# Patient Record
Sex: MALE | Race: OTHER
[De-identification: names, ages, dates, MRNs, and addresses within clinical notes are randomized per-mention and may not be internally consistent; named-entity substitution may affect disease eponyms.]

---

## 2020-08-20 ENCOUNTER — HOSPITAL ENCOUNTER (INPATIENT)
Dept: HOSPITAL 61 - ER | Age: 65
LOS: 20 days | DRG: 870 | End: 2020-09-09
Attending: INTERNAL MEDICINE | Admitting: INTERNAL MEDICINE
Payer: SELF-PAY

## 2020-08-20 VITALS — SYSTOLIC BLOOD PRESSURE: 100 MMHG | DIASTOLIC BLOOD PRESSURE: 69 MMHG

## 2020-08-20 VITALS — SYSTOLIC BLOOD PRESSURE: 97 MMHG | DIASTOLIC BLOOD PRESSURE: 65 MMHG

## 2020-08-20 VITALS — SYSTOLIC BLOOD PRESSURE: 100 MMHG | DIASTOLIC BLOOD PRESSURE: 67 MMHG

## 2020-08-20 VITALS — SYSTOLIC BLOOD PRESSURE: 63 MMHG | DIASTOLIC BLOOD PRESSURE: 42 MMHG

## 2020-08-20 VITALS — SYSTOLIC BLOOD PRESSURE: 57 MMHG | DIASTOLIC BLOOD PRESSURE: 44 MMHG

## 2020-08-20 VITALS — DIASTOLIC BLOOD PRESSURE: 42 MMHG | SYSTOLIC BLOOD PRESSURE: 58 MMHG

## 2020-08-20 VITALS — SYSTOLIC BLOOD PRESSURE: 105 MMHG | DIASTOLIC BLOOD PRESSURE: 68 MMHG

## 2020-08-20 VITALS — DIASTOLIC BLOOD PRESSURE: 41 MMHG | SYSTOLIC BLOOD PRESSURE: 59 MMHG

## 2020-08-20 VITALS — DIASTOLIC BLOOD PRESSURE: 79 MMHG | SYSTOLIC BLOOD PRESSURE: 126 MMHG

## 2020-08-20 VITALS — DIASTOLIC BLOOD PRESSURE: 65 MMHG | SYSTOLIC BLOOD PRESSURE: 100 MMHG

## 2020-08-20 VITALS — SYSTOLIC BLOOD PRESSURE: 90 MMHG | DIASTOLIC BLOOD PRESSURE: 63 MMHG

## 2020-08-20 VITALS — HEIGHT: 62 IN | WEIGHT: 220.02 LBS | BODY MASS INDEX: 40.49 KG/M2

## 2020-08-20 VITALS — DIASTOLIC BLOOD PRESSURE: 39 MMHG | SYSTOLIC BLOOD PRESSURE: 57 MMHG

## 2020-08-20 DIAGNOSIS — E87.1: ICD-10-CM

## 2020-08-20 DIAGNOSIS — Z66: ICD-10-CM

## 2020-08-20 DIAGNOSIS — E87.0: ICD-10-CM

## 2020-08-20 DIAGNOSIS — R73.9: ICD-10-CM

## 2020-08-20 DIAGNOSIS — J96.01: ICD-10-CM

## 2020-08-20 DIAGNOSIS — I10: ICD-10-CM

## 2020-08-20 DIAGNOSIS — J12.89: ICD-10-CM

## 2020-08-20 DIAGNOSIS — R74.0: ICD-10-CM

## 2020-08-20 DIAGNOSIS — A41.89: Primary | ICD-10-CM

## 2020-08-20 DIAGNOSIS — N17.0: ICD-10-CM

## 2020-08-20 DIAGNOSIS — Z79.899: ICD-10-CM

## 2020-08-20 DIAGNOSIS — Z78.9: ICD-10-CM

## 2020-08-20 DIAGNOSIS — E86.1: ICD-10-CM

## 2020-08-20 DIAGNOSIS — U07.1: ICD-10-CM

## 2020-08-20 DIAGNOSIS — E43: ICD-10-CM

## 2020-08-20 LAB
% ATYL: 2 % (ref 0–0)
% BANDS: 18 % (ref 0–9)
% LYMPHS: 7 % (ref 24–48)
% METAS: 3 % (ref 0–0)
% MONOS: 4 % (ref 0–10)
% SEGS: 63 % (ref 35–66)
ALBUMIN SERPL-MCNC: 2.6 G/DL (ref 3.4–5)
ALBUMIN/GLOB SERPL: 0.5 {RATIO} (ref 1–1.7)
ALP SERPL-CCNC: 141 U/L (ref 46–116)
ALT SERPL-CCNC: 63 U/L (ref 16–63)
AMORPH SED URNS QL MICRO: PRESENT /HPF
ANION GAP SERPL CALC-SCNC: 17 MMOL/L (ref 6–14)
APTT PPP: (no result) S
AST SERPL-CCNC: 65 U/L (ref 15–37)
BACTERIA #/AREA URNS HPF: 0 /HPF
BASE EXCESS ABG: -6 MMOL/L (ref -3–3)
BASOPHILS # BLD AUTO: 0 X10^3/UL (ref 0–0.2)
BASOPHILS NFR BLD AUTO: 1 % (ref 0–3)
BASOPHILS NFR BLD: 0 % (ref 0–3)
BILIRUB SERPL-MCNC: 1.4 MG/DL (ref 0.2–1)
BILIRUB UR QL STRIP: (no result)
BUN SERPL-MCNC: 25 MG/DL (ref 8–26)
BUN/CREAT SERPL: 16 (ref 6–20)
CALCIUM SERPL-MCNC: 8.4 MG/DL (ref 8.5–10.1)
CHLORIDE SERPL-SCNC: 90 MMOL/L (ref 98–107)
CK SERPL-CCNC: 120 U/L (ref 39–308)
CO2 SERPL-SCNC: 22 MMOL/L (ref 21–32)
CREAT SERPL-MCNC: 1.6 MG/DL (ref 0.7–1.3)
CRP SERPL-MCNC: 342.9 MG/L (ref 0–3.3)
EOSINOPHIL NFR BLD AUTO: 2 % (ref 0–5)
EOSINOPHIL NFR BLD: 0 % (ref 0–3)
EOSINOPHIL NFR BLD: 0 X10^3/UL (ref 0–0.7)
ERYTHROCYTE [DISTWIDTH] IN BLOOD BY AUTOMATED COUNT: 13.1 % (ref 11.5–14.5)
FIBRINOGEN PPP-MCNC: (no result) MG/DL
GFR SERPLBLD BASED ON 1.73 SQ M-ARVRAT: 45.1 ML/MIN
GLOBULIN SER-MCNC: 5.3 G/DL (ref 2.2–3.8)
GLUCOSE SERPL-MCNC: 301 MG/DL (ref 70–99)
GRAN CASTS #/AREA URNS LPF: (no result) /HPF
HCO3 BLDA-SCNC: 18 MMOL/L (ref 21–28)
HCT VFR BLD CALC: 38.5 % (ref 39–53)
HGB BLD-MCNC: 13.3 G/DL (ref 13–17.5)
HYALINE CASTS #/AREA URNS LPF: (no result) /HPF
INSPIRATION SETTING TIME VENT: 95
LIPASE: 109 U/L (ref 73–393)
LYMPHOCYTES # BLD: 1.1 X10^3/UL (ref 1–4.8)
LYMPHOCYTES NFR BLD AUTO: 8 % (ref 24–48)
MAGNESIUM SERPL-MCNC: 2.4 MG/DL (ref 1.8–2.4)
MCH RBC QN AUTO: 31 PG (ref 25–35)
MCHC RBC AUTO-ENTMCNC: 34 G/DL (ref 31–37)
MCV RBC AUTO: 89 FL (ref 79–100)
MONO #: 0.8 X10^3/UL (ref 0–1.1)
MONOCYTES NFR BLD: 5 % (ref 0–9)
NEUT #: 12.5 X10^3/UL (ref 1.8–7.7)
NEUTROPHILS NFR BLD AUTO: 87 % (ref 31–73)
NITRITE UR QL STRIP: NEGATIVE
PCO2 BLDA: 31 MMHG (ref 35–46)
PH UR STRIP: 5 [PH]
PLATELET # BLD AUTO: 294 X10^3/UL (ref 140–400)
PLATELET # BLD EST: ADEQUATE 10*3/UL
PO2 BLDA: 82 MMHG (ref 75–108)
POLYCHROMASIA BLD QL SMEAR: SLIGHT
POTASSIUM SERPL-SCNC: 3.6 MMOL/L (ref 3.5–5.1)
PROT SERPL-MCNC: 7.9 G/DL (ref 6.4–8.2)
PROT UR STRIP-MCNC: 30 MG/DL
RBC # BLD AUTO: 4.33 X10^6/UL (ref 4.3–5.7)
RBC #/AREA URNS HPF: (no result) /HPF (ref 0–2)
SAO2 % BLDA: 95 % (ref 92–99)
SODIUM SERPL-SCNC: 129 MMOL/L (ref 136–145)
SQUAMOUS #/AREA URNS LPF: (no result) /LPF
UROBILINOGEN UR-MCNC: 2 MG/DL
WBC # BLD AUTO: 14.4 X10^3/UL (ref 4–11)
WBC #/AREA URNS HPF: (no result) /HPF (ref 0–4)

## 2020-08-20 PROCEDURE — 94640 AIRWAY INHALATION TREATMENT: CPT

## 2020-08-20 PROCEDURE — 84100 ASSAY OF PHOSPHORUS: CPT

## 2020-08-20 PROCEDURE — 71045 X-RAY EXAM CHEST 1 VIEW: CPT

## 2020-08-20 PROCEDURE — 82728 ASSAY OF FERRITIN: CPT

## 2020-08-20 PROCEDURE — 83735 ASSAY OF MAGNESIUM: CPT

## 2020-08-20 PROCEDURE — 80048 BASIC METABOLIC PNL TOTAL CA: CPT

## 2020-08-20 PROCEDURE — 86900 BLOOD TYPING SEROLOGIC ABO: CPT

## 2020-08-20 PROCEDURE — 85025 COMPLETE CBC W/AUTO DIFF WBC: CPT

## 2020-08-20 PROCEDURE — 86140 C-REACTIVE PROTEIN: CPT

## 2020-08-20 PROCEDURE — 83935 ASSAY OF URINE OSMOLALITY: CPT

## 2020-08-20 PROCEDURE — 96376 TX/PRO/DX INJ SAME DRUG ADON: CPT

## 2020-08-20 PROCEDURE — 51702 INSERT TEMP BLADDER CATH: CPT

## 2020-08-20 PROCEDURE — 94002 VENT MGMT INPAT INIT DAY: CPT

## 2020-08-20 PROCEDURE — 84145 PROCALCITONIN (PCT): CPT

## 2020-08-20 PROCEDURE — 93970 EXTREMITY STUDY: CPT

## 2020-08-20 PROCEDURE — 94003 VENT MGMT INPAT SUBQ DAY: CPT

## 2020-08-20 PROCEDURE — 83690 ASSAY OF LIPASE: CPT

## 2020-08-20 PROCEDURE — 93005 ELECTROCARDIOGRAM TRACING: CPT

## 2020-08-20 PROCEDURE — 36600 WITHDRAWAL OF ARTERIAL BLOOD: CPT

## 2020-08-20 PROCEDURE — 82805 BLOOD GASES W/O2 SATURATION: CPT

## 2020-08-20 PROCEDURE — 80053 COMPREHEN METABOLIC PANEL: CPT

## 2020-08-20 PROCEDURE — 31500 INSERT EMERGENCY AIRWAY: CPT

## 2020-08-20 PROCEDURE — 86850 RBC ANTIBODY SCREEN: CPT

## 2020-08-20 PROCEDURE — 5A1955Z RESPIRATORY VENTILATION, GREATER THAN 96 CONSECUTIVE HOURS: ICD-10-PCS | Performed by: INTERNAL MEDICINE

## 2020-08-20 PROCEDURE — 87103 BLOOD FUNGUS CULTURE: CPT

## 2020-08-20 PROCEDURE — 85007 BL SMEAR W/DIFF WBC COUNT: CPT

## 2020-08-20 PROCEDURE — 87040 BLOOD CULTURE FOR BACTERIA: CPT

## 2020-08-20 PROCEDURE — 84478 ASSAY OF TRIGLYCERIDES: CPT

## 2020-08-20 PROCEDURE — 36415 COLL VENOUS BLD VENIPUNCTURE: CPT

## 2020-08-20 PROCEDURE — 86927 PLASMA FRESH FROZEN: CPT

## 2020-08-20 PROCEDURE — 82550 ASSAY OF CK (CPK): CPT

## 2020-08-20 PROCEDURE — 85379 FIBRIN DEGRADATION QUANT: CPT

## 2020-08-20 PROCEDURE — 0BH17EZ INSERTION OF ENDOTRACHEAL AIRWAY INTO TRACHEA, VIA NATURAL OR ARTIFICIAL OPENING: ICD-10-PCS | Performed by: INTERNAL MEDICINE

## 2020-08-20 PROCEDURE — 87086 URINE CULTURE/COLONY COUNT: CPT

## 2020-08-20 PROCEDURE — P9017 PLASMA 1 DONOR FRZ W/IN 8 HR: HCPCS

## 2020-08-20 PROCEDURE — G0378 HOSPITAL OBSERVATION PER HR: HCPCS

## 2020-08-20 PROCEDURE — 81001 URINALYSIS AUTO W/SCOPE: CPT

## 2020-08-20 PROCEDURE — 96374 THER/PROPH/DIAG INJ IV PUSH: CPT

## 2020-08-20 PROCEDURE — 86901 BLOOD TYPING SEROLOGIC RH(D): CPT

## 2020-08-20 PROCEDURE — 83930 ASSAY OF BLOOD OSMOLALITY: CPT

## 2020-08-20 PROCEDURE — C9113 INJ PANTOPRAZOLE SODIUM, VIA: HCPCS

## 2020-08-20 PROCEDURE — 82962 GLUCOSE BLOOD TEST: CPT

## 2020-08-20 RX ADMIN — ENOXAPARIN SODIUM SCH MG: 40 INJECTION SUBCUTANEOUS at 22:13

## 2020-08-20 RX ADMIN — CHLORHEXIDINE GLUCONATE SCH ML: 1.2 RINSE ORAL at 22:13

## 2020-08-20 RX ADMIN — PROPOFOL ONE MLS/HR: 10 INJECTION, EMULSION INTRAVENOUS at 18:55

## 2020-08-20 RX ADMIN — MIDAZOLAM PRN MLS/HR: 5 INJECTION, SOLUTION INTRAMUSCULAR; INTRAVENOUS at 19:37

## 2020-08-20 RX ADMIN — VECURONIUM BROMIDE PRN MG: 1 INJECTION, POWDER, LYOPHILIZED, FOR SOLUTION INTRAVENOUS at 22:03

## 2020-08-20 RX ADMIN — METHYLPREDNISOLONE SODIUM SUCCINATE SCH MG: 40 INJECTION, POWDER, FOR SOLUTION INTRAMUSCULAR; INTRAVENOUS at 22:14

## 2020-08-20 NOTE — RAD
AP portable chest  radiograph 8/20/2020

 

Clinical History: Respiratory failure.

 

An AP erect portable digital radiograph of the chest was obtained.

 

No previous studies available for comparison. An ET tube has been placed. 

The tip of this tube overlies the trachea 3 cm below the level of 

clavicles. An NG tube been placed. The tip of this tube extends to overlie

the antrum of the stomach. The cardiac silhouette is mildly enlarged. The 

thoracic aorta is mildly tortuous. Bilateral perihilar infiltrates are 

seen which are most confluent within the lower lobes. No pneumothorax or 

pleural effusion is noted the osseous structures are grossly intact.

 

IMPRESSION:

1. ET and NG tube position as discussed above.

2. Bilateral perihilar infiltrates.

 

Electronically signed by: Shar Qureshi MD (8/20/2020 9:14 PM) [id number]

## 2020-08-20 NOTE — NUR
Patient admitted to room 116 at 2100. Patient transferred to ICU bed. RT at bedside placed 
patient on ventilator. ICU monitors placed. Patient not tolerating ventilator well, 
coughing, high peak pressures, raised arms- O2 saturation in 80s. Patient received bolus of 
versed. Patient became more relaxed, SBP then dropped to the 50s. Another ICU RN called Dr. Morgan on behalf of this writer. Orders received for levophed, vec, NS bolus, and Vec PRN. 
Patient responded well to levophed. This RN called patient's brother Keon- update given. 
Emphasized that patient is critically ill. Family states patient does not have known medical 
history and is not currently taking any medications. Patient did recently test positive for 
covid a couple days ago. Admission completed using this information to the best of this RN's 
ability.



ER reassessments not completed documented "not done" by this RN

## 2020-08-20 NOTE — NUR
Spoke with Dr. Morgan regarding patient's status. Orders received to keep patient well 
sedated--may add Fentanyl and 6 mg Vec PRN. For hypotension, may give 1 L bolus of fluid and 
then reassess--if the bolus helps patient then we may give another 1 L bolus NS. May start 
Levophed for BP, get ABG in AM, and may keep PEEP at 12 if needed, but if patient's O2 sats 
improve then try turning down PEEP to 11 and see how he does. 



Reassessments of Versed and Fentanyl not completed by ED RN--documented as "not done" by 
this RN.

## 2020-08-20 NOTE — PHYS DOC
Past Medical History


Past Medical History:  No Pertinent History


Past Surgical History:  No Surgical History


Smoking Status:  Unknown if ever smoked


Alcohol Use:  None





General Adult


EDM:


Chief Complaint:  SHORTNESS OF BREATH





HPI:


HPI:


Patient is a 55-year-old male reportedly previously healthy who presents 

emergency room complaining of respiratory distress.  History is very limited due

to respiratory distress and patient being Polish-speaking only.  Patient rep

ortedly tested positive for COVID on Monday





Review of Systems:


Review of Systems:


Unable to obtain





Heart Score:


Risk Factors:


Risk Factors:  DM, Current or recent (<one month) smoker, HTN, HLP, family 

history of CAD, obesity.


Risk Scores:


Score 0 - 3:  2.5% MACE over next 6 weeks - Discharge Home


Score 4 - 6:  20.3% MACE over next 6 weeks - Admit for Clinical Observation


Score 7 - 10:  72.7% MACE over next 6 weeks - Early Invasive Strategies





Current Medications:





Current Medications








 Medications


  (Trade)  Dose


 Ordered  Sig/Breanne  Start Time


 Stop Time Status Last Admin


Dose Admin


 


 Etomidate


  (Amidate)  20 mg  STK-MED ONCE  8/20/20 18:54


 8/20/20 18:54 DC  





 


 Propofol  50 ml @ As


 Directed  STK-MED ONCE  8/20/20 18:55


 8/20/20 18:56 DC  





 


 Succinylcholine


 Chloride


  (Anectine)  200 mg  STK-MED ONCE  8/20/20 18:55


 8/20/20 18:55 DC  














Allergies:


Allergies:





Allergies








Coded Allergies Type Severity Reaction Last Updated Verified


 


  No Known Drug Allergies    8/20/20 No











Physical Exam:


PE:


General: Awake, alert, moderate distress D. Well Nourished, well hydrated. 

Cooperative


HEENT: Atraumatic, EOMI, PERRL, airway patent, moist oral mucosa


Neck: Supple, trachea midline


Respiratory: Tachypnea, diffuse crackles, decreased breath sounds, respiratory 

distress, increased work of breathing


CV: Tachycardic, no murmur, cap refill <2


GI: Soft, nondistended, nontender, no masses


MSK: No obvious deformities


Skin: Warm, dry, intact


Neuro: A&O x3, speech NL, sensory and motor grossly intact, no focal deficits


Psych: Normal affect, normal mood, not suicidal or homicidal





Current Patient Data:


Vital Signs:





                                   Vital Signs








  Date Time  Temp Pulse Resp B/P (MAP) Pulse Ox O2 Delivery O2 Flow Rate FiO2


 


8/20/20 18:30 97.9 110 45 199/95 (129) 100 NonRebreather Mask 18.0 





 97.9       











EKG:


EKG:


[]





Radiology/Procedures:


Radiology/Procedures:


[]





Course & Med Decision Making:


Course & Med Decision Making


Pertinent Labs and Imaging studies reviewed. (See chart for details)





Patient is a 55-year-old male who presents to the emergency room with 

respiratory distress.  At this time there is concern for the novel coronavirus 

19.  Patient's risk factors include age, race.  Risk stratifying work-up was 

ordered including chest x-ray, d-dimer, CPK, CRP, LDH, troponin, ferritin, CBC, 

CMP. At this time, patients labs, vitals, and exam are significant for elevated 

inflammatory markers.  Upon arrival to the emergency room patient is in respirat

ory distress requiring 25 L on a nonrebreather.  Patient is in significant 

respiratory distress and is hypoxic.  He was intubated without difficulty.  

There was some difficulty with keeping him sedated.  Adjustments were made to 

the ventilator to help with oxygenation.  Chest x-ray and ABG are suggestive of 

severe ARDs. due to patient's risk and clinical picture, they will need to be 

admitted at this time. IVFs will be limited due to concern for fluid overload in

 COVID-19 patients. Patient will be given empiric antibiotics due to infiltrates

 and risk of co-bacterial infection. Further treatment will be dictated by the 

inpatient team.





Dragon Disclaimer:


Dragon Disclaimer:


This electronic medical record was generated, in whole or in part, using a voice

 recognition dictation system.





Justicifation of Admission Dx:


Justifications for Admission:


Justification of Admission Dx:  Yes





Critical Care Time


Critical Care:


Authorized and Performed by: Klever Ruiz MD


Total critical care time: approximately 40 minutes


Due to a high probability of clinically significant, life threatening 

deterioration, the patient required my highest level of preparedness to 

intervene emergently and I personally spent this critical care time directly and

 personally managing the patient. This critical care time included obtaining a 

history; examining the patient; pulse oximetry; ventilator management if 

necessary; ordering and review of studies; arranging urgent treatment with 

development of a management plan; evaluation of patient's response to treatment;

 frequent reassessment; discussion with patient/family; and, discussions with 

other providers.


This critical care time was performed to assess and manage the high probability 

of imminent, life-threatening deterioration that could result in multi-organ 

failure. It was exclusive of separately billable procedures and treating other 

patients and teaching time.


Please see MDM section and the rest of the note for further information on 

patient assessment and treatment.





PROCEDURE


Procedure


Intubation Performed by: Klever Ruiz MD


 Consent: Verbal consent not obtained. The procedure was performed in an 

emergent situation. 


Required items: required blood products, implants, devices, and special 

equipment available 


Patient identity confirmed: arm band 


Time out: Immediately prior to procedure a "time out" was called to verify the 

correct patient, procedure, equipment, support staff and site/side marked as 

required. 


Indications: respiratory failure and airway protection 


Intubation method:  glidescope


Patient status: paralyzed (RSI) 


Preoxygenation: NRB


Sedatives: etomidate


 Paralytic: succinylcoline 


Laryngoscope size: Mac 4 


Tube size: 7.5 mm 


Tube type: cuffed 


Number of attempts: 1 


Cords visualized: yes 


Post-procedure assessment: chest rise, BS = bilaterally none over epigastrum, 

+CO2 detector 


Breath sounds: equal and absent over the epigastrium 


Cuff inflated: yes 


Tube secured with: adhesive tape 


Chest x-ray interpreted by me. Chest x-ray findings: endotracheal tube in 

appropriate position 


Patient tolerance: Patient tolerated the procedure well with no immediate 

complications.











KLEVER RUIZ MD              Aug 20, 2020 19:01

## 2020-08-21 VITALS — SYSTOLIC BLOOD PRESSURE: 117 MMHG | DIASTOLIC BLOOD PRESSURE: 83 MMHG

## 2020-08-21 VITALS — SYSTOLIC BLOOD PRESSURE: 97 MMHG | DIASTOLIC BLOOD PRESSURE: 65 MMHG

## 2020-08-21 VITALS — SYSTOLIC BLOOD PRESSURE: 108 MMHG | DIASTOLIC BLOOD PRESSURE: 64 MMHG

## 2020-08-21 VITALS — SYSTOLIC BLOOD PRESSURE: 76 MMHG | DIASTOLIC BLOOD PRESSURE: 57 MMHG

## 2020-08-21 VITALS — SYSTOLIC BLOOD PRESSURE: 112 MMHG | DIASTOLIC BLOOD PRESSURE: 69 MMHG

## 2020-08-21 VITALS — DIASTOLIC BLOOD PRESSURE: 69 MMHG | SYSTOLIC BLOOD PRESSURE: 112 MMHG

## 2020-08-21 VITALS — DIASTOLIC BLOOD PRESSURE: 85 MMHG | SYSTOLIC BLOOD PRESSURE: 126 MMHG

## 2020-08-21 VITALS — SYSTOLIC BLOOD PRESSURE: 98 MMHG | DIASTOLIC BLOOD PRESSURE: 65 MMHG

## 2020-08-21 VITALS — SYSTOLIC BLOOD PRESSURE: 114 MMHG | DIASTOLIC BLOOD PRESSURE: 68 MMHG

## 2020-08-21 VITALS — SYSTOLIC BLOOD PRESSURE: 105 MMHG | DIASTOLIC BLOOD PRESSURE: 70 MMHG

## 2020-08-21 VITALS — SYSTOLIC BLOOD PRESSURE: 121 MMHG | DIASTOLIC BLOOD PRESSURE: 82 MMHG

## 2020-08-21 VITALS — SYSTOLIC BLOOD PRESSURE: 91 MMHG | DIASTOLIC BLOOD PRESSURE: 59 MMHG

## 2020-08-21 VITALS — DIASTOLIC BLOOD PRESSURE: 66 MMHG | SYSTOLIC BLOOD PRESSURE: 103 MMHG

## 2020-08-21 VITALS — SYSTOLIC BLOOD PRESSURE: 104 MMHG | DIASTOLIC BLOOD PRESSURE: 71 MMHG

## 2020-08-21 VITALS — SYSTOLIC BLOOD PRESSURE: 86 MMHG | DIASTOLIC BLOOD PRESSURE: 59 MMHG

## 2020-08-21 VITALS — SYSTOLIC BLOOD PRESSURE: 95 MMHG | DIASTOLIC BLOOD PRESSURE: 62 MMHG

## 2020-08-21 VITALS — SYSTOLIC BLOOD PRESSURE: 107 MMHG | DIASTOLIC BLOOD PRESSURE: 69 MMHG

## 2020-08-21 VITALS — SYSTOLIC BLOOD PRESSURE: 119 MMHG | DIASTOLIC BLOOD PRESSURE: 78 MMHG

## 2020-08-21 VITALS — DIASTOLIC BLOOD PRESSURE: 61 MMHG | SYSTOLIC BLOOD PRESSURE: 87 MMHG

## 2020-08-21 VITALS — SYSTOLIC BLOOD PRESSURE: 101 MMHG | DIASTOLIC BLOOD PRESSURE: 67 MMHG

## 2020-08-21 VITALS — DIASTOLIC BLOOD PRESSURE: 60 MMHG | SYSTOLIC BLOOD PRESSURE: 101 MMHG

## 2020-08-21 VITALS — DIASTOLIC BLOOD PRESSURE: 74 MMHG | SYSTOLIC BLOOD PRESSURE: 106 MMHG

## 2020-08-21 VITALS — DIASTOLIC BLOOD PRESSURE: 68 MMHG | SYSTOLIC BLOOD PRESSURE: 114 MMHG

## 2020-08-21 VITALS — SYSTOLIC BLOOD PRESSURE: 106 MMHG | DIASTOLIC BLOOD PRESSURE: 69 MMHG

## 2020-08-21 VITALS — SYSTOLIC BLOOD PRESSURE: 89 MMHG | DIASTOLIC BLOOD PRESSURE: 61 MMHG

## 2020-08-21 VITALS — SYSTOLIC BLOOD PRESSURE: 167 MMHG | DIASTOLIC BLOOD PRESSURE: 101 MMHG

## 2020-08-21 VITALS — DIASTOLIC BLOOD PRESSURE: 65 MMHG | SYSTOLIC BLOOD PRESSURE: 92 MMHG

## 2020-08-21 VITALS — DIASTOLIC BLOOD PRESSURE: 85 MMHG | SYSTOLIC BLOOD PRESSURE: 133 MMHG

## 2020-08-21 VITALS — DIASTOLIC BLOOD PRESSURE: 71 MMHG | SYSTOLIC BLOOD PRESSURE: 119 MMHG

## 2020-08-21 VITALS — SYSTOLIC BLOOD PRESSURE: 122 MMHG | DIASTOLIC BLOOD PRESSURE: 78 MMHG

## 2020-08-21 VITALS — DIASTOLIC BLOOD PRESSURE: 82 MMHG | SYSTOLIC BLOOD PRESSURE: 119 MMHG

## 2020-08-21 VITALS — SYSTOLIC BLOOD PRESSURE: 110 MMHG | DIASTOLIC BLOOD PRESSURE: 66 MMHG

## 2020-08-21 VITALS — DIASTOLIC BLOOD PRESSURE: 63 MMHG | SYSTOLIC BLOOD PRESSURE: 105 MMHG

## 2020-08-21 VITALS — SYSTOLIC BLOOD PRESSURE: 142 MMHG | DIASTOLIC BLOOD PRESSURE: 90 MMHG

## 2020-08-21 VITALS — SYSTOLIC BLOOD PRESSURE: 103 MMHG | DIASTOLIC BLOOD PRESSURE: 61 MMHG

## 2020-08-21 LAB
ALBUMIN SERPL-MCNC: 2 G/DL (ref 3.4–5)
ALBUMIN/GLOB SERPL: 0.4 {RATIO} (ref 1–1.7)
ALP SERPL-CCNC: 103 U/L (ref 46–116)
ALT SERPL-CCNC: 55 U/L (ref 16–63)
ANION GAP SERPL CALC-SCNC: 10 MMOL/L (ref 6–14)
AST SERPL-CCNC: 57 U/L (ref 15–37)
BASE EXCESS ABG: -6 MMOL/L (ref -3–3)
BASOPHILS # BLD AUTO: 0 X10^3/UL (ref 0–0.2)
BASOPHILS NFR BLD: 0 % (ref 0–3)
BILIRUB SERPL-MCNC: 1.1 MG/DL (ref 0.2–1)
BUN SERPL-MCNC: 29 MG/DL (ref 8–26)
BUN/CREAT SERPL: 24 (ref 6–20)
CALCIUM SERPL-MCNC: 7.5 MG/DL (ref 8.5–10.1)
CHLORIDE SERPL-SCNC: 97 MMOL/L (ref 98–107)
CO2 SERPL-SCNC: 23 MMOL/L (ref 21–32)
CREAT SERPL-MCNC: 1.2 MG/DL (ref 0.7–1.3)
EOSINOPHIL NFR BLD: 0 % (ref 0–3)
EOSINOPHIL NFR BLD: 0 X10^3/UL (ref 0–0.7)
ERYTHROCYTE [DISTWIDTH] IN BLOOD BY AUTOMATED COUNT: 13.1 % (ref 11.5–14.5)
GFR SERPLBLD BASED ON 1.73 SQ M-ARVRAT: 62.9 ML/MIN
GLOBULIN SER-MCNC: 4.5 G/DL (ref 2.2–3.8)
GLUCOSE SERPL-MCNC: 420 MG/DL (ref 70–99)
HCO3 BLDA-SCNC: 19 MMOL/L (ref 21–28)
HCT VFR BLD CALC: 33 % (ref 39–53)
HGB BLD-MCNC: 11.4 G/DL (ref 13–17.5)
INSPIRATION SETTING TIME VENT: 100
LYMPHOCYTES # BLD: 0.5 X10^3/UL (ref 1–4.8)
LYMPHOCYTES NFR BLD AUTO: 5 % (ref 24–48)
MCH RBC QN AUTO: 31 PG (ref 25–35)
MCHC RBC AUTO-ENTMCNC: 35 G/DL (ref 31–37)
MCV RBC AUTO: 88 FL (ref 79–100)
MONO #: 0.4 X10^3/UL (ref 0–1.1)
MONOCYTES NFR BLD: 4 % (ref 0–9)
NEUT #: 9.4 X10^3/UL (ref 1.8–7.7)
NEUTROPHILS NFR BLD AUTO: 91 % (ref 31–73)
PCO2 BLDA: 36 MMHG (ref 35–46)
PLATELET # BLD AUTO: 212 X10^3/UL (ref 140–400)
PO2 BLDA: 72 MMHG (ref 75–108)
POTASSIUM SERPL-SCNC: 4.5 MMOL/L (ref 3.5–5.1)
PROT SERPL-MCNC: 6.5 G/DL (ref 6.4–8.2)
RBC # BLD AUTO: 3.73 X10^6/UL (ref 4.3–5.7)
SAO2 % BLDA: 92 % (ref 92–99)
SODIUM SERPL-SCNC: 130 MMOL/L (ref 136–145)
WBC # BLD AUTO: 10.4 X10^3/UL (ref 4–11)

## 2020-08-21 PROCEDURE — 02HV33Z INSERTION OF INFUSION DEVICE INTO SUPERIOR VENA CAVA, PERCUTANEOUS APPROACH: ICD-10-PCS | Performed by: INTERNAL MEDICINE

## 2020-08-21 PROCEDURE — B548ZZA ULTRASONOGRAPHY OF SUPERIOR VENA CAVA, GUIDANCE: ICD-10-PCS | Performed by: INTERNAL MEDICINE

## 2020-08-21 PROCEDURE — XW13325 TRANSFUSION OF CONVALESCENT PLASMA (NONAUTOLOGOUS) INTO PERIPHERAL VEIN, PERCUTANEOUS APPROACH, NEW TECHNOLOGY GROUP 5: ICD-10-PCS | Performed by: INTERNAL MEDICINE

## 2020-08-21 RX ADMIN — INSULIN GLARGINE SCH UNIT: 100 INJECTION, SOLUTION SUBCUTANEOUS at 20:54

## 2020-08-21 RX ADMIN — ZINC SULFATE CAP 220 MG (50 MG ELEMENTAL ZN) SCH MG: 220 (50 ZN) CAP at 08:15

## 2020-08-21 RX ADMIN — MIDAZOLAM PRN MLS/HR: 5 INJECTION, SOLUTION INTRAMUSCULAR; INTRAVENOUS at 05:37

## 2020-08-21 RX ADMIN — ENOXAPARIN SODIUM SCH MG: 40 INJECTION SUBCUTANEOUS at 08:15

## 2020-08-21 RX ADMIN — PIPERACILLIN SODIUM AND TAZOBACTAM SODIUM SCH MLS/HR: 3; .375 INJECTION, POWDER, LYOPHILIZED, FOR SOLUTION INTRAVENOUS at 11:31

## 2020-08-21 RX ADMIN — METHYLPREDNISOLONE SODIUM SUCCINATE SCH MG: 40 INJECTION, POWDER, FOR SOLUTION INTRAMUSCULAR; INTRAVENOUS at 13:35

## 2020-08-21 RX ADMIN — PIPERACILLIN SODIUM AND TAZOBACTAM SODIUM SCH MLS/HR: 3; .375 INJECTION, POWDER, LYOPHILIZED, FOR SOLUTION INTRAVENOUS at 05:37

## 2020-08-21 RX ADMIN — METHYLPREDNISOLONE SODIUM SUCCINATE SCH MG: 40 INJECTION, POWDER, FOR SOLUTION INTRAMUSCULAR; INTRAVENOUS at 05:36

## 2020-08-21 RX ADMIN — INSULIN LISPRO SCH UNITS: 100 INJECTION, SOLUTION INTRAVENOUS; SUBCUTANEOUS at 17:42

## 2020-08-21 RX ADMIN — INSULIN LISPRO SCH UNITS: 100 INJECTION, SOLUTION INTRAVENOUS; SUBCUTANEOUS at 11:41

## 2020-08-21 RX ADMIN — CHLORHEXIDINE GLUCONATE SCH ML: 1.2 RINSE ORAL at 08:16

## 2020-08-21 RX ADMIN — OXYCODONE HYDROCHLORIDE AND ACETAMINOPHEN SCH MG: 500 TABLET ORAL at 17:36

## 2020-08-21 RX ADMIN — MIDAZOLAM PRN MLS/HR: 5 INJECTION, SOLUTION INTRAMUSCULAR; INTRAVENOUS at 20:07

## 2020-08-21 RX ADMIN — METHYLPREDNISOLONE SODIUM SUCCINATE SCH MG: 40 INJECTION, POWDER, FOR SOLUTION INTRAMUSCULAR; INTRAVENOUS at 20:52

## 2020-08-21 RX ADMIN — PANTOPRAZOLE SODIUM SCH MG: 40 INJECTION, POWDER, FOR SOLUTION INTRAVENOUS at 08:11

## 2020-08-21 RX ADMIN — BACITRACIN SCH MLS/HR: 5000 INJECTION, POWDER, FOR SOLUTION INTRAMUSCULAR at 10:16

## 2020-08-21 RX ADMIN — PIPERACILLIN SODIUM AND TAZOBACTAM SODIUM SCH MLS/HR: 3; .375 INJECTION, POWDER, LYOPHILIZED, FOR SOLUTION INTRAVENOUS at 17:36

## 2020-08-21 RX ADMIN — OXYCODONE HYDROCHLORIDE AND ACETAMINOPHEN SCH MG: 500 TABLET ORAL at 11:31

## 2020-08-21 RX ADMIN — PIPERACILLIN SODIUM AND TAZOBACTAM SODIUM SCH MLS/HR: 3; .375 INJECTION, POWDER, LYOPHILIZED, FOR SOLUTION INTRAVENOUS at 00:51

## 2020-08-21 NOTE — RAD
EXAM: CHEST ONE VIEW.

 

HISTORY: Central line placement.

 

COMPARISON: 08/20/2020.

 

FINDINGS: A frontal view of the chest is obtained. A right internal 

jugular central venous catheter has its tip in the superior vena cava. A 

nasogastric tube has its tip below the inferior margin of the view. An 

endotracheal tube has its tip 3 cm above the ko.

 

Bilateral diffuse airspace opacities have improved but not completely 

resolved. There is no pneumothorax or clear pleural effusion. The heart is

not enlarged.

 

IMPRESSION: 

1. Improved bilateral infiltrates. Correlate for moderate residual 

pulmonary edema or multifocal pneumonia.

 

Electronically signed by: KRISTEL Hearn MD (8/21/2020 3:07 PM) 

[id number]

## 2020-08-21 NOTE — EKG
[facility]

              [address] [place] [address]

Test Date:    2020               Test Time:    18:40:03

Pat Name:     RENATO KIRK         Department:   

Patient ID:   [id number]           Room:         116 1

Gender:       M                        Technician:   

:          1965               Requested By: KLEVER MONTAÑO

Order Number: [id number]           Reading MD:     

                                 Measurements

Intervals                              Axis          

Rate:         115                      P:            -2

NY:           106                      QRS:          36

QRSD:         98                       T:            47

QT:           314                                    

QTc:          436                                    

                           Interpretive Statements

SINUS TACHYCARDIA

LEFT ATRIAL ABNORMALITY

ABNORMAL ECG

RI6.02

No previous ECG available for comparison

## 2020-08-21 NOTE — CONS
DATE OF CONSULTATION:  



PULMONARY CONSULTATION



ATTENDING PHYSICIAN:  Griffin Jacobs MD



REASON FOR CONSULTATION:  Respiratory failure, COVID-19 pneumonia.



HISTORY OF PRESENT ILLNESS:  The patient is a 55-year-old  male with a

BMI of 31.  He was brought into the hospital in respiratory distress.  The

patient was clinically suspected to have COVID-19 hypoxic respiratory failure as

he tested positive on Monday.  Due to his respiratory distress, he was

intubated.  The patient is currently in the ICU.  His ABGs has latest shown a pH

of 7.34, pCO2 of 36 and a pO2 of 72 on 100% FiO2 with bicarbonate of 19.  He is

currently on assist control mode and 10 of PEEP and 100% FiO2.  His chest x-ray

revealed bilateral perihilar infiltrates.  Consultation requested for further

evaluation and management.



PAST MEDICAL HISTORY:  Unable to obtain.



FAMILY HISTORY:  Unable to obtain.



ALLERGIES:  None.



SOCIAL HISTORY:  Unable to obtain.



MEDICATIONS:  Reviewed as listed in the MRAD including broad-spectrum

antibiotic, Zosyn.  He is on sedation, Lovenox for DVT prophylaxis.



REVIEW OF SYSTEMS:  Unable to obtain from the patient.



PHYSICAL EXAMINATION:

VITAL SIGNS:  Reviewed.  Blood pressure 97 systolic.  Pulse ox is 99% on current

FiO2.  T-max of 99.

GENERAL:  Visual exam done due to COVID-19 pandemia.  He is obese.  He is in no

respiratory distress.  No paradoxical breathing pattern.

EXTREMITIES:  With trace pitting edema.



LABORATORY DATA:  Reviewed.  Sodium 130, potassium 4.5, chloride 97, BUN 29 and

creatinine 1.2.  Bilirubin 1.1 from 1.4.  AST, ALT mildly elevated.  Albumin is

2.0.  D-dimer is 4.02.  White cell count 10.4, hemoglobin 11.4 and platelets are

212.



IMPRESSION:

1.  Acute hypoxic respiratory failure secondary to COVID-19 pneumonia/ARDS/acute

lung injury.

2.  Abnormal chest x-ray with bilateral infiltrates consistent with COVID-19

pneumonia.

3.  Hypotension secondary to combination of hypovolemia and sepsis status post 2

liters of IV fluids.

4.  Acute kidney injury, improving.

5.  Severe protein-calorie malnutrition.

6.  Abnormal D-dimer related to COVID-19 pneumonia.  Currently on DVT

prophylaxis.



RECOMMENDATIONS:

1.  Continue present assist control mode, PEEP of 8 and FiO2 of 100% and

gradually wean based on improvement clinically.

2.  Follow chest x-rays as needed.

3.  Broad spectrum antibiotic.

4.  IV steroids will be added.

5.  DVT prophylaxis with Lovenox high dose.

6.  The patient would require intubation for minimal 1 week.

7.  Stress ulcer prophylaxis.

8.  Discussed with RN and RT.



Critical care time 39 minutes.

 



______________________________

ANGELA HUFFMAN MD



DR:  YAZAN/latesha  JOB#:  [id number] / [id number]

DD:  08/21/2020 09:25  DT:  08/21/2020 09:47

## 2020-08-21 NOTE — NUR
SS following for discharge planning. SS reviewed pt chart and discussed with pt RN. Pt is 
from home and is currently on the vent. Pt self pay. Per RN, pt tested positive for COVID19 
on Monday. COVID19 retest ordered. Plasma being ordered. Pt on IV Zosyn. SS will continue to 
follow for discharge planning.

## 2020-08-21 NOTE — PDOC1
History and Physical


Date of Admission


Date of Admission


Respiratory failure





Identification/Chief Complaint


Chief Complaint


COVID-19 infection





Source


Source:  Chart review





History of Present Illness


History of Present Illness


Patient is a 55-year-old gentleman who was diagnosed with COVID-19 infection on 

Monday 5 days prior to his admission to the intensive care unit.  Apparently the

patient reported worsening respiratory distress reason why he came to the 

emergency department for evaluation and treatment.  The story was quite limited 

since the patient was speaking Mongolian only and his acuity was such that he 

required mechanical ventilation and prompt intubation by the emergency 

department physician.  At the present time patient is intubated and sedated 

continues to require a lot of support no pertinent past medical history was 

reported he is admitted to the intensive care unit for further treatment





Past Medical History


Past Medical History


No past medical history





Past Surgical History


Past Surgical History:  No pertinent history





Family History


Family History:  No Significant





Social History


Smoke:  No


ALCOHOL:  none


Drugs:  None





Current Medications


Current Medications





Current Medications








 Medications


  (Trade)  Dose


 Ordered  Sig/Breanne  Start Time


 Stop Time Status Last Admin


Dose Admin


 


 Acetaminophen


  (Tylenol Supp)  650 mg  PRN Q4HRS  PRN  8/20/20 21:30


     





 


 Acetaminophen


  (Tylenol)  650 mg  PRN Q4HRS  PRN  8/20/20 21:30


     





 


 Albuterol/


 Ipratropium


  (Duoneb)  3 ml  STK-MED ONCE  8/20/20 19:27


 8/20/20 19:27 DC  





 


 Atropine Sulfate


  (ATROPINE 0.5mg


 SYRINGE)  0.5 mg  PRN Q5MIN  PRN  8/20/20 20:15


     





 


 Chlorhexidine


 Gluconate


  (Peridex)  15 ml  BID  8/20/20 21:00


    8/21/20 08:16


15 ML


 


 Dexmedetomidine


 HCl 400 mcg/


 Sodium Chloride  100 ml @ 0


 mls/hr  CONT  PRN  8/20/20 20:15


     





 


 Docusate Sodium


  (Colace)  100 mg  PRN BID  PRN  8/20/20 21:30


     





 


 Enoxaparin Sodium


  (Lovenox 40mg


 Syringe)  40 mg  BID  8/20/20 21:30


    8/21/20 08:15


40 MG


 


 Etomidate


  (Amidate)  20 mg  STK-MED ONCE  8/20/20 18:54


 8/20/20 18:54 DC  





 


 Fentanyl Citrate  30 ml @ 0


 mls/hr  CONT  PRN  8/20/20 21:45


    8/21/20 07:36


2.5 MLS/HR


 


 Fentanyl Citrate


  (Fentanyl 2ml


 Vial)  100 mcg  1X  ONCE  8/20/20 20:15


 8/20/20 20:18 DC 8/20/20 20:17


100 MCG


 


 Methylprednisolone


 Sodium Succinate


  (SOLU-Medrol


 40MG VIAL)  40 mg  Q8HRS  8/20/20 22:00


    8/21/20 05:36


40 MG


 


 Midazolam HCl


  (Versed)  5 mg  1X  ONCE  8/20/20 19:18


 8/20/20 19:59 DC 8/20/20 19:18


5 MG


 


 Midazolam HCl 50


 mg/Sodium Chloride  50 ml @ 0


 mls/hr  1X  ONCE  8/20/20 19:30


 8/20/20 19:31 UNV  





 


 Norepinephrine


 Bitartrate 8 mg/


 Dextrose  258 ml @ 


 12.578 mls/


 hr  CONT  PRN  8/20/20 21:45


    8/20/20 22:15


6.289 MLS/HR


 


 Ondansetron HCl


  (Zofran)  4 mg  PRN Q4HRS  PRN  8/20/20 21:30


     





 


 Pantoprazole


 Sodium


  (PROTONIX VIAL


 for IV PUSH)  40 mg  DAILYAC  8/21/20 07:30


    8/21/20 08:11


40 MG


 


 Piperacillin Sod/


 Tazobactam Sod


 3.375 gm/Sodium


 Chloride  50 ml @ 


 100 mls/hr  Q6HRS  8/21/20 00:00


    8/21/20 05:37


100 MLS/HR


 


 Potassium


 Chloride/Dextrose/


 Sod Cl  1,000 ml @ 


 125 mls/hr  Q8H ONCE  8/20/20 21:30


 8/21/20 05:29 DC 8/20/20 22:13


125 MLS/HR


 


 Propofol  50 ml @ 0


 mls/hr  1X  ONCE  8/20/20 20:00


 8/20/20 20:01 DC 8/20/20 18:55


3.9 MLS/HR


 


 Sodium Chloride  1,000 ml @ 


 1,000 mls/hr  1X  ONCE  8/20/20 22:30


 8/20/20 23:29 DC 8/20/20 22:36


1,000 MLS/HR


 


 Succinylcholine


 Chloride


  (Anectine)  200 mg  STK-MED ONCE  8/20/20 18:55


 8/20/20 18:55 DC  





 


 Vecuronium Bromide


  (Norcuron Bolus)  6 mg  PRN Q2HR  PRN  8/20/20 21:45


    8/20/20 22:03


6 MG


 


 Zinc Sulfate


  (Orazinc)  220 mg  DAILY  8/21/20 09:00


    8/21/20 08:15


220 MG











Allergies


Allergies





Allergies








Coded Allergies Type Severity Reaction Last Updated Verified


 


  No Known Drug Allergies    8/20/20 No











ROS


Review of System


Patient is intubated and sedated unable to assess due to acuity of his illness





Physical Exam


Physical Exam


GEN.:    No apparent distress.  Sedated


HEENT:    Head is normocephalic, atraumatic


NECK:    Supple.  


LUNGS:    Coarse to auscultation.


HEART:    RRR, S1, S2 present.  Peripheral pulses intact


ABDOMEN:    Soft, nontender.  Positive bowel sounds.


EXTREMITIES:    Without any cyanosis.    


NEUROLOGIC:    Sedated


PSYCHIATRIC:    Unable to assess


SKIN:   No ulcerations





Vitals


Vitals





Vital Signs








  Date Time  Temp Pulse Resp B/P (MAP) Pulse Ox O2 Delivery O2 Flow Rate FiO2


 


8/21/20 08:02     99 Ventilator  


 


8/21/20 07:36   20     


 


8/21/20 06:15    97/65 (76)    


 


8/21/20 06:00  73      


 


8/21/20 04:00 99.0       





 99.0       


 


8/20/20 18:30       18.0 











Labs


Labs





Laboratory Tests








Test


 8/20/20


18:30 8/20/20


19:00 8/20/20


19:41 8/21/20


05:30


 


White Blood Count


 14.4 x10^3/uL


(4.0-11.0) 


 


 10.4 x10^3/uL


(4.0-11.0)


 


Red Blood Count


 4.33 x10^6/uL


(4.30-5.70) 


 


 3.73 x10^6/uL


(4.30-5.70)


 


Hemoglobin


 13.3 g/dL


(13.0-17.5) 


 


 11.4 g/dL


(13.0-17.5)


 


Hematocrit


 38.5 %


(39.0-53.0) 


 


 33.0 %


(39.0-53.0)


 


Mean Corpuscular Volume 89 fL ()    88 fL () 


 


Mean Corpuscular Hemoglobin 31 pg (25-35)    31 pg (25-35) 


 


Mean Corpuscular Hemoglobin


Concent 34 g/dL


(31-37) 


 


 35 g/dL


(31-37)


 


Red Cell Distribution Width


 13.1 %


(11.5-14.5) 


 


 13.1 %


(11.5-14.5)


 


Platelet Count


 294 x10^3/uL


(140-400) 


 


 212 x10^3/uL


(140-400)


 


Neutrophils (%) (Auto) 87 % (31-73)    91 % (31-73) 


 


Lymphocytes (%) (Auto) 8 % (24-48)    5 % (24-48) 


 


Monocytes (%) (Auto) 5 % (0-9)    4 % (0-9) 


 


Eosinophils (%) (Auto) 0 % (0-3)    0 % (0-3) 


 


Basophils (%) (Auto) 0 % (0-3)    0 % (0-3) 


 


Neutrophils # (Auto)


 12.5 x10^3/uL


(1.8-7.7) 


 


 9.4 x10^3/uL


(1.8-7.7)


 


Lymphocytes # (Auto)


 1.1 x10^3/uL


(1.0-4.8) 


 


 0.5 x10^3/uL


(1.0-4.8)


 


Monocytes # (Auto)


 0.8 x10^3/uL


(0.0-1.1) 


 


 0.4 x10^3/uL


(0.0-1.1)


 


Eosinophils # (Auto)


 0.0 x10^3/uL


(0.0-0.7) 


 


 0.0 x10^3/uL


(0.0-0.7)


 


Basophils # (Auto)


 0.0 x10^3/uL


(0.0-0.2) 


 


 0.0 x10^3/uL


(0.0-0.2)


 


Segmented Neutrophils % 63 % (35-66)    


 


Band Neutrophils % 18 % (0-9)    


 


Lymphocytes % 7 % (24-48)    


 


Atypical Lymphocytes %


(Manual) 2 % (0-0) 


 


 


 





 


Monocytes % 4 % (0-10)    


 


Eosinophils % 2 % (0-5)    


 


Basophils % 1 % (0-3)    


 


Metamyelocytes % 3 % (0-0)    


 


Platelet Estimate


 Adequate


(ADEQUATE) 


 


 





 


Giant Platelets     


 


Polychromasia Slight    


 


D-Dimer (Lexii)


 4.02 ug/mlFEU


(0.00-0.50) 


 


 





 


Sodium Level


 129 mmol/L


(136-145) 


 


 130 mmol/L


(136-145)


 


Potassium Level


 3.6 mmol/L


(3.5-5.1) 


 


 4.5 mmol/L


(3.5-5.1)


 


Chloride Level


 90 mmol/L


() 


 


 97 mmol/L


()


 


Carbon Dioxide Level


 22 mmol/L


(21-32) 


 


 23 mmol/L


(21-32)


 


Anion Gap 17 (6-14)    10 (6-14) 


 


Blood Urea Nitrogen


 25 mg/dL


(8-26) 


 


 29 mg/dL


(8-26)


 


Creatinine


 1.6 mg/dL


(0.7-1.3) 


 


 1.2 mg/dL


(0.7-1.3)


 


Estimated GFR


(Cockcroft-Gault) 45.1 


 


 


 62.9 





 


BUN/Creatinine Ratio 16 (6-20)    24 (6-20) 


 


Glucose Level


 301 mg/dL


(70-99) 


 


 420 mg/dL


(70-99)


 


Calcium Level


 8.4 mg/dL


(8.5-10.1) 


 


 7.5 mg/dL


(8.5-10.1)


 


Magnesium Level


 2.4 mg/dL


(1.8-2.4) 


 


 





 


Ferritin


 1683 ng/mL


() 


 


 





 


Total Bilirubin


 1.4 mg/dL


(0.2-1.0) 


 


 1.1 mg/dL


(0.2-1.0)


 


Aspartate Amino Transf


(AST/SGOT) 65 U/L (15-37) 


 


 


 57 U/L (15-37) 





 


Alanine Aminotransferase


(ALT/SGPT) 63 U/L (16-63) 


 


 


 55 U/L (16-63) 





 


Alkaline Phosphatase


 141 U/L


() 


 


 103 U/L


()


 


Creatine Kinase


 120 U/L


() 


 


 





 


C-Reactive Protein,


Quantitative 342.9 mg/L


(0-3.3) 


 


 





 


Total Protein


 7.9 g/dL


(6.4-8.2) 


 


 6.5 g/dL


(6.4-8.2)


 


Albumin


 2.6 g/dL


(3.4-5.0) 


 


 2.0 g/dL


(3.4-5.0)


 


Albumin/Globulin Ratio 0.5 (1.0-1.7)    0.4 (1.0-1.7) 


 


Lipase


 109 U/L


() 


 


 





 


O2 Saturation  95 % (92-99)   


 


Arterial Blood pH


 


 7.38


(7.35-7.45) 


 





 


Arterial Blood pCO2 at


Patient Temp 


 31 mmHg


(35-46) 


 





 


Arterial Blood pO2 at Patient


Temp 


 82 mmHg


() 


 





 


Arterial Blood HCO3


 


 18 mmol/L


(21-28) 


 





 


Arterial Blood Base Excess


 


 -6 mmol/L


(-3-3) 


 





 


FiO2  95   


 


Urine Collection Type   U cath  


 


Urine Color   Orange  


 


Urine Clarity   Cloudy  


 


Urine pH   5.0 (<5.0-8.0)  


 


Urine Specific Gravity


 


 


 1.025


(1.000-1.030) 





 


Urine Protein


 


 


 30 mg/dL


(NEG-TRACE) 





 


Urine Glucose (UA)


 


 


 250 mg/dL


(NEG) 





 


Urine Ketones (Stick)


 


 


 Trace mg/dL


(NEG) 





 


Urine Blood   Negative (NEG)  


 


Urine Nitrite   Negative (NEG)  


 


Urine Bilirubin   Small (NEG)  


 


Urine Urobilinogen Dipstick


 


 


 2.0 mg/dL (0.2


mg/dL) 





 


Urine Leukocyte Esterase   Trace (NEG)  


 


Urine RBC   Occ /HPF (0-2)  


 


Urine WBC   1-4 /HPF (0-4)  


 


Urine Squamous Epithelial


Cells 


 


 Occ /LPF 


 





 


Urine Amorphous Sediment   Present /HPF  


 


Urine Bacteria   0 /HPF (0-FEW)  


 


Urine Hyaline Casts   Moderate /HPF  


 


Urine Granular Casts


 


 


 Occasional


/HPF 





 


Urine Mucus   Marked /LPF  


 


Test


 8/21/20


08:15 


 


 





 


O2 Saturation 92 % (92-99)    


 


Arterial Blood pH


 7.34


(7.35-7.45) 


 


 





 


Arterial Blood pCO2 at


Patient Temp 36 mmHg


(35-46) 


 


 





 


Arterial Blood pO2 at Patient


Temp 72 mmHg


() 


 


 





 


Arterial Blood HCO3


 19 mmol/L


(21-28) 


 


 





 


Arterial Blood Base Excess


 -6 mmol/L


(-3-3) 


 


 





 


FiO2 100    








Laboratory Tests








Test


 8/20/20


18:30 8/20/20


19:00 8/20/20


19:41 8/21/20


05:30


 


White Blood Count


 14.4 x10^3/uL


(4.0-11.0) 


 


 10.4 x10^3/uL


(4.0-11.0)


 


Red Blood Count


 4.33 x10^6/uL


(4.30-5.70) 


 


 3.73 x10^6/uL


(4.30-5.70)


 


Hemoglobin


 13.3 g/dL


(13.0-17.5) 


 


 11.4 g/dL


(13.0-17.5)


 


Hematocrit


 38.5 %


(39.0-53.0) 


 


 33.0 %


(39.0-53.0)


 


Mean Corpuscular Volume 89 fL ()    88 fL () 


 


Mean Corpuscular Hemoglobin 31 pg (25-35)    31 pg (25-35) 


 


Mean Corpuscular Hemoglobin


Concent 34 g/dL


(31-37) 


 


 35 g/dL


(31-37)


 


Red Cell Distribution Width


 13.1 %


(11.5-14.5) 


 


 13.1 %


(11.5-14.5)


 


Platelet Count


 294 x10^3/uL


(140-400) 


 


 212 x10^3/uL


(140-400)


 


Neutrophils (%) (Auto) 87 % (31-73)    91 % (31-73) 


 


Lymphocytes (%) (Auto) 8 % (24-48)    5 % (24-48) 


 


Monocytes (%) (Auto) 5 % (0-9)    4 % (0-9) 


 


Eosinophils (%) (Auto) 0 % (0-3)    0 % (0-3) 


 


Basophils (%) (Auto) 0 % (0-3)    0 % (0-3) 


 


Neutrophils # (Auto)


 12.5 x10^3/uL


(1.8-7.7) 


 


 9.4 x10^3/uL


(1.8-7.7)


 


Lymphocytes # (Auto)


 1.1 x10^3/uL


(1.0-4.8) 


 


 0.5 x10^3/uL


(1.0-4.8)


 


Monocytes # (Auto)


 0.8 x10^3/uL


(0.0-1.1) 


 


 0.4 x10^3/uL


(0.0-1.1)


 


Eosinophils # (Auto)


 0.0 x10^3/uL


(0.0-0.7) 


 


 0.0 x10^3/uL


(0.0-0.7)


 


Basophils # (Auto)


 0.0 x10^3/uL


(0.0-0.2) 


 


 0.0 x10^3/uL


(0.0-0.2)


 


Segmented Neutrophils % 63 % (35-66)    


 


Band Neutrophils % 18 % (0-9)    


 


Lymphocytes % 7 % (24-48)    


 


Atypical Lymphocytes %


(Manual) 2 % (0-0) 


 


 


 





 


Monocytes % 4 % (0-10)    


 


Eosinophils % 2 % (0-5)    


 


Basophils % 1 % (0-3)    


 


Metamyelocytes % 3 % (0-0)    


 


Platelet Estimate


 Adequate


(ADEQUATE) 


 


 





 


Giant Platelets     


 


Polychromasia Slight    


 


D-Dimer (Lexii)


 4.02 ug/mlFEU


(0.00-0.50) 


 


 





 


Sodium Level


 129 mmol/L


(136-145) 


 


 130 mmol/L


(136-145)


 


Potassium Level


 3.6 mmol/L


(3.5-5.1) 


 


 4.5 mmol/L


(3.5-5.1)


 


Chloride Level


 90 mmol/L


() 


 


 97 mmol/L


()


 


Carbon Dioxide Level


 22 mmol/L


(21-32) 


 


 23 mmol/L


(21-32)


 


Anion Gap 17 (6-14)    10 (6-14) 


 


Blood Urea Nitrogen


 25 mg/dL


(8-26) 


 


 29 mg/dL


(8-26)


 


Creatinine


 1.6 mg/dL


(0.7-1.3) 


 


 1.2 mg/dL


(0.7-1.3)


 


Estimated GFR


(Cockcroft-Gault) 45.1 


 


 


 62.9 





 


BUN/Creatinine Ratio 16 (6-20)    24 (6-20) 


 


Glucose Level


 301 mg/dL


(70-99) 


 


 420 mg/dL


(70-99)


 


Calcium Level


 8.4 mg/dL


(8.5-10.1) 


 


 7.5 mg/dL


(8.5-10.1)


 


Magnesium Level


 2.4 mg/dL


(1.8-2.4) 


 


 





 


Ferritin


 1683 ng/mL


() 


 


 





 


Total Bilirubin


 1.4 mg/dL


(0.2-1.0) 


 


 1.1 mg/dL


(0.2-1.0)


 


Aspartate Amino Transf


(AST/SGOT) 65 U/L (15-37) 


 


 


 57 U/L (15-37) 





 


Alanine Aminotransferase


(ALT/SGPT) 63 U/L (16-63) 


 


 


 55 U/L (16-63) 





 


Alkaline Phosphatase


 141 U/L


() 


 


 103 U/L


()


 


Creatine Kinase


 120 U/L


() 


 


 





 


C-Reactive Protein,


Quantitative 342.9 mg/L


(0-3.3) 


 


 





 


Total Protein


 7.9 g/dL


(6.4-8.2) 


 


 6.5 g/dL


(6.4-8.2)


 


Albumin


 2.6 g/dL


(3.4-5.0) 


 


 2.0 g/dL


(3.4-5.0)


 


Albumin/Globulin Ratio 0.5 (1.0-1.7)    0.4 (1.0-1.7) 


 


Lipase


 109 U/L


() 


 


 





 


O2 Saturation  95 % (92-99)   


 


Arterial Blood pH


 


 7.38


(7.35-7.45) 


 





 


Arterial Blood pCO2 at


Patient Temp 


 31 mmHg


(35-46) 


 





 


Arterial Blood pO2 at Patient


Temp 


 82 mmHg


() 


 





 


Arterial Blood HCO3


 


 18 mmol/L


(21-28) 


 





 


Arterial Blood Base Excess


 


 -6 mmol/L


(-3-3) 


 





 


FiO2  95   


 


Urine Collection Type   U cath  


 


Urine Color   Orange  


 


Urine Clarity   Cloudy  


 


Urine pH   5.0 (<5.0-8.0)  


 


Urine Specific Gravity


 


 


 1.025


(1.000-1.030) 





 


Urine Protein


 


 


 30 mg/dL


(NEG-TRACE) 





 


Urine Glucose (UA)


 


 


 250 mg/dL


(NEG) 





 


Urine Ketones (Stick)


 


 


 Trace mg/dL


(NEG) 





 


Urine Blood   Negative (NEG)  


 


Urine Nitrite   Negative (NEG)  


 


Urine Bilirubin   Small (NEG)  


 


Urine Urobilinogen Dipstick


 


 


 2.0 mg/dL (0.2


mg/dL) 





 


Urine Leukocyte Esterase   Trace (NEG)  


 


Urine RBC   Occ /HPF (0-2)  


 


Urine WBC   1-4 /HPF (0-4)  


 


Urine Squamous Epithelial


Cells 


 


 Occ /LPF 


 





 


Urine Amorphous Sediment   Present /HPF  


 


Urine Bacteria   0 /HPF (0-FEW)  


 


Urine Hyaline Casts   Moderate /HPF  


 


Urine Granular Casts


 


 


 Occasional


/HPF 





 


Urine Mucus   Marked /LPF  


 


Test


 8/21/20


08:15 


 


 





 


O2 Saturation 92 % (92-99)    


 


Arterial Blood pH


 7.34


(7.35-7.45) 


 


 





 


Arterial Blood pCO2 at


Patient Temp 36 mmHg


(35-46) 


 


 





 


Arterial Blood pO2 at Patient


Temp 72 mmHg


() 


 


 





 


Arterial Blood HCO3


 19 mmol/L


(21-28) 


 


 





 


Arterial Blood Base Excess


 -6 mmol/L


(-3-3) 


 


 





 


FiO2 100    











VTE Prophylaxis Ordered


VTE Prophylaxis Devices:  Yes


VTE Pharmacological Prophylaxi:  Yes





Assessment/Plan


Assessment/Plan


Acute hypoxemic respiratory failure


Leukocytosis with bandemia secondary to septic process


Hyponatremia secondary to low effective circulatory volume


Acue renal failure due to vasomotor nephropathy most likely 


Hyperglycemia


mild transaminitis 





Plan:


supoprtive measures


will start insulin


follow labs in am


prognosis guarded


consult pulmonology





Justicifation of Admission Dx:


Justifications for Admission:


Justification of Admission Dx:  Yes











ELIJAH AGUSTIN MD             Aug 21, 2020 09:44

## 2020-08-22 VITALS — SYSTOLIC BLOOD PRESSURE: 119 MMHG | DIASTOLIC BLOOD PRESSURE: 71 MMHG

## 2020-08-22 VITALS — SYSTOLIC BLOOD PRESSURE: 135 MMHG | DIASTOLIC BLOOD PRESSURE: 67 MMHG

## 2020-08-22 VITALS — DIASTOLIC BLOOD PRESSURE: 73 MMHG | SYSTOLIC BLOOD PRESSURE: 145 MMHG

## 2020-08-22 VITALS — DIASTOLIC BLOOD PRESSURE: 64 MMHG | SYSTOLIC BLOOD PRESSURE: 126 MMHG

## 2020-08-22 VITALS — DIASTOLIC BLOOD PRESSURE: 64 MMHG | SYSTOLIC BLOOD PRESSURE: 128 MMHG

## 2020-08-22 VITALS — DIASTOLIC BLOOD PRESSURE: 68 MMHG | SYSTOLIC BLOOD PRESSURE: 132 MMHG

## 2020-08-22 VITALS — DIASTOLIC BLOOD PRESSURE: 68 MMHG | SYSTOLIC BLOOD PRESSURE: 143 MMHG

## 2020-08-22 VITALS — SYSTOLIC BLOOD PRESSURE: 126 MMHG | DIASTOLIC BLOOD PRESSURE: 67 MMHG

## 2020-08-22 VITALS — SYSTOLIC BLOOD PRESSURE: 161 MMHG | DIASTOLIC BLOOD PRESSURE: 81 MMHG

## 2020-08-22 VITALS — SYSTOLIC BLOOD PRESSURE: 117 MMHG | DIASTOLIC BLOOD PRESSURE: 64 MMHG

## 2020-08-22 VITALS — SYSTOLIC BLOOD PRESSURE: 127 MMHG | DIASTOLIC BLOOD PRESSURE: 67 MMHG

## 2020-08-22 VITALS — SYSTOLIC BLOOD PRESSURE: 143 MMHG | DIASTOLIC BLOOD PRESSURE: 73 MMHG

## 2020-08-22 VITALS — DIASTOLIC BLOOD PRESSURE: 68 MMHG | SYSTOLIC BLOOD PRESSURE: 142 MMHG

## 2020-08-22 VITALS — DIASTOLIC BLOOD PRESSURE: 64 MMHG | SYSTOLIC BLOOD PRESSURE: 114 MMHG

## 2020-08-22 VITALS — DIASTOLIC BLOOD PRESSURE: 87 MMHG | SYSTOLIC BLOOD PRESSURE: 188 MMHG

## 2020-08-22 VITALS — DIASTOLIC BLOOD PRESSURE: 64 MMHG | SYSTOLIC BLOOD PRESSURE: 117 MMHG

## 2020-08-22 VITALS — SYSTOLIC BLOOD PRESSURE: 115 MMHG | DIASTOLIC BLOOD PRESSURE: 60 MMHG

## 2020-08-22 VITALS — DIASTOLIC BLOOD PRESSURE: 62 MMHG | SYSTOLIC BLOOD PRESSURE: 122 MMHG

## 2020-08-22 VITALS — DIASTOLIC BLOOD PRESSURE: 67 MMHG | SYSTOLIC BLOOD PRESSURE: 137 MMHG

## 2020-08-22 VITALS — DIASTOLIC BLOOD PRESSURE: 72 MMHG | SYSTOLIC BLOOD PRESSURE: 146 MMHG

## 2020-08-22 VITALS — DIASTOLIC BLOOD PRESSURE: 88 MMHG | SYSTOLIC BLOOD PRESSURE: 167 MMHG

## 2020-08-22 VITALS — DIASTOLIC BLOOD PRESSURE: 69 MMHG | SYSTOLIC BLOOD PRESSURE: 137 MMHG

## 2020-08-22 VITALS — DIASTOLIC BLOOD PRESSURE: 83 MMHG | SYSTOLIC BLOOD PRESSURE: 160 MMHG

## 2020-08-22 VITALS — SYSTOLIC BLOOD PRESSURE: 160 MMHG | DIASTOLIC BLOOD PRESSURE: 75 MMHG

## 2020-08-22 VITALS — DIASTOLIC BLOOD PRESSURE: 72 MMHG | SYSTOLIC BLOOD PRESSURE: 145 MMHG

## 2020-08-22 LAB
ALBUMIN SERPL-MCNC: 1.9 G/DL (ref 3.4–5)
ALBUMIN/GLOB SERPL: 0.5 {RATIO} (ref 1–1.7)
ALP SERPL-CCNC: 110 U/L (ref 46–116)
ALT SERPL-CCNC: 74 U/L (ref 16–63)
ANION GAP SERPL CALC-SCNC: 13 MMOL/L (ref 6–14)
AST SERPL-CCNC: 96 U/L (ref 15–37)
BASE EXCESS ABG: -6 MMOL/L (ref -3–3)
BASOPHILS # BLD AUTO: 0 X10^3/UL (ref 0–0.2)
BASOPHILS NFR BLD: 0 % (ref 0–3)
BILIRUB SERPL-MCNC: 0.9 MG/DL (ref 0.2–1)
BUN SERPL-MCNC: 50 MG/DL (ref 8–26)
BUN/CREAT SERPL: 36 (ref 6–20)
CALCIUM SERPL-MCNC: 7.3 MG/DL (ref 8.5–10.1)
CHLORIDE SERPL-SCNC: 104 MMOL/L (ref 98–107)
CO2 SERPL-SCNC: 21 MMOL/L (ref 21–32)
CREAT SERPL-MCNC: 1.4 MG/DL (ref 0.7–1.3)
EOSINOPHIL NFR BLD: 0 % (ref 0–3)
EOSINOPHIL NFR BLD: 0 X10^3/UL (ref 0–0.7)
ERYTHROCYTE [DISTWIDTH] IN BLOOD BY AUTOMATED COUNT: 13.4 % (ref 11.5–14.5)
GFR SERPLBLD BASED ON 1.73 SQ M-ARVRAT: 52.6 ML/MIN
GLOBULIN SER-MCNC: 3.6 G/DL (ref 2.2–3.8)
GLUCOSE SERPL-MCNC: 350 MG/DL (ref 70–99)
HCO3 BLDA-SCNC: 17 MMOL/L (ref 21–28)
HCT VFR BLD CALC: 30.3 % (ref 39–53)
HGB BLD-MCNC: 10.4 G/DL (ref 13–17.5)
INSPIRATION SETTING TIME VENT: (no result)
LYMPHOCYTES # BLD: 0.4 X10^3/UL (ref 1–4.8)
LYMPHOCYTES NFR BLD AUTO: 4 % (ref 24–48)
MCH RBC QN AUTO: 31 PG (ref 25–35)
MCHC RBC AUTO-ENTMCNC: 34 G/DL (ref 31–37)
MCV RBC AUTO: 90 FL (ref 79–100)
MONO #: 0.4 X10^3/UL (ref 0–1.1)
MONOCYTES NFR BLD: 3 % (ref 0–9)
NEUT #: 11.1 X10^3/UL (ref 1.8–7.7)
NEUTROPHILS NFR BLD AUTO: 93 % (ref 31–73)
PCO2 BLDA: 28 MMHG (ref 35–46)
PLATELET # BLD AUTO: 215 X10^3/UL (ref 140–400)
PO2 BLDA: 80 MMHG (ref 75–108)
POTASSIUM SERPL-SCNC: 3.7 MMOL/L (ref 3.5–5.1)
PROT SERPL-MCNC: 5.5 G/DL (ref 6.4–8.2)
RBC # BLD AUTO: 3.36 X10^6/UL (ref 4.3–5.7)
SAO2 % BLDA: 94 % (ref 92–99)
SODIUM SERPL-SCNC: 138 MMOL/L (ref 136–145)
WBC # BLD AUTO: 12 X10^3/UL (ref 4–11)

## 2020-08-22 RX ADMIN — INSULIN GLARGINE SCH UNIT: 100 INJECTION, SOLUTION SUBCUTANEOUS at 21:07

## 2020-08-22 RX ADMIN — PIPERACILLIN SODIUM AND TAZOBACTAM SODIUM SCH MLS/HR: 3; .375 INJECTION, POWDER, LYOPHILIZED, FOR SOLUTION INTRAVENOUS at 11:30

## 2020-08-22 RX ADMIN — INSULIN LISPRO SCH UNITS: 100 INJECTION, SOLUTION INTRAVENOUS; SUBCUTANEOUS at 18:23

## 2020-08-22 RX ADMIN — PIPERACILLIN SODIUM AND TAZOBACTAM SODIUM SCH MLS/HR: 3; .375 INJECTION, POWDER, LYOPHILIZED, FOR SOLUTION INTRAVENOUS at 18:11

## 2020-08-22 RX ADMIN — DEXMEDETOMIDINE HYDROCHLORIDE PRN MLS/HR: 100 INJECTION, SOLUTION, CONCENTRATE INTRAVENOUS at 16:29

## 2020-08-22 RX ADMIN — METHYLPREDNISOLONE SODIUM SUCCINATE SCH MG: 40 INJECTION, POWDER, FOR SOLUTION INTRAMUSCULAR; INTRAVENOUS at 13:59

## 2020-08-22 RX ADMIN — OXYCODONE HYDROCHLORIDE AND ACETAMINOPHEN SCH MG: 500 TABLET ORAL at 18:11

## 2020-08-22 RX ADMIN — MIDAZOLAM PRN MLS/HR: 5 INJECTION, SOLUTION INTRAMUSCULAR; INTRAVENOUS at 06:09

## 2020-08-22 RX ADMIN — Medication PRN MLS/HR: at 16:31

## 2020-08-22 RX ADMIN — INSULIN LISPRO SCH UNITS: 100 INJECTION, SOLUTION INTRAVENOUS; SUBCUTANEOUS at 00:32

## 2020-08-22 RX ADMIN — VECURONIUM BROMIDE PRN MG: 1 INJECTION, POWDER, LYOPHILIZED, FOR SOLUTION INTRAVENOUS at 06:36

## 2020-08-22 RX ADMIN — VECURONIUM BROMIDE PRN MG: 1 INJECTION, POWDER, LYOPHILIZED, FOR SOLUTION INTRAVENOUS at 11:23

## 2020-08-22 RX ADMIN — INSULIN GLARGINE SCH UNIT: 100 INJECTION, SOLUTION SUBCUTANEOUS at 09:27

## 2020-08-22 RX ADMIN — INSULIN LISPRO SCH UNITS: 100 INJECTION, SOLUTION INTRAVENOUS; SUBCUTANEOUS at 06:11

## 2020-08-22 RX ADMIN — PROPOFOL PRN MLS/HR: 10 INJECTION, EMULSION INTRAVENOUS at 17:09

## 2020-08-22 RX ADMIN — ACETAMINOPHEN PRN MG: 650 SOLUTION ORAL at 19:35

## 2020-08-22 RX ADMIN — DEXMEDETOMIDINE HYDROCHLORIDE PRN MLS/HR: 100 INJECTION, SOLUTION, CONCENTRATE INTRAVENOUS at 06:10

## 2020-08-22 RX ADMIN — ENOXAPARIN SODIUM SCH MG: 100 INJECTION SUBCUTANEOUS at 21:02

## 2020-08-22 RX ADMIN — PIPERACILLIN SODIUM AND TAZOBACTAM SODIUM SCH MLS/HR: 3; .375 INJECTION, POWDER, LYOPHILIZED, FOR SOLUTION INTRAVENOUS at 06:09

## 2020-08-22 RX ADMIN — OXYCODONE HYDROCHLORIDE AND ACETAMINOPHEN SCH MG: 500 TABLET ORAL at 00:22

## 2020-08-22 RX ADMIN — METHYLPREDNISOLONE SODIUM SUCCINATE SCH MG: 40 INJECTION, POWDER, FOR SOLUTION INTRAMUSCULAR; INTRAVENOUS at 06:09

## 2020-08-22 RX ADMIN — MIDAZOLAM PRN MLS/HR: 5 INJECTION, SOLUTION INTRAMUSCULAR; INTRAVENOUS at 16:29

## 2020-08-22 RX ADMIN — OXYCODONE HYDROCHLORIDE AND ACETAMINOPHEN SCH MG: 500 TABLET ORAL at 23:38

## 2020-08-22 RX ADMIN — ZINC SULFATE CAP 220 MG (50 MG ELEMENTAL ZN) SCH MG: 220 (50 ZN) CAP at 09:26

## 2020-08-22 RX ADMIN — PIPERACILLIN SODIUM AND TAZOBACTAM SODIUM SCH MLS/HR: 3; .375 INJECTION, POWDER, LYOPHILIZED, FOR SOLUTION INTRAVENOUS at 00:22

## 2020-08-22 RX ADMIN — VECURONIUM BROMIDE PRN MG: 1 INJECTION, POWDER, LYOPHILIZED, FOR SOLUTION INTRAVENOUS at 19:35

## 2020-08-22 RX ADMIN — VECURONIUM BROMIDE PRN MG: 1 INJECTION, POWDER, LYOPHILIZED, FOR SOLUTION INTRAVENOUS at 04:19

## 2020-08-22 RX ADMIN — INSULIN LISPRO SCH UNITS: 100 INJECTION, SOLUTION INTRAVENOUS; SUBCUTANEOUS at 11:48

## 2020-08-22 RX ADMIN — INSULIN LISPRO SCH UNITS: 100 INJECTION, SOLUTION INTRAVENOUS; SUBCUTANEOUS at 23:41

## 2020-08-22 RX ADMIN — OXYCODONE HYDROCHLORIDE AND ACETAMINOPHEN SCH MG: 500 TABLET ORAL at 06:09

## 2020-08-22 RX ADMIN — BACITRACIN SCH MLS/HR: 5000 INJECTION, POWDER, FOR SOLUTION INTRAMUSCULAR at 12:59

## 2020-08-22 RX ADMIN — METHYLPREDNISOLONE SODIUM SUCCINATE SCH MG: 40 INJECTION, POWDER, FOR SOLUTION INTRAMUSCULAR; INTRAVENOUS at 21:02

## 2020-08-22 RX ADMIN — PIPERACILLIN SODIUM AND TAZOBACTAM SODIUM SCH MLS/HR: 3; .375 INJECTION, POWDER, LYOPHILIZED, FOR SOLUTION INTRAVENOUS at 23:39

## 2020-08-22 RX ADMIN — ENOXAPARIN SODIUM SCH MG: 100 INJECTION SUBCUTANEOUS at 09:26

## 2020-08-22 RX ADMIN — BACITRACIN SCH MLS/HR: 5000 INJECTION, POWDER, FOR SOLUTION INTRAMUSCULAR at 00:22

## 2020-08-22 RX ADMIN — PANTOPRAZOLE SODIUM SCH MG: 40 INJECTION, POWDER, FOR SOLUTION INTRAVENOUS at 07:51

## 2020-08-22 RX ADMIN — OXYCODONE HYDROCHLORIDE AND ACETAMINOPHEN SCH MG: 500 TABLET ORAL at 11:30

## 2020-08-22 NOTE — NUR
Patient overbreathing vent RR in 32-38, Peaks easily hit 50-60 when even touched. Deep 
suctioning produces no sputum. Patient recently placed on propofol gtt, now maxed, also used 
dose of PRN vec to get patient back in sync. Also spiked new fever of 101.7. Paged Dr. Jacobs about development, spoke in lengths about patient condition: labs, UO, sedation, vent 
settings. Decided to draw fungal blood cultures and see if dose of Tylenol and ice packs are 
effective in treating newly developed fever.

## 2020-08-22 NOTE — RAD
EXAM: CHEST ONE VIEW.

 

HISTORY: Ventilated, respiratory failure.

 

COMPARISON: 08/21/2020.

 

FINDINGS: A frontal view of the chest is obtained. An endotracheal tube 

has its tip 5 cm above the ko. A right internal jugular central venous

catheter has its tip in the right brachycephalic vein. A nasogastric tube 

has its tip below the inferior margin of the view.

 

Bilateral diffuse airspace infiltrates appear increased in the left base 

and slightly improved elsewhere. There is no pneumothorax or clear pleural

effusion. The heart is not enlarged.

 

IMPRESSION: 

1. Bilateral diffuse infiltrates are increased in the left base and mildly

improved elsewhere.

 

Electronically signed by: KRISTEL Hearn MD (8/22/2020 8:47 AM) 

[id number]

## 2020-08-22 NOTE — PDOC
PROGRESS NOTES


Date of Service:


DATE: 8/22/20 


TIME: 11:07





Chief Complaint


Chief Complaint


Assessment/Plan


Acute hypoxemic respiratory failure


Leukocytosis with bandemia secondary to septic process


Hyponatremia secondary to low effective circulatory volume


Acue renal failure due to vasomotor nephropathy most likely 


Hyperglycemia


mild transaminitis 





Plan:


supportive measures


will adjust insulin for better glycemia control


follow labs in am


prognosis guarded


follow pulmonology recommendations





Vitals


Vitals





Vital Signs








  Date Time  Temp Pulse Resp B/P (MAP) Pulse Ox O2 Delivery O2 Flow Rate FiO2


 


8/22/20 11:00  82 37 143/68 (93) 94   


 


8/22/20 10:44      Ventilator  


 


8/22/20 08:00 99.2       





 99.2       











Labs


LABS





Laboratory Tests








Test


 8/21/20


17:41 8/21/20


20:51 8/22/20


00:29 8/22/20


05:30


 


Glucose (Fingerstick)


 285 mg/dL


(70-99) 257 mg/dL


(70-99) 316 mg/dL


(70-99) 





 


White Blood Count


 


 


 


 12.0 x10^3/uL


(4.0-11.0)


 


Red Blood Count


 


 


 


 3.36 x10^6/uL


(4.30-5.70)


 


Hemoglobin


 


 


 


 10.4 g/dL


(13.0-17.5)


 


Hematocrit


 


 


 


 30.3 %


(39.0-53.0)


 


Mean Corpuscular Volume    90 fL () 


 


Mean Corpuscular Hemoglobin    31 pg (25-35) 


 


Mean Corpuscular Hemoglobin


Concent 


 


 


 34 g/dL


(31-37)


 


Red Cell Distribution Width


 


 


 


 13.4 %


(11.5-14.5)


 


Platelet Count


 


 


 


 215 x10^3/uL


(140-400)


 


Neutrophils (%) (Auto)    93 % (31-73) 


 


Lymphocytes (%) (Auto)    4 % (24-48) 


 


Monocytes (%) (Auto)    3 % (0-9) 


 


Eosinophils (%) (Auto)    0 % (0-3) 


 


Basophils (%) (Auto)    0 % (0-3) 


 


Neutrophils # (Auto)


 


 


 


 11.1 x10^3/uL


(1.8-7.7)


 


Lymphocytes # (Auto)


 


 


 


 0.4 x10^3/uL


(1.0-4.8)


 


Monocytes # (Auto)


 


 


 


 0.4 x10^3/uL


(0.0-1.1)


 


Eosinophils # (Auto)


 


 


 


 0.0 x10^3/uL


(0.0-0.7)


 


Basophils # (Auto)


 


 


 


 0.0 x10^3/uL


(0.0-0.2)


 


Sodium Level


 


 


 


 138 mmol/L


(136-145)


 


Potassium Level


 


 


 


 3.7 mmol/L


(3.5-5.1)


 


Chloride Level


 


 


 


 104 mmol/L


()


 


Carbon Dioxide Level


 


 


 


 21 mmol/L


(21-32)


 


Anion Gap    13 (6-14) 


 


Blood Urea Nitrogen


 


 


 


 50 mg/dL


(8-26)


 


Creatinine


 


 


 


 1.4 mg/dL


(0.7-1.3)


 


Estimated GFR


(Cockcroft-Gault) 


 


 


 52.6 





 


BUN/Creatinine Ratio    36 (6-20) 


 


Glucose Level


 


 


 


 350 mg/dL


(70-99)


 


Calcium Level


 


 


 


 7.3 mg/dL


(8.5-10.1)


 


Total Bilirubin


 


 


 


 0.9 mg/dL


(0.2-1.0)


 


Aspartate Amino Transf


(AST/SGOT) 


 


 


 96 U/L (15-37) 





 


Alanine Aminotransferase


(ALT/SGPT) 


 


 


 74 U/L (16-63) 





 


Alkaline Phosphatase


 


 


 


 110 U/L


()


 


Total Protein


 


 


 


 5.5 g/dL


(6.4-8.2)


 


Albumin


 


 


 


 1.9 g/dL


(3.4-5.0)


 


Albumin/Globulin Ratio    0.5 (1.0-1.7) 


 


Test


 8/22/20


06:00 8/22/20


08:00 


 





 


Glucose (Fingerstick)


 351 mg/dL


(70-99) 


 


 





 


O2 Saturation  94 % (92-99)   


 


Arterial Blood pH


 


 7.41


(7.35-7.45) 


 





 


Arterial Blood pCO2 at


Patient Temp 


 28 mmHg


(35-46) 


 





 


Arterial Blood pO2 at Patient


Temp 


 80 mmHg


() 


 





 


Arterial Blood HCO3


 


 17 mmol/L


(21-28) 


 





 


Arterial Blood Base Excess


 


 -6 mmol/L


(-3-3) 


 





 


FiO2  90%+10   











Comment


Review of Relevant


I have reviewed the following items jim (where applicable) has been applied.


Labs





Laboratory Tests








Test


 8/20/20


18:30 8/20/20


19:00 8/20/20


19:35 8/20/20


19:41


 


White Blood Count


 14.4 x10^3/uL


(4.0-11.0) 


 


 





 


Red Blood Count


 4.33 x10^6/uL


(4.30-5.70) 


 


 





 


Hemoglobin


 13.3 g/dL


(13.0-17.5) 


 


 





 


Hematocrit


 38.5 %


(39.0-53.0) 


 


 





 


Mean Corpuscular Volume 89 fL ()    


 


Mean Corpuscular Hemoglobin 31 pg (25-35)    


 


Mean Corpuscular Hemoglobin


Concent 34 g/dL


(31-37) 


 


 





 


Red Cell Distribution Width


 13.1 %


(11.5-14.5) 


 


 





 


Platelet Count


 294 x10^3/uL


(140-400) 


 


 





 


Neutrophils (%) (Auto) 87 % (31-73)    


 


Lymphocytes (%) (Auto) 8 % (24-48)    


 


Monocytes (%) (Auto) 5 % (0-9)    


 


Eosinophils (%) (Auto) 0 % (0-3)    


 


Basophils (%) (Auto) 0 % (0-3)    


 


Neutrophils # (Auto)


 12.5 x10^3/uL


(1.8-7.7) 


 


 





 


Lymphocytes # (Auto)


 1.1 x10^3/uL


(1.0-4.8) 


 


 





 


Monocytes # (Auto)


 0.8 x10^3/uL


(0.0-1.1) 


 


 





 


Eosinophils # (Auto)


 0.0 x10^3/uL


(0.0-0.7) 


 


 





 


Basophils # (Auto)


 0.0 x10^3/uL


(0.0-0.2) 


 


 





 


Segmented Neutrophils % 63 % (35-66)    


 


Band Neutrophils % 18 % (0-9)    


 


Lymphocytes % 7 % (24-48)    


 


Atypical Lymphocytes %


(Manual) 2 % (0-0) 


 


 


 





 


Monocytes % 4 % (0-10)    


 


Eosinophils % 2 % (0-5)    


 


Basophils % 1 % (0-3)    


 


Metamyelocytes % 3 % (0-0)    


 


Platelet Estimate


 Adequate


(ADEQUATE) 


 


 





 


Giant Platelets     


 


Polychromasia Slight    


 


D-Dimer (Lexii)


 4.02 ug/mlFEU


(0.00-0.50) 


 


 





 


Sodium Level


 129 mmol/L


(136-145) 


 


 





 


Potassium Level


 3.6 mmol/L


(3.5-5.1) 


 


 





 


Chloride Level


 90 mmol/L


() 


 


 





 


Carbon Dioxide Level


 22 mmol/L


(21-32) 


 


 





 


Anion Gap 17 (6-14)    


 


Blood Urea Nitrogen


 25 mg/dL


(8-26) 


 


 





 


Creatinine


 1.6 mg/dL


(0.7-1.3) 


 


 





 


Estimated GFR


(Cockcroft-Gault) 45.1 


 


 


 





 


BUN/Creatinine Ratio 16 (6-20)    


 


Glucose Level


 301 mg/dL


(70-99) 


 


 





 


Calcium Level


 8.4 mg/dL


(8.5-10.1) 


 


 





 


Magnesium Level


 2.4 mg/dL


(1.8-2.4) 


 


 





 


Ferritin


 1683 ng/mL


() 


 


 





 


Total Bilirubin


 1.4 mg/dL


(0.2-1.0) 


 


 





 


Aspartate Amino Transf


(AST/SGOT) 65 U/L (15-37) 


 


 


 





 


Alanine Aminotransferase


(ALT/SGPT) 63 U/L (16-63) 


 


 


 





 


Alkaline Phosphatase


 141 U/L


() 


 


 





 


Creatine Kinase


 120 U/L


() 


 


 





 


C-Reactive Protein,


Quantitative 342.9 mg/L


(0-3.3) 


 


 





 


Total Protein


 7.9 g/dL


(6.4-8.2) 


 


 





 


Albumin


 2.6 g/dL


(3.4-5.0) 


 


 





 


Albumin/Globulin Ratio 0.5 (1.0-1.7)    


 


Lipase


 109 U/L


() 


 


 





 


O2 Saturation  95 % (92-99)   


 


Arterial Blood pH


 


 7.38


(7.35-7.45) 


 





 


Arterial Blood pCO2 at


Patient Temp 


 31 mmHg


(35-46) 


 





 


Arterial Blood pO2 at Patient


Temp 


 82 mmHg


() 


 





 


Arterial Blood HCO3


 


 18 mmol/L


(21-28) 


 





 


Arterial Blood Base Excess


 


 -6 mmol/L


(-3-3) 


 





 


FiO2  95   


 


Coronavirus (PCR)


 


 


 Detected (Not


Detected) 





 


Urine Collection Type    U cath 


 


Urine Color    Orange 


 


Urine Clarity    Cloudy 


 


Urine pH    5.0 (<5.0-8.0) 


 


Urine Specific Gravity


 


 


 


 1.025


(1.000-1.030)


 


Urine Protein


 


 


 


 30 mg/dL


(NEG-TRACE)


 


Urine Glucose (UA)


 


 


 


 250 mg/dL


(NEG)


 


Urine Ketones (Stick)


 


 


 


 Trace mg/dL


(NEG)


 


Urine Blood    Negative (NEG) 


 


Urine Nitrite    Negative (NEG) 


 


Urine Bilirubin    Small (NEG) 


 


Urine Urobilinogen Dipstick


 


 


 


 2.0 mg/dL (0.2


mg/dL)


 


Urine Leukocyte Esterase    Trace (NEG) 


 


Urine RBC    Occ /HPF (0-2) 


 


Urine WBC    1-4 /HPF (0-4) 


 


Urine Squamous Epithelial


Cells 


 


 


 Occ /LPF 





 


Urine Amorphous Sediment    Present /HPF 


 


Urine Bacteria    0 /HPF (0-FEW) 


 


Urine Hyaline Casts    Moderate /HPF 


 


Urine Granular Casts


 


 


 


 Occasional


/HPF


 


Urine Mucus    Marked /LPF 


 


Test


 8/21/20


05:30 8/21/20


08:15 8/21/20


17:41 8/21/20


20:51


 


White Blood Count


 10.4 x10^3/uL


(4.0-11.0) 


 


 





 


Red Blood Count


 3.73 x10^6/uL


(4.30-5.70) 


 


 





 


Hemoglobin


 11.4 g/dL


(13.0-17.5) 


 


 





 


Hematocrit


 33.0 %


(39.0-53.0) 


 


 





 


Mean Corpuscular Volume 88 fL ()    


 


Mean Corpuscular Hemoglobin 31 pg (25-35)    


 


Mean Corpuscular Hemoglobin


Concent 35 g/dL


(31-37) 


 


 





 


Red Cell Distribution Width


 13.1 %


(11.5-14.5) 


 


 





 


Platelet Count


 212 x10^3/uL


(140-400) 


 


 





 


Neutrophils (%) (Auto) 91 % (31-73)    


 


Lymphocytes (%) (Auto) 5 % (24-48)    


 


Monocytes (%) (Auto) 4 % (0-9)    


 


Eosinophils (%) (Auto) 0 % (0-3)    


 


Basophils (%) (Auto) 0 % (0-3)    


 


Neutrophils # (Auto)


 9.4 x10^3/uL


(1.8-7.7) 


 


 





 


Lymphocytes # (Auto)


 0.5 x10^3/uL


(1.0-4.8) 


 


 





 


Monocytes # (Auto)


 0.4 x10^3/uL


(0.0-1.1) 


 


 





 


Eosinophils # (Auto)


 0.0 x10^3/uL


(0.0-0.7) 


 


 





 


Basophils # (Auto)


 0.0 x10^3/uL


(0.0-0.2) 


 


 





 


Sodium Level


 130 mmol/L


(136-145) 


 


 





 


Potassium Level


 4.5 mmol/L


(3.5-5.1) 


 


 





 


Chloride Level


 97 mmol/L


() 


 


 





 


Carbon Dioxide Level


 23 mmol/L


(21-32) 


 


 





 


Anion Gap 10 (6-14)    


 


Blood Urea Nitrogen


 29 mg/dL


(8-26) 


 


 





 


Creatinine


 1.2 mg/dL


(0.7-1.3) 


 


 





 


Estimated GFR


(Cockcroft-Gault) 62.9 


 


 


 





 


BUN/Creatinine Ratio 24 (6-20)    


 


Glucose Level


 420 mg/dL


(70-99) 


 


 





 


Calcium Level


 7.5 mg/dL


(8.5-10.1) 


 


 





 


Total Bilirubin


 1.1 mg/dL


(0.2-1.0) 


 


 





 


Aspartate Amino Transf


(AST/SGOT) 57 U/L (15-37) 


 


 


 





 


Alanine Aminotransferase


(ALT/SGPT) 55 U/L (16-63) 


 


 


 





 


Alkaline Phosphatase


 103 U/L


() 


 


 





 


Total Protein


 6.5 g/dL


(6.4-8.2) 


 


 





 


Albumin


 2.0 g/dL


(3.4-5.0) 


 


 





 


Albumin/Globulin Ratio 0.4 (1.0-1.7)    


 


O2 Saturation  92 % (92-99)   


 


Arterial Blood pH


 


 7.34


(7.35-7.45) 


 





 


Arterial Blood pCO2 at


Patient Temp 


 36 mmHg


(35-46) 


 





 


Arterial Blood pO2 at Patient


Temp 


 72 mmHg


() 


 





 


Arterial Blood HCO3


 


 19 mmol/L


(21-28) 


 





 


Arterial Blood Base Excess


 


 -6 mmol/L


(-3-3) 


 





 


FiO2  100   


 


Glucose (Fingerstick)


 


 


 285 mg/dL


(70-99) 257 mg/dL


(70-99)


 


Test


 8/22/20


00:29 8/22/20


05:30 8/22/20


06:00 8/22/20


08:00


 


Glucose (Fingerstick)


 316 mg/dL


(70-99) 


 351 mg/dL


(70-99) 





 


White Blood Count


 


 12.0 x10^3/uL


(4.0-11.0) 


 





 


Red Blood Count


 


 3.36 x10^6/uL


(4.30-5.70) 


 





 


Hemoglobin


 


 10.4 g/dL


(13.0-17.5) 


 





 


Hematocrit


 


 30.3 %


(39.0-53.0) 


 





 


Mean Corpuscular Volume  90 fL ()   


 


Mean Corpuscular Hemoglobin  31 pg (25-35)   


 


Mean Corpuscular Hemoglobin


Concent 


 34 g/dL


(31-37) 


 





 


Red Cell Distribution Width


 


 13.4 %


(11.5-14.5) 


 





 


Platelet Count


 


 215 x10^3/uL


(140-400) 


 





 


Neutrophils (%) (Auto)  93 % (31-73)   


 


Lymphocytes (%) (Auto)  4 % (24-48)   


 


Monocytes (%) (Auto)  3 % (0-9)   


 


Eosinophils (%) (Auto)  0 % (0-3)   


 


Basophils (%) (Auto)  0 % (0-3)   


 


Neutrophils # (Auto)


 


 11.1 x10^3/uL


(1.8-7.7) 


 





 


Lymphocytes # (Auto)


 


 0.4 x10^3/uL


(1.0-4.8) 


 





 


Monocytes # (Auto)


 


 0.4 x10^3/uL


(0.0-1.1) 


 





 


Eosinophils # (Auto)


 


 0.0 x10^3/uL


(0.0-0.7) 


 





 


Basophils # (Auto)


 


 0.0 x10^3/uL


(0.0-0.2) 


 





 


Sodium Level


 


 138 mmol/L


(136-145) 


 





 


Potassium Level


 


 3.7 mmol/L


(3.5-5.1) 


 





 


Chloride Level


 


 104 mmol/L


() 


 





 


Carbon Dioxide Level


 


 21 mmol/L


(21-32) 


 





 


Anion Gap  13 (6-14)   


 


Blood Urea Nitrogen


 


 50 mg/dL


(8-26) 


 





 


Creatinine


 


 1.4 mg/dL


(0.7-1.3) 


 





 


Estimated GFR


(Cockcroft-Gault) 


 52.6 


 


 





 


BUN/Creatinine Ratio  36 (6-20)   


 


Glucose Level


 


 350 mg/dL


(70-99) 


 





 


Calcium Level


 


 7.3 mg/dL


(8.5-10.1) 


 





 


Total Bilirubin


 


 0.9 mg/dL


(0.2-1.0) 


 





 


Aspartate Amino Transf


(AST/SGOT) 


 96 U/L (15-37) 


 


 





 


Alanine Aminotransferase


(ALT/SGPT) 


 74 U/L (16-63) 


 


 





 


Alkaline Phosphatase


 


 110 U/L


() 


 





 


Total Protein


 


 5.5 g/dL


(6.4-8.2) 


 





 


Albumin


 


 1.9 g/dL


(3.4-5.0) 


 





 


Albumin/Globulin Ratio  0.5 (1.0-1.7)   


 


O2 Saturation    94 % (92-99) 


 


Arterial Blood pH


 


 


 


 7.41


(7.35-7.45)


 


Arterial Blood pCO2 at


Patient Temp 


 


 


 28 mmHg


(35-46)


 


Arterial Blood pO2 at Patient


Temp 


 


 


 80 mmHg


()


 


Arterial Blood HCO3


 


 


 


 17 mmol/L


(21-28)


 


Arterial Blood Base Excess


 


 


 


 -6 mmol/L


(-3-3)


 


FiO2    90%+10 








Laboratory Tests








Test


 8/21/20


17:41 8/21/20


20:51 8/22/20


00:29 8/22/20


05:30


 


Glucose (Fingerstick)


 285 mg/dL


(70-99) 257 mg/dL


(70-99) 316 mg/dL


(70-99) 





 


White Blood Count


 


 


 


 12.0 x10^3/uL


(4.0-11.0)


 


Red Blood Count


 


 


 


 3.36 x10^6/uL


(4.30-5.70)


 


Hemoglobin


 


 


 


 10.4 g/dL


(13.0-17.5)


 


Hematocrit


 


 


 


 30.3 %


(39.0-53.0)


 


Mean Corpuscular Volume    90 fL () 


 


Mean Corpuscular Hemoglobin    31 pg (25-35) 


 


Mean Corpuscular Hemoglobin


Concent 


 


 


 34 g/dL


(31-37)


 


Red Cell Distribution Width


 


 


 


 13.4 %


(11.5-14.5)


 


Platelet Count


 


 


 


 215 x10^3/uL


(140-400)


 


Neutrophils (%) (Auto)    93 % (31-73) 


 


Lymphocytes (%) (Auto)    4 % (24-48) 


 


Monocytes (%) (Auto)    3 % (0-9) 


 


Eosinophils (%) (Auto)    0 % (0-3) 


 


Basophils (%) (Auto)    0 % (0-3) 


 


Neutrophils # (Auto)


 


 


 


 11.1 x10^3/uL


(1.8-7.7)


 


Lymphocytes # (Auto)


 


 


 


 0.4 x10^3/uL


(1.0-4.8)


 


Monocytes # (Auto)


 


 


 


 0.4 x10^3/uL


(0.0-1.1)


 


Eosinophils # (Auto)


 


 


 


 0.0 x10^3/uL


(0.0-0.7)


 


Basophils # (Auto)


 


 


 


 0.0 x10^3/uL


(0.0-0.2)


 


Sodium Level


 


 


 


 138 mmol/L


(136-145)


 


Potassium Level


 


 


 


 3.7 mmol/L


(3.5-5.1)


 


Chloride Level


 


 


 


 104 mmol/L


()


 


Carbon Dioxide Level


 


 


 


 21 mmol/L


(21-32)


 


Anion Gap    13 (6-14) 


 


Blood Urea Nitrogen


 


 


 


 50 mg/dL


(8-26)


 


Creatinine


 


 


 


 1.4 mg/dL


(0.7-1.3)


 


Estimated GFR


(Cockcroft-Gault) 


 


 


 52.6 





 


BUN/Creatinine Ratio    36 (6-20) 


 


Glucose Level


 


 


 


 350 mg/dL


(70-99)


 


Calcium Level


 


 


 


 7.3 mg/dL


(8.5-10.1)


 


Total Bilirubin


 


 


 


 0.9 mg/dL


(0.2-1.0)


 


Aspartate Amino Transf


(AST/SGOT) 


 


 


 96 U/L (15-37) 





 


Alanine Aminotransferase


(ALT/SGPT) 


 


 


 74 U/L (16-63) 





 


Alkaline Phosphatase


 


 


 


 110 U/L


()


 


Total Protein


 


 


 


 5.5 g/dL


(6.4-8.2)


 


Albumin


 


 


 


 1.9 g/dL


(3.4-5.0)


 


Albumin/Globulin Ratio    0.5 (1.0-1.7) 


 


Test


 8/22/20


06:00 8/22/20


08:00 


 





 


Glucose (Fingerstick)


 351 mg/dL


(70-99) 


 


 





 


O2 Saturation  94 % (92-99)   


 


Arterial Blood pH


 


 7.41


(7.35-7.45) 


 





 


Arterial Blood pCO2 at


Patient Temp 


 28 mmHg


(35-46) 


 





 


Arterial Blood pO2 at Patient


Temp 


 80 mmHg


() 


 





 


Arterial Blood HCO3


 


 17 mmol/L


(21-28) 


 





 


Arterial Blood Base Excess


 


 -6 mmol/L


(-3-3) 


 





 


FiO2  90%+10   








Microbiology


8/20/20 Urine Culture - Final, Complete


Medications





Current Medications


Etomidate (Amidate) 20 mg STK-MED ONCE IV ;  Start 8/20/20 at 18:54;  Stop 

8/20/20 at 18:54;  Status DC


Succinylcholine Chloride (Anectine) 200 mg STK-MED ONCE .ROUTE ;  Start 8/20/20 

at 18:55;  Stop 8/20/20 at 18:55;  Status DC


Propofol 50 ml @ As Directed STK-MED ONCE IV ;  Start 8/20/20 at 18:55;  Stop 

8/20/20 at 18:56;  Status DC


Propofol 100 ml @ 0 mls/hr CONT  PRN IV SEE PROTOCOL;  Start 8/20/20 at 19:00


Fentanyl Citrate (Fentanyl 2ml Vial) 25 mcg PRN Q1HR  PRN IV SEE COMMENTS;  

Start 8/20/20 at 19:00


Fentanyl Citrate (Fentanyl 2ml Vial) 50 mcg PRN Q1HR  PRN IV SEE COMMENTS Last 

administered on 8/20/20at 19:42;  Start 8/20/20 at 19:00


Chlorhexidine Gluconate (Peridex) 15 ml BID MM  Last administered on 8/21/20at 

08:16;  Start 8/20/20 at 21:00;  Stop 8/21/20 at 09:45;  Status DC


Midazolam HCl (Versed) 5 mg STK-MED ONCE .ROUTE ;  Start 8/20/20 at 19:17;  Stop

8/20/20 at 19:18;  Status DC


Midazolam HCl 50 mg/Sodium Chloride 50 ml @ 0 mls/hr 1X  ONCE IV ;  Start 

8/20/20 at 19:30;  Stop 8/20/20 at 19:31;  Status UNV


Albuterol/ Ipratropium (Duoneb) 3 ml STK-MED ONCE .ROUTE ;  Start 8/20/20 at 

19:27;  Stop 8/20/20 at 19:27;  Status DC


Midazolam HCl 100 ml @ 0 mls/hr CONT  PRN IV SEE PROTOCOL Last administered on 

8/22/20at 06:09;  Start 8/20/20 at 19:30


Propofol 50 ml @ 0 mls/hr 1X  ONCE IV  Last administered on 8/20/20at 18:55;  

Start 8/20/20 at 20:00;  Stop 8/20/20 at 20:01;  Status DC


Midazolam HCl (Versed) 5 mg 1X  ONCE NS  Last administered on 8/20/20at 19:18;  

Start 8/20/20 at 19:18;  Stop 8/20/20 at 19:59;  Status DC


Piperacillin Sod/ Tazobactam Sod 3.375 gm/Sodium Chloride 50 ml @  100 mls/hr 1X

 ONCE IV  Last administered on 8/20/20at 21:47;  Start 8/20/20 at 20:45;  Stop 

8/20/20 at 21:14;  Status DC


Dexmedetomidine HCl 400 mcg/ Sodium Chloride 100 ml @ 0 mls/hr CONT  PRN IV 

SEDATION Last administered on 8/22/20at 06:10;  Start 8/20/20 at 20:15


Sodium Chloride 500 ml @  500 mls/hr 1X PRN  PRN IV SEE COMMENTS;  Start 8/20/20

at 20:15


Atropine Sulfate (ATROPINE 0.5mg SYRINGE) 0.5 mg PRN Q5MIN  PRN IV SEE COMMENTS;

 Start 8/20/20 at 20:15


Fentanyl Citrate (Fentanyl 2ml Vial) 100 mcg 1X  ONCE IVP  Last administered on 

8/20/20at 20:17;  Start 8/20/20 at 20:15;  Stop 8/20/20 at 20:18;  Status DC


Methylprednisolone Sodium Succinate (SOLU-Medrol 40MG VIAL) 40 mg Q8HRS IV  Last

administered on 8/22/20at 06:09;  Start 8/20/20 at 22:00


Enoxaparin Sodium (Lovenox 40mg Syringe) 40 mg BID SQ  Last administered on 

8/21/20at 08:15;  Start 8/20/20 at 21:30;  Stop 8/21/20 at 09:47;  Status DC


Potassium Chloride/Dextrose/ Sod Cl 1,000 ml @  125 mls/hr Q8H ONCE IV  Last 

administered on 8/20/20at 22:13;  Start 8/20/20 at 21:30;  Stop 8/21/20 at 05:

29;  Status DC


Zinc Sulfate (Orazinc) 220 mg DAILY PO  Last administered on 8/22/20at 09:26;  

Start 8/21/20 at 09:00


Ondansetron HCl (Zofran) 4 mg PRN Q4HRS  PRN IV NAUSEA/VOMITING;  Start 8/20/20 

at 21:30


Acetaminophen (Tylenol) 650 mg PRN Q4HRS  PRN GT TEMP OVER 100.4F OR MILD PAIN; 

Start 8/20/20 at 21:30


Acetaminophen (Tylenol Supp) 650 mg PRN Q4HRS  PRN [place] TEMP OVER 100.4F OR MILD 

PAIN;  Start 8/20/20 at 21:30


Docusate Sodium (Colace) 100 mg PRN BID  PRN PO HARD STOOLS;  Start 8/20/20 at 

21:30


Piperacillin Sod/ Tazobactam Sod 3.375 gm/Sodium Chloride 50 ml @  100 mls/hr 

Q6HRS IV  Last administered on 8/22/20at 06:09;  Start 8/21/20 at 00:00


Pantoprazole Sodium (PROTONIX VIAL for IV PUSH) 40 mg DAILYAC IVP  Last 

administered on 8/22/20at 07:51;  Start 8/21/20 at 07:30


Norepinephrine Bitartrate 8 mg/ Dextrose 258 ml @  12.578 mls/ hr CONT  PRN IV 

PER PROTOCOL Last administered on 8/20/20at 22:15;  Start 8/20/20 at 21:45


Sodium Chloride 1,000 ml @  1,000 mls/hr 1X  ONCE IV  Last administered on 

8/20/20at 21:44;  Start 8/20/20 at 21:45;  Stop 8/20/20 at 22:44;  Status DC


Fentanyl Citrate 30 ml @ 0 mls/hr CONT  PRN IV SEE PROTOCOL Last administered on

8/22/20at 10:44;  Start 8/20/20 at 21:45


Vecuronium Bromide (Norcuron Bolus) 6 mg PRN Q2HR  PRN IV VENT ASYNCHRONY Last 

administered on 8/22/20at 06:36;  Start 8/20/20 at 21:45


Sodium Chloride 1,000 ml @  1,000 mls/hr 1X  ONCE IV  Last administered on 

8/20/20at 22:36;  Start 8/20/20 at 22:30;  Stop 8/20/20 at 23:29;  Status DC


Methylprednisolone Sodium Succinate (SOLU-Medrol 40MG VIAL) 40 mg Q8HRS IV ;  

Start 8/21/20 at 14:00;  Status UNV


Enoxaparin Sodium (Lovenox 80mg Syringe) 80 mg BID SQ  Last administered on 

8/21/20at 20:53;  Start 8/21/20 at 21:00;  Stop 8/22/20 at 08:57;  Status DC


Insulin Human Lispro (HumaLOG) 6 units TID SQ ;  Start 8/21/20 at 14:00;  Stop 

8/21/20 at 10:05;  Status DC


Insulin Glargine (Lantus Syringe) 10 unit BID SQ  Last administered on 8/22/20at

09:27;  Start 8/21/20 at 21:00


Ascorbic Acid (Vitamin C) 250 mg Q6HRS PO  Last administered on 8/22/20at 06:09;

 Start 8/21/20 at 12:00


Sodium Chloride 1,000 ml @  75 mls/hr [id number] IV  Last administered on 8/22/20at

00:22;  Start 8/21/20 at 10:00


Insulin Human Lispro (HumaLOG) 6 units Q6HRS SQ  Last administered on 8/22/20at 

06:11;  Start 8/21/20 at 12:00


Insulin Glargine (Lantus Syringe) 10 unit 1X  ONCE SQ  Last administered on 

8/21/20at 11:40;  Start 8/21/20 at 11:00;  Stop 8/21/20 at 11:01;  Status DC


Enoxaparin Sodium (Lovenox 60mg Syringe) 60 mg BID SQ  Last administered on 

8/22/20at 09:26;  Start 8/22/20 at 09:00


Vecuronium Bromide (Norcuron Bolus) 6 mg PRN Q6HRS  PRN IV SEDATION;  Start 

8/22/20 at 09:30


Vitals/I & O





Vital Sign - Last 24 Hours








 8/21/20 8/21/20 8/21/20 8/21/20





 12:00 12:00 12:15 13:00


 


Temp  99.2  





  99.2  


 


Pulse  72  66


 


Resp  20  20


 


B/P (MAP)  91/59 (70)  95/62 (73)


 


Pulse Ox  100 100 100


 


O2 Delivery Mechanical Ventilator  Ventilator 


 


    





    





 8/21/20 8/21/20 8/21/20 8/21/20





 14:00 15:00 15:46 16:00


 


Temp    99.3





    99.3


 


Pulse 64 66  67


 


Resp 20 20 26


 


B/P (MAP) 86/59 (68) 117/83 (94)  101/67 (78)


 


Pulse Ox 98 100 100 96


 


O2 Delivery   Ventilator 


 


    





    





 8/21/20 8/21/20 8/21/20 8/21/20





 16:00 16:24 17:00 18:00


 


Pulse   67 66


 


Resp   22 20


 


B/P (MAP)   103/61 (75) 101/60 (74)


 


Pulse Ox  99 100 100


 


O2 Delivery Mechanical Ventilator Ventilator  





 8/21/20 8/21/20 8/21/20 8/21/20





 19:00 20:00 20:00 21:00


 


Temp   99.0 





   99.0 


 


Pulse 66  64 66


 


Resp 20 22 20


 


B/P (MAP) 108/64 (79)  105/63 (77) 110/66 (81)


 


Pulse Ox 100  100 96


 


O2 Delivery  Mechanical Ventilator  


 


    





    





 8/21/20 8/21/20 8/21/20 8/21/20





 21:00 21:54 22:00 22:54


 


Temp  98.7  99.0





  98.7  99.0


 


Pulse  66 66 67


 


Resp  22 22 22


 


B/P (MAP)  112/69 112/69 (83) 114/68


 


Pulse Ox 96  96 


 


O2 Delivery Ventilator   


 


    





    





 8/21/20 8/21/20 8/22/20 8/22/20





 23:00 23:54 00:00 00:00


 


Temp  98.6  98.6





  98.6  98.6


 


Pulse 67 74  74


 


Resp 22 22 22


 


B/P (MAP) 114/68 (83) 119/71  119/71 (87)


 


Pulse Ox 99   100


 


O2 Delivery   Mechanical Ventilator 


 


    





    





 8/22/20 8/22/20 8/22/20 8/22/20





 00:46 01:00 01:52 02:00


 


Temp   98.5 





   98.5 


 


Pulse  70 73 73


 


Resp  22 22 22


 


B/P (MAP)  114/64 (81) 117/64 117/64 (81)


 


Pulse Ox 95 99  96


 


O2 Delivery Ventilator   


 


    





    





 8/22/20 8/22/20 8/22/20 8/22/20





 03:00 04:00 04:00 05:00


 


Temp   99.8 





   99.8 


 


Pulse 71  73 84


 


Resp 22 22 28


 


B/P (MAP) 126/67 (86)  115/60 (78) 126/64 (84)


 


Pulse Ox 99  100 97


 


O2 Delivery  Mechanical Ventilator  


 


    





    





 8/22/20 8/22/20 8/22/20 8/22/20





 05:00 06:00 07:00 07:45


 


Pulse  75 82 


 


Resp  30 24 


 


B/P (MAP)  137/67 (90) 142/68 (92) 


 


Pulse Ox 95 98 100 98


 


O2 Delivery Ventilator   Ventilator





 8/22/20 8/22/20 8/22/20 8/22/20





 08:00 08:00 09:00 10:00


 


Temp  99.2  





  99.2  


 


Pulse  83 85 82


 


Resp  23 35 36


 


B/P (MAP)  145/72 (96) 135/67 (89) 145/73 (97)


 


Pulse Ox  99 93 96


 


O2 Delivery Mechanical Ventilator   


 


    





    





 8/22/20 8/22/20  





 10:44 11:00  


 


Pulse  82  


 


Resp 22 37  


 


B/P (MAP)  143/68 (93)  


 


Pulse Ox 96 94  


 


O2 Delivery Ventilator   














Intake and Output   


 


 8/21/20 8/21/20 8/22/20





 15:00 23:00 07:00


 


Intake Total 225 ml 678 ml 1969 ml


 


Output Total 370 ml 255 ml 450 ml


 


Balance -145 ml 423 ml 1519 ml











Justicifation of Admission Dx:


Justifications for Admission:


Justification of Admission Dx:  Yes











ELIJAH AGUSTIN MD             Aug 22, 2020 11:24

## 2020-08-22 NOTE — NUR
Domenico's Renal Function has Changed



8/21

BUN 29

Creat 1.2



08/22

BUN 50

Creat 1.4



Paged Dr. Dillon to discuss need for Renal Consult?

## 2020-08-22 NOTE — PDOC
PULMONARY PROGRESS NOTES


DATE: 8/22/20 


TIME: 08:49


Subjective


remains intubated/sedated


AC mode ,90%FIO2/10 PEEP


Vitals





Vital Signs








  Date Time  Temp Pulse Resp B/P (MAP) Pulse Ox O2 Delivery O2 Flow Rate FiO2


 


8/22/20 08:00 99.2 83 23 145/72 (96) 99   





 99.2       


 


8/22/20 08:00      Mechanical Ventilator  








Comments


visual exam done due to COVID-Pandemia


no resp distess


no skin rash


lower extremity edema


Labs





Laboratory Tests








Test


 8/20/20


18:30 8/20/20


19:00 8/20/20


19:35 8/20/20


19:41


 


White Blood Count


 14.4 x10^3/uL


(4.0-11.0) 


 


 





 


Red Blood Count


 4.33 x10^6/uL


(4.30-5.70) 


 


 





 


Hemoglobin


 13.3 g/dL


(13.0-17.5) 


 


 





 


Hematocrit


 38.5 %


(39.0-53.0) 


 


 





 


Mean Corpuscular Volume 89 fL ()    


 


Mean Corpuscular Hemoglobin 31 pg (25-35)    


 


Mean Corpuscular Hemoglobin


Concent 34 g/dL


(31-37) 


 


 





 


Red Cell Distribution Width


 13.1 %


(11.5-14.5) 


 


 





 


Platelet Count


 294 x10^3/uL


(140-400) 


 


 





 


Neutrophils (%) (Auto) 87 % (31-73)    


 


Lymphocytes (%) (Auto) 8 % (24-48)    


 


Monocytes (%) (Auto) 5 % (0-9)    


 


Eosinophils (%) (Auto) 0 % (0-3)    


 


Basophils (%) (Auto) 0 % (0-3)    


 


Neutrophils # (Auto)


 12.5 x10^3/uL


(1.8-7.7) 


 


 





 


Lymphocytes # (Auto)


 1.1 x10^3/uL


(1.0-4.8) 


 


 





 


Monocytes # (Auto)


 0.8 x10^3/uL


(0.0-1.1) 


 


 





 


Eosinophils # (Auto)


 0.0 x10^3/uL


(0.0-0.7) 


 


 





 


Basophils # (Auto)


 0.0 x10^3/uL


(0.0-0.2) 


 


 





 


Segmented Neutrophils % 63 % (35-66)    


 


Band Neutrophils % 18 % (0-9)    


 


Lymphocytes % 7 % (24-48)    


 


Atypical Lymphocytes %


(Manual) 2 % (0-0) 


 


 


 





 


Monocytes % 4 % (0-10)    


 


Eosinophils % 2 % (0-5)    


 


Basophils % 1 % (0-3)    


 


Metamyelocytes % 3 % (0-0)    


 


Platelet Estimate


 Adequate


(ADEQUATE) 


 


 





 


Giant Platelets     


 


Polychromasia Slight    


 


D-Dimer (Lexii)


 4.02 ug/mlFEU


(0.00-0.50) 


 


 





 


Sodium Level


 129 mmol/L


(136-145) 


 


 





 


Potassium Level


 3.6 mmol/L


(3.5-5.1) 


 


 





 


Chloride Level


 90 mmol/L


() 


 


 





 


Carbon Dioxide Level


 22 mmol/L


(21-32) 


 


 





 


Anion Gap 17 (6-14)    


 


Blood Urea Nitrogen


 25 mg/dL


(8-26) 


 


 





 


Creatinine


 1.6 mg/dL


(0.7-1.3) 


 


 





 


Estimated GFR


(Cockcroft-Gault) 45.1 


 


 


 





 


BUN/Creatinine Ratio 16 (6-20)    


 


Glucose Level


 301 mg/dL


(70-99) 


 


 





 


Calcium Level


 8.4 mg/dL


(8.5-10.1) 


 


 





 


Magnesium Level


 2.4 mg/dL


(1.8-2.4) 


 


 





 


Ferritin


 1683 ng/mL


() 


 


 





 


Total Bilirubin


 1.4 mg/dL


(0.2-1.0) 


 


 





 


Aspartate Amino Transf


(AST/SGOT) 65 U/L (15-37) 


 


 


 





 


Alanine Aminotransferase


(ALT/SGPT) 63 U/L (16-63) 


 


 


 





 


Alkaline Phosphatase


 141 U/L


() 


 


 





 


Creatine Kinase


 120 U/L


() 


 


 





 


C-Reactive Protein,


Quantitative 342.9 mg/L


(0-3.3) 


 


 





 


Total Protein


 7.9 g/dL


(6.4-8.2) 


 


 





 


Albumin


 2.6 g/dL


(3.4-5.0) 


 


 





 


Albumin/Globulin Ratio 0.5 (1.0-1.7)    


 


Lipase


 109 U/L


() 


 


 





 


O2 Saturation  95 % (92-99)   


 


Arterial Blood pH


 


 7.38


(7.35-7.45) 


 





 


Arterial Blood pCO2 at


Patient Temp 


 31 mmHg


(35-46) 


 





 


Arterial Blood pO2 at Patient


Temp 


 82 mmHg


() 


 





 


Arterial Blood HCO3


 


 18 mmol/L


(21-28) 


 





 


Arterial Blood Base Excess


 


 -6 mmol/L


(-3-3) 


 





 


FiO2  95   


 


Coronavirus (PCR)


 


 


 Detected (Not


Detected) 





 


Urine Collection Type    U cath 


 


Urine Color    Orange 


 


Urine Clarity    Cloudy 


 


Urine pH    5.0 (<5.0-8.0) 


 


Urine Specific Gravity


 


 


 


 1.025


(1.000-1.030)


 


Urine Protein


 


 


 


 30 mg/dL


(NEG-TRACE)


 


Urine Glucose (UA)


 


 


 


 250 mg/dL


(NEG)


 


Urine Ketones (Stick)


 


 


 


 Trace mg/dL


(NEG)


 


Urine Blood    Negative (NEG) 


 


Urine Nitrite    Negative (NEG) 


 


Urine Bilirubin    Small (NEG) 


 


Urine Urobilinogen Dipstick


 


 


 


 2.0 mg/dL (0.2


mg/dL)


 


Urine Leukocyte Esterase    Trace (NEG) 


 


Urine RBC    Occ /HPF (0-2) 


 


Urine WBC    1-4 /HPF (0-4) 


 


Urine Squamous Epithelial


Cells 


 


 


 Occ /LPF 





 


Urine Amorphous Sediment    Present /HPF 


 


Urine Bacteria    0 /HPF (0-FEW) 


 


Urine Hyaline Casts    Moderate /HPF 


 


Urine Granular Casts


 


 


 


 Occasional


/HPF


 


Urine Mucus    Marked /LPF 


 


Test


 8/21/20


05:30 8/21/20


08:15 8/21/20


17:41 8/21/20


20:51


 


White Blood Count


 10.4 x10^3/uL


(4.0-11.0) 


 


 





 


Red Blood Count


 3.73 x10^6/uL


(4.30-5.70) 


 


 





 


Hemoglobin


 11.4 g/dL


(13.0-17.5) 


 


 





 


Hematocrit


 33.0 %


(39.0-53.0) 


 


 





 


Mean Corpuscular Volume 88 fL ()    


 


Mean Corpuscular Hemoglobin 31 pg (25-35)    


 


Mean Corpuscular Hemoglobin


Concent 35 g/dL


(31-37) 


 


 





 


Red Cell Distribution Width


 13.1 %


(11.5-14.5) 


 


 





 


Platelet Count


 212 x10^3/uL


(140-400) 


 


 





 


Neutrophils (%) (Auto) 91 % (31-73)    


 


Lymphocytes (%) (Auto) 5 % (24-48)    


 


Monocytes (%) (Auto) 4 % (0-9)    


 


Eosinophils (%) (Auto) 0 % (0-3)    


 


Basophils (%) (Auto) 0 % (0-3)    


 


Neutrophils # (Auto)


 9.4 x10^3/uL


(1.8-7.7) 


 


 





 


Lymphocytes # (Auto)


 0.5 x10^3/uL


(1.0-4.8) 


 


 





 


Monocytes # (Auto)


 0.4 x10^3/uL


(0.0-1.1) 


 


 





 


Eosinophils # (Auto)


 0.0 x10^3/uL


(0.0-0.7) 


 


 





 


Basophils # (Auto)


 0.0 x10^3/uL


(0.0-0.2) 


 


 





 


Sodium Level


 130 mmol/L


(136-145) 


 


 





 


Potassium Level


 4.5 mmol/L


(3.5-5.1) 


 


 





 


Chloride Level


 97 mmol/L


() 


 


 





 


Carbon Dioxide Level


 23 mmol/L


(21-32) 


 


 





 


Anion Gap 10 (6-14)    


 


Blood Urea Nitrogen


 29 mg/dL


(8-26) 


 


 





 


Creatinine


 1.2 mg/dL


(0.7-1.3) 


 


 





 


Estimated GFR


(Cockcroft-Gault) 62.9 


 


 


 





 


BUN/Creatinine Ratio 24 (6-20)    


 


Glucose Level


 420 mg/dL


(70-99) 


 


 





 


Calcium Level


 7.5 mg/dL


(8.5-10.1) 


 


 





 


Total Bilirubin


 1.1 mg/dL


(0.2-1.0) 


 


 





 


Aspartate Amino Transf


(AST/SGOT) 57 U/L (15-37) 


 


 


 





 


Alanine Aminotransferase


(ALT/SGPT) 55 U/L (16-63) 


 


 


 





 


Alkaline Phosphatase


 103 U/L


() 


 


 





 


Total Protein


 6.5 g/dL


(6.4-8.2) 


 


 





 


Albumin


 2.0 g/dL


(3.4-5.0) 


 


 





 


Albumin/Globulin Ratio 0.4 (1.0-1.7)    


 


O2 Saturation  92 % (92-99)   


 


Arterial Blood pH


 


 7.34


(7.35-7.45) 


 





 


Arterial Blood pCO2 at


Patient Temp 


 36 mmHg


(35-46) 


 





 


Arterial Blood pO2 at Patient


Temp 


 72 mmHg


() 


 





 


Arterial Blood HCO3


 


 19 mmol/L


(21-28) 


 





 


Arterial Blood Base Excess


 


 -6 mmol/L


(-3-3) 


 





 


FiO2  100   


 


Glucose (Fingerstick)


 


 


 285 mg/dL


(70-99) 257 mg/dL


(70-99)


 


Test


 8/22/20


00:29 8/22/20


05:30 8/22/20


06:00 





 


Glucose (Fingerstick)


 316 mg/dL


(70-99) 


 351 mg/dL


(70-99) 





 


White Blood Count


 


 12.0 x10^3/uL


(4.0-11.0) 


 





 


Red Blood Count


 


 3.36 x10^6/uL


(4.30-5.70) 


 





 


Hemoglobin


 


 10.4 g/dL


(13.0-17.5) 


 





 


Hematocrit


 


 30.3 %


(39.0-53.0) 


 





 


Mean Corpuscular Volume  90 fL ()   


 


Mean Corpuscular Hemoglobin  31 pg (25-35)   


 


Mean Corpuscular Hemoglobin


Concent 


 34 g/dL


(31-37) 


 





 


Red Cell Distribution Width


 


 13.4 %


(11.5-14.5) 


 





 


Platelet Count


 


 215 x10^3/uL


(140-400) 


 





 


Neutrophils (%) (Auto)  93 % (31-73)   


 


Lymphocytes (%) (Auto)  4 % (24-48)   


 


Monocytes (%) (Auto)  3 % (0-9)   


 


Eosinophils (%) (Auto)  0 % (0-3)   


 


Basophils (%) (Auto)  0 % (0-3)   


 


Neutrophils # (Auto)


 


 11.1 x10^3/uL


(1.8-7.7) 


 





 


Lymphocytes # (Auto)


 


 0.4 x10^3/uL


(1.0-4.8) 


 





 


Monocytes # (Auto)


 


 0.4 x10^3/uL


(0.0-1.1) 


 





 


Eosinophils # (Auto)


 


 0.0 x10^3/uL


(0.0-0.7) 


 





 


Basophils # (Auto)


 


 0.0 x10^3/uL


(0.0-0.2) 


 





 


Sodium Level


 


 138 mmol/L


(136-145) 


 





 


Potassium Level


 


 3.7 mmol/L


(3.5-5.1) 


 





 


Chloride Level


 


 104 mmol/L


() 


 





 


Carbon Dioxide Level


 


 21 mmol/L


(21-32) 


 





 


Anion Gap  13 (6-14)   


 


Blood Urea Nitrogen


 


 50 mg/dL


(8-26) 


 





 


Creatinine


 


 1.4 mg/dL


(0.7-1.3) 


 





 


Estimated GFR


(Cockcroft-Gault) 


 52.6 


 


 





 


BUN/Creatinine Ratio  36 (6-20)   


 


Glucose Level


 


 350 mg/dL


(70-99) 


 





 


Calcium Level


 


 7.3 mg/dL


(8.5-10.1) 


 





 


Total Bilirubin


 


 0.9 mg/dL


(0.2-1.0) 


 





 


Aspartate Amino Transf


(AST/SGOT) 


 96 U/L (15-37) 


 


 





 


Alanine Aminotransferase


(ALT/SGPT) 


 74 U/L (16-63) 


 


 





 


Alkaline Phosphatase


 


 110 U/L


() 


 





 


Total Protein


 


 5.5 g/dL


(6.4-8.2) 


 





 


Albumin


 


 1.9 g/dL


(3.4-5.0) 


 





 


Albumin/Globulin Ratio  0.5 (1.0-1.7)   








Laboratory Tests








Test


 8/21/20


17:41 8/21/20


20:51 8/22/20


00:29 8/22/20


05:30


 


Glucose (Fingerstick)


 285 mg/dL


(70-99) 257 mg/dL


(70-99) 316 mg/dL


(70-99) 





 


White Blood Count


 


 


 


 12.0 x10^3/uL


(4.0-11.0)


 


Red Blood Count


 


 


 


 3.36 x10^6/uL


(4.30-5.70)


 


Hemoglobin


 


 


 


 10.4 g/dL


(13.0-17.5)


 


Hematocrit


 


 


 


 30.3 %


(39.0-53.0)


 


Mean Corpuscular Volume    90 fL () 


 


Mean Corpuscular Hemoglobin    31 pg (25-35) 


 


Mean Corpuscular Hemoglobin


Concent 


 


 


 34 g/dL


(31-37)


 


Red Cell Distribution Width


 


 


 


 13.4 %


(11.5-14.5)


 


Platelet Count


 


 


 


 215 x10^3/uL


(140-400)


 


Neutrophils (%) (Auto)    93 % (31-73) 


 


Lymphocytes (%) (Auto)    4 % (24-48) 


 


Monocytes (%) (Auto)    3 % (0-9) 


 


Eosinophils (%) (Auto)    0 % (0-3) 


 


Basophils (%) (Auto)    0 % (0-3) 


 


Neutrophils # (Auto)


 


 


 


 11.1 x10^3/uL


(1.8-7.7)


 


Lymphocytes # (Auto)


 


 


 


 0.4 x10^3/uL


(1.0-4.8)


 


Monocytes # (Auto)


 


 


 


 0.4 x10^3/uL


(0.0-1.1)


 


Eosinophils # (Auto)


 


 


 


 0.0 x10^3/uL


(0.0-0.7)


 


Basophils # (Auto)


 


 


 


 0.0 x10^3/uL


(0.0-0.2)


 


Sodium Level


 


 


 


 138 mmol/L


(136-145)


 


Potassium Level


 


 


 


 3.7 mmol/L


(3.5-5.1)


 


Chloride Level


 


 


 


 104 mmol/L


()


 


Carbon Dioxide Level


 


 


 


 21 mmol/L


(21-32)


 


Anion Gap    13 (6-14) 


 


Blood Urea Nitrogen


 


 


 


 50 mg/dL


(8-26)


 


Creatinine


 


 


 


 1.4 mg/dL


(0.7-1.3)


 


Estimated GFR


(Cockcroft-Gault) 


 


 


 52.6 





 


BUN/Creatinine Ratio    36 (6-20) 


 


Glucose Level


 


 


 


 350 mg/dL


(70-99)


 


Calcium Level


 


 


 


 7.3 mg/dL


(8.5-10.1)


 


Total Bilirubin


 


 


 


 0.9 mg/dL


(0.2-1.0)


 


Aspartate Amino Transf


(AST/SGOT) 


 


 


 96 U/L (15-37) 





 


Alanine Aminotransferase


(ALT/SGPT) 


 


 


 74 U/L (16-63) 





 


Alkaline Phosphatase


 


 


 


 110 U/L


()


 


Total Protein


 


 


 


 5.5 g/dL


(6.4-8.2)


 


Albumin


 


 


 


 1.9 g/dL


(3.4-5.0)


 


Albumin/Globulin Ratio    0.5 (1.0-1.7) 


 


Test


 8/22/20


06:00 


 


 





 


Glucose (Fingerstick)


 351 mg/dL


(70-99) 


 


 














Impression


.


1.  Acute hypoxic respiratory failure secondary to COVID-19 pneumonia/ARDS/acute


lung injury.


2.  Abnormal chest x-ray with bilateral infiltrates consistent with COVID-19


pneumonia.


3.  Hypotension secondary to combination of hypovolemia and sepsis status post 2


liters of IV fluids. no pressors


4.  Acute kidney injury, improving.


5.  Severe protein-calorie malnutrition.


6.  Abnormal D-dimer related to COVID-19 pneumonia.  Currently on DVT


prophylaxis.





Plan


.


RECOMMENDATIONS:


1.  Continue present assist control mode, PEEP of 10 and FiO2 of 90% and


gradually wean based on improvement clinically.


2.  Follow chest x-rays as needed.


3.  Broad spectrum antibiotic.


4.  IV steroids 


5.  DVT prophylaxis with Lovenox high dose. 


6.  The patient would require intubation for minimal 1 week.


7.  Stress ulcer prophylaxis.


8.  Discussed with RN and RT.


9.  s/p Plasma


10. repeat D-Dimer Monday





Critical care time 30 minutes.











ANGELA HUFFMAN MD                 Aug 22, 2020 08:56

## 2020-08-23 VITALS — DIASTOLIC BLOOD PRESSURE: 90 MMHG | SYSTOLIC BLOOD PRESSURE: 169 MMHG

## 2020-08-23 VITALS — DIASTOLIC BLOOD PRESSURE: 81 MMHG | SYSTOLIC BLOOD PRESSURE: 151 MMHG

## 2020-08-23 VITALS — SYSTOLIC BLOOD PRESSURE: 180 MMHG | DIASTOLIC BLOOD PRESSURE: 92 MMHG

## 2020-08-23 VITALS — SYSTOLIC BLOOD PRESSURE: 152 MMHG | DIASTOLIC BLOOD PRESSURE: 84 MMHG

## 2020-08-23 VITALS — DIASTOLIC BLOOD PRESSURE: 89 MMHG | SYSTOLIC BLOOD PRESSURE: 162 MMHG

## 2020-08-23 VITALS — SYSTOLIC BLOOD PRESSURE: 174 MMHG | DIASTOLIC BLOOD PRESSURE: 96 MMHG

## 2020-08-23 VITALS — DIASTOLIC BLOOD PRESSURE: 91 MMHG | SYSTOLIC BLOOD PRESSURE: 163 MMHG

## 2020-08-23 VITALS — SYSTOLIC BLOOD PRESSURE: 172 MMHG | DIASTOLIC BLOOD PRESSURE: 81 MMHG

## 2020-08-23 VITALS — DIASTOLIC BLOOD PRESSURE: 87 MMHG | SYSTOLIC BLOOD PRESSURE: 150 MMHG

## 2020-08-23 VITALS — DIASTOLIC BLOOD PRESSURE: 85 MMHG | SYSTOLIC BLOOD PRESSURE: 166 MMHG

## 2020-08-23 VITALS — SYSTOLIC BLOOD PRESSURE: 175 MMHG | DIASTOLIC BLOOD PRESSURE: 91 MMHG

## 2020-08-23 VITALS — DIASTOLIC BLOOD PRESSURE: 83 MMHG | SYSTOLIC BLOOD PRESSURE: 159 MMHG

## 2020-08-23 VITALS — DIASTOLIC BLOOD PRESSURE: 69 MMHG | SYSTOLIC BLOOD PRESSURE: 128 MMHG

## 2020-08-23 VITALS — DIASTOLIC BLOOD PRESSURE: 92 MMHG | SYSTOLIC BLOOD PRESSURE: 172 MMHG

## 2020-08-23 VITALS — DIASTOLIC BLOOD PRESSURE: 71 MMHG | SYSTOLIC BLOOD PRESSURE: 157 MMHG

## 2020-08-23 VITALS — DIASTOLIC BLOOD PRESSURE: 80 MMHG | SYSTOLIC BLOOD PRESSURE: 147 MMHG

## 2020-08-23 VITALS — SYSTOLIC BLOOD PRESSURE: 167 MMHG | DIASTOLIC BLOOD PRESSURE: 86 MMHG

## 2020-08-23 VITALS — SYSTOLIC BLOOD PRESSURE: 155 MMHG | DIASTOLIC BLOOD PRESSURE: 85 MMHG

## 2020-08-23 VITALS — SYSTOLIC BLOOD PRESSURE: 144 MMHG | DIASTOLIC BLOOD PRESSURE: 79 MMHG

## 2020-08-23 VITALS — DIASTOLIC BLOOD PRESSURE: 83 MMHG | SYSTOLIC BLOOD PRESSURE: 152 MMHG

## 2020-08-23 VITALS — SYSTOLIC BLOOD PRESSURE: 145 MMHG | DIASTOLIC BLOOD PRESSURE: 75 MMHG

## 2020-08-23 VITALS — SYSTOLIC BLOOD PRESSURE: 161 MMHG | DIASTOLIC BLOOD PRESSURE: 82 MMHG

## 2020-08-23 VITALS — DIASTOLIC BLOOD PRESSURE: 93 MMHG | SYSTOLIC BLOOD PRESSURE: 166 MMHG

## 2020-08-23 VITALS — SYSTOLIC BLOOD PRESSURE: 170 MMHG | DIASTOLIC BLOOD PRESSURE: 96 MMHG

## 2020-08-23 LAB
ALBUMIN SERPL-MCNC: 1.7 G/DL (ref 3.4–5)
ALBUMIN/GLOB SERPL: 0.5 {RATIO} (ref 1–1.7)
ALP SERPL-CCNC: 139 U/L (ref 46–116)
ALT SERPL-CCNC: 101 U/L (ref 16–63)
ANION GAP SERPL CALC-SCNC: 10 MMOL/L (ref 6–14)
AST SERPL-CCNC: 106 U/L (ref 15–37)
BASE EXCESS ABG: -4 MMOL/L (ref -3–3)
BASOPHILS # BLD AUTO: 0.1 X10^3/UL (ref 0–0.2)
BASOPHILS NFR BLD: 1 % (ref 0–3)
BILIRUB SERPL-MCNC: 0.7 MG/DL (ref 0.2–1)
BUN SERPL-MCNC: 44 MG/DL (ref 8–26)
BUN/CREAT SERPL: 31 (ref 6–20)
CALCIUM SERPL-MCNC: 6.9 MG/DL (ref 8.5–10.1)
CHLORIDE SERPL-SCNC: 109 MMOL/L (ref 98–107)
CO2 SERPL-SCNC: 24 MMOL/L (ref 21–32)
CREAT SERPL-MCNC: 1.4 MG/DL (ref 0.7–1.3)
EOSINOPHIL NFR BLD: 0 % (ref 0–3)
EOSINOPHIL NFR BLD: 0 X10^3/UL (ref 0–0.7)
ERYTHROCYTE [DISTWIDTH] IN BLOOD BY AUTOMATED COUNT: 13.4 % (ref 11.5–14.5)
GFR SERPLBLD BASED ON 1.73 SQ M-ARVRAT: 52.6 ML/MIN
GLOBULIN SER-MCNC: 3.6 G/DL (ref 2.2–3.8)
GLUCOSE SERPL-MCNC: 462 MG/DL (ref 70–99)
HCO3 BLDA-SCNC: 20 MMOL/L (ref 21–28)
HCT VFR BLD CALC: 31.2 % (ref 39–53)
HGB BLD-MCNC: 10.6 G/DL (ref 13–17.5)
INSPIRATION SETTING TIME VENT: (no result)
LYMPHOCYTES # BLD: 0.5 X10^3/UL (ref 1–4.8)
LYMPHOCYTES NFR BLD AUTO: 5 % (ref 24–48)
MCH RBC QN AUTO: 31 PG (ref 25–35)
MCHC RBC AUTO-ENTMCNC: 34 G/DL (ref 31–37)
MCV RBC AUTO: 91 FL (ref 79–100)
MONO #: 0.4 X10^3/UL (ref 0–1.1)
MONOCYTES NFR BLD: 4 % (ref 0–9)
NEUT #: 10.2 X10^3/UL (ref 1.8–7.7)
NEUTROPHILS NFR BLD AUTO: 91 % (ref 31–73)
PCO2 BLDA: 34 MMHG (ref 35–46)
PLATELET # BLD AUTO: 202 X10^3/UL (ref 140–400)
PO2 BLDA: 46 MMHG (ref 75–108)
POTASSIUM SERPL-SCNC: 4.4 MMOL/L (ref 3.5–5.1)
PROT SERPL-MCNC: 5.3 G/DL (ref 6.4–8.2)
RBC # BLD AUTO: 3.44 X10^6/UL (ref 4.3–5.7)
SODIUM SERPL-SCNC: 143 MMOL/L (ref 136–145)
WBC # BLD AUTO: 11.3 X10^3/UL (ref 4–11)

## 2020-08-23 RX ADMIN — OXYCODONE HYDROCHLORIDE AND ACETAMINOPHEN SCH MG: 500 TABLET ORAL at 23:28

## 2020-08-23 RX ADMIN — INSULIN LISPRO SCH UNITS: 100 INJECTION, SOLUTION INTRAVENOUS; SUBCUTANEOUS at 06:19

## 2020-08-23 RX ADMIN — OXYCODONE HYDROCHLORIDE AND ACETAMINOPHEN SCH MG: 500 TABLET ORAL at 17:46

## 2020-08-23 RX ADMIN — PROPOFOL PRN MLS/HR: 10 INJECTION, EMULSION INTRAVENOUS at 12:49

## 2020-08-23 RX ADMIN — OXYCODONE HYDROCHLORIDE AND ACETAMINOPHEN SCH MG: 500 TABLET ORAL at 11:39

## 2020-08-23 RX ADMIN — METHYLPREDNISOLONE SODIUM SUCCINATE SCH MG: 40 INJECTION, POWDER, FOR SOLUTION INTRAMUSCULAR; INTRAVENOUS at 14:41

## 2020-08-23 RX ADMIN — ZINC SULFATE CAP 220 MG (50 MG ELEMENTAL ZN) SCH MG: 220 (50 ZN) CAP at 09:02

## 2020-08-23 RX ADMIN — ACETAMINOPHEN PRN MG: 650 SOLUTION ORAL at 22:59

## 2020-08-23 RX ADMIN — INSULIN LISPRO SCH UNITS: 100 INJECTION, SOLUTION INTRAVENOUS; SUBCUTANEOUS at 11:42

## 2020-08-23 RX ADMIN — Medication PRN MLS/HR: at 10:24

## 2020-08-23 RX ADMIN — PROPOFOL PRN MLS/HR: 10 INJECTION, EMULSION INTRAVENOUS at 17:23

## 2020-08-23 RX ADMIN — PROPOFOL PRN MLS/HR: 10 INJECTION, EMULSION INTRAVENOUS at 02:35

## 2020-08-23 RX ADMIN — INSULIN GLARGINE SCH UNIT: 100 INJECTION, SOLUTION SUBCUTANEOUS at 09:02

## 2020-08-23 RX ADMIN — DEXMEDETOMIDINE HYDROCHLORIDE PRN MLS/HR: 100 INJECTION, SOLUTION, CONCENTRATE INTRAVENOUS at 12:58

## 2020-08-23 RX ADMIN — PANTOPRAZOLE SODIUM SCH MG: 40 INJECTION, POWDER, FOR SOLUTION INTRAVENOUS at 07:50

## 2020-08-23 RX ADMIN — OXYCODONE HYDROCHLORIDE AND ACETAMINOPHEN SCH MG: 500 TABLET ORAL at 05:56

## 2020-08-23 RX ADMIN — BACITRACIN SCH MLS/HR: 5000 INJECTION, POWDER, FOR SOLUTION INTRAMUSCULAR at 15:04

## 2020-08-23 RX ADMIN — INSULIN GLARGINE SCH UNIT: 100 INJECTION, SOLUTION SUBCUTANEOUS at 21:10

## 2020-08-23 RX ADMIN — PIPERACILLIN SODIUM AND TAZOBACTAM SODIUM SCH MLS/HR: 3; .375 INJECTION, POWDER, LYOPHILIZED, FOR SOLUTION INTRAVENOUS at 17:47

## 2020-08-23 RX ADMIN — PIPERACILLIN SODIUM AND TAZOBACTAM SODIUM SCH MLS/HR: 3; .375 INJECTION, POWDER, LYOPHILIZED, FOR SOLUTION INTRAVENOUS at 11:39

## 2020-08-23 RX ADMIN — MIDAZOLAM PRN MLS/HR: 5 INJECTION, SOLUTION INTRAMUSCULAR; INTRAVENOUS at 12:59

## 2020-08-23 RX ADMIN — INSULIN LISPRO SCH UNITS: 100 INJECTION, SOLUTION INTRAVENOUS; SUBCUTANEOUS at 17:52

## 2020-08-23 RX ADMIN — PROPOFOL PRN MLS/HR: 10 INJECTION, EMULSION INTRAVENOUS at 03:47

## 2020-08-23 RX ADMIN — ENOXAPARIN SODIUM SCH MG: 100 INJECTION SUBCUTANEOUS at 09:00

## 2020-08-23 RX ADMIN — DEXMEDETOMIDINE HYDROCHLORIDE PRN MLS/HR: 100 INJECTION, SOLUTION, CONCENTRATE INTRAVENOUS at 00:43

## 2020-08-23 RX ADMIN — DEXMEDETOMIDINE HYDROCHLORIDE PRN MLS/HR: 100 INJECTION, SOLUTION, CONCENTRATE INTRAVENOUS at 04:56

## 2020-08-23 RX ADMIN — BACITRACIN SCH MLS/HR: 5000 INJECTION, POWDER, FOR SOLUTION INTRAMUSCULAR at 03:40

## 2020-08-23 RX ADMIN — PROPOFOL PRN MLS/HR: 10 INJECTION, EMULSION INTRAVENOUS at 08:11

## 2020-08-23 RX ADMIN — DEXMEDETOMIDINE HYDROCHLORIDE PRN MLS/HR: 100 INJECTION, SOLUTION, CONCENTRATE INTRAVENOUS at 08:12

## 2020-08-23 RX ADMIN — DEXMEDETOMIDINE HYDROCHLORIDE PRN MLS/HR: 100 INJECTION, SOLUTION, CONCENTRATE INTRAVENOUS at 18:20

## 2020-08-23 RX ADMIN — METHYLPREDNISOLONE SODIUM SUCCINATE SCH MG: 40 INJECTION, POWDER, FOR SOLUTION INTRAMUSCULAR; INTRAVENOUS at 23:28

## 2020-08-23 RX ADMIN — PIPERACILLIN SODIUM AND TAZOBACTAM SODIUM SCH MLS/HR: 3; .375 INJECTION, POWDER, LYOPHILIZED, FOR SOLUTION INTRAVENOUS at 05:56

## 2020-08-23 RX ADMIN — PIPERACILLIN SODIUM AND TAZOBACTAM SODIUM SCH MLS/HR: 3; .375 INJECTION, POWDER, LYOPHILIZED, FOR SOLUTION INTRAVENOUS at 23:28

## 2020-08-23 RX ADMIN — ENOXAPARIN SODIUM SCH MG: 100 INJECTION SUBCUTANEOUS at 21:08

## 2020-08-23 RX ADMIN — METHYLPREDNISOLONE SODIUM SUCCINATE SCH MG: 40 INJECTION, POWDER, FOR SOLUTION INTRAMUSCULAR; INTRAVENOUS at 05:56

## 2020-08-23 RX ADMIN — VECURONIUM BROMIDE PRN MG: 1 INJECTION, POWDER, LYOPHILIZED, FOR SOLUTION INTRAVENOUS at 15:38

## 2020-08-23 RX ADMIN — PROPOFOL PRN MLS/HR: 10 INJECTION, EMULSION INTRAVENOUS at 21:09

## 2020-08-23 RX ADMIN — MIDAZOLAM PRN MLS/HR: 5 INJECTION, SOLUTION INTRAMUSCULAR; INTRAVENOUS at 04:55

## 2020-08-23 NOTE — PDOC
PULMONARY PROGRESS NOTES


DATE: 8/23/20 


TIME: 09:37


Subjective


remains intubated/sedated


AC mode ,100%FIO2/10 PEEP


nursing report hypoxia with minimal stimulation


Vitals





Vital Signs








  Date Time  Temp Pulse Resp B/P (MAP) Pulse Ox O2 Delivery O2 Flow Rate FiO2


 


8/23/20 09:00  73 21 144/79 (100) 94   


 


8/23/20 08:00 98.8       





 98.8       


 


8/23/20 08:00      Mechanical Ventilator  








Comments


visual exam done due to COVID-Pandemia


no resp distess


no skin rash


lower extremity edema


Labs





Laboratory Tests








Test


 8/21/20


17:41 8/21/20


20:51 8/22/20


00:29 8/22/20


05:30


 


Glucose (Fingerstick)


 285 mg/dL


(70-99) 257 mg/dL


(70-99) 316 mg/dL


(70-99) 





 


White Blood Count


 


 


 


 12.0 x10^3/uL


(4.0-11.0)


 


Red Blood Count


 


 


 


 3.36 x10^6/uL


(4.30-5.70)


 


Hemoglobin


 


 


 


 10.4 g/dL


(13.0-17.5)


 


Hematocrit


 


 


 


 30.3 %


(39.0-53.0)


 


Mean Corpuscular Volume    90 fL () 


 


Mean Corpuscular Hemoglobin    31 pg (25-35) 


 


Mean Corpuscular Hemoglobin


Concent 


 


 


 34 g/dL


(31-37)


 


Red Cell Distribution Width


 


 


 


 13.4 %


(11.5-14.5)


 


Platelet Count


 


 


 


 215 x10^3/uL


(140-400)


 


Neutrophils (%) (Auto)    93 % (31-73) 


 


Lymphocytes (%) (Auto)    4 % (24-48) 


 


Monocytes (%) (Auto)    3 % (0-9) 


 


Eosinophils (%) (Auto)    0 % (0-3) 


 


Basophils (%) (Auto)    0 % (0-3) 


 


Neutrophils # (Auto)


 


 


 


 11.1 x10^3/uL


(1.8-7.7)


 


Lymphocytes # (Auto)


 


 


 


 0.4 x10^3/uL


(1.0-4.8)


 


Monocytes # (Auto)


 


 


 


 0.4 x10^3/uL


(0.0-1.1)


 


Eosinophils # (Auto)


 


 


 


 0.0 x10^3/uL


(0.0-0.7)


 


Basophils # (Auto)


 


 


 


 0.0 x10^3/uL


(0.0-0.2)


 


Sodium Level


 


 


 


 138 mmol/L


(136-145)


 


Potassium Level


 


 


 


 3.7 mmol/L


(3.5-5.1)


 


Chloride Level


 


 


 


 104 mmol/L


()


 


Carbon Dioxide Level


 


 


 


 21 mmol/L


(21-32)


 


Anion Gap    13 (6-14) 


 


Blood Urea Nitrogen


 


 


 


 50 mg/dL


(8-26)


 


Creatinine


 


 


 


 1.4 mg/dL


(0.7-1.3)


 


Estimated GFR


(Cockcroft-Gault) 


 


 


 52.6 





 


BUN/Creatinine Ratio    36 (6-20) 


 


Glucose Level


 


 


 


 350 mg/dL


(70-99)


 


Calcium Level


 


 


 


 7.3 mg/dL


(8.5-10.1)


 


Total Bilirubin


 


 


 


 0.9 mg/dL


(0.2-1.0)


 


Aspartate Amino Transf


(AST/SGOT) 


 


 


 96 U/L (15-37) 





 


Alanine Aminotransferase


(ALT/SGPT) 


 


 


 74 U/L (16-63) 





 


Alkaline Phosphatase


 


 


 


 110 U/L


()


 


Total Protein


 


 


 


 5.5 g/dL


(6.4-8.2)


 


Albumin


 


 


 


 1.9 g/dL


(3.4-5.0)


 


Albumin/Globulin Ratio    0.5 (1.0-1.7) 


 


Test


 8/22/20


06:00 8/22/20


08:00 8/22/20


11:42 8/22/20


18:16


 


Glucose (Fingerstick)


 351 mg/dL


(70-99) 


 420 mg/dL


(70-99) 390 mg/dL


(70-99)


 


O2 Saturation  94 % (92-99)   


 


Arterial Blood pH


 


 7.41


(7.35-7.45) 


 





 


Arterial Blood pCO2 at


Patient Temp 


 28 mmHg


(35-46) 


 





 


Arterial Blood pO2 at Patient


Temp 


 80 mmHg


() 


 





 


Arterial Blood HCO3


 


 17 mmol/L


(21-28) 


 





 


Arterial Blood Base Excess


 


 -6 mmol/L


(-3-3) 


 





 


FiO2  90%+10   


 


Test


 8/22/20


20:58 8/22/20


23:33 8/23/20


05:30 8/23/20


05:42


 


Glucose (Fingerstick)


 429 mg/dL


(70-99) 432 mg/dL


(70-99) 


 444 mg/dL


(70-99)


 


White Blood Count


 


 


 11.3 x10^3/uL


(4.0-11.0) 





 


Red Blood Count


 


 


 3.44 x10^6/uL


(4.30-5.70) 





 


Hemoglobin


 


 


 10.6 g/dL


(13.0-17.5) 





 


Hematocrit


 


 


 31.2 %


(39.0-53.0) 





 


Mean Corpuscular Volume   91 fL ()  


 


Mean Corpuscular Hemoglobin   31 pg (25-35)  


 


Mean Corpuscular Hemoglobin


Concent 


 


 34 g/dL


(31-37) 





 


Red Cell Distribution Width


 


 


 13.4 %


(11.5-14.5) 





 


Platelet Count


 


 


 202 x10^3/uL


(140-400) 





 


Neutrophils (%) (Auto)   91 % (31-73)  


 


Lymphocytes (%) (Auto)   5 % (24-48)  


 


Monocytes (%) (Auto)   4 % (0-9)  


 


Eosinophils (%) (Auto)   0 % (0-3)  


 


Basophils (%) (Auto)   1 % (0-3)  


 


Neutrophils # (Auto)


 


 


 10.2 x10^3/uL


(1.8-7.7) 





 


Lymphocytes # (Auto)


 


 


 0.5 x10^3/uL


(1.0-4.8) 





 


Monocytes # (Auto)


 


 


 0.4 x10^3/uL


(0.0-1.1) 





 


Eosinophils # (Auto)


 


 


 0.0 x10^3/uL


(0.0-0.7) 





 


Basophils # (Auto)


 


 


 0.1 x10^3/uL


(0.0-0.2) 





 


Sodium Level


 


 


 143 mmol/L


(136-145) 





 


Potassium Level


 


 


 4.4 mmol/L


(3.5-5.1) 





 


Chloride Level


 


 


 109 mmol/L


() 





 


Carbon Dioxide Level


 


 


 24 mmol/L


(21-32) 





 


Anion Gap   10 (6-14)  


 


Blood Urea Nitrogen


 


 


 44 mg/dL


(8-26) 





 


Creatinine


 


 


 1.4 mg/dL


(0.7-1.3) 





 


Estimated GFR


(Cockcroft-Gault) 


 


 52.6 


 





 


BUN/Creatinine Ratio   31 (6-20)  


 


Glucose Level


 


 


 462 mg/dL


(70-99) 





 


Calcium Level


 


 


 6.9 mg/dL


(8.5-10.1) 





 


Total Bilirubin


 


 


 0.7 mg/dL


(0.2-1.0) 





 


Aspartate Amino Transf


(AST/SGOT) 


 


 106 U/L


(15-37) 





 


Alanine Aminotransferase


(ALT/SGPT) 


 


 101 U/L


(16-63) 





 


Alkaline Phosphatase


 


 


 139 U/L


() 





 


Total Protein


 


 


 5.3 g/dL


(6.4-8.2) 





 


Albumin


 


 


 1.7 g/dL


(3.4-5.0) 





 


Albumin/Globulin Ratio   0.5 (1.0-1.7)  


 


Test


 8/23/20


08:00 


 


 





 


Arterial Blood pH


 7.40


(7.35-7.45) 


 


 





 


Arterial Blood pCO2 at


Patient Temp 34 mmHg


(35-46) 


 


 





 


Arterial Blood pO2 at Patient


Temp 46 mmHg


() 


 


 





 


Arterial Blood HCO3


 20 mmol/L


(21-28) 


 


 





 


Arterial Blood Base Excess


 -4 mmol/L


(-3-3) 


 


 





 


FiO2 100%+10    








Laboratory Tests








Test


 8/22/20


11:42 8/22/20


18:16 8/22/20


20:58 8/22/20


23:33


 


Glucose (Fingerstick)


 420 mg/dL


(70-99) 390 mg/dL


(70-99) 429 mg/dL


(70-99) 432 mg/dL


(70-99)


 


Test


 8/23/20


05:30 8/23/20


05:42 8/23/20


08:00 





 


White Blood Count


 11.3 x10^3/uL


(4.0-11.0) 


 


 





 


Red Blood Count


 3.44 x10^6/uL


(4.30-5.70) 


 


 





 


Hemoglobin


 10.6 g/dL


(13.0-17.5) 


 


 





 


Hematocrit


 31.2 %


(39.0-53.0) 


 


 





 


Mean Corpuscular Volume 91 fL ()    


 


Mean Corpuscular Hemoglobin 31 pg (25-35)    


 


Mean Corpuscular Hemoglobin


Concent 34 g/dL


(31-37) 


 


 





 


Red Cell Distribution Width


 13.4 %


(11.5-14.5) 


 


 





 


Platelet Count


 202 x10^3/uL


(140-400) 


 


 





 


Neutrophils (%) (Auto) 91 % (31-73)    


 


Lymphocytes (%) (Auto) 5 % (24-48)    


 


Monocytes (%) (Auto) 4 % (0-9)    


 


Eosinophils (%) (Auto) 0 % (0-3)    


 


Basophils (%) (Auto) 1 % (0-3)    


 


Neutrophils # (Auto)


 10.2 x10^3/uL


(1.8-7.7) 


 


 





 


Lymphocytes # (Auto)


 0.5 x10^3/uL


(1.0-4.8) 


 


 





 


Monocytes # (Auto)


 0.4 x10^3/uL


(0.0-1.1) 


 


 





 


Eosinophils # (Auto)


 0.0 x10^3/uL


(0.0-0.7) 


 


 





 


Basophils # (Auto)


 0.1 x10^3/uL


(0.0-0.2) 


 


 





 


Sodium Level


 143 mmol/L


(136-145) 


 


 





 


Potassium Level


 4.4 mmol/L


(3.5-5.1) 


 


 





 


Chloride Level


 109 mmol/L


() 


 


 





 


Carbon Dioxide Level


 24 mmol/L


(21-32) 


 


 





 


Anion Gap 10 (6-14)    


 


Blood Urea Nitrogen


 44 mg/dL


(8-26) 


 


 





 


Creatinine


 1.4 mg/dL


(0.7-1.3) 


 


 





 


Estimated GFR


(Cockcroft-Gault) 52.6 


 


 


 





 


BUN/Creatinine Ratio 31 (6-20)    


 


Glucose Level


 462 mg/dL


(70-99) 


 


 





 


Calcium Level


 6.9 mg/dL


(8.5-10.1) 


 


 





 


Total Bilirubin


 0.7 mg/dL


(0.2-1.0) 


 


 





 


Aspartate Amino Transf


(AST/SGOT) 106 U/L


(15-37) 


 


 





 


Alanine Aminotransferase


(ALT/SGPT) 101 U/L


(16-63) 


 


 





 


Alkaline Phosphatase


 139 U/L


() 


 


 





 


Total Protein


 5.3 g/dL


(6.4-8.2) 


 


 





 


Albumin


 1.7 g/dL


(3.4-5.0) 


 


 





 


Albumin/Globulin Ratio 0.5 (1.0-1.7)    


 


Glucose (Fingerstick)


 


 444 mg/dL


(70-99) 


 





 


Arterial Blood pH


 


 


 7.40


(7.35-7.45) 





 


Arterial Blood pCO2 at


Patient Temp 


 


 34 mmHg


(35-46) 





 


Arterial Blood pO2 at Patient


Temp 


 


 46 mmHg


() 





 


Arterial Blood HCO3


 


 


 20 mmol/L


(21-28) 





 


Arterial Blood Base Excess


 


 


 -4 mmol/L


(-3-3) 





 


FiO2   100%+10  








Comments


 CXR 8/21 


IMPRESSION: 


1. Bilateral diffuse infiltrates are increased in the left base and mildly


improved elsewhere.





Impression


.


1.  Acute hypoxic respiratory failure secondary to COVID-19 pneumonia/ARDS/acute


lung injury--worsening oxygen requirements 


2.  Abnormal chest x-ray with bilateral infiltrates consistent with COVID-19


pneumonia.


3.  Hypotension secondary to combination of hypovolemia and sepsis status post 2


liters of IV fluids. no pressors


4.  Acute kidney injury, improving.


5.  Severe protein-calorie malnutrition.


6.  Abnormal D-dimer related to COVID-19 pneumonia.  Currently on DVT


prophylaxis.





Plan


.


RECOMMENDATIONS:


1.  Continue present assist control mode, PEEP of 10 and FiO2 of 90% ABG 

reviewed , remain hypoxic will increase PEEP, ensure adequate sedation 


2.  Follow chest x-rays as needed.


3.  Broad spectrum antibiotic.


4. Cont.  IV steroids 


5.  DVT prophylaxis with Lovenox high dose. 


6.  The patient would require intubation for minimal 1 week.


7.  Stress ulcer prophylaxis.


8.  Discussed with RN and RT.


9.  s/p Plasma


10. repeat D-Dimer Monday 11. cont. TF for nutritional sup[port 





Critical care time 30 minutes.











ANGELA HUFFMAN MD                 Aug 23, 2020 09:39

## 2020-08-24 VITALS — SYSTOLIC BLOOD PRESSURE: 157 MMHG | DIASTOLIC BLOOD PRESSURE: 77 MMHG

## 2020-08-24 VITALS — DIASTOLIC BLOOD PRESSURE: 65 MMHG | SYSTOLIC BLOOD PRESSURE: 120 MMHG

## 2020-08-24 VITALS — SYSTOLIC BLOOD PRESSURE: 169 MMHG | DIASTOLIC BLOOD PRESSURE: 81 MMHG

## 2020-08-24 VITALS — DIASTOLIC BLOOD PRESSURE: 66 MMHG | SYSTOLIC BLOOD PRESSURE: 124 MMHG

## 2020-08-24 VITALS — SYSTOLIC BLOOD PRESSURE: 110 MMHG | DIASTOLIC BLOOD PRESSURE: 60 MMHG

## 2020-08-24 VITALS — SYSTOLIC BLOOD PRESSURE: 137 MMHG | DIASTOLIC BLOOD PRESSURE: 68 MMHG

## 2020-08-24 VITALS — SYSTOLIC BLOOD PRESSURE: 155 MMHG | DIASTOLIC BLOOD PRESSURE: 77 MMHG

## 2020-08-24 VITALS — SYSTOLIC BLOOD PRESSURE: 145 MMHG | DIASTOLIC BLOOD PRESSURE: 75 MMHG

## 2020-08-24 VITALS — DIASTOLIC BLOOD PRESSURE: 62 MMHG | SYSTOLIC BLOOD PRESSURE: 117 MMHG

## 2020-08-24 VITALS — SYSTOLIC BLOOD PRESSURE: 156 MMHG | DIASTOLIC BLOOD PRESSURE: 79 MMHG

## 2020-08-24 VITALS — DIASTOLIC BLOOD PRESSURE: 67 MMHG | SYSTOLIC BLOOD PRESSURE: 162 MMHG

## 2020-08-24 VITALS — DIASTOLIC BLOOD PRESSURE: 67 MMHG | SYSTOLIC BLOOD PRESSURE: 136 MMHG

## 2020-08-24 VITALS — SYSTOLIC BLOOD PRESSURE: 134 MMHG | DIASTOLIC BLOOD PRESSURE: 67 MMHG

## 2020-08-24 VITALS — SYSTOLIC BLOOD PRESSURE: 137 MMHG | DIASTOLIC BLOOD PRESSURE: 79 MMHG

## 2020-08-24 VITALS — SYSTOLIC BLOOD PRESSURE: 131 MMHG | DIASTOLIC BLOOD PRESSURE: 69 MMHG

## 2020-08-24 VITALS — SYSTOLIC BLOOD PRESSURE: 172 MMHG | DIASTOLIC BLOOD PRESSURE: 80 MMHG

## 2020-08-24 VITALS — SYSTOLIC BLOOD PRESSURE: 117 MMHG | DIASTOLIC BLOOD PRESSURE: 61 MMHG

## 2020-08-24 VITALS — DIASTOLIC BLOOD PRESSURE: 65 MMHG | SYSTOLIC BLOOD PRESSURE: 134 MMHG

## 2020-08-24 VITALS — SYSTOLIC BLOOD PRESSURE: 127 MMHG | DIASTOLIC BLOOD PRESSURE: 66 MMHG

## 2020-08-24 VITALS — SYSTOLIC BLOOD PRESSURE: 127 MMHG | DIASTOLIC BLOOD PRESSURE: 68 MMHG

## 2020-08-24 VITALS — SYSTOLIC BLOOD PRESSURE: 184 MMHG | DIASTOLIC BLOOD PRESSURE: 83 MMHG

## 2020-08-24 VITALS — SYSTOLIC BLOOD PRESSURE: 166 MMHG | DIASTOLIC BLOOD PRESSURE: 81 MMHG

## 2020-08-24 VITALS — SYSTOLIC BLOOD PRESSURE: 141 MMHG | DIASTOLIC BLOOD PRESSURE: 70 MMHG

## 2020-08-24 VITALS — DIASTOLIC BLOOD PRESSURE: 77 MMHG | SYSTOLIC BLOOD PRESSURE: 167 MMHG

## 2020-08-24 LAB
ALBUMIN SERPL-MCNC: 1.6 G/DL (ref 3.4–5)
ALBUMIN/GLOB SERPL: 0.4 {RATIO} (ref 1–1.7)
ALP SERPL-CCNC: 118 U/L (ref 46–116)
ALT SERPL-CCNC: 77 U/L (ref 16–63)
ANION GAP SERPL CALC-SCNC: 10 MMOL/L (ref 6–14)
AST SERPL-CCNC: 76 U/L (ref 15–37)
BASE EXCESS ABG: 1 MMOL/L (ref -3–3)
BASOPHILS # BLD AUTO: 0 X10^3/UL (ref 0–0.2)
BASOPHILS NFR BLD: 0 % (ref 0–3)
BILIRUB SERPL-MCNC: 0.7 MG/DL (ref 0.2–1)
BUN SERPL-MCNC: 34 MG/DL (ref 8–26)
BUN/CREAT SERPL: 31 (ref 6–20)
CALCIUM SERPL-MCNC: 7.2 MG/DL (ref 8.5–10.1)
CHLORIDE SERPL-SCNC: 114 MMOL/L (ref 98–107)
CO2 SERPL-SCNC: 25 MMOL/L (ref 21–32)
CREAT SERPL-MCNC: 1.1 MG/DL (ref 0.7–1.3)
EOSINOPHIL NFR BLD: 0 % (ref 0–3)
EOSINOPHIL NFR BLD: 0 X10^3/UL (ref 0–0.7)
ERYTHROCYTE [DISTWIDTH] IN BLOOD BY AUTOMATED COUNT: 13.3 % (ref 11.5–14.5)
GFR SERPLBLD BASED ON 1.73 SQ M-ARVRAT: 69.5 ML/MIN
GLOBULIN SER-MCNC: 4.1 G/DL (ref 2.2–3.8)
GLUCOSE SERPL-MCNC: 316 MG/DL (ref 70–99)
HCO3 BLDA-SCNC: 25 MMOL/L (ref 21–28)
HCT VFR BLD CALC: 30.9 % (ref 39–53)
HGB BLD-MCNC: 10.4 G/DL (ref 13–17.5)
INSPIRATION SETTING TIME VENT: 100
LYMPHOCYTES # BLD: 0.5 X10^3/UL (ref 1–4.8)
LYMPHOCYTES NFR BLD AUTO: 4 % (ref 24–48)
MCH RBC QN AUTO: 31 PG (ref 25–35)
MCHC RBC AUTO-ENTMCNC: 34 G/DL (ref 31–37)
MCV RBC AUTO: 91 FL (ref 79–100)
MONO #: 0.4 X10^3/UL (ref 0–1.1)
MONOCYTES NFR BLD: 3 % (ref 0–9)
NEUT #: 13 X10^3/UL (ref 1.8–7.7)
NEUTROPHILS NFR BLD AUTO: 93 % (ref 31–73)
PCO2 BLDA: 40 MMHG (ref 35–46)
PLATELET # BLD AUTO: 184 X10^3/UL (ref 140–400)
PO2 BLDA: 83 MMHG (ref 75–108)
POTASSIUM SERPL-SCNC: 4.5 MMOL/L (ref 3.5–5.1)
PROT SERPL-MCNC: 5.7 G/DL (ref 6.4–8.2)
RBC # BLD AUTO: 3.38 X10^6/UL (ref 4.3–5.7)
SODIUM SERPL-SCNC: 149 MMOL/L (ref 136–145)
WBC # BLD AUTO: 13.9 X10^3/UL (ref 4–11)

## 2020-08-24 RX ADMIN — DEXMEDETOMIDINE HYDROCHLORIDE PRN MLS/HR: 100 INJECTION, SOLUTION, CONCENTRATE INTRAVENOUS at 18:59

## 2020-08-24 RX ADMIN — MIDAZOLAM PRN MLS/HR: 5 INJECTION, SOLUTION INTRAMUSCULAR; INTRAVENOUS at 10:27

## 2020-08-24 RX ADMIN — ENOXAPARIN SODIUM SCH MG: 80 INJECTION SUBCUTANEOUS at 21:11

## 2020-08-24 RX ADMIN — MIDAZOLAM PRN MLS/HR: 5 INJECTION, SOLUTION INTRAMUSCULAR; INTRAVENOUS at 19:29

## 2020-08-24 RX ADMIN — OXYCODONE HYDROCHLORIDE AND ACETAMINOPHEN SCH MG: 500 TABLET ORAL at 18:28

## 2020-08-24 RX ADMIN — INSULIN LISPRO SCH UNITS: 100 INJECTION, SOLUTION INTRAVENOUS; SUBCUTANEOUS at 01:20

## 2020-08-24 RX ADMIN — PROPOFOL PRN MLS/HR: 10 INJECTION, EMULSION INTRAVENOUS at 10:28

## 2020-08-24 RX ADMIN — PIPERACILLIN SODIUM AND TAZOBACTAM SODIUM SCH MLS/HR: 3; .375 INJECTION, POWDER, LYOPHILIZED, FOR SOLUTION INTRAVENOUS at 12:04

## 2020-08-24 RX ADMIN — PROPOFOL PRN MLS/HR: 10 INJECTION, EMULSION INTRAVENOUS at 15:31

## 2020-08-24 RX ADMIN — METHYLPREDNISOLONE SODIUM SUCCINATE SCH MG: 40 INJECTION, POWDER, FOR SOLUTION INTRAMUSCULAR; INTRAVENOUS at 05:41

## 2020-08-24 RX ADMIN — DEXMEDETOMIDINE HYDROCHLORIDE PRN MLS/HR: 100 INJECTION, SOLUTION, CONCENTRATE INTRAVENOUS at 00:18

## 2020-08-24 RX ADMIN — DEXMEDETOMIDINE HYDROCHLORIDE PRN MLS/HR: 100 INJECTION, SOLUTION, CONCENTRATE INTRAVENOUS at 05:42

## 2020-08-24 RX ADMIN — VECURONIUM BROMIDE PRN MG: 1 INJECTION, POWDER, LYOPHILIZED, FOR SOLUTION INTRAVENOUS at 14:00

## 2020-08-24 RX ADMIN — INSULIN LISPRO SCH UNITS: 100 INJECTION, SOLUTION INTRAVENOUS; SUBCUTANEOUS at 18:41

## 2020-08-24 RX ADMIN — Medication PRN MLS/HR: at 02:32

## 2020-08-24 RX ADMIN — BACITRACIN SCH MLS/HR: 5000 INJECTION, POWDER, FOR SOLUTION INTRAMUSCULAR at 19:33

## 2020-08-24 RX ADMIN — INSULIN LISPRO SCH UNITS: 100 INJECTION, SOLUTION INTRAVENOUS; SUBCUTANEOUS at 12:11

## 2020-08-24 RX ADMIN — OXYCODONE HYDROCHLORIDE AND ACETAMINOPHEN SCH MG: 500 TABLET ORAL at 12:05

## 2020-08-24 RX ADMIN — VECURONIUM BROMIDE PRN MG: 1 INJECTION, POWDER, LYOPHILIZED, FOR SOLUTION INTRAVENOUS at 16:36

## 2020-08-24 RX ADMIN — PROPOFOL PRN MLS/HR: 10 INJECTION, EMULSION INTRAVENOUS at 05:50

## 2020-08-24 RX ADMIN — INSULIN GLARGINE SCH UNIT: 100 INJECTION, SOLUTION SUBCUTANEOUS at 09:24

## 2020-08-24 RX ADMIN — PIPERACILLIN SODIUM AND TAZOBACTAM SODIUM SCH MLS/HR: 3; .375 INJECTION, POWDER, LYOPHILIZED, FOR SOLUTION INTRAVENOUS at 05:41

## 2020-08-24 RX ADMIN — ENOXAPARIN SODIUM SCH MG: 100 INJECTION SUBCUTANEOUS at 08:55

## 2020-08-24 RX ADMIN — Medication PRN MLS/HR: at 19:32

## 2020-08-24 RX ADMIN — MIDAZOLAM PRN MLS/HR: 5 INJECTION, SOLUTION INTRAMUSCULAR; INTRAVENOUS at 00:16

## 2020-08-24 RX ADMIN — OXYCODONE HYDROCHLORIDE AND ACETAMINOPHEN SCH MG: 500 TABLET ORAL at 05:42

## 2020-08-24 RX ADMIN — ZINC SULFATE CAP 220 MG (50 MG ELEMENTAL ZN) SCH MG: 220 (50 ZN) CAP at 08:56

## 2020-08-24 RX ADMIN — VECURONIUM BROMIDE PRN MLS/HR: 1 INJECTION, POWDER, LYOPHILIZED, FOR SOLUTION INTRAVENOUS at 16:36

## 2020-08-24 RX ADMIN — INSULIN LISPRO SCH UNITS: 100 INJECTION, SOLUTION INTRAVENOUS; SUBCUTANEOUS at 05:43

## 2020-08-24 RX ADMIN — PROPOFOL PRN MLS/HR: 10 INJECTION, EMULSION INTRAVENOUS at 01:21

## 2020-08-24 RX ADMIN — PROPOFOL PRN MLS/HR: 10 INJECTION, EMULSION INTRAVENOUS at 17:17

## 2020-08-24 RX ADMIN — BACITRACIN SCH MLS/HR: 5000 INJECTION, POWDER, FOR SOLUTION INTRAMUSCULAR at 05:44

## 2020-08-24 RX ADMIN — PANTOPRAZOLE SODIUM SCH MG: 40 INJECTION, POWDER, FOR SOLUTION INTRAVENOUS at 08:55

## 2020-08-24 RX ADMIN — VECURONIUM BROMIDE PRN MLS/HR: 1 INJECTION, POWDER, LYOPHILIZED, FOR SOLUTION INTRAVENOUS at 21:12

## 2020-08-24 RX ADMIN — METHYLPREDNISOLONE SODIUM SUCCINATE SCH MG: 40 INJECTION, POWDER, FOR SOLUTION INTRAMUSCULAR; INTRAVENOUS at 21:12

## 2020-08-24 RX ADMIN — PIPERACILLIN SODIUM AND TAZOBACTAM SODIUM SCH MLS/HR: 3; .375 INJECTION, POWDER, LYOPHILIZED, FOR SOLUTION INTRAVENOUS at 18:28

## 2020-08-24 RX ADMIN — METHYLPREDNISOLONE SODIUM SUCCINATE SCH MG: 40 INJECTION, POWDER, FOR SOLUTION INTRAMUSCULAR; INTRAVENOUS at 13:32

## 2020-08-24 RX ADMIN — INSULIN GLARGINE SCH UNIT: 100 INJECTION, SOLUTION SUBCUTANEOUS at 21:12

## 2020-08-24 NOTE — RAD
Examination: Bilateral Lower Extremity Venous Doppler Ultrasound

 

History: Bilateral lower extremity edema

 

Comparison: None

 

Procedure: Gray scale, color flow 2D and spectal waveform analysis images 

are obtained with and without compression in the area of the common 

femoral vein, superficial femoral vein - femoral vein junction, main 

femoral vein (superficial femoral vein) and popliteal vein. Veins of the 

proximal calf are also imaged.

 

Findings:

 

There is normal duplex flow, color flow and compressibility of all 

visualized vein segments. No evidence of deep venous thrombus is present.

 

Impression: 

 

No evidence of DVT in the bilateral lower extremity venous system. 

 

Electronically signed by: Denzel Meredith MD (8/24/2020 1:11 PM) [id number]

## 2020-08-24 NOTE — PDOC
PULMONARY PROGRESS NOTES


DATE: 8/24/20 


TIME: 11:44


Subjective


remains intubated/sedated


AC mode ,100%FIO2/10 PEEP


nursing report hypoxia with minimal stimulation


Vitals





Vital Signs








  Date Time  Temp Pulse Resp B/P (MAP) Pulse Ox O2 Delivery O2 Flow Rate FiO2


 


8/24/20 10:00  62 22 137/68 (91) 100 Ventilator  


 


8/24/20 08:00 98.1       





 98.1       


 


8/24/20 03:02       18.0 








Comments


visual exam done due to COVID-Pandemia


no resp distess


no skin rash


lower extremity edema


Labs





Laboratory Tests








Test


 8/22/20


18:16 8/22/20


20:58 8/22/20


23:33 8/23/20


05:30


 


Glucose (Fingerstick)


 390 mg/dL


(70-99) 429 mg/dL


(70-99) 432 mg/dL


(70-99) 





 


White Blood Count


 


 


 


 11.3 x10^3/uL


(4.0-11.0)


 


Red Blood Count


 


 


 


 3.44 x10^6/uL


(4.30-5.70)


 


Hemoglobin


 


 


 


 10.6 g/dL


(13.0-17.5)


 


Hematocrit


 


 


 


 31.2 %


(39.0-53.0)


 


Mean Corpuscular Volume    91 fL () 


 


Mean Corpuscular Hemoglobin    31 pg (25-35) 


 


Mean Corpuscular Hemoglobin


Concent 


 


 


 34 g/dL


(31-37)


 


Red Cell Distribution Width


 


 


 


 13.4 %


(11.5-14.5)


 


Platelet Count


 


 


 


 202 x10^3/uL


(140-400)


 


Neutrophils (%) (Auto)    91 % (31-73) 


 


Lymphocytes (%) (Auto)    5 % (24-48) 


 


Monocytes (%) (Auto)    4 % (0-9) 


 


Eosinophils (%) (Auto)    0 % (0-3) 


 


Basophils (%) (Auto)    1 % (0-3) 


 


Neutrophils # (Auto)


 


 


 


 10.2 x10^3/uL


(1.8-7.7)


 


Lymphocytes # (Auto)


 


 


 


 0.5 x10^3/uL


(1.0-4.8)


 


Monocytes # (Auto)


 


 


 


 0.4 x10^3/uL


(0.0-1.1)


 


Eosinophils # (Auto)


 


 


 


 0.0 x10^3/uL


(0.0-0.7)


 


Basophils # (Auto)


 


 


 


 0.1 x10^3/uL


(0.0-0.2)


 


Sodium Level


 


 


 


 143 mmol/L


(136-145)


 


Potassium Level


 


 


 


 4.4 mmol/L


(3.5-5.1)


 


Chloride Level


 


 


 


 109 mmol/L


()


 


Carbon Dioxide Level


 


 


 


 24 mmol/L


(21-32)


 


Anion Gap    10 (6-14) 


 


Blood Urea Nitrogen


 


 


 


 44 mg/dL


(8-26)


 


Creatinine


 


 


 


 1.4 mg/dL


(0.7-1.3)


 


Estimated GFR


(Cockcroft-Gault) 


 


 


 52.6 





 


BUN/Creatinine Ratio    31 (6-20) 


 


Glucose Level


 


 


 


 462 mg/dL


(70-99)


 


Calcium Level


 


 


 


 6.9 mg/dL


(8.5-10.1)


 


Total Bilirubin


 


 


 


 0.7 mg/dL


(0.2-1.0)


 


Aspartate Amino Transf


(AST/SGOT) 


 


 


 106 U/L


(15-37)


 


Alanine Aminotransferase


(ALT/SGPT) 


 


 


 101 U/L


(16-63)


 


Alkaline Phosphatase


 


 


 


 139 U/L


()


 


Total Protein


 


 


 


 5.3 g/dL


(6.4-8.2)


 


Albumin


 


 


 


 1.7 g/dL


(3.4-5.0)


 


Albumin/Globulin Ratio    0.5 (1.0-1.7) 


 


Test


 8/23/20


05:42 8/23/20


08:00 8/23/20


11:28 8/23/20


17:51


 


Glucose (Fingerstick)


 444 mg/dL


(70-99) 


 426 mg/dL


(70-99) 363 mg/dL


(70-99)


 


O2 Saturation   % (92-99)   


 


Arterial Blood pH


 


 7.40


(7.35-7.45) 


 





 


Arterial Blood pCO2 at


Patient Temp 


 34 mmHg


(35-46) 


 





 


Arterial Blood pO2 at Patient


Temp 


 46 mmHg


() 


 





 


Arterial Blood HCO3


 


 20 mmol/L


(21-28) 


 





 


Arterial Blood Base Excess


 


 -4 mmol/L


(-3-3) 


 





 


FiO2  100%+10   


 


Test


 8/24/20


00:39 8/24/20


05:00 8/24/20


05:04 8/24/20


07:30


 


Glucose (Fingerstick)


 342 mg/dL


(70-99) 


 326 mg/dL


(70-99) 





 


White Blood Count


 


 13.9 x10^3/uL


(4.0-11.0) 


 





 


Red Blood Count


 


 3.38 x10^6/uL


(4.30-5.70) 


 





 


Hemoglobin


 


 10.4 g/dL


(13.0-17.5) 


 





 


Hematocrit


 


 30.9 %


(39.0-53.0) 


 





 


Mean Corpuscular Volume  91 fL ()   


 


Mean Corpuscular Hemoglobin  31 pg (25-35)   


 


Mean Corpuscular Hemoglobin


Concent 


 34 g/dL


(31-37) 


 





 


Red Cell Distribution Width


 


 13.3 %


(11.5-14.5) 


 





 


Platelet Count


 


 184 x10^3/uL


(140-400) 


 





 


Neutrophils (%) (Auto)  93 % (31-73)   


 


Lymphocytes (%) (Auto)  4 % (24-48)   


 


Monocytes (%) (Auto)  3 % (0-9)   


 


Eosinophils (%) (Auto)  0 % (0-3)   


 


Basophils (%) (Auto)  0 % (0-3)   


 


Neutrophils # (Auto)


 


 13.0 x10^3/uL


(1.8-7.7) 


 





 


Lymphocytes # (Auto)


 


 0.5 x10^3/uL


(1.0-4.8) 


 





 


Monocytes # (Auto)


 


 0.4 x10^3/uL


(0.0-1.1) 


 





 


Eosinophils # (Auto)


 


 0.0 x10^3/uL


(0.0-0.7) 


 





 


Basophils # (Auto)


 


 0.0 x10^3/uL


(0.0-0.2) 


 





 


D-Dimer (Lexii)


 


 > 20.00


ug/mlFEU 


 





 


Sodium Level


 


 149 mmol/L


(136-145) 


 





 


Potassium Level


 


 4.5 mmol/L


(3.5-5.1) 


 





 


Chloride Level


 


 114 mmol/L


() 


 





 


Carbon Dioxide Level


 


 25 mmol/L


(21-32) 


 





 


Anion Gap  10 (6-14)   


 


Blood Urea Nitrogen


 


 34 mg/dL


(8-26) 


 





 


Creatinine


 


 1.1 mg/dL


(0.7-1.3) 


 





 


Estimated GFR


(Cockcroft-Gault) 


 69.5 


 


 





 


BUN/Creatinine Ratio  31 (6-20)   


 


Glucose Level


 


 316 mg/dL


(70-99) 


 





 


Calcium Level


 


 7.2 mg/dL


(8.5-10.1) 


 





 


Total Bilirubin


 


 0.7 mg/dL


(0.2-1.0) 


 





 


Aspartate Amino Transf


(AST/SGOT) 


 76 U/L (15-37) 


 


 





 


Alanine Aminotransferase


(ALT/SGPT) 


 77 U/L (16-63) 


 


 





 


Alkaline Phosphatase


 


 118 U/L


() 


 





 


Total Protein


 


 5.7 g/dL


(6.4-8.2) 


 





 


Albumin


 


 1.6 g/dL


(3.4-5.0) 


 





 


Albumin/Globulin Ratio  0.4 (1.0-1.7)   


 


Arterial Blood pH


 


 


 


 7.42


(7.35-7.45)


 


Arterial Blood pCO2 at


Patient Temp 


 


 


 40 mmHg


(35-46)


 


Arterial Blood pO2 at Patient


Temp 


 


 


 83 mmHg


()


 


Arterial Blood HCO3


 


 


 


 25 mmol/L


(21-28)


 


Arterial Blood Base Excess


 


 


 


 1 mmol/L


(-3-3)


 


FiO2    100 








Laboratory Tests








Test


 8/23/20


17:51 8/24/20


00:39 8/24/20


05:00 8/24/20


05:04


 


Glucose (Fingerstick)


 363 mg/dL


(70-99) 342 mg/dL


(70-99) 


 326 mg/dL


(70-99)


 


White Blood Count


 


 


 13.9 x10^3/uL


(4.0-11.0) 





 


Red Blood Count


 


 


 3.38 x10^6/uL


(4.30-5.70) 





 


Hemoglobin


 


 


 10.4 g/dL


(13.0-17.5) 





 


Hematocrit


 


 


 30.9 %


(39.0-53.0) 





 


Mean Corpuscular Volume   91 fL ()  


 


Mean Corpuscular Hemoglobin   31 pg (25-35)  


 


Mean Corpuscular Hemoglobin


Concent 


 


 34 g/dL


(31-37) 





 


Red Cell Distribution Width


 


 


 13.3 %


(11.5-14.5) 





 


Platelet Count


 


 


 184 x10^3/uL


(140-400) 





 


Neutrophils (%) (Auto)   93 % (31-73)  


 


Lymphocytes (%) (Auto)   4 % (24-48)  


 


Monocytes (%) (Auto)   3 % (0-9)  


 


Eosinophils (%) (Auto)   0 % (0-3)  


 


Basophils (%) (Auto)   0 % (0-3)  


 


Neutrophils # (Auto)


 


 


 13.0 x10^3/uL


(1.8-7.7) 





 


Lymphocytes # (Auto)


 


 


 0.5 x10^3/uL


(1.0-4.8) 





 


Monocytes # (Auto)


 


 


 0.4 x10^3/uL


(0.0-1.1) 





 


Eosinophils # (Auto)


 


 


 0.0 x10^3/uL


(0.0-0.7) 





 


Basophils # (Auto)


 


 


 0.0 x10^3/uL


(0.0-0.2) 





 


D-Dimer (Lexii)


 


 


 > 20.00


ug/mlFEU 





 


Sodium Level


 


 


 149 mmol/L


(136-145) 





 


Potassium Level


 


 


 4.5 mmol/L


(3.5-5.1) 





 


Chloride Level


 


 


 114 mmol/L


() 





 


Carbon Dioxide Level


 


 


 25 mmol/L


(21-32) 





 


Anion Gap   10 (6-14)  


 


Blood Urea Nitrogen


 


 


 34 mg/dL


(8-26) 





 


Creatinine


 


 


 1.1 mg/dL


(0.7-1.3) 





 


Estimated GFR


(Cockcroft-Gault) 


 


 69.5 


 





 


BUN/Creatinine Ratio   31 (6-20)  


 


Glucose Level


 


 


 316 mg/dL


(70-99) 





 


Calcium Level


 


 


 7.2 mg/dL


(8.5-10.1) 





 


Total Bilirubin


 


 


 0.7 mg/dL


(0.2-1.0) 





 


Aspartate Amino Transf


(AST/SGOT) 


 


 76 U/L (15-37) 


 





 


Alanine Aminotransferase


(ALT/SGPT) 


 


 77 U/L (16-63) 


 





 


Alkaline Phosphatase


 


 


 118 U/L


() 





 


Total Protein


 


 


 5.7 g/dL


(6.4-8.2) 





 


Albumin


 


 


 1.6 g/dL


(3.4-5.0) 





 


Albumin/Globulin Ratio   0.4 (1.0-1.7)  


 


Test


 8/24/20


07:30 


 


 





 


Arterial Blood pH


 7.42


(7.35-7.45) 


 


 





 


Arterial Blood pCO2 at


Patient Temp 40 mmHg


(35-46) 


 


 





 


Arterial Blood pO2 at Patient


Temp 83 mmHg


() 


 


 





 


Arterial Blood HCO3


 25 mmol/L


(21-28) 


 


 





 


Arterial Blood Base Excess


 1 mmol/L


(-3-3) 


 


 





 


FiO2 100    








Comments


 CXR 8/21 


IMPRESSION: 


1. Bilateral diffuse infiltrates are increased in the left base and mildly


improved elsewhere.





Impression


.


1.  Acute hypoxic respiratory failure secondary to COVID-19 pneumonia/ARDS/acute


lung injury--worsening oxygen requirements 


2.  Abnormal chest x-ray with bilateral infiltrates consistent with COVID-19


pneumonia.


3.  Hypotension secondary to combination of hypovolemia and sepsis status post 2


liters of IV fluids. no pressors


4.  Acute kidney injury, improving.


5.  Severe protein-calorie malnutrition.


6.  Abnormal D-dimer related to COVID-19 pneumonia.  Currently on high dose DVT


prophylaxis.Sig increase in D-Dimer to 20. will order dopplers





Plan


.


RECOMMENDATIONS:


1.  Continue present assist control mode, PEEP of 12 and FiO2 of 100% ABG 

reviewed , wean FIO2/PEEP as tolerated, ensure adequate sedation 


2.  Follow chest x-rays as needed.


3.  Broad spectrum antibiotic.


4. Cont.  IV steroids 


5. Abnormal D-dimer related to COVID-19 pneumonia.  Currently on high dose DVT


prophylaxis. Sig increase in D-Dimer to 20. will order dopplers


7.  Stress ulcer prophylaxis.


8.  Discussed with RN and RT.


9.  s/p Plasma


10. cont. TF for nutritional sup[port 





Critical care time 30 minutes.











ANGELA HUFFMAN MD                 Aug 24, 2020 11:47

## 2020-08-24 NOTE — NUR
SS following up with discharge planning. SS reviewed pt chart and discussed with pt RN. Pt 
remains on the vent at this time. COVID19 positive. Pt on IV Zosyn. Pt is self pay. SS 
discussed with [facility]. Pt is not a citizen and works at [employer]. [employer] working 
to complete application for [employer]. SS will continue to follow for discharge planning.

## 2020-08-24 NOTE — PDOC
PROGRESS NOTES


Date of Service:


DATE: 8/24/20 


TIME: 12:23





Chief Complaint


Chief Complaint


Assessment/Plan


Acute hypoxemic respiratory failure


Leukocytosis with bandemia secondary to septic process


Hyponatremia secondary to low effective circulatory volume


Acue renal failure due to vasomotor nephropathy most likely 


Hyperglycemia


mild transaminitis 


Uncontrolled hypertension





Plan:


will start hidralazine


will increase insulin for better glycemia control 


supportive measures


will adjust insulin for better glycemia control


follow labs in am


prognosis guarded


follow pulmonology recommendations





History of Present Illness


Patient is a 55-year-old gentleman who was diagnosed with COVID-19 infection on 

Monday 5 days prior to his admission to the intensive care unit.  Apparently the

patient reported worsening respiratory distress reason why he came to the 

emergency department for evaluation and treatment.  The story was quite limited 

since the patient was speaking [place]ian only and his acuity was such that he 

required mechanical ventilation and prompt intubation by the emergency 

department physician.  At the present time patient is intubated and sedated 

continues to require a lot of support no pertinent past medical history was 

reported he is admitted to the intensive care unit for further treatment








8/24/2020


Continues to require full support, glycemia has been noted in hypertension as 

well.  Discussed with nursing staff, all of concerns were addressed to the best 

of my abilities no acute events reported overnight





Vitals


Vitals





Vital Signs








  Date Time  Temp Pulse Resp B/P (MAP) Pulse Ox O2 Delivery O2 Flow Rate FiO2


 


8/24/20 10:00  62 22 137/68 (91) 100 Ventilator  


 


8/24/20 08:00 98.1       





 98.1       


 


8/24/20 03:02       18.0 











Labs


LABS





Laboratory Tests








Test


 8/23/20


17:51 8/24/20


00:39 8/24/20


05:00 8/24/20


05:04


 


Glucose (Fingerstick)


 363 mg/dL


(70-99) 342 mg/dL


(70-99) 


 326 mg/dL


(70-99)


 


White Blood Count


 


 


 13.9 x10^3/uL


(4.0-11.0) 





 


Red Blood Count


 


 


 3.38 x10^6/uL


(4.30-5.70) 





 


Hemoglobin


 


 


 10.4 g/dL


(13.0-17.5) 





 


Hematocrit


 


 


 30.9 %


(39.0-53.0) 





 


Mean Corpuscular Volume   91 fL ()  


 


Mean Corpuscular Hemoglobin   31 pg (25-35)  


 


Mean Corpuscular Hemoglobin


Concent 


 


 34 g/dL


(31-37) 





 


Red Cell Distribution Width


 


 


 13.3 %


(11.5-14.5) 





 


Platelet Count


 


 


 184 x10^3/uL


(140-400) 





 


Neutrophils (%) (Auto)   93 % (31-73)  


 


Lymphocytes (%) (Auto)   4 % (24-48)  


 


Monocytes (%) (Auto)   3 % (0-9)  


 


Eosinophils (%) (Auto)   0 % (0-3)  


 


Basophils (%) (Auto)   0 % (0-3)  


 


Neutrophils # (Auto)


 


 


 13.0 x10^3/uL


(1.8-7.7) 





 


Lymphocytes # (Auto)


 


 


 0.5 x10^3/uL


(1.0-4.8) 





 


Monocytes # (Auto)


 


 


 0.4 x10^3/uL


(0.0-1.1) 





 


Eosinophils # (Auto)


 


 


 0.0 x10^3/uL


(0.0-0.7) 





 


Basophils # (Auto)


 


 


 0.0 x10^3/uL


(0.0-0.2) 





 


D-Dimer (Lexii)


 


 


 > 20.00


ug/mlFEU 





 


Sodium Level


 


 


 149 mmol/L


(136-145) 





 


Potassium Level


 


 


 4.5 mmol/L


(3.5-5.1) 





 


Chloride Level


 


 


 114 mmol/L


() 





 


Carbon Dioxide Level


 


 


 25 mmol/L


(21-32) 





 


Anion Gap   10 (6-14)  


 


Blood Urea Nitrogen


 


 


 34 mg/dL


(8-26) 





 


Creatinine


 


 


 1.1 mg/dL


(0.7-1.3) 





 


Estimated GFR


(Cockcroft-Gault) 


 


 69.5 


 





 


BUN/Creatinine Ratio   31 (6-20)  


 


Glucose Level


 


 


 316 mg/dL


(70-99) 





 


Calcium Level


 


 


 7.2 mg/dL


(8.5-10.1) 





 


Total Bilirubin


 


 


 0.7 mg/dL


(0.2-1.0) 





 


Aspartate Amino Transf


(AST/SGOT) 


 


 76 U/L (15-37) 


 





 


Alanine Aminotransferase


(ALT/SGPT) 


 


 77 U/L (16-63) 


 





 


Alkaline Phosphatase


 


 


 118 U/L


() 





 


Total Protein


 


 


 5.7 g/dL


(6.4-8.2) 





 


Albumin


 


 


 1.6 g/dL


(3.4-5.0) 





 


Albumin/Globulin Ratio   0.4 (1.0-1.7)  


 


Test


 8/24/20


07:30 8/24/20


11:53 


 





 


Arterial Blood pH


 7.42


(7.35-7.45) 


 


 





 


Arterial Blood pCO2 at


Patient Temp 40 mmHg


(35-46) 


 


 





 


Arterial Blood pO2 at Patient


Temp 83 mmHg


() 


 


 





 


Arterial Blood HCO3


 25 mmol/L


(21-28) 


 


 





 


Arterial Blood Base Excess


 1 mmol/L


(-3-3) 


 


 





 


FiO2 100    


 


Glucose (Fingerstick)


 


 243 mg/dL


(70-99) 


 














Comment


Review of Relevant


I have reviewed the following items jim (where applicable) has been applied.


Labs





Laboratory Tests








Test


 8/22/20


18:16 8/22/20


20:58 8/22/20


23:33 8/23/20


05:30


 


Glucose (Fingerstick)


 390 mg/dL


(70-99) 429 mg/dL


(70-99) 432 mg/dL


(70-99) 





 


White Blood Count


 


 


 


 11.3 x10^3/uL


(4.0-11.0)


 


Red Blood Count


 


 


 


 3.44 x10^6/uL


(4.30-5.70)


 


Hemoglobin


 


 


 


 10.6 g/dL


(13.0-17.5)


 


Hematocrit


 


 


 


 31.2 %


(39.0-53.0)


 


Mean Corpuscular Volume    91 fL () 


 


Mean Corpuscular Hemoglobin    31 pg (25-35) 


 


Mean Corpuscular Hemoglobin


Concent 


 


 


 34 g/dL


(31-37)


 


Red Cell Distribution Width


 


 


 


 13.4 %


(11.5-14.5)


 


Platelet Count


 


 


 


 202 x10^3/uL


(140-400)


 


Neutrophils (%) (Auto)    91 % (31-73) 


 


Lymphocytes (%) (Auto)    5 % (24-48) 


 


Monocytes (%) (Auto)    4 % (0-9) 


 


Eosinophils (%) (Auto)    0 % (0-3) 


 


Basophils (%) (Auto)    1 % (0-3) 


 


Neutrophils # (Auto)


 


 


 


 10.2 x10^3/uL


(1.8-7.7)


 


Lymphocytes # (Auto)


 


 


 


 0.5 x10^3/uL


(1.0-4.8)


 


Monocytes # (Auto)


 


 


 


 0.4 x10^3/uL


(0.0-1.1)


 


Eosinophils # (Auto)


 


 


 


 0.0 x10^3/uL


(0.0-0.7)


 


Basophils # (Auto)


 


 


 


 0.1 x10^3/uL


(0.0-0.2)


 


Sodium Level


 


 


 


 143 mmol/L


(136-145)


 


Potassium Level


 


 


 


 4.4 mmol/L


(3.5-5.1)


 


Chloride Level


 


 


 


 109 mmol/L


()


 


Carbon Dioxide Level


 


 


 


 24 mmol/L


(21-32)


 


Anion Gap    10 (6-14) 


 


Blood Urea Nitrogen


 


 


 


 44 mg/dL


(8-26)


 


Creatinine


 


 


 


 1.4 mg/dL


(0.7-1.3)


 


Estimated GFR


(Cockcroft-Gault) 


 


 


 52.6 





 


BUN/Creatinine Ratio    31 (6-20) 


 


Glucose Level


 


 


 


 462 mg/dL


(70-99)


 


Calcium Level


 


 


 


 6.9 mg/dL


(8.5-10.1)


 


Total Bilirubin


 


 


 


 0.7 mg/dL


(0.2-1.0)


 


Aspartate Amino Transf


(AST/SGOT) 


 


 


 106 U/L


(15-37)


 


Alanine Aminotransferase


(ALT/SGPT) 


 


 


 101 U/L


(16-63)


 


Alkaline Phosphatase


 


 


 


 139 U/L


()


 


Total Protein


 


 


 


 5.3 g/dL


(6.4-8.2)


 


Albumin


 


 


 


 1.7 g/dL


(3.4-5.0)


 


Albumin/Globulin Ratio    0.5 (1.0-1.7) 


 


Test


 8/23/20


05:42 8/23/20


08:00 8/23/20


11:28 8/23/20


17:51


 


Glucose (Fingerstick)


 444 mg/dL


(70-99) 


 426 mg/dL


(70-99) 363 mg/dL


(70-99)


 


O2 Saturation   % (92-99)   


 


Arterial Blood pH


 


 7.40


(7.35-7.45) 


 





 


Arterial Blood pCO2 at


Patient Temp 


 34 mmHg


(35-46) 


 





 


Arterial Blood pO2 at Patient


Temp 


 46 mmHg


() 


 





 


Arterial Blood HCO3


 


 20 mmol/L


(21-28) 


 





 


Arterial Blood Base Excess


 


 -4 mmol/L


(-3-3) 


 





 


FiO2  100%+10   


 


Test


 8/24/20


00:39 8/24/20


05:00 8/24/20


05:04 8/24/20


07:30


 


Glucose (Fingerstick)


 342 mg/dL


(70-99) 


 326 mg/dL


(70-99) 





 


White Blood Count


 


 13.9 x10^3/uL


(4.0-11.0) 


 





 


Red Blood Count


 


 3.38 x10^6/uL


(4.30-5.70) 


 





 


Hemoglobin


 


 10.4 g/dL


(13.0-17.5) 


 





 


Hematocrit


 


 30.9 %


(39.0-53.0) 


 





 


Mean Corpuscular Volume  91 fL ()   


 


Mean Corpuscular Hemoglobin  31 pg (25-35)   


 


Mean Corpuscular Hemoglobin


Concent 


 34 g/dL


(31-37) 


 





 


Red Cell Distribution Width


 


 13.3 %


(11.5-14.5) 


 





 


Platelet Count


 


 184 x10^3/uL


(140-400) 


 





 


Neutrophils (%) (Auto)  93 % (31-73)   


 


Lymphocytes (%) (Auto)  4 % (24-48)   


 


Monocytes (%) (Auto)  3 % (0-9)   


 


Eosinophils (%) (Auto)  0 % (0-3)   


 


Basophils (%) (Auto)  0 % (0-3)   


 


Neutrophils # (Auto)


 


 13.0 x10^3/uL


(1.8-7.7) 


 





 


Lymphocytes # (Auto)


 


 0.5 x10^3/uL


(1.0-4.8) 


 





 


Monocytes # (Auto)


 


 0.4 x10^3/uL


(0.0-1.1) 


 





 


Eosinophils # (Auto)


 


 0.0 x10^3/uL


(0.0-0.7) 


 





 


Basophils # (Auto)


 


 0.0 x10^3/uL


(0.0-0.2) 


 





 


D-Dimer (Lexii)


 


 > 20.00


ug/mlFEU 


 





 


Sodium Level


 


 149 mmol/L


(136-145) 


 





 


Potassium Level


 


 4.5 mmol/L


(3.5-5.1) 


 





 


Chloride Level


 


 114 mmol/L


() 


 





 


Carbon Dioxide Level


 


 25 mmol/L


(21-32) 


 





 


Anion Gap  10 (6-14)   


 


Blood Urea Nitrogen


 


 34 mg/dL


(8-26) 


 





 


Creatinine


 


 1.1 mg/dL


(0.7-1.3) 


 





 


Estimated GFR


(Cockcroft-Gault) 


 69.5 


 


 





 


BUN/Creatinine Ratio  31 (6-20)   


 


Glucose Level


 


 316 mg/dL


(70-99) 


 





 


Calcium Level


 


 7.2 mg/dL


(8.5-10.1) 


 





 


Total Bilirubin


 


 0.7 mg/dL


(0.2-1.0) 


 





 


Aspartate Amino Transf


(AST/SGOT) 


 76 U/L (15-37) 


 


 





 


Alanine Aminotransferase


(ALT/SGPT) 


 77 U/L (16-63) 


 


 





 


Alkaline Phosphatase


 


 118 U/L


() 


 





 


Total Protein


 


 5.7 g/dL


(6.4-8.2) 


 





 


Albumin


 


 1.6 g/dL


(3.4-5.0) 


 





 


Albumin/Globulin Ratio  0.4 (1.0-1.7)   


 


Arterial Blood pH


 


 


 


 7.42


(7.35-7.45)


 


Arterial Blood pCO2 at


Patient Temp 


 


 


 40 mmHg


(35-46)


 


Arterial Blood pO2 at Patient


Temp 


 


 


 83 mmHg


()


 


Arterial Blood HCO3


 


 


 


 25 mmol/L


(21-28)


 


Arterial Blood Base Excess


 


 


 


 1 mmol/L


(-3-3)


 


FiO2    100 


 


Test


 8/24/20


11:53 


 


 





 


Glucose (Fingerstick)


 243 mg/dL


(70-99) 


 


 











Laboratory Tests








Test


 8/23/20


17:51 8/24/20


00:39 8/24/20


05:00 8/24/20


05:04


 


Glucose (Fingerstick)


 363 mg/dL


(70-99) 342 mg/dL


(70-99) 


 326 mg/dL


(70-99)


 


White Blood Count


 


 


 13.9 x10^3/uL


(4.0-11.0) 





 


Red Blood Count


 


 


 3.38 x10^6/uL


(4.30-5.70) 





 


Hemoglobin


 


 


 10.4 g/dL


(13.0-17.5) 





 


Hematocrit


 


 


 30.9 %


(39.0-53.0) 





 


Mean Corpuscular Volume   91 fL ()  


 


Mean Corpuscular Hemoglobin   31 pg (25-35)  


 


Mean Corpuscular Hemoglobin


Concent 


 


 34 g/dL


(31-37) 





 


Red Cell Distribution Width


 


 


 13.3 %


(11.5-14.5) 





 


Platelet Count


 


 


 184 x10^3/uL


(140-400) 





 


Neutrophils (%) (Auto)   93 % (31-73)  


 


Lymphocytes (%) (Auto)   4 % (24-48)  


 


Monocytes (%) (Auto)   3 % (0-9)  


 


Eosinophils (%) (Auto)   0 % (0-3)  


 


Basophils (%) (Auto)   0 % (0-3)  


 


Neutrophils # (Auto)


 


 


 13.0 x10^3/uL


(1.8-7.7) 





 


Lymphocytes # (Auto)


 


 


 0.5 x10^3/uL


(1.0-4.8) 





 


Monocytes # (Auto)


 


 


 0.4 x10^3/uL


(0.0-1.1) 





 


Eosinophils # (Auto)


 


 


 0.0 x10^3/uL


(0.0-0.7) 





 


Basophils # (Auto)


 


 


 0.0 x10^3/uL


(0.0-0.2) 





 


D-Dimer (Lexii)


 


 


 > 20.00


ug/mlFEU 





 


Sodium Level


 


 


 149 mmol/L


(136-145) 





 


Potassium Level


 


 


 4.5 mmol/L


(3.5-5.1) 





 


Chloride Level


 


 


 114 mmol/L


() 





 


Carbon Dioxide Level


 


 


 25 mmol/L


(21-32) 





 


Anion Gap   10 (6-14)  


 


Blood Urea Nitrogen


 


 


 34 mg/dL


(8-26) 





 


Creatinine


 


 


 1.1 mg/dL


(0.7-1.3) 





 


Estimated GFR


(Cockcroft-Gault) 


 


 69.5 


 





 


BUN/Creatinine Ratio   31 (6-20)  


 


Glucose Level


 


 


 316 mg/dL


(70-99) 





 


Calcium Level


 


 


 7.2 mg/dL


(8.5-10.1) 





 


Total Bilirubin


 


 


 0.7 mg/dL


(0.2-1.0) 





 


Aspartate Amino Transf


(AST/SGOT) 


 


 76 U/L (15-37) 


 





 


Alanine Aminotransferase


(ALT/SGPT) 


 


 77 U/L (16-63) 


 





 


Alkaline Phosphatase


 


 


 118 U/L


() 





 


Total Protein


 


 


 5.7 g/dL


(6.4-8.2) 





 


Albumin


 


 


 1.6 g/dL


(3.4-5.0) 





 


Albumin/Globulin Ratio   0.4 (1.0-1.7)  


 


Test


 8/24/20


07:30 8/24/20


11:53 


 





 


Arterial Blood pH


 7.42


(7.35-7.45) 


 


 





 


Arterial Blood pCO2 at


Patient Temp 40 mmHg


(35-46) 


 


 





 


Arterial Blood pO2 at Patient


Temp 83 mmHg


() 


 


 





 


Arterial Blood HCO3


 25 mmol/L


(21-28) 


 


 





 


Arterial Blood Base Excess


 1 mmol/L


(-3-3) 


 


 





 


FiO2 100    


 


Glucose (Fingerstick)


 


 243 mg/dL


(70-99) 


 











Microbiology


8/22/20 Blood Culture - Preliminary, Resulted


          NO GROWTH AFTER 1 DAY


8/20/20 Urine Culture - Final, Complete


Medications





Current Medications


Etomidate (Amidate) 20 mg STK-MED ONCE IV ;  Start 8/20/20 at 18:54;  Stop 

8/20/20 at 18:54;  Status DC


Succinylcholine Chloride (Anectine) 200 mg STK-MED ONCE .ROUTE ;  Start 8/20/20 

at 18:55;  Stop 8/20/20 at 18:55;  Status DC


Propofol 50 ml @ As Directed STK-MED ONCE IV ;  Start 8/20/20 at 18:55;  Stop 

8/20/20 at 18:56;  Status DC


Propofol 100 ml @ 0 mls/hr CONT  PRN IV SEE PROTOCOL Last administered on 

8/24/20at 10:28;  Start 8/20/20 at 19:00


Fentanyl Citrate (Fentanyl 2ml Vial) 25 mcg PRN Q1HR  PRN IV SEE COMMENTS;  

Start 8/20/20 at 19:00


Fentanyl Citrate (Fentanyl 2ml Vial) 50 mcg PRN Q1HR  PRN IV SEE COMMENTS Last 

administered on 8/20/20at 19:42;  Start 8/20/20 at 19:00


Chlorhexidine Gluconate (Peridex) 15 ml BID MM  Last administered on 8/21/20at 

08:16;  Start 8/20/20 at 21:00;  Stop 8/21/20 at 09:45;  Status DC


Midazolam HCl (Versed) 5 mg STK-MED ONCE .ROUTE ;  Start 8/20/20 at 19:17;  Stop

8/20/20 at 19:18;  Status DC


Midazolam HCl 50 mg/Sodium Chloride 50 ml @ 0 mls/hr 1X  ONCE IV ;  Start 

8/20/20 at 19:30;  Stop 8/20/20 at 19:31;  Status UNV


Albuterol/ Ipratropium (Duoneb) 3 ml STK-MED ONCE .ROUTE ;  Start 8/20/20 at 

19:27;  Stop 8/20/20 at 19:27;  Status DC


Midazolam HCl 100 ml @ 0 mls/hr CONT  PRN IV SEE PROTOCOL Last administered on 

8/24/20at 10:27;  Start 8/20/20 at 19:30


Propofol 50 ml @ 0 mls/hr 1X  ONCE IV  Last administered on 8/20/20at 18:55;  

Start 8/20/20 at 20:00;  Stop 8/20/20 at 20:01;  Status DC


Midazolam HCl (Versed) 5 mg 1X  ONCE NS  Last administered on 8/20/20at 19:18;  

Start 8/20/20 at 19:18;  Stop 8/20/20 at 19:59;  Status DC


Piperacillin Sod/ Tazobactam Sod 3.375 gm/Sodium Chloride 50 ml @  100 mls/hr 1X

 ONCE IV  Last administered on 8/20/20at 21:47;  Start 8/20/20 at 20:45;  Stop 

8/20/20 at 21:14;  Status DC


Dexmedetomidine HCl 400 mcg/ Sodium Chloride 100 ml @ 0 mls/hr CONT  PRN IV 

SEDATION Last administered on 8/24/20at 05:42;  Start 8/20/20 at 20:15


Sodium Chloride 500 ml @  500 mls/hr 1X PRN  PRN IV SEE COMMENTS;  Start 8/20/20

at 20:15


Atropine Sulfate (ATROPINE 0.5mg SYRINGE) 0.5 mg PRN Q5MIN  PRN IV SEE COMMENTS;

 Start 8/20/20 at 20:15


Fentanyl Citrate (Fentanyl 2ml Vial) 100 mcg 1X  ONCE IVP  Last administered on 

8/20/20at 20:17;  Start 8/20/20 at 20:15;  Stop 8/20/20 at 20:18;  Status DC


Methylprednisolone Sodium Succinate (SOLU-Medrol 40MG VIAL) 40 mg Q8HRS IV  Last

administered on 8/24/20at 05:41;  Start 8/20/20 at 22:00


Enoxaparin Sodium (Lovenox 40mg Syringe) 40 mg BID SQ  Last administered on 

8/21/20at 08:15;  Start 8/20/20 at 21:30;  Stop 8/21/20 at 09:47;  Status DC


Potassium Chloride/Dextrose/ Sod Cl 1,000 ml @  125 mls/hr Q8H ONCE IV  Last 

administered on 8/20/20at 22:13;  Start 8/20/20 at 21:30;  Stop 8/21/20 at 

05:29;  Status DC


Zinc Sulfate (Orazinc) 220 mg DAILY PO  Last administered on 8/24/20at 08:56;  

Start 8/21/20 at 09:00


Ondansetron HCl (Zofran) 4 mg PRN Q4HRS  PRN IV NAUSEA/VOMITING;  Start 8/20/20 

at 21:30


Acetaminophen (Tylenol) 650 mg PRN Q4HRS  PRN GT TEMP OVER 100.4F OR MILD PAIN 

Last administered on 8/23/20at 22:59;  Start 8/20/20 at 21:30


Acetaminophen (Tylenol Supp) 650 mg PRN Q4HRS  PRN NE TEMP OVER 100.4F OR MILD 

PAIN;  Start 8/20/20 at 21:30


Docusate Sodium (Colace) 100 mg PRN BID  PRN PO HARD STOOLS;  Start 8/20/20 at 

21:30


Piperacillin Sod/ Tazobactam Sod 3.375 gm/Sodium Chloride 50 ml @  100 mls/hr 

Q6HRS IV  Last administered on 8/24/20at 12:04;  Start 8/21/20 at 00:00


Pantoprazole Sodium (PROTONIX VIAL for IV PUSH) 40 mg DAILYAC IVP  Last 

administered on 8/24/20at 08:55;  Start 8/21/20 at 07:30


Norepinephrine Bitartrate 8 mg/ Dextrose 258 ml @  12.578 mls/ hr CONT  PRN IV 

PER PROTOCOL Last administered on 8/20/20at 22:15;  Start 8/20/20 at 21:45


Sodium Chloride 1,000 ml @  1,000 mls/hr 1X  ONCE IV  Last administered on 

8/20/20at 21:44;  Start 8/20/20 at 21:45;  Stop 8/20/20 at 22:44;  Status DC


Fentanyl Citrate 30 ml @ 0 mls/hr CONT  PRN IV SEE PROTOCOL Last administered on

8/22/20at 10:44;  Start 8/20/20 at 21:45;  Stop 8/22/20 at 14:49;  Status DC


Vecuronium Bromide (Norcuron Bolus) 6 mg PRN Q2HR  PRN IV VENT ASYNCHRONY Last 

administered on 8/22/20at 11:23;  Start 8/20/20 at 21:45;  Stop 8/22/20 at 

14:48;  Status DC


Sodium Chloride 1,000 ml @  1,000 mls/hr 1X  ONCE IV  Last administered on 

8/20/20at 22:36;  Start 8/20/20 at 22:30;  Stop 8/20/20 at 23:29;  Status DC


Methylprednisolone Sodium Succinate (SOLU-Medrol 40MG VIAL) 40 mg Q8HRS IV ;  

Start 8/21/20 at 14:00;  Status UNV


Enoxaparin Sodium (Lovenox 80mg Syringe) 80 mg BID SQ  Last administered on 

8/21/20at 20:53;  Start 8/21/20 at 21:00;  Stop 8/22/20 at 08:57;  Status DC


Insulin Human Lispro (HumaLOG) 6 units TID SQ ;  Start 8/21/20 at 14:00;  Stop 

8/21/20 at 10:05;  Status DC


Insulin Glargine (Lantus Syringe) 10 unit BID SQ  Last administered on 8/22/20at

09:27;  Start 8/21/20 at 21:00;  Stop 8/22/20 at 11:25;  Status DC


Ascorbic Acid (Vitamin C) 250 mg Q6HRS PO  Last administered on 8/24/20at 12:05;

 Start 8/21/20 at 12:00


Sodium Chloride 1,000 ml @  75 mls/hr [id number] IV  Last administered on 8/24/20at

05:44;  Start 8/21/20 at 10:00


Insulin Human Lispro (HumaLOG) 6 units Q6HRS SQ  Last administered on 8/22/20at 

06:11;  Start 8/21/20 at 12:00;  Stop 8/22/20 at 11:25;  Status DC


Insulin Glargine (Lantus Syringe) 10 unit 1X  ONCE SQ  Last administered on 

8/21/20at 11:40;  Start 8/21/20 at 11:00;  Stop 8/21/20 at 11:01;  Status DC


Enoxaparin Sodium (Lovenox 60mg Syringe) 60 mg BID SQ  Last administered on 8/24 /20at 08:55;  Start 8/22/20 at 09:00


Vecuronium Bromide (Norcuron Bolus) 6 mg PRN Q6HRS  PRN IV SEDATION Last 

administered on 8/23/20at 15:38;  Start 8/22/20 at 09:30


Insulin Glargine (Lantus Syringe) 14 unit BID SQ  Last administered on 8/24/20at

09:24;  Start 8/22/20 at 21:00


Insulin Human Lispro (HumaLOG) 8 units Q6HRS SQ  Last administered on 8/24/20at 

12:11;  Start 8/22/20 at 12:00


Fentanyl Citrate 55 ml @ 0 mls/hr CONT  PRN IV SEE PROTOCOL Last administered on

8/24/20at 02:32;  Start 8/22/20 at 14:49


Lorazepam (Ativan Inj) 1 mg PRN Q1HR  PRN IVP ANXIETY / AGITATION- MODERATE;  

Start 8/22/20 at 16:15


Lorazepam (Ativan Inj) 2 mg PRN Q1HR  PRN IVP ANXIETY / AGITATION- SEVERE Last 

administered on 8/23/20at 13:10;  Start 8/22/20 at 16:30


Insulin Human Lispro (HumaLOG) 6 units 1X  ONCE SQ  Last administered on 

8/23/20at 06:20;  Start 8/23/20 at 06:15;  Stop 8/23/20 at 06:16;  Status DC


Hydralazine HCl (Apresoline Inj) 10 mg PRN Q4HRS  PRN IVP ELEVATED BP, SEE 

COMMENTS;  Start 8/24/20 at 09:15


Alteplase, Recombinant (Cathflo For Central Catheter Clearance) 1 mg 1X  ONCE 

INT CAT  Last administered on 8/24/20at 12:05;  Start 8/24/20 at 11:00;  Stop 

8/24/20 at 11:01;  Status DC


Vitals/I & O





Vital Sign - Last 24 Hours








 8/23/20 8/23/20 8/23/20 8/23/20





 13:00 14:00 15:00 15:22


 


Pulse 81 77 78 


 


Resp 24 23 23 


 


B/P (MAP) 172/81 (111) 166/85 (112) 180/92 (121) 


 


Pulse Ox 93 99 97 98


 


O2 Delivery    Ventilator





 8/23/20 8/23/20 8/23/20 8/23/20





 16:00 16:00 17:00 18:00


 


Temp 99.1   





 99.1   


 


Pulse 79  77 80


 


Resp 23 23 22


 


B/P (MAP) 167/86 (113)  157/71 (99) 172/92 (118)


 


Pulse Ox 97  99 100


 


O2 Delivery  Mechanical Ventilator  


 


    





    





 8/23/20 8/23/20 8/23/20 8/23/20





 19:00 19:26 20:00 20:00


 


Pulse 81   81


 


Resp 25 22


 


B/P (MAP) 161/82 (108)   174/96 (122)


 


Pulse Ox 99 94  100


 


O2 Delivery  Ventilator Mechanical Ventilator 





 8/23/20 8/23/20 8/23/20 8/23/20





 21:00 22:00 23:00 23:59


 


Temp 103.0   





 103.0   


 


Pulse 81 80 78 


 


Resp 22 22 22 


 


B/P (MAP) 175/91 (119) 169/90 (116) 145/75 (98) 


 


Pulse Ox 100 100 100 


 


O2 Delivery    Mechanical Ventilator


 


    





    





 8/24/20 8/24/20 8/24/20 8/24/20





 00:01 00:33 01:00 02:00


 


Temp 102.0   





 102.0   


 


Pulse 78  77 77


 


Resp 23 22 22


 


B/P (MAP) 172/80 (110)  145/75 (98) 184/83 (116)


 


Pulse Ox 99 100 100 100


 


O2 Delivery  Ventilator  


 


    





    





 8/24/20 8/24/20 8/24/20 8/24/20





 02:32 03:00 03:02 03:57


 


Pulse  77  


 


Resp 22 22 22 


 


B/P (MAP)  162/67 (98)  


 


Pulse Ox 100 100 100 100


 


O2 Delivery   Ventilator Ventilator


 


O2 Flow Rate 18.0  18.0 





 8/24/20 8/24/20 8/24/20 8/24/20





 04:00 04:00 05:00 06:00


 


Temp 101.3   





 101.3   


 


Pulse 77  71 70


 


Resp 22 22 24


 


B/P (MAP) 167/77 (107)  169/81 (110) 166/81 (109)


 


Pulse Ox 100  100 100


 


O2 Delivery  Mechanical Ventilator  


 


    





    





 8/24/20 8/24/20 8/24/20 8/24/20





 07:00 07:30 08:00 08:00


 


Temp   98.1 





   98.1 


 


Pulse 66  64 


 


Resp 22  22 


 


B/P (MAP) 157/77 (103)  156/79 (104) 


 


Pulse Ox 100 100 100 


 


O2 Delivery Ventilator Ventilator Ventilator Mechanical Ventilator


 


    





    





 8/24/20 8/24/20  





 09:00 10:00  


 


Pulse 62 62  


 


Resp 22 22  


 


B/P (MAP) 155/77 (103) 137/68 (91)  


 


Pulse Ox 100 100  


 


O2 Delivery Ventilator Ventilator  














Intake and Output   


 


 8/23/20 8/23/20 8/24/20





 15:00 23:00 07:00


 


Intake Total 300 ml 2518 ml 2995.1 ml


 


Output Total 1100 ml 1050 ml 775 ml


 


Balance -800 ml 1468 ml 2220.1 ml











Justicifation of Admission Dx:


Justifications for Admission:


Justification of Admission Dx:  Yes











ELIJAH AGUSTIN MD             Aug 24, 2020 12:27

## 2020-08-25 VITALS — DIASTOLIC BLOOD PRESSURE: 70 MMHG | SYSTOLIC BLOOD PRESSURE: 147 MMHG

## 2020-08-25 VITALS — SYSTOLIC BLOOD PRESSURE: 142 MMHG | DIASTOLIC BLOOD PRESSURE: 62 MMHG

## 2020-08-25 VITALS — DIASTOLIC BLOOD PRESSURE: 93 MMHG | SYSTOLIC BLOOD PRESSURE: 220 MMHG

## 2020-08-25 VITALS — DIASTOLIC BLOOD PRESSURE: 64 MMHG | SYSTOLIC BLOOD PRESSURE: 140 MMHG

## 2020-08-25 VITALS — SYSTOLIC BLOOD PRESSURE: 143 MMHG | DIASTOLIC BLOOD PRESSURE: 63 MMHG

## 2020-08-25 VITALS — DIASTOLIC BLOOD PRESSURE: 71 MMHG | SYSTOLIC BLOOD PRESSURE: 149 MMHG

## 2020-08-25 VITALS — DIASTOLIC BLOOD PRESSURE: 66 MMHG | SYSTOLIC BLOOD PRESSURE: 144 MMHG

## 2020-08-25 VITALS — SYSTOLIC BLOOD PRESSURE: 171 MMHG | DIASTOLIC BLOOD PRESSURE: 76 MMHG

## 2020-08-25 VITALS — SYSTOLIC BLOOD PRESSURE: 163 MMHG | DIASTOLIC BLOOD PRESSURE: 77 MMHG

## 2020-08-25 VITALS — DIASTOLIC BLOOD PRESSURE: 65 MMHG | SYSTOLIC BLOOD PRESSURE: 138 MMHG

## 2020-08-25 VITALS — DIASTOLIC BLOOD PRESSURE: 71 MMHG | SYSTOLIC BLOOD PRESSURE: 148 MMHG

## 2020-08-25 VITALS — DIASTOLIC BLOOD PRESSURE: 70 MMHG | SYSTOLIC BLOOD PRESSURE: 141 MMHG

## 2020-08-25 VITALS — DIASTOLIC BLOOD PRESSURE: 62 MMHG | SYSTOLIC BLOOD PRESSURE: 140 MMHG

## 2020-08-25 VITALS — SYSTOLIC BLOOD PRESSURE: 160 MMHG | DIASTOLIC BLOOD PRESSURE: 77 MMHG

## 2020-08-25 VITALS — SYSTOLIC BLOOD PRESSURE: 144 MMHG | DIASTOLIC BLOOD PRESSURE: 65 MMHG

## 2020-08-25 VITALS — SYSTOLIC BLOOD PRESSURE: 134 MMHG | DIASTOLIC BLOOD PRESSURE: 61 MMHG

## 2020-08-25 VITALS — SYSTOLIC BLOOD PRESSURE: 148 MMHG | DIASTOLIC BLOOD PRESSURE: 67 MMHG

## 2020-08-25 VITALS — DIASTOLIC BLOOD PRESSURE: 63 MMHG | SYSTOLIC BLOOD PRESSURE: 144 MMHG

## 2020-08-25 VITALS — SYSTOLIC BLOOD PRESSURE: 157 MMHG | DIASTOLIC BLOOD PRESSURE: 71 MMHG

## 2020-08-25 VITALS — SYSTOLIC BLOOD PRESSURE: 151 MMHG | DIASTOLIC BLOOD PRESSURE: 75 MMHG

## 2020-08-25 VITALS — DIASTOLIC BLOOD PRESSURE: 64 MMHG | SYSTOLIC BLOOD PRESSURE: 133 MMHG

## 2020-08-25 VITALS — SYSTOLIC BLOOD PRESSURE: 174 MMHG | DIASTOLIC BLOOD PRESSURE: 72 MMHG

## 2020-08-25 VITALS — SYSTOLIC BLOOD PRESSURE: 134 MMHG | DIASTOLIC BLOOD PRESSURE: 64 MMHG

## 2020-08-25 LAB
ALBUMIN SERPL-MCNC: 1.4 G/DL (ref 3.4–5)
ALBUMIN/GLOB SERPL: 0.4 {RATIO} (ref 1–1.7)
ALP SERPL-CCNC: 111 U/L (ref 46–116)
ALT SERPL-CCNC: 55 U/L (ref 16–63)
ANION GAP SERPL CALC-SCNC: 8 MMOL/L (ref 6–14)
AST SERPL-CCNC: 51 U/L (ref 15–37)
BASE EXCESS ABG: -2 MMOL/L (ref -3–3)
BASOPHILS # BLD AUTO: 0 X10^3/UL (ref 0–0.2)
BASOPHILS NFR BLD: 0 % (ref 0–3)
BILIRUB SERPL-MCNC: 0.6 MG/DL (ref 0.2–1)
BUN SERPL-MCNC: 39 MG/DL (ref 8–26)
BUN/CREAT SERPL: 39 (ref 6–20)
CALCIUM SERPL-MCNC: 7.1 MG/DL (ref 8.5–10.1)
CHLORIDE SERPL-SCNC: 114 MMOL/L (ref 98–107)
CO2 SERPL-SCNC: 26 MMOL/L (ref 21–32)
CREAT SERPL-MCNC: 1 MG/DL (ref 0.7–1.3)
EOSINOPHIL NFR BLD: 0 % (ref 0–3)
EOSINOPHIL NFR BLD: 0 X10^3/UL (ref 0–0.7)
ERYTHROCYTE [DISTWIDTH] IN BLOOD BY AUTOMATED COUNT: 13.6 % (ref 11.5–14.5)
GFR SERPLBLD BASED ON 1.73 SQ M-ARVRAT: 75 ML/MIN
GLOBULIN SER-MCNC: 4 G/DL (ref 2.2–3.8)
GLUCOSE SERPL-MCNC: 342 MG/DL (ref 70–99)
HCO3 BLDA-SCNC: 23 MMOL/L (ref 21–28)
HCT VFR BLD CALC: 30.3 % (ref 39–53)
HGB BLD-MCNC: 10 G/DL (ref 13–17.5)
INSPIRATION SETTING TIME VENT: (no result)
LYMPHOCYTES # BLD: 0.4 X10^3/UL (ref 1–4.8)
LYMPHOCYTES NFR BLD AUTO: 4 % (ref 24–48)
MCH RBC QN AUTO: 31 PG (ref 25–35)
MCHC RBC AUTO-ENTMCNC: 33 G/DL (ref 31–37)
MCV RBC AUTO: 92 FL (ref 79–100)
MONO #: 0.2 X10^3/UL (ref 0–1.1)
MONOCYTES NFR BLD: 2 % (ref 0–9)
NEUT #: 11.7 X10^3/UL (ref 1.8–7.7)
NEUTROPHILS NFR BLD AUTO: 95 % (ref 31–73)
PCO2 BLDA: 45 MMHG (ref 35–46)
PLATELET # BLD AUTO: 179 X10^3/UL (ref 140–400)
PO2 BLDA: 63 MMHG (ref 65–108)
POTASSIUM SERPL-SCNC: 4.8 MMOL/L (ref 3.5–5.1)
PROT SERPL-MCNC: 5.4 G/DL (ref 6.4–8.2)
RBC # BLD AUTO: 3.28 X10^6/UL (ref 4.3–5.7)
SAO2 % BLDA: 90 % (ref 92–99)
SAO2 % BLDA: 95 % (ref 92–99)
SODIUM SERPL-SCNC: 148 MMOL/L (ref 136–145)
WBC # BLD AUTO: 12.3 X10^3/UL (ref 4–11)

## 2020-08-25 RX ADMIN — INSULIN GLARGINE SCH UNIT: 100 INJECTION, SOLUTION SUBCUTANEOUS at 10:30

## 2020-08-25 RX ADMIN — PROPOFOL PRN MLS/HR: 10 INJECTION, EMULSION INTRAVENOUS at 13:59

## 2020-08-25 RX ADMIN — PROPOFOL PRN MLS/HR: 10 INJECTION, EMULSION INTRAVENOUS at 21:04

## 2020-08-25 RX ADMIN — PROPOFOL PRN MLS/HR: 10 INJECTION, EMULSION INTRAVENOUS at 10:19

## 2020-08-25 RX ADMIN — METHYLPREDNISOLONE SODIUM SUCCINATE SCH MG: 40 INJECTION, POWDER, FOR SOLUTION INTRAMUSCULAR; INTRAVENOUS at 20:31

## 2020-08-25 RX ADMIN — OXYCODONE HYDROCHLORIDE AND ACETAMINOPHEN SCH MG: 500 TABLET ORAL at 17:36

## 2020-08-25 RX ADMIN — ACETAMINOPHEN PRN MG: 650 SOLUTION ORAL at 20:31

## 2020-08-25 RX ADMIN — INSULIN LISPRO SCH UNITS: 100 INJECTION, SOLUTION INTRAVENOUS; SUBCUTANEOUS at 05:53

## 2020-08-25 RX ADMIN — OXYCODONE HYDROCHLORIDE AND ACETAMINOPHEN SCH MG: 500 TABLET ORAL at 00:30

## 2020-08-25 RX ADMIN — VECURONIUM BROMIDE PRN MLS/HR: 1 INJECTION, POWDER, LYOPHILIZED, FOR SOLUTION INTRAVENOUS at 09:47

## 2020-08-25 RX ADMIN — METHYLPREDNISOLONE SODIUM SUCCINATE SCH MG: 40 INJECTION, POWDER, FOR SOLUTION INTRAMUSCULAR; INTRAVENOUS at 05:53

## 2020-08-25 RX ADMIN — INSULIN LISPRO SCH UNITS: 100 INJECTION, SOLUTION INTRAVENOUS; SUBCUTANEOUS at 11:33

## 2020-08-25 RX ADMIN — PIPERACILLIN SODIUM AND TAZOBACTAM SODIUM SCH MLS/HR: 3; .375 INJECTION, POWDER, LYOPHILIZED, FOR SOLUTION INTRAVENOUS at 17:29

## 2020-08-25 RX ADMIN — MIDAZOLAM PRN MLS/HR: 5 INJECTION, SOLUTION INTRAMUSCULAR; INTRAVENOUS at 07:09

## 2020-08-25 RX ADMIN — INSULIN GLARGINE SCH UNIT: 100 INJECTION, SOLUTION SUBCUTANEOUS at 08:30

## 2020-08-25 RX ADMIN — DOCUSATE SODIUM PRN MG: 100 CAPSULE, LIQUID FILLED ORAL at 08:02

## 2020-08-25 RX ADMIN — INSULIN GLARGINE SCH UNIT: 100 INJECTION, SOLUTION SUBCUTANEOUS at 20:30

## 2020-08-25 RX ADMIN — ENOXAPARIN SODIUM SCH MG: 80 INJECTION SUBCUTANEOUS at 20:30

## 2020-08-25 RX ADMIN — PROPOFOL PRN MLS/HR: 10 INJECTION, EMULSION INTRAVENOUS at 05:54

## 2020-08-25 RX ADMIN — PIPERACILLIN SODIUM AND TAZOBACTAM SODIUM SCH MLS/HR: 3; .375 INJECTION, POWDER, LYOPHILIZED, FOR SOLUTION INTRAVENOUS at 05:54

## 2020-08-25 RX ADMIN — INSULIN LISPRO SCH UNITS: 100 INJECTION, SOLUTION INTRAVENOUS; SUBCUTANEOUS at 17:32

## 2020-08-25 RX ADMIN — DEXMEDETOMIDINE HYDROCHLORIDE PRN MLS/HR: 100 INJECTION, SOLUTION, CONCENTRATE INTRAVENOUS at 18:36

## 2020-08-25 RX ADMIN — HYDRALAZINE HYDROCHLORIDE PRN MG: 20 INJECTION INTRAMUSCULAR; INTRAVENOUS at 18:10

## 2020-08-25 RX ADMIN — MIDAZOLAM PRN MLS/HR: 5 INJECTION, SOLUTION INTRAMUSCULAR; INTRAVENOUS at 18:42

## 2020-08-25 RX ADMIN — METHYLPREDNISOLONE SODIUM SUCCINATE SCH MG: 40 INJECTION, POWDER, FOR SOLUTION INTRAMUSCULAR; INTRAVENOUS at 13:34

## 2020-08-25 RX ADMIN — DEXMEDETOMIDINE HYDROCHLORIDE PRN MLS/HR: 100 INJECTION, SOLUTION, CONCENTRATE INTRAVENOUS at 08:29

## 2020-08-25 RX ADMIN — INSULIN LISPRO SCH UNITS: 100 INJECTION, SOLUTION INTRAVENOUS; SUBCUTANEOUS at 00:31

## 2020-08-25 RX ADMIN — VECURONIUM BROMIDE PRN MLS/HR: 1 INJECTION, POWDER, LYOPHILIZED, FOR SOLUTION INTRAVENOUS at 18:35

## 2020-08-25 RX ADMIN — PIPERACILLIN SODIUM AND TAZOBACTAM SODIUM SCH MLS/HR: 3; .375 INJECTION, POWDER, LYOPHILIZED, FOR SOLUTION INTRAVENOUS at 11:32

## 2020-08-25 RX ADMIN — ZINC SULFATE CAP 220 MG (50 MG ELEMENTAL ZN) SCH MG: 220 (50 ZN) CAP at 08:02

## 2020-08-25 RX ADMIN — PIPERACILLIN SODIUM AND TAZOBACTAM SODIUM SCH MLS/HR: 3; .375 INJECTION, POWDER, LYOPHILIZED, FOR SOLUTION INTRAVENOUS at 00:30

## 2020-08-25 RX ADMIN — HYDRALAZINE HYDROCHLORIDE PRN MG: 20 INJECTION INTRAMUSCULAR; INTRAVENOUS at 09:23

## 2020-08-25 RX ADMIN — BACITRACIN SCH MLS/HR: 5000 INJECTION, POWDER, FOR SOLUTION INTRAMUSCULAR at 10:22

## 2020-08-25 RX ADMIN — OXYCODONE HYDROCHLORIDE AND ACETAMINOPHEN SCH MG: 500 TABLET ORAL at 11:33

## 2020-08-25 RX ADMIN — OXYCODONE HYDROCHLORIDE AND ACETAMINOPHEN SCH MG: 500 TABLET ORAL at 05:52

## 2020-08-25 RX ADMIN — PROPOFOL PRN MLS/HR: 10 INJECTION, EMULSION INTRAVENOUS at 02:46

## 2020-08-25 RX ADMIN — ENOXAPARIN SODIUM SCH MG: 80 INJECTION SUBCUTANEOUS at 08:03

## 2020-08-25 RX ADMIN — Medication PRN MLS/HR: at 13:58

## 2020-08-25 RX ADMIN — PANTOPRAZOLE SODIUM SCH MG: 40 INJECTION, POWDER, FOR SOLUTION INTRAVENOUS at 08:02

## 2020-08-25 NOTE — PDOC
PULMONARY PROGRESS NOTES


DATE: 8/25/20 


TIME: 09:49


Subjective


remains intubated/sedated


AC mode ,100%FIO2/12 PEEP


nursing report hypoxia with minimal stimulation 


Full dose lovenox added 8/24 due to increase in D-dimer to 20


Vitals





Vital Signs








  Date Time  Temp Pulse Resp B/P (MAP) Pulse Ox O2 Delivery O2 Flow Rate FiO2


 


8/25/20 09:23  93  177/72    


 


8/25/20 07:45     95 Ventilator  


 


8/25/20 07:00   22     


 


8/25/20 04:00 99.1       





 99.1       








Comments


visual exam done due to COVID-Pandemia


no resp distess


no skin rash


lower extremity edema


Labs





Laboratory Tests








Test


 8/23/20


11:28 8/23/20


17:51 8/24/20


00:39 8/24/20


05:00


 


Glucose (Fingerstick)


 426 mg/dL


(70-99) 363 mg/dL


(70-99) 342 mg/dL


(70-99) 





 


White Blood Count


 


 


 


 13.9 x10^3/uL


(4.0-11.0)


 


Red Blood Count


 


 


 


 3.38 x10^6/uL


(4.30-5.70)


 


Hemoglobin


 


 


 


 10.4 g/dL


(13.0-17.5)


 


Hematocrit


 


 


 


 30.9 %


(39.0-53.0)


 


Mean Corpuscular Volume    91 fL () 


 


Mean Corpuscular Hemoglobin    31 pg (25-35) 


 


Mean Corpuscular Hemoglobin


Concent 


 


 


 34 g/dL


(31-37)


 


Red Cell Distribution Width


 


 


 


 13.3 %


(11.5-14.5)


 


Platelet Count


 


 


 


 184 x10^3/uL


(140-400)


 


Neutrophils (%) (Auto)    93 % (31-73) 


 


Lymphocytes (%) (Auto)    4 % (24-48) 


 


Monocytes (%) (Auto)    3 % (0-9) 


 


Eosinophils (%) (Auto)    0 % (0-3) 


 


Basophils (%) (Auto)    0 % (0-3) 


 


Neutrophils # (Auto)


 


 


 


 13.0 x10^3/uL


(1.8-7.7)


 


Lymphocytes # (Auto)


 


 


 


 0.5 x10^3/uL


(1.0-4.8)


 


Monocytes # (Auto)


 


 


 


 0.4 x10^3/uL


(0.0-1.1)


 


Eosinophils # (Auto)


 


 


 


 0.0 x10^3/uL


(0.0-0.7)


 


Basophils # (Auto)


 


 


 


 0.0 x10^3/uL


(0.0-0.2)


 


D-Dimer (Lexii)


 


 


 


 > 20.00


ug/mlFEU


 


Sodium Level


 


 


 


 149 mmol/L


(136-145)


 


Potassium Level


 


 


 


 4.5 mmol/L


(3.5-5.1)


 


Chloride Level


 


 


 


 114 mmol/L


()


 


Carbon Dioxide Level


 


 


 


 25 mmol/L


(21-32)


 


Anion Gap    10 (6-14) 


 


Blood Urea Nitrogen


 


 


 


 34 mg/dL


(8-26)


 


Creatinine


 


 


 


 1.1 mg/dL


(0.7-1.3)


 


Estimated GFR


(Cockcroft-Gault) 


 


 


 69.5 





 


BUN/Creatinine Ratio    31 (6-20) 


 


Glucose Level


 


 


 


 316 mg/dL


(70-99)


 


Calcium Level


 


 


 


 7.2 mg/dL


(8.5-10.1)


 


Total Bilirubin


 


 


 


 0.7 mg/dL


(0.2-1.0)


 


Aspartate Amino Transf


(AST/SGOT) 


 


 


 76 U/L (15-37) 





 


Alanine Aminotransferase


(ALT/SGPT) 


 


 


 77 U/L (16-63) 





 


Alkaline Phosphatase


 


 


 


 118 U/L


()


 


Total Protein


 


 


 


 5.7 g/dL


(6.4-8.2)


 


Albumin


 


 


 


 1.6 g/dL


(3.4-5.0)


 


Albumin/Globulin Ratio    0.4 (1.0-1.7) 


 


Test


 8/24/20


05:04 8/24/20


07:30 8/24/20


11:53 8/24/20


18:34


 


Glucose (Fingerstick)


 326 mg/dL


(70-99) 


 243 mg/dL


(70-99) 317 mg/dL


(70-99)


 


O2 Saturation  95 % (92-99)   


 


Arterial Blood pH


 


 7.42


(7.35-7.45) 


 





 


Arterial Blood pCO2 at


Patient Temp 


 40 mmHg


(35-46) 


 





 


Arterial Blood pO2 at Patient


Temp 


 83 mmHg


() 


 





 


Arterial Blood HCO3


 


 25 mmol/L


(21-28) 


 





 


Arterial Blood Base Excess


 


 1 mmol/L


(-3-3) 


 





 


FiO2  100   


 


Test


 8/25/20


00:29 8/25/20


05:30 8/25/20


05:41 8/25/20


08:00


 


Glucose (Fingerstick)


 304 mg/dL


(70-99) 


 327 mg/dL


(70-99) 





 


White Blood Count


 


 12.3 x10^3/uL


(4.0-11.0) 


 





 


Red Blood Count


 


 3.28 x10^6/uL


(4.30-5.70) 


 





 


Hemoglobin


 


 10.0 g/dL


(13.0-17.5) 


 





 


Hematocrit


 


 30.3 %


(39.0-53.0) 


 





 


Mean Corpuscular Volume  92 fL ()   


 


Mean Corpuscular Hemoglobin  31 pg (25-35)   


 


Mean Corpuscular Hemoglobin


Concent 


 33 g/dL


(31-37) 


 





 


Red Cell Distribution Width


 


 13.6 %


(11.5-14.5) 


 





 


Platelet Count


 


 179 x10^3/uL


(140-400) 


 





 


Neutrophils (%) (Auto)  95 % (31-73)   


 


Lymphocytes (%) (Auto)  4 % (24-48)   


 


Monocytes (%) (Auto)  2 % (0-9)   


 


Eosinophils (%) (Auto)  0 % (0-3)   


 


Basophils (%) (Auto)  0 % (0-3)   


 


Neutrophils # (Auto)


 


 11.7 x10^3/uL


(1.8-7.7) 


 





 


Lymphocytes # (Auto)


 


 0.4 x10^3/uL


(1.0-4.8) 


 





 


Monocytes # (Auto)


 


 0.2 x10^3/uL


(0.0-1.1) 


 





 


Eosinophils # (Auto)


 


 0.0 x10^3/uL


(0.0-0.7) 


 





 


Basophils # (Auto)


 


 0.0 x10^3/uL


(0.0-0.2) 


 





 


Sodium Level


 


 148 mmol/L


(136-145) 


 





 


Potassium Level


 


 4.8 mmol/L


(3.5-5.1) 


 





 


Chloride Level


 


 114 mmol/L


() 


 





 


Carbon Dioxide Level


 


 26 mmol/L


(21-32) 


 





 


Anion Gap  8 (6-14)   


 


Blood Urea Nitrogen


 


 39 mg/dL


(8-26) 


 





 


Creatinine


 


 1.0 mg/dL


(0.7-1.3) 


 





 


Estimated GFR


(Cockcroft-Gault) 


 75.0 


 


 





 


BUN/Creatinine Ratio  39 (6-20)   


 


Glucose Level


 


 342 mg/dL


(70-99) 


 





 


Calcium Level


 


 7.1 mg/dL


(8.5-10.1) 


 





 


Total Bilirubin


 


 0.6 mg/dL


(0.2-1.0) 


 





 


Aspartate Amino Transf


(AST/SGOT) 


 51 U/L (15-37) 


 


 





 


Alanine Aminotransferase


(ALT/SGPT) 


 55 U/L (16-63) 


 


 





 


Alkaline Phosphatase


 


 111 U/L


() 


 





 


Total Protein


 


 5.4 g/dL


(6.4-8.2) 


 





 


Albumin


 


 1.4 g/dL


(3.4-5.0) 


 





 


Albumin/Globulin Ratio  0.4 (1.0-1.7)   


 


O2 Saturation    90 % (92-99) 


 


Arterial Blood pH


 


 


 


 7.34


(7.35-7.45)


 


Arterial Blood pCO2 at


Patient Temp 


 


 


 45 mmHg


(35-46)


 


Arterial Blood pO2 at Patient


Temp 


 


 


 63 mmHg


()


 


Arterial Blood HCO3


 


 


 


 23 mmol/L


(21-28)


 


Arterial Blood Base Excess


 


 


 


 -2 mmol/L


(-3-3)


 


FiO2    100% vent 








Laboratory Tests








Test


 8/24/20


11:53 8/24/20


18:34 8/25/20


00:29 8/25/20


05:30


 


Glucose (Fingerstick)


 243 mg/dL


(70-99) 317 mg/dL


(70-99) 304 mg/dL


(70-99) 





 


White Blood Count


 


 


 


 12.3 x10^3/uL


(4.0-11.0)


 


Red Blood Count


 


 


 


 3.28 x10^6/uL


(4.30-5.70)


 


Hemoglobin


 


 


 


 10.0 g/dL


(13.0-17.5)


 


Hematocrit


 


 


 


 30.3 %


(39.0-53.0)


 


Mean Corpuscular Volume    92 fL () 


 


Mean Corpuscular Hemoglobin    31 pg (25-35) 


 


Mean Corpuscular Hemoglobin


Concent 


 


 


 33 g/dL


(31-37)


 


Red Cell Distribution Width


 


 


 


 13.6 %


(11.5-14.5)


 


Platelet Count


 


 


 


 179 x10^3/uL


(140-400)


 


Neutrophils (%) (Auto)    95 % (31-73) 


 


Lymphocytes (%) (Auto)    4 % (24-48) 


 


Monocytes (%) (Auto)    2 % (0-9) 


 


Eosinophils (%) (Auto)    0 % (0-3) 


 


Basophils (%) (Auto)    0 % (0-3) 


 


Neutrophils # (Auto)


 


 


 


 11.7 x10^3/uL


(1.8-7.7)


 


Lymphocytes # (Auto)


 


 


 


 0.4 x10^3/uL


(1.0-4.8)


 


Monocytes # (Auto)


 


 


 


 0.2 x10^3/uL


(0.0-1.1)


 


Eosinophils # (Auto)


 


 


 


 0.0 x10^3/uL


(0.0-0.7)


 


Basophils # (Auto)


 


 


 


 0.0 x10^3/uL


(0.0-0.2)


 


Sodium Level


 


 


 


 148 mmol/L


(136-145)


 


Potassium Level


 


 


 


 4.8 mmol/L


(3.5-5.1)


 


Chloride Level


 


 


 


 114 mmol/L


()


 


Carbon Dioxide Level


 


 


 


 26 mmol/L


(21-32)


 


Anion Gap    8 (6-14) 


 


Blood Urea Nitrogen


 


 


 


 39 mg/dL


(8-26)


 


Creatinine


 


 


 


 1.0 mg/dL


(0.7-1.3)


 


Estimated GFR


(Cockcroft-Gault) 


 


 


 75.0 





 


BUN/Creatinine Ratio    39 (6-20) 


 


Glucose Level


 


 


 


 342 mg/dL


(70-99)


 


Calcium Level


 


 


 


 7.1 mg/dL


(8.5-10.1)


 


Total Bilirubin


 


 


 


 0.6 mg/dL


(0.2-1.0)


 


Aspartate Amino Transf


(AST/SGOT) 


 


 


 51 U/L (15-37) 





 


Alanine Aminotransferase


(ALT/SGPT) 


 


 


 55 U/L (16-63) 





 


Alkaline Phosphatase


 


 


 


 111 U/L


()


 


Total Protein


 


 


 


 5.4 g/dL


(6.4-8.2)


 


Albumin


 


 


 


 1.4 g/dL


(3.4-5.0)


 


Albumin/Globulin Ratio    0.4 (1.0-1.7) 


 


Test


 8/25/20


05:41 8/25/20


08:00 


 





 


Glucose (Fingerstick)


 327 mg/dL


(70-99) 


 


 





 


O2 Saturation  90 % (92-99)   


 


Arterial Blood pH


 


 7.34


(7.35-7.45) 


 





 


Arterial Blood pCO2 at


Patient Temp 


 45 mmHg


(35-46) 


 





 


Arterial Blood pO2 at Patient


Temp 


 63 mmHg


() 


 





 


Arterial Blood HCO3


 


 23 mmol/L


(21-28) 


 





 


Arterial Blood Base Excess


 


 -2 mmol/L


(-3-3) 


 





 


FiO2  100% vent   








Comments


 CXR 8/21 


IMPRESSION: 


1. Bilateral diffuse infiltrates are increased in the left base and mildly


improved elsewhere.





Impression


.


1.  Acute hypoxic respiratory failure secondary to COVID-19 pneumonia/ARDS/acute


lung injury--worsening oxygen requirements 


2.  Abnormal chest x-ray with bilateral infiltrates consistent with COVID-19


pneumonia.


3.  Hypotension secondary to combination of hypovolemia and sepsis status post 2


liters of IV fluids. no pressors, now hypertensive


4.  Acute kidney injury, improving.


5.  Severe protein-calorie malnutrition.


6.  Abnormal D-dimer related to COVID-19 pneumonia. Full dose lovenox added 8/24

due to increase in D-dimer to 20





Plan


.


RECOMMENDATIONS:


1.  Continue present assist control mode, PEEP of 12 and FiO2 of 100% ABG revi

ewed , wean FIO2/PEEP as tolerated, ensure adequate sedation 


2.  Follow chest x-rays as needed.


3.  Broad spectrum antibiotic.


4. Cont.  IV steroids 


5. Abnormal D-dimer related to COVID-19 pneumonia.  Full dose lovenox added 8/24

due to increase in D-dimer to 20. dopplers neg for DVT


7.  Stress ulcer prophylaxis.


8.  Discussed with RN and RT.


9.  s/p Plasma


10. cont. TF for nutritional sup[port 





Critical care time 30 minutes.











ANGELA HUFFMAN MD                 Aug 25, 2020 09:52

## 2020-08-25 NOTE — NUR
Asymptomatic bradycardia. VSS,[id number]. NO overt resp distress observed CPOT in parameters. Dr Nuñez at bedside. Aware of rhythm and lack of symptoms. No order changes at this time

 

-------------------------------------------------------------------------------

Addendum: 08/25/20 at 1436 by Summer Gross RN

-------------------------------------------------------------------------------

disreguard above note. Documented on wrong patient

## 2020-08-25 NOTE — NUR
SS following up with discharge planning. SS reviewed pt chart and discussed with pt RN. Pt 
remains on the vent at this time. COVID19 positive. Pt on IV Zosyn. Pt is self pay. SS will 
continue to follow for discharge planning.

## 2020-08-25 NOTE — PDOC
PROGRESS NOTES


Date of Service:


DATE: 8/25/20 


TIME: 10:00





Chief Complaint


Chief Complaint


Assessment/Plan


Acute hypoxemic respiratory failure


Leukocytosis with bandemia secondary to septic process


Hyponatremia secondary to low effective circulatory volume


Acue renal failure due to vasomotor nephropathy most likely 


Hyperglycemia


mild transaminitis 


Uncontrolled hypertension





Plan:


will start hidralazine


will increase insulin for better glycemia control 


supportive measures


will adjust insulin for better glycemia control


follow labs in am


prognosis guarded


follow pulmonology recommendations





History of Present Illness


Patient is a 55-year-old gentleman who was diagnosed with COVID-19 infection on 

Monday 5 days prior to his admission to the intensive care unit.  Apparently the

patient reported worsening respiratory distress reason why he came to the 

emergency department for evaluation and treatment.  The story was quite limited 

since the patient was speaking [place] only and his acuity was such that he 

required mechanical ventilation and prompt intubation by the emergency 

department physician.  At the present time patient is intubated and sedated 

continues to require a lot of support no pertinent past medical history was 

reported he is admitted to the intensive care unit for further treatment





8/24/2020


Continues to require full support, glycemia has been noted in hypertension as 

well.  Discussed with nursing staff, all of concerns were addressed to the best 

of my abilities no acute events reported overnight





8/25/2020


No acute events reported overnight, case discussed with nursing staff patient in

no acute distress





Vitals


Vitals





Vital Signs








  Date Time  Temp Pulse Resp B/P (MAP) Pulse Ox O2 Delivery O2 Flow Rate FiO2


 


8/25/20 09:23  93  177/72    


 


8/25/20 08:00      Mechanical Ventilator  


 


8/25/20 07:45     95   


 


8/25/20 07:00   22     


 


8/25/20 04:00 99.1       





 99.1       











Labs


LABS





Laboratory Tests








Test


 8/24/20


11:53 8/24/20


18:34 8/25/20


00:29 8/25/20


05:30


 


Glucose (Fingerstick)


 243 mg/dL


(70-99) 317 mg/dL


(70-99) 304 mg/dL


(70-99) 





 


White Blood Count


 


 


 


 12.3 x10^3/uL


(4.0-11.0)


 


Red Blood Count


 


 


 


 3.28 x10^6/uL


(4.30-5.70)


 


Hemoglobin


 


 


 


 10.0 g/dL


(13.0-17.5)


 


Hematocrit


 


 


 


 30.3 %


(39.0-53.0)


 


Mean Corpuscular Volume    92 fL () 


 


Mean Corpuscular Hemoglobin    31 pg (25-35) 


 


Mean Corpuscular Hemoglobin


Concent 


 


 


 33 g/dL


(31-37)


 


Red Cell Distribution Width


 


 


 


 13.6 %


(11.5-14.5)


 


Platelet Count


 


 


 


 179 x10^3/uL


(140-400)


 


Neutrophils (%) (Auto)    95 % (31-73) 


 


Lymphocytes (%) (Auto)    4 % (24-48) 


 


Monocytes (%) (Auto)    2 % (0-9) 


 


Eosinophils (%) (Auto)    0 % (0-3) 


 


Basophils (%) (Auto)    0 % (0-3) 


 


Neutrophils # (Auto)


 


 


 


 11.7 x10^3/uL


(1.8-7.7)


 


Lymphocytes # (Auto)


 


 


 


 0.4 x10^3/uL


(1.0-4.8)


 


Monocytes # (Auto)


 


 


 


 0.2 x10^3/uL


(0.0-1.1)


 


Eosinophils # (Auto)


 


 


 


 0.0 x10^3/uL


(0.0-0.7)


 


Basophils # (Auto)


 


 


 


 0.0 x10^3/uL


(0.0-0.2)


 


Sodium Level


 


 


 


 148 mmol/L


(136-145)


 


Potassium Level


 


 


 


 4.8 mmol/L


(3.5-5.1)


 


Chloride Level


 


 


 


 114 mmol/L


()


 


Carbon Dioxide Level


 


 


 


 26 mmol/L


(21-32)


 


Anion Gap    8 (6-14) 


 


Blood Urea Nitrogen


 


 


 


 39 mg/dL


(8-26)


 


Creatinine


 


 


 


 1.0 mg/dL


(0.7-1.3)


 


Estimated GFR


(Cockcroft-Gault) 


 


 


 75.0 





 


BUN/Creatinine Ratio    39 (6-20) 


 


Glucose Level


 


 


 


 342 mg/dL


(70-99)


 


Calcium Level


 


 


 


 7.1 mg/dL


(8.5-10.1)


 


Total Bilirubin


 


 


 


 0.6 mg/dL


(0.2-1.0)


 


Aspartate Amino Transf


(AST/SGOT) 


 


 


 51 U/L (15-37) 





 


Alanine Aminotransferase


(ALT/SGPT) 


 


 


 55 U/L (16-63) 





 


Alkaline Phosphatase


 


 


 


 111 U/L


()


 


Total Protein


 


 


 


 5.4 g/dL


(6.4-8.2)


 


Albumin


 


 


 


 1.4 g/dL


(3.4-5.0)


 


Albumin/Globulin Ratio    0.4 (1.0-1.7) 


 


Test


 8/25/20


05:41 8/25/20


08:00 


 





 


Glucose (Fingerstick)


 327 mg/dL


(70-99) 


 


 





 


O2 Saturation  90 % (92-99)   


 


Arterial Blood pH


 


 7.34


(7.35-7.45) 


 





 


Arterial Blood pCO2 at


Patient Temp 


 45 mmHg


(35-46) 


 





 


Arterial Blood pO2 at Patient


Temp 


 63 mmHg


() 


 





 


Arterial Blood HCO3


 


 23 mmol/L


(21-28) 


 





 


Arterial Blood Base Excess


 


 -2 mmol/L


(-3-3) 


 





 


FiO2  100% vent   











Comment


Review of Relevant


I have reviewed the following items jim (where applicable) has been applied.


Labs





Laboratory Tests








Test


 8/23/20


11:28 8/23/20


17:51 8/24/20


00:39 8/24/20


05:00


 


Glucose (Fingerstick)


 426 mg/dL


(70-99) 363 mg/dL


(70-99) 342 mg/dL


(70-99) 





 


White Blood Count


 


 


 


 13.9 x10^3/uL


(4.0-11.0)


 


Red Blood Count


 


 


 


 3.38 x10^6/uL


(4.30-5.70)


 


Hemoglobin


 


 


 


 10.4 g/dL


(13.0-17.5)


 


Hematocrit


 


 


 


 30.9 %


(39.0-53.0)


 


Mean Corpuscular Volume    91 fL () 


 


Mean Corpuscular Hemoglobin    31 pg (25-35) 


 


Mean Corpuscular Hemoglobin


Concent 


 


 


 34 g/dL


(31-37)


 


Red Cell Distribution Width


 


 


 


 13.3 %


(11.5-14.5)


 


Platelet Count


 


 


 


 184 x10^3/uL


(140-400)


 


Neutrophils (%) (Auto)    93 % (31-73) 


 


Lymphocytes (%) (Auto)    4 % (24-48) 


 


Monocytes (%) (Auto)    3 % (0-9) 


 


Eosinophils (%) (Auto)    0 % (0-3) 


 


Basophils (%) (Auto)    0 % (0-3) 


 


Neutrophils # (Auto)


 


 


 


 13.0 x10^3/uL


(1.8-7.7)


 


Lymphocytes # (Auto)


 


 


 


 0.5 x10^3/uL


(1.0-4.8)


 


Monocytes # (Auto)


 


 


 


 0.4 x10^3/uL


(0.0-1.1)


 


Eosinophils # (Auto)


 


 


 


 0.0 x10^3/uL


(0.0-0.7)


 


Basophils # (Auto)


 


 


 


 0.0 x10^3/uL


(0.0-0.2)


 


D-Dimer (Lexii)


 


 


 


 > 20.00


ug/mlFEU


 


Sodium Level


 


 


 


 149 mmol/L


(136-145)


 


Potassium Level


 


 


 


 4.5 mmol/L


(3.5-5.1)


 


Chloride Level


 


 


 


 114 mmol/L


()


 


Carbon Dioxide Level


 


 


 


 25 mmol/L


(21-32)


 


Anion Gap    10 (6-14) 


 


Blood Urea Nitrogen


 


 


 


 34 mg/dL


(8-26)


 


Creatinine


 


 


 


 1.1 mg/dL


(0.7-1.3)


 


Estimated GFR


(Cockcroft-Gault) 


 


 


 69.5 





 


BUN/Creatinine Ratio    31 (6-20) 


 


Glucose Level


 


 


 


 316 mg/dL


(70-99)


 


Calcium Level


 


 


 


 7.2 mg/dL


(8.5-10.1)


 


Total Bilirubin


 


 


 


 0.7 mg/dL


(0.2-1.0)


 


Aspartate Amino Transf


(AST/SGOT) 


 


 


 76 U/L (15-37) 





 


Alanine Aminotransferase


(ALT/SGPT) 


 


 


 77 U/L (16-63) 





 


Alkaline Phosphatase


 


 


 


 118 U/L


()


 


Total Protein


 


 


 


 5.7 g/dL


(6.4-8.2)


 


Albumin


 


 


 


 1.6 g/dL


(3.4-5.0)


 


Albumin/Globulin Ratio    0.4 (1.0-1.7) 


 


Test


 8/24/20


05:04 8/24/20


07:30 8/24/20


11:53 8/24/20


18:34


 


Glucose (Fingerstick)


 326 mg/dL


(70-99) 


 243 mg/dL


(70-99) 317 mg/dL


(70-99)


 


O2 Saturation  95 % (92-99)   


 


Arterial Blood pH


 


 7.42


(7.35-7.45) 


 





 


Arterial Blood pCO2 at


Patient Temp 


 40 mmHg


(35-46) 


 





 


Arterial Blood pO2 at Patient


Temp 


 83 mmHg


() 


 





 


Arterial Blood HCO3


 


 25 mmol/L


(21-28) 


 





 


Arterial Blood Base Excess


 


 1 mmol/L


(-3-3) 


 





 


FiO2  100   


 


Test


 8/25/20


00:29 8/25/20


05:30 8/25/20


05:41 8/25/20


08:00


 


Glucose (Fingerstick)


 304 mg/dL


(70-99) 


 327 mg/dL


(70-99) 





 


White Blood Count


 


 12.3 x10^3/uL


(4.0-11.0) 


 





 


Red Blood Count


 


 3.28 x10^6/uL


(4.30-5.70) 


 





 


Hemoglobin


 


 10.0 g/dL


(13.0-17.5) 


 





 


Hematocrit


 


 30.3 %


(39.0-53.0) 


 





 


Mean Corpuscular Volume  92 fL ()   


 


Mean Corpuscular Hemoglobin  31 pg (25-35)   


 


Mean Corpuscular Hemoglobin


Concent 


 33 g/dL


(31-37) 


 





 


Red Cell Distribution Width


 


 13.6 %


(11.5-14.5) 


 





 


Platelet Count


 


 179 x10^3/uL


(140-400) 


 





 


Neutrophils (%) (Auto)  95 % (31-73)   


 


Lymphocytes (%) (Auto)  4 % (24-48)   


 


Monocytes (%) (Auto)  2 % (0-9)   


 


Eosinophils (%) (Auto)  0 % (0-3)   


 


Basophils (%) (Auto)  0 % (0-3)   


 


Neutrophils # (Auto)


 


 11.7 x10^3/uL


(1.8-7.7) 


 





 


Lymphocytes # (Auto)


 


 0.4 x10^3/uL


(1.0-4.8) 


 





 


Monocytes # (Auto)


 


 0.2 x10^3/uL


(0.0-1.1) 


 





 


Eosinophils # (Auto)


 


 0.0 x10^3/uL


(0.0-0.7) 


 





 


Basophils # (Auto)


 


 0.0 x10^3/uL


(0.0-0.2) 


 





 


Sodium Level


 


 148 mmol/L


(136-145) 


 





 


Potassium Level


 


 4.8 mmol/L


(3.5-5.1) 


 





 


Chloride Level


 


 114 mmol/L


() 


 





 


Carbon Dioxide Level


 


 26 mmol/L


(21-32) 


 





 


Anion Gap  8 (6-14)   


 


Blood Urea Nitrogen


 


 39 mg/dL


(8-26) 


 





 


Creatinine


 


 1.0 mg/dL


(0.7-1.3) 


 





 


Estimated GFR


(Cockcroft-Gault) 


 75.0 


 


 





 


BUN/Creatinine Ratio  39 (6-20)   


 


Glucose Level


 


 342 mg/dL


(70-99) 


 





 


Calcium Level


 


 7.1 mg/dL


(8.5-10.1) 


 





 


Total Bilirubin


 


 0.6 mg/dL


(0.2-1.0) 


 





 


Aspartate Amino Transf


(AST/SGOT) 


 51 U/L (15-37) 


 


 





 


Alanine Aminotransferase


(ALT/SGPT) 


 55 U/L (16-63) 


 


 





 


Alkaline Phosphatase


 


 111 U/L


() 


 





 


Total Protein


 


 5.4 g/dL


(6.4-8.2) 


 





 


Albumin


 


 1.4 g/dL


(3.4-5.0) 


 





 


Albumin/Globulin Ratio  0.4 (1.0-1.7)   


 


O2 Saturation    90 % (92-99) 


 


Arterial Blood pH


 


 


 


 7.34


(7.35-7.45)


 


Arterial Blood pCO2 at


Patient Temp 


 


 


 45 mmHg


(35-46)


 


Arterial Blood pO2 at Patient


Temp 


 


 


 63 mmHg


()


 


Arterial Blood HCO3


 


 


 


 23 mmol/L


(21-28)


 


Arterial Blood Base Excess


 


 


 


 -2 mmol/L


(-3-3)


 


FiO2    100% vent 








Laboratory Tests








Test


 8/24/20


11:53 8/24/20


18:34 8/25/20


00:29 8/25/20


05:30


 


Glucose (Fingerstick)


 243 mg/dL


(70-99) 317 mg/dL


(70-99) 304 mg/dL


(70-99) 





 


White Blood Count


 


 


 


 12.3 x10^3/uL


(4.0-11.0)


 


Red Blood Count


 


 


 


 3.28 x10^6/uL


(4.30-5.70)


 


Hemoglobin


 


 


 


 10.0 g/dL


(13.0-17.5)


 


Hematocrit


 


 


 


 30.3 %


(39.0-53.0)


 


Mean Corpuscular Volume    92 fL () 


 


Mean Corpuscular Hemoglobin    31 pg (25-35) 


 


Mean Corpuscular Hemoglobin


Concent 


 


 


 33 g/dL


(31-37)


 


Red Cell Distribution Width


 


 


 


 13.6 %


(11.5-14.5)


 


Platelet Count


 


 


 


 179 x10^3/uL


(140-400)


 


Neutrophils (%) (Auto)    95 % (31-73) 


 


Lymphocytes (%) (Auto)    4 % (24-48) 


 


Monocytes (%) (Auto)    2 % (0-9) 


 


Eosinophils (%) (Auto)    0 % (0-3) 


 


Basophils (%) (Auto)    0 % (0-3) 


 


Neutrophils # (Auto)


 


 


 


 11.7 x10^3/uL


(1.8-7.7)


 


Lymphocytes # (Auto)


 


 


 


 0.4 x10^3/uL


(1.0-4.8)


 


Monocytes # (Auto)


 


 


 


 0.2 x10^3/uL


(0.0-1.1)


 


Eosinophils # (Auto)


 


 


 


 0.0 x10^3/uL


(0.0-0.7)


 


Basophils # (Auto)


 


 


 


 0.0 x10^3/uL


(0.0-0.2)


 


Sodium Level


 


 


 


 148 mmol/L


(136-145)


 


Potassium Level


 


 


 


 4.8 mmol/L


(3.5-5.1)


 


Chloride Level


 


 


 


 114 mmol/L


()


 


Carbon Dioxide Level


 


 


 


 26 mmol/L


(21-32)


 


Anion Gap    8 (6-14) 


 


Blood Urea Nitrogen


 


 


 


 39 mg/dL


(8-26)


 


Creatinine


 


 


 


 1.0 mg/dL


(0.7-1.3)


 


Estimated GFR


(Cockcroft-Gault) 


 


 


 75.0 





 


BUN/Creatinine Ratio    39 (6-20) 


 


Glucose Level


 


 


 


 342 mg/dL


(70-99)


 


Calcium Level


 


 


 


 7.1 mg/dL


(8.5-10.1)


 


Total Bilirubin


 


 


 


 0.6 mg/dL


(0.2-1.0)


 


Aspartate Amino Transf


(AST/SGOT) 


 


 


 51 U/L (15-37) 





 


Alanine Aminotransferase


(ALT/SGPT) 


 


 


 55 U/L (16-63) 





 


Alkaline Phosphatase


 


 


 


 111 U/L


()


 


Total Protein


 


 


 


 5.4 g/dL


(6.4-8.2)


 


Albumin


 


 


 


 1.4 g/dL


(3.4-5.0)


 


Albumin/Globulin Ratio    0.4 (1.0-1.7) 


 


Test


 8/25/20


05:41 8/25/20


08:00 


 





 


Glucose (Fingerstick)


 327 mg/dL


(70-99) 


 


 





 


O2 Saturation  90 % (92-99)   


 


Arterial Blood pH


 


 7.34


(7.35-7.45) 


 





 


Arterial Blood pCO2 at


Patient Temp 


 45 mmHg


(35-46) 


 





 


Arterial Blood pO2 at Patient


Temp 


 63 mmHg


() 


 





 


Arterial Blood HCO3


 


 23 mmol/L


(21-28) 


 





 


Arterial Blood Base Excess


 


 -2 mmol/L


(-3-3) 


 





 


FiO2  100% vent   








Microbiology


8/22/20 Blood Culture - Preliminary, Resulted


          NO GROWTH AFTER 2 DAYS


8/20/20 Urine Culture - Final, Complete


Medications





Current Medications


Etomidate (Amidate) 20 mg STK-MED ONCE IV ;  Start 8/20/20 at 18:54;  Stop 

8/20/20 at 18:54;  Status DC


Succinylcholine Chloride (Anectine) 200 mg STK-MED ONCE .ROUTE ;  Start 8/20/20 

at 18:55;  Stop 8/20/20 at 18:55;  Status DC


Propofol 50 ml @ As Directed STK-MED ONCE IV ;  Start 8/20/20 at 18:55;  Stop 

8/20/20 at 18:56;  Status DC


Propofol 100 ml @ 0 mls/hr CONT  PRN IV SEE PROTOCOL Last administered on 

8/25/20at 05:54;  Start 8/20/20 at 19:00


Fentanyl Citrate (Fentanyl 2ml Vial) 25 mcg PRN Q1HR  PRN IV SEE COMMENTS;  

Start 8/20/20 at 19:00


Fentanyl Citrate (Fentanyl 2ml Vial) 50 mcg PRN Q1HR  PRN IV SEE COMMENTS Last 

administered on 8/20/20at 19:42;  Start 8/20/20 at 19:00


Chlorhexidine Gluconate (Peridex) 15 ml BID MM  Last administered on 8/21/20at 

08:16;  Start 8/20/20 at 21:00;  Stop 8/21/20 at 09:45;  Status DC


Midazolam HCl (Versed) 5 mg STK-MED ONCE .ROUTE ;  Start 8/20/20 at 19:17;  Stop

8/20/20 at 19:18;  Status DC


Midazolam HCl 50 mg/Sodium Chloride 50 ml @ 0 mls/hr 1X  ONCE IV ;  Start 

8/20/20 at 19:30;  Stop 8/20/20 at 19:31;  Status UNV


Albuterol/ Ipratropium (Duoneb) 3 ml STK-MED ONCE .ROUTE ;  Start 8/20/20 at 

19:27;  Stop 8/20/20 at 19:27;  Status DC


Midazolam HCl 100 ml @ 0 mls/hr CONT  PRN IV SEE PROTOCOL Last administered on 

8/25/20at 07:09;  Start 8/20/20 at 19:30


Propofol 50 ml @ 0 mls/hr 1X  ONCE IV  Last administered on 8/20/20at 18:55;  

Start 8/20/20 at 20:00;  Stop 8/20/20 at 20:01;  Status DC


Midazolam HCl (Versed) 5 mg 1X  ONCE NS  Last administered on 8/20/20at 19:18;  

Start 8/20/20 at 19:18;  Stop 8/20/20 at 19:59;  Status DC


Piperacillin Sod/ Tazobactam Sod 3.375 gm/Sodium Chloride 50 ml @  100 mls/hr 1X

 ONCE IV  Last administered on 8/20/20at 21:47;  Start 8/20/20 at 20:45;  Stop 

8/20/20 at 21:14;  Status DC


Dexmedetomidine HCl 400 mcg/ Sodium Chloride 100 ml @ 0 mls/hr CONT  PRN IV 

SEDATION Last administered on 8/25/20at 08:29;  Start 8/20/20 at 20:15


Sodium Chloride 500 ml @  500 mls/hr 1X PRN  PRN IV SEE COMMENTS;  Start 8/20/20

at 20:15


Atropine Sulfate (ATROPINE 0.5mg SYRINGE) 0.5 mg PRN Q5MIN  PRN IV SEE COMMENTS;

 Start 8/20/20 at 20:15


Fentanyl Citrate (Fentanyl 2ml Vial) 100 mcg 1X  ONCE IVP  Last administered on 

8/20/20at 20:17;  Start 8/20/20 at 20:15;  Stop 8/20/20 at 20:18;  Status DC


Methylprednisolone Sodium Succinate (SOLU-Medrol 40MG VIAL) 40 mg Q8HRS IV  Last

administered on 8/25/20at 05:53;  Start 8/20/20 at 22:00


Enoxaparin Sodium (Lovenox 40mg Syringe) 40 mg BID SQ  Last administered on 

8/21/20at 08:15;  Start 8/20/20 at 21:30;  Stop 8/21/20 at 09:47;  Status DC


Potassium Chloride/Dextrose/ Sod Cl 1,000 ml @  125 mls/hr Q8H ONCE IV  Last 

administered on 8/20/20at 22:13;  Start 8/20/20 at 21:30;  Stop 8/21/20 at 

05:29;  Status DC


Zinc Sulfate (Orazinc) 220 mg DAILY PO  Last administered on 8/25/20at 08:02;  

Start 8/21/20 at 09:00


Ondansetron HCl (Zofran) 4 mg PRN Q4HRS  PRN IV NAUSEA/VOMITING;  Start 8/20/20 

at 21:30


Acetaminophen (Tylenol) 650 mg PRN Q4HRS  PRN GT TEMP OVER 100.4F OR MILD PAIN 

Last administered on 8/23/20at 22:59;  Start 8/20/20 at 21:30


Acetaminophen (Tylenol Supp) 650 mg PRN Q4HRS  PRN MT TEMP OVER 100.4F OR MILD 

PAIN;  Start 8/20/20 at 21:30


Docusate Sodium (Colace) 100 mg PRN BID  PRN PO HARD STOOLS Last administered on

8/25/20at 08:02;  Start 8/20/20 at 21:30


Piperacillin Sod/ Tazobactam Sod 3.375 gm/Sodium Chloride 50 ml @  100 mls/hr 

Q6HRS IV  Last administered on 8/25/20at 05:54;  Start 8/21/20 at 00:00


Pantoprazole Sodium (PROTONIX VIAL for IV PUSH) 40 mg DAILYAC IVP  Last 

administered on 8/25/20at 08:02;  Start 8/21/20 at 07:30


Norepinephrine Bitartrate 8 mg/ Dextrose 258 ml @  12.578 mls/ hr CONT  PRN IV 

PER PROTOCOL Last administered on 8/20/20at 22:15;  Start 8/20/20 at 21:45


Sodium Chloride 1,000 ml @  1,000 mls/hr 1X  ONCE IV  Last administered on 

8/20/20at 21:44;  Start 8/20/20 at 21:45;  Stop 8/20/20 at 22:44;  Status DC


Fentanyl Citrate 30 ml @ 0 mls/hr CONT  PRN IV SEE PROTOCOL Last administered on

8/22/20at 10:44;  Start 8/20/20 at 21:45;  Stop 8/22/20 at 14:49;  Status DC


Vecuronium Bromide (Norcuron Bolus) 6 mg PRN Q2HR  PRN IV VENT ASYNCHRONY Last 

administered on 8/22/20at 11:23;  Start 8/20/20 at 21:45;  Stop 8/22/20 at 

14:48;  Status DC


Sodium Chloride 1,000 ml @  1,000 mls/hr 1X  ONCE IV  Last administered on 

8/20/20at 22:36;  Start 8/20/20 at 22:30;  Stop 8/20/20 at 23:29;  Status DC


Methylprednisolone Sodium Succinate (SOLU-Medrol 40MG VIAL) 40 mg Q8HRS IV ;  

Start 8/21/20 at 14:00;  Status UNV


Enoxaparin Sodium (Lovenox 80mg Syringe) 80 mg BID SQ  Last administered on 

8/21/20at 20:53;  Start 8/21/20 at 21:00;  Stop 8/22/20 at 08:57;  Status DC


Insulin Human Lispro (HumaLOG) 6 units TID SQ ;  Start 8/21/20 at 14:00;  Stop 

8/21/20 at 10:05;  Status DC


Insulin Glargine (Lantus Syringe) 10 unit BID SQ  Last administered on 8/22/20at

09:27;  Start 8/21/20 at 21:00;  Stop 8/22/20 at 11:25;  Status DC


Ascorbic Acid (Vitamin C) 250 mg Q6HRS PO  Last administered on 8/25/20at 05:52;

 Start 8/21/20 at 12:00


Sodium Chloride 1,000 ml @  75 mls/hr [id number] IV  Last administered on 8/24/20at

19:33;  Start 8/21/20 at 10:00


Insulin Human Lispro (HumaLOG) 6 units Q6HRS SQ  Last administered on 8/22/20at 

06:11;  Start 8/21/20 at 12:00;  Stop 8/22/20 at 11:25;  Status DC


Insulin Glargine (Lantus Syringe) 10 unit 1X  ONCE SQ  Last administered on 

8/21/20at 11:40;  Start 8/21/20 at 11:00;  Stop 8/21/20 at 11:01;  Status DC


Enoxaparin Sodium (Lovenox 60mg Syringe) 60 mg BID SQ  Last administered on 

8/24/20at 08:55;  Start 8/22/20 at 09:00;  Stop 8/24/20 at 16:45;  Status DC


Vecuronium Bromide (Norcuron Bolus) 6 mg PRN Q6HRS  PRN IV SEDATION Last 

administered on 8/24/20at 16:36;  Start 8/22/20 at 09:30


Insulin Glargine (Lantus Syringe) 14 unit BID SQ  Last administered on 8/24/20at

09:24;  Start 8/22/20 at 21:00;  Stop 8/24/20 at 12:27;  Status DC


Insulin Human Lispro (HumaLOG) 8 units Q6HRS SQ  Last administered on 8/24/20at 

12:11;  Start 8/22/20 at 12:00;  Stop 8/24/20 at 12:27;  Status DC


Fentanyl Citrate 55 ml @ 0 mls/hr CONT  PRN IV SEE PROTOCOL Last administered on

8/24/20at 19:32;  Start 8/22/20 at 14:49


Lorazepam (Ativan Inj) 1 mg PRN Q1HR  PRN IVP ANXIETY / AGITATION- MODERATE;  

Start 8/22/20 at 16:15


Lorazepam (Ativan Inj) 2 mg PRN Q1HR  PRN IVP ANXIETY / AGITATION- SEVERE Last 

administered on 8/23/20at 13:10;  Start 8/22/20 at 16:30


Insulin Human Lispro (HumaLOG) 6 units 1X  ONCE SQ  Last administered on 

8/23/20at 06:20;  Start 8/23/20 at 06:15;  Stop 8/23/20 at 06:16;  Status DC


Hydralazine HCl (Apresoline Inj) 10 mg PRN Q4HRS  PRN IVP ELEVATED BP, SEE 

COMMENTS Last administered on 8/25/20at 09:23;  Start 8/24/20 at 09:15


Alteplase, Recombinant (Cathflo For Central Catheter Clearance) 1 mg 1X  ONCE 

INT CAT  Last administered on 8/24/20at 12:05;  Start 8/24/20 at 11:00;  Stop 

8/24/20 at 11:01;  Status DC


Insulin Glargine (Lantus Syringe) 18 unit BID SQ  Last administered on 8/25/20at

08:30;  Start 8/24/20 at 21:00


Insulin Human Lispro (HumaLOG) 10 units Q6HRS SQ  Last administered on 8/25/20at

05:53;  Start 8/24/20 at 18:00


Vecuronium Bromide 50 mg/ Miscellaneous 50 ml @  4.027 mls/ hr CONT  PRN IV SEE 

I/O RECORD Last administered on 8/25/20at 09:47;  Start 8/24/20 at 16:15


Enoxaparin Sodium (Lovenox Per Pharmacy Treatment Dosing) 1 each PRN DAILY  PRN 

MC SEE COMMENTS;  Start 8/24/20 at 16:45


Enoxaparin Sodium (Lovenox 80mg Syringe) 80 mg Q12HR SQ  Last administered on 

8/25/20at 08:03;  Start 8/24/20 at 21:00


Furosemide (Lasix) 40 mg 1X  ONCE IVP ;  Start 8/25/20 at 10:00;  Stop 8/25/20 

at 10:01


Vitals/I & O





Vital Sign - Last 24 Hours








 8/24/20 8/24/20 8/24/20 8/24/20





 11:00 12:00 12:00 12:00


 


Temp    98.4





    98.4


 


Pulse 62   60


 


Resp 22   22


 


B/P (MAP) 141/70 (93)   137/79 (98)


 


Pulse Ox 99 99  99


 


O2 Delivery Ventilator Ventilator Mechanical Ventilator Ventilator


 


    





    





 8/24/20 8/24/20 8/24/20 8/24/20





 13:00 14:00 15:00 15:38


 


Pulse 62 76 64 


 


Resp 22 22 22 


 


B/P (MAP) 117/62 (80) 127/66 (86) 134/67 (89) 


 


Pulse Ox 98 44 89 88


 


O2 Delivery Ventilator Ventilator Ventilator Ventilator





 8/24/20 8/24/20 8/24/20 8/24/20





 16:00 16:00 17:00 18:00


 


Temp 98.0   





 98.0   


 


Pulse 68  66 68


 


Resp 22 22 22


 


B/P (MAP) 117/61 (79)  110/60 (77) 120/65 (83)


 


Pulse Ox 70  94 94


 


O2 Delivery Ventilator Mechanical Ventilator Ventilator Ventilator


 


    





    





 8/24/20 8/24/20 8/24/20 8/24/20





 19:00 19:32 20:00 20:00


 


Temp    98.5





    98.5


 


Pulse 69   71


 


Resp 22 22


 


B/P (MAP) 124/66 (85)   136/67 (90)


 


Pulse Ox 95 94  96


 


O2 Delivery Ventilator Ventilator Mechanical Ventilator Ventilator


 


    





    





 8/24/20 8/24/20 8/24/20 8/25/20





 21:00 22:00 23:00 00:00


 


Pulse 70 71 72 


 


Resp 22 22 22 


 


B/P (MAP) 134/65 (88) 127/68 (87) 131/69 (89) 


 


Pulse Ox 97 97 96 


 


O2 Delivery Ventilator Ventilator Ventilator Mechanical Ventilator





 8/25/20 8/25/20 8/25/20 8/25/20





 00:00 00:04 01:00 02:00


 


Temp 98.5   





 98.5   


 


Pulse 72  72 78


 


Resp 22  22 22


 


B/P (MAP) 140/62 (88)  138/65 (89) 144/66 (92)


 


Pulse Ox 98 94 98 99


 


O2 Delivery Ventilator Ventilator Ventilator Ventilator


 


    





    





 8/25/20 8/25/20 8/25/20 8/25/20





 03:00 03:44 04:00 04:00


 


Temp   99.1 





   99.1 


 


Pulse 72  84 


 


Resp 22 22 


 


B/P (MAP) 134/61 (85)  142/62 (88) 


 


Pulse Ox 99 96 97 


 


O2 Delivery Ventilator Ventilator Ventilator Mechanical Ventilator


 


    





    





 8/25/20 8/25/20 8/25/20 8/25/20





 05:00 06:00 07:00 07:45


 


Pulse 83 86 88 


 


Resp 22 22 22 


 


B/P (MAP) 148/67 (94) 149/71 (97) 151/75 (100) 


 


Pulse Ox 96 99 98 95


 


O2 Delivery Ventilator Ventilator Ventilator Ventilator





 8/25/20 8/25/20  





 08:00 09:23  


 


Pulse  93  


 


B/P (MAP)  177/72  


 


O2 Delivery Mechanical Ventilator   














Intake and Output   


 


 8/24/20 8/24/20 8/25/20





 15:00 23:00 07:00


 


Intake Total 300 ml 2449.6 ml 2361 ml


 


Output Total 525 ml 525 ml 700 ml


 


Balance -225 ml 1924.6 ml 1661 ml











Justicifation of Admission Dx:


Justifications for Admission:


Justification of Admission Dx:  Yes











ELIJAH AGUSTIN MD             Aug 25, 2020 10:01

## 2020-08-26 VITALS — DIASTOLIC BLOOD PRESSURE: 66 MMHG | SYSTOLIC BLOOD PRESSURE: 117 MMHG

## 2020-08-26 VITALS — SYSTOLIC BLOOD PRESSURE: 131 MMHG | DIASTOLIC BLOOD PRESSURE: 67 MMHG

## 2020-08-26 VITALS — SYSTOLIC BLOOD PRESSURE: 140 MMHG | DIASTOLIC BLOOD PRESSURE: 66 MMHG

## 2020-08-26 VITALS — SYSTOLIC BLOOD PRESSURE: 129 MMHG | DIASTOLIC BLOOD PRESSURE: 74 MMHG

## 2020-08-26 VITALS — DIASTOLIC BLOOD PRESSURE: 67 MMHG | SYSTOLIC BLOOD PRESSURE: 125 MMHG

## 2020-08-26 VITALS — DIASTOLIC BLOOD PRESSURE: 71 MMHG | SYSTOLIC BLOOD PRESSURE: 149 MMHG

## 2020-08-26 VITALS — SYSTOLIC BLOOD PRESSURE: 135 MMHG | DIASTOLIC BLOOD PRESSURE: 67 MMHG

## 2020-08-26 VITALS — SYSTOLIC BLOOD PRESSURE: 126 MMHG | DIASTOLIC BLOOD PRESSURE: 68 MMHG

## 2020-08-26 VITALS — DIASTOLIC BLOOD PRESSURE: 64 MMHG | SYSTOLIC BLOOD PRESSURE: 135 MMHG

## 2020-08-26 VITALS — DIASTOLIC BLOOD PRESSURE: 62 MMHG | SYSTOLIC BLOOD PRESSURE: 120 MMHG

## 2020-08-26 VITALS — SYSTOLIC BLOOD PRESSURE: 106 MMHG | DIASTOLIC BLOOD PRESSURE: 63 MMHG

## 2020-08-26 VITALS — SYSTOLIC BLOOD PRESSURE: 135 MMHG | DIASTOLIC BLOOD PRESSURE: 71 MMHG

## 2020-08-26 VITALS — SYSTOLIC BLOOD PRESSURE: 105 MMHG | DIASTOLIC BLOOD PRESSURE: 61 MMHG

## 2020-08-26 VITALS — DIASTOLIC BLOOD PRESSURE: 63 MMHG | SYSTOLIC BLOOD PRESSURE: 149 MMHG

## 2020-08-26 VITALS — SYSTOLIC BLOOD PRESSURE: 138 MMHG | DIASTOLIC BLOOD PRESSURE: 66 MMHG

## 2020-08-26 VITALS — SYSTOLIC BLOOD PRESSURE: 131 MMHG | DIASTOLIC BLOOD PRESSURE: 70 MMHG

## 2020-08-26 VITALS — SYSTOLIC BLOOD PRESSURE: 204 MMHG | DIASTOLIC BLOOD PRESSURE: 78 MMHG

## 2020-08-26 VITALS — DIASTOLIC BLOOD PRESSURE: 75 MMHG | SYSTOLIC BLOOD PRESSURE: 167 MMHG

## 2020-08-26 VITALS — DIASTOLIC BLOOD PRESSURE: 79 MMHG | SYSTOLIC BLOOD PRESSURE: 153 MMHG

## 2020-08-26 VITALS — DIASTOLIC BLOOD PRESSURE: 69 MMHG | SYSTOLIC BLOOD PRESSURE: 128 MMHG

## 2020-08-26 VITALS — DIASTOLIC BLOOD PRESSURE: 56 MMHG | SYSTOLIC BLOOD PRESSURE: 134 MMHG

## 2020-08-26 VITALS — SYSTOLIC BLOOD PRESSURE: 128 MMHG | DIASTOLIC BLOOD PRESSURE: 72 MMHG

## 2020-08-26 VITALS — DIASTOLIC BLOOD PRESSURE: 85 MMHG | SYSTOLIC BLOOD PRESSURE: 266 MMHG

## 2020-08-26 VITALS — DIASTOLIC BLOOD PRESSURE: 78 MMHG | SYSTOLIC BLOOD PRESSURE: 169 MMHG

## 2020-08-26 LAB
BASE EXCESS ABG: 0 MMOL/L (ref -3–3)
HCO3 BLDA-SCNC: 26 MMOL/L (ref 21–28)
INSPIRATION SETTING TIME VENT: 100
PCO2 BLDA: 45 MMHG (ref 35–46)
PCO2 TEMP ADJ BLD: 47 MMHG
PH TEMP ADJ BLD: 7.36 [PH]
PO2 BLDA: 55 MMHG (ref 65–108)
PO2 TEMP ADJ BLD: 58 MMHG
SAO2 % BLDA: 87 % (ref 92–99)

## 2020-08-26 RX ADMIN — VECURONIUM BROMIDE PRN MLS/HR: 1 INJECTION, POWDER, LYOPHILIZED, FOR SOLUTION INTRAVENOUS at 01:10

## 2020-08-26 RX ADMIN — MIDAZOLAM PRN MLS/HR: 5 INJECTION, SOLUTION INTRAMUSCULAR; INTRAVENOUS at 05:41

## 2020-08-26 RX ADMIN — ENOXAPARIN SODIUM SCH MG: 80 INJECTION SUBCUTANEOUS at 21:00

## 2020-08-26 RX ADMIN — PIPERACILLIN SODIUM AND TAZOBACTAM SODIUM SCH MLS/HR: 3; .375 INJECTION, POWDER, LYOPHILIZED, FOR SOLUTION INTRAVENOUS at 01:03

## 2020-08-26 RX ADMIN — METHYLPREDNISOLONE SODIUM SUCCINATE SCH MG: 40 INJECTION, POWDER, FOR SOLUTION INTRAMUSCULAR; INTRAVENOUS at 06:05

## 2020-08-26 RX ADMIN — INSULIN LISPRO SCH UNITS: 100 INJECTION, SOLUTION INTRAVENOUS; SUBCUTANEOUS at 13:18

## 2020-08-26 RX ADMIN — OXYCODONE HYDROCHLORIDE AND ACETAMINOPHEN SCH MG: 500 TABLET ORAL at 00:00

## 2020-08-26 RX ADMIN — Medication PRN MLS/HR: at 03:45

## 2020-08-26 RX ADMIN — DEXMEDETOMIDINE HYDROCHLORIDE PRN MLS/HR: 100 INJECTION, SOLUTION, CONCENTRATE INTRAVENOUS at 19:12

## 2020-08-26 RX ADMIN — MIDAZOLAM PRN MLS/HR: 5 INJECTION, SOLUTION INTRAMUSCULAR; INTRAVENOUS at 16:15

## 2020-08-26 RX ADMIN — INSULIN GLARGINE SCH UNIT: 100 INJECTION, SOLUTION SUBCUTANEOUS at 10:45

## 2020-08-26 RX ADMIN — INSULIN LISPRO SCH UNITS: 100 INJECTION, SOLUTION INTRAVENOUS; SUBCUTANEOUS at 06:07

## 2020-08-26 RX ADMIN — METHYLPREDNISOLONE SODIUM SUCCINATE SCH MG: 40 INJECTION, POWDER, FOR SOLUTION INTRAMUSCULAR; INTRAVENOUS at 13:18

## 2020-08-26 RX ADMIN — ZINC SULFATE CAP 220 MG (50 MG ELEMENTAL ZN) SCH MG: 220 (50 ZN) CAP at 08:27

## 2020-08-26 RX ADMIN — INSULIN LISPRO SCH UNITS: 100 INJECTION, SOLUTION INTRAVENOUS; SUBCUTANEOUS at 17:58

## 2020-08-26 RX ADMIN — PIPERACILLIN SODIUM AND TAZOBACTAM SODIUM SCH MLS/HR: 3; .375 INJECTION, POWDER, LYOPHILIZED, FOR SOLUTION INTRAVENOUS at 13:21

## 2020-08-26 RX ADMIN — PIPERACILLIN SODIUM AND TAZOBACTAM SODIUM SCH MLS/HR: 3; .375 INJECTION, POWDER, LYOPHILIZED, FOR SOLUTION INTRAVENOUS at 16:58

## 2020-08-26 RX ADMIN — DOCUSATE SODIUM PRN MG: 100 CAPSULE, LIQUID FILLED ORAL at 08:26

## 2020-08-26 RX ADMIN — PROPOFOL PRN MLS/HR: 10 INJECTION, EMULSION INTRAVENOUS at 02:39

## 2020-08-26 RX ADMIN — OXYCODONE HYDROCHLORIDE AND ACETAMINOPHEN SCH MG: 500 TABLET ORAL at 13:18

## 2020-08-26 RX ADMIN — OXYCODONE HYDROCHLORIDE AND ACETAMINOPHEN SCH MG: 500 TABLET ORAL at 06:05

## 2020-08-26 RX ADMIN — ENOXAPARIN SODIUM SCH MG: 80 INJECTION SUBCUTANEOUS at 08:29

## 2020-08-26 RX ADMIN — OXYCODONE HYDROCHLORIDE AND ACETAMINOPHEN SCH MG: 500 TABLET ORAL at 16:59

## 2020-08-26 RX ADMIN — PROPOFOL PRN MLS/HR: 10 INJECTION, EMULSION INTRAVENOUS at 13:19

## 2020-08-26 RX ADMIN — PANTOPRAZOLE SODIUM SCH MG: 40 INJECTION, POWDER, FOR SOLUTION INTRAVENOUS at 08:26

## 2020-08-26 RX ADMIN — INSULIN LISPRO SCH UNITS: 100 INJECTION, SOLUTION INTRAVENOUS; SUBCUTANEOUS at 01:04

## 2020-08-26 RX ADMIN — VECURONIUM BROMIDE PRN MLS/HR: 1 INJECTION, POWDER, LYOPHILIZED, FOR SOLUTION INTRAVENOUS at 16:57

## 2020-08-26 RX ADMIN — Medication PRN MLS/HR: at 18:00

## 2020-08-26 RX ADMIN — METHYLPREDNISOLONE SODIUM SUCCINATE SCH MG: 40 INJECTION, POWDER, FOR SOLUTION INTRAMUSCULAR; INTRAVENOUS at 22:00

## 2020-08-26 RX ADMIN — ACETAMINOPHEN PRN MG: 650 SOLUTION ORAL at 06:05

## 2020-08-26 RX ADMIN — Medication PRN EACH: at 09:59

## 2020-08-26 RX ADMIN — PIPERACILLIN SODIUM AND TAZOBACTAM SODIUM SCH MLS/HR: 3; .375 INJECTION, POWDER, LYOPHILIZED, FOR SOLUTION INTRAVENOUS at 06:06

## 2020-08-26 NOTE — PDOC
PROGRESS NOTES


Date of Service:


DATE: 8/26/20 


TIME: 11:05





Chief Complaint


Chief Complaint


Assessment/Plan


Acute hypoxemic respiratory failure


Leukocytosis with bandemia secondary to septic process


Hyponatremia secondary to low effective circulatory volume


Acue renal failure due to vasomotor nephropathy most likely 


Hyperglycemia


mild transaminitis 


Uncontrolled hypertension





History of Present Illness


Patient is a 55-year-old gentleman who was diagnosed with COVID-19 infection on 

Monday 5 days prior to his admission to the intensive care unit.  Apparently the

patient reported worsening respiratory distress reason why he came to the 

emergency department for evaluation and treatment.  The story was quite limited 

since the patient was speaking Citizen of [place] only and his acuity was such that he 

required mechanical ventilation and prompt intubation by the emergency de

partment physician.  At the present time patient is intubated and sedated 

continues to require a lot of support no pertinent past medical history was 

reported he is admitted to the intensive care unit for further treatment





8/24/2020


Continues to require full support, glycemia has been noted in hypertension as 

well.  Discussed with nursing staff, all of concerns were addressed to the best 

of my abilities no acute events reported overnight





8/25/2020


No acute events reported overnight, case discussed with nursing staff patient in

no acute distress  





8/26/2020


Per nursing staff, patient febrile overnight, treated with Tylenol and tepid 

water bath. Case discussed with nursing staff, continue with IV antibiotics and 

full VTE prophylaxis.





Vitals


Vitals





Vital Signs








  Date Time  Temp Pulse Resp B/P (MAP) Pulse Ox O2 Delivery O2 Flow Rate FiO2


 


8/26/20 07:09     98 Ventilator  


 


8/26/20 06:00  80 22 106/63 (77)    


 


8/26/20 04:00 99.7       





 99.7       


 


8/25/20 14:28       18.0 











Labs


LABS





Laboratory Tests








Test


 8/25/20


11:29 8/25/20


16:54 8/26/20


01:00 8/26/20


06:02


 


Glucose (Fingerstick)


 327 mg/dL


(70-99) 303 mg/dL


(70-99) 288 mg/dL


(70-99) 245 mg/dL


(70-99)


 


Test


 8/26/20


07:45 8/26/20


07:50 8/26/20


08:15 





 


O2 Saturation 87 % (92-99)    


 


Arterial Blood pH


 7.37


(7.35-7.45) 


 


 





 


Arterial Blood pH (Temp


corrected) 7.36 


 


 


 





 


Arterial Blood pCO2 at


Patient Temp 45 mmHg


(35-46) 


 


 





 


Arterial Blood pCO2 (Temp


correct) 47 mmHg 


 


 


 





 


Arterial Blood pO2 at Patient


Temp 55 mmHg


() 


 


 





 


Arterial Blood pO2 (Temp


corrected) 58 mmHg 


 


 


 





 


Arterial Blood HCO3


 26 mmol/L


(21-28) 


 


 





 


Arterial Blood Base Excess


 0 mmol/L


(-3-3) 


 


 





 


FiO2 100    


 


D-Dimer (Lexii)


 


 8.10 ug/mlFEU


(0.00-0.50) 


 





 


Glucose (Fingerstick)


 


 


 203 mg/dL


(70-99) 














Review of Systems


Review of Systems


Unable to obtain due to clinical condition





Assessment and Plan


Assessmemt and Plan


Continue IV antibiotics, continue IV steroids, continue full dose Lovenox.





Comment


Review of Relevant


I have reviewed the following items jim (where applicable) has been applied.


Labs





Laboratory Tests








Test


 8/24/20


11:53 8/24/20


18:34 8/25/20


00:29 8/25/20


05:30


 


Glucose (Fingerstick)


 243 mg/dL


(70-99) 317 mg/dL


(70-99) 304 mg/dL


(70-99) 





 


White Blood Count


 


 


 


 12.3 x10^3/uL


(4.0-11.0)


 


Red Blood Count


 


 


 


 3.28 x10^6/uL


(4.30-5.70)


 


Hemoglobin


 


 


 


 10.0 g/dL


(13.0-17.5)


 


Hematocrit


 


 


 


 30.3 %


(39.0-53.0)


 


Mean Corpuscular Volume    92 fL () 


 


Mean Corpuscular Hemoglobin    31 pg (25-35) 


 


Mean Corpuscular Hemoglobin


Concent 


 


 


 33 g/dL


(31-37)


 


Red Cell Distribution Width


 


 


 


 13.6 %


(11.5-14.5)


 


Platelet Count


 


 


 


 179 x10^3/uL


(140-400)


 


Neutrophils (%) (Auto)    95 % (31-73) 


 


Lymphocytes (%) (Auto)    4 % (24-48) 


 


Monocytes (%) (Auto)    2 % (0-9) 


 


Eosinophils (%) (Auto)    0 % (0-3) 


 


Basophils (%) (Auto)    0 % (0-3) 


 


Neutrophils # (Auto)


 


 


 


 11.7 x10^3/uL


(1.8-7.7)


 


Lymphocytes # (Auto)


 


 


 


 0.4 x10^3/uL


(1.0-4.8)


 


Monocytes # (Auto)


 


 


 


 0.2 x10^3/uL


(0.0-1.1)


 


Eosinophils # (Auto)


 


 


 


 0.0 x10^3/uL


(0.0-0.7)


 


Basophils # (Auto)


 


 


 


 0.0 x10^3/uL


(0.0-0.2)


 


Sodium Level


 


 


 


 148 mmol/L


(136-145)


 


Potassium Level


 


 


 


 4.8 mmol/L


(3.5-5.1)


 


Chloride Level


 


 


 


 114 mmol/L


()


 


Carbon Dioxide Level


 


 


 


 26 mmol/L


(21-32)


 


Anion Gap    8 (6-14) 


 


Blood Urea Nitrogen


 


 


 


 39 mg/dL


(8-26)


 


Creatinine


 


 


 


 1.0 mg/dL


(0.7-1.3)


 


Estimated GFR


(Cockcroft-Gault) 


 


 


 75.0 





 


BUN/Creatinine Ratio    39 (6-20) 


 


Glucose Level


 


 


 


 342 mg/dL


(70-99)


 


Calcium Level


 


 


 


 7.1 mg/dL


(8.5-10.1)


 


Total Bilirubin


 


 


 


 0.6 mg/dL


(0.2-1.0)


 


Aspartate Amino Transf


(AST/SGOT) 


 


 


 51 U/L (15-37) 





 


Alanine Aminotransferase


(ALT/SGPT) 


 


 


 55 U/L (16-63) 





 


Alkaline Phosphatase


 


 


 


 111 U/L


()


 


Total Protein


 


 


 


 5.4 g/dL


(6.4-8.2)


 


Albumin


 


 


 


 1.4 g/dL


(3.4-5.0)


 


Albumin/Globulin Ratio    0.4 (1.0-1.7) 


 


Test


 8/25/20


05:41 8/25/20


08:00 8/25/20


11:29 8/25/20


16:54


 


Glucose (Fingerstick)


 327 mg/dL


(70-99) 


 327 mg/dL


(70-99) 303 mg/dL


(70-99)


 


O2 Saturation  90 % (92-99)   


 


Arterial Blood pH


 


 7.34


(7.35-7.45) 


 





 


Arterial Blood pCO2 at


Patient Temp 


 45 mmHg


(35-46) 


 





 


Arterial Blood pO2 at Patient


Temp 


 63 mmHg


() 


 





 


Arterial Blood HCO3


 


 23 mmol/L


(21-28) 


 





 


Arterial Blood Base Excess


 


 -2 mmol/L


(-3-3) 


 





 


FiO2  100% vent   


 


Test


 8/26/20


01:00 8/26/20


06:02 8/26/20


07:45 8/26/20


07:50


 


Glucose (Fingerstick)


 288 mg/dL


(70-99) 245 mg/dL


(70-99) 


 





 


O2 Saturation   87 % (92-99)  


 


Arterial Blood pH


 


 


 7.37


(7.35-7.45) 





 


Arterial Blood pH (Temp


corrected) 


 


 7.36 


 





 


Arterial Blood pCO2 at


Patient Temp 


 


 45 mmHg


(35-46) 





 


Arterial Blood pCO2 (Temp


correct) 


 


 47 mmHg 


 





 


Arterial Blood pO2 at Patient


Temp 


 


 55 mmHg


() 





 


Arterial Blood pO2 (Temp


corrected) 


 


 58 mmHg 


 





 


Arterial Blood HCO3


 


 


 26 mmol/L


(21-28) 





 


Arterial Blood Base Excess


 


 


 0 mmol/L


(-3-3) 





 


FiO2   100  


 


D-Dimer (Lexii)


 


 


 


 8.10 ug/mlFEU


(0.00-0.50)


 


Test


 8/26/20


08:15 


 


 





 


Glucose (Fingerstick)


 203 mg/dL


(70-99) 


 


 











Laboratory Tests








Test


 8/25/20


11:29 8/25/20


16:54 8/26/20


01:00 8/26/20


06:02


 


Glucose (Fingerstick)


 327 mg/dL


(70-99) 303 mg/dL


(70-99) 288 mg/dL


(70-99) 245 mg/dL


(70-99)


 


Test


 8/26/20


07:45 8/26/20


07:50 8/26/20


08:15 





 


O2 Saturation 87 % (92-99)    


 


Arterial Blood pH


 7.37


(7.35-7.45) 


 


 





 


Arterial Blood pH (Temp


corrected) 7.36 


 


 


 





 


Arterial Blood pCO2 at


Patient Temp 45 mmHg


(35-46) 


 


 





 


Arterial Blood pCO2 (Temp


correct) 47 mmHg 


 


 


 





 


Arterial Blood pO2 at Patient


Temp 55 mmHg


() 


 


 





 


Arterial Blood pO2 (Temp


corrected) 58 mmHg 


 


 


 





 


Arterial Blood HCO3


 26 mmol/L


(21-28) 


 


 





 


Arterial Blood Base Excess


 0 mmol/L


(-3-3) 


 


 





 


FiO2 100    


 


D-Dimer (Lexii)


 


 8.10 ug/mlFEU


(0.00-0.50) 


 





 


Glucose (Fingerstick)


 


 


 203 mg/dL


(70-99) 











Microbiology


8/22/20 Blood Culture - Preliminary, Resulted


          NO GROWTH AFTER 3 DAYS


8/20/20 Urine Culture - Final, Complete


Medications





Current Medications


Etomidate (Amidate) 20 mg STK-MED ONCE IV ;  Start 8/20/20 at 18:54;  Stop 

8/20/20 at 18:54;  Status DC


Succinylcholine Chloride (Anectine) 200 mg STK-MED ONCE .ROUTE ;  Start 8/20/20 

at 18:55;  Stop 8/20/20 at 18:55;  Status DC


Propofol 50 ml @ As Directed STK-MED ONCE IV ;  Start 8/20/20 at 18:55;  Stop 

8/20/20 at 18:56;  Status DC


Propofol 100 ml @ 0 mls/hr CONT  PRN IV SEE PROTOCOL Last administered on 8/ 26/20at 02:39;  Start 8/20/20 at 19:00


Fentanyl Citrate (Fentanyl 2ml Vial) 25 mcg PRN Q1HR  PRN IV SEE COMMENTS;  

Start 8/20/20 at 19:00


Fentanyl Citrate (Fentanyl 2ml Vial) 50 mcg PRN Q1HR  PRN IV SEE COMMENTS Last 

administered on 8/20/20at 19:42;  Start 8/20/20 at 19:00


Chlorhexidine Gluconate (Peridex) 15 ml BID MM  Last administered on 8/21/20at 

08:16;  Start 8/20/20 at 21:00;  Stop 8/21/20 at 09:45;  Status DC


Midazolam HCl (Versed) 5 mg STK-MED ONCE .ROUTE ;  Start 8/20/20 at 19:17;  Stop

8/20/20 at 19:18;  Status DC


Midazolam HCl 50 mg/Sodium Chloride 50 ml @ 0 mls/hr 1X  ONCE IV ;  Start 

8/20/20 at 19:30;  Stop 8/20/20 at 19:31;  Status UNV


Albuterol/ Ipratropium (Duoneb) 3 ml STK-MED ONCE .ROUTE ;  Start 8/20/20 at 

19:27;  Stop 8/20/20 at 19:27;  Status DC


Midazolam HCl 100 ml @ 0 mls/hr CONT  PRN IV SEE PROTOCOL Last administered on 

8/26/20at 05:41;  Start 8/20/20 at 19:30


Propofol 50 ml @ 0 mls/hr 1X  ONCE IV  Last administered on 8/20/20at 18:55;  

Start 8/20/20 at 20:00;  Stop 8/20/20 at 20:01;  Status DC


Midazolam HCl (Versed) 5 mg 1X  ONCE NS  Last administered on 8/20/20at 19:18;  

Start 8/20/20 at 19:18;  Stop 8/20/20 at 19:59;  Status DC


Piperacillin Sod/ Tazobactam Sod 3.375 gm/Sodium Chloride 50 ml @  100 mls/hr 1X

 ONCE IV  Last administered on 8/20/20at 21:47;  Start 8/20/20 at 20:45;  Stop 

8/20/20 at 21:14;  Status DC


Dexmedetomidine HCl 400 mcg/ Sodium Chloride 100 ml @ 0 mls/hr CONT  PRN IV 

SEDATION Last administered on 8/25/20at 18:36;  Start 8/20/20 at 20:15


Sodium Chloride 500 ml @  500 mls/hr 1X PRN  PRN IV SEE COMMENTS;  Start 8/20/20

at 20:15


Atropine Sulfate (ATROPINE 0.5mg SYRINGE) 0.5 mg PRN Q5MIN  PRN IV SEE COMMENTS;

 Start 8/20/20 at 20:15


Fentanyl Citrate (Fentanyl 2ml Vial) 100 mcg 1X  ONCE IVP  Last administered on 

8/20/20at 20:17;  Start 8/20/20 at 20:15;  Stop 8/20/20 at 20:18;  Status DC


Methylprednisolone Sodium Succinate (SOLU-Medrol 40MG VIAL) 40 mg Q8HRS IV  Last

administered on 8/26/20at 06:05;  Start 8/20/20 at 22:00


Enoxaparin Sodium (Lovenox 40mg Syringe) 40 mg BID SQ  Last administered on 

8/21/20at 08:15;  Start 8/20/20 at 21:30;  Stop 8/21/20 at 09:47;  Status DC


Potassium Chloride/Dextrose/ Sod Cl 1,000 ml @  125 mls/hr Q8H ONCE IV  Last 

administered on 8/20/20at 22:13;  Start 8/20/20 at 21:30;  Stop 8/21/20 at 

05:29;  Status DC


Zinc Sulfate (Orazinc) 220 mg DAILY PO  Last administered on 8/26/20at 08:27;  

Start 8/21/20 at 09:00


Ondansetron HCl (Zofran) 4 mg PRN Q4HRS  PRN IV NAUSEA/VOMITING;  Start 8/20/20 

at 21:30


Acetaminophen (Tylenol) 650 mg PRN Q4HRS  PRN GT TEMP OVER 100.4F OR MILD PAIN 

Last administered on 8/26/20at 06:05;  Start 8/20/20 at 21:30


Acetaminophen (Tylenol Supp) 650 mg PRN Q4HRS  PRN WV TEMP OVER 100.4F OR MILD 

PAIN;  Start 8/20/20 at 21:30


Docusate Sodium (Colace) 100 mg PRN BID  PRN PO HARD STOOLS Last administered on

8/26/20at 08:26;  Start 8/20/20 at 21:30


Piperacillin Sod/ Tazobactam Sod 3.375 gm/Sodium Chloride 50 ml @  100 mls/hr 

Q6HRS IV  Last administered on 8/26/20at 06:06;  Start 8/21/20 at 00:00


Pantoprazole Sodium (PROTONIX VIAL for IV PUSH) 40 mg DAILYAC IVP  Last 

administered on 8/26/20at 08:26;  Start 8/21/20 at 07:30


Norepinephrine Bitartrate 8 mg/ Dextrose 258 ml @  12.578 mls/ hr CONT  PRN IV 

PER PROTOCOL Last administered on 8/20/20at 22:15;  Start 8/20/20 at 21:45


Sodium Chloride 1,000 ml @  1,000 mls/hr 1X  ONCE IV  Last administered on 

8/20/20at 21:44;  Start 8/20/20 at 21:45;  Stop 8/20/20 at 22:44;  Status DC


Fentanyl Citrate 30 ml @ 0 mls/hr CONT  PRN IV SEE PROTOCOL Last administered on

8/22/20at 10:44;  Start 8/20/20 at 21:45;  Stop 8/22/20 at 14:49;  Status DC


Vecuronium Bromide (Norcuron Bolus) 6 mg PRN Q2HR  PRN IV VENT ASYNCHRONY Last 

administered on 8/22/20at 11:23;  Start 8/20/20 at 21:45;  Stop 8/22/20 at 

14:48;  Status DC


Sodium Chloride 1,000 ml @  1,000 mls/hr 1X  ONCE IV  Last administered on 

8/20/20at 22:36;  Start 8/20/20 at 22:30;  Stop 8/20/20 at 23:29;  Status DC


Methylprednisolone Sodium Succinate (SOLU-Medrol 40MG VIAL) 40 mg Q8HRS IV ;  

Start 8/21/20 at 14:00;  Status UNV


Enoxaparin Sodium (Lovenox 80mg Syringe) 80 mg BID SQ  Last administered on 

8/21/20at 20:53;  Start 8/21/20 at 21:00;  Stop 8/22/20 at 08:57;  Status DC


Insulin Human Lispro (HumaLOG) 6 units TID SQ ;  Start 8/21/20 at 14:00;  Stop 

8/21/20 at 10:05;  Status DC


Insulin Glargine (Lantus Syringe) 10 unit BID SQ  Last administered on 8/22/20at

09:27;  Start 8/21/20 at 21:00;  Stop 8/22/20 at 11:25;  Status DC


Ascorbic Acid (Vitamin C) 250 mg Q6HRS PO  Last administered on 8/26/20at 06:05;

 Start 8/21/20 at 12:00


Sodium Chloride 1,000 ml @  75 mls/hr [id number] IV  Last administered on 8/24/20at

19:33;  Start 8/21/20 at 10:00;  Stop 8/25/20 at 10:23;  Status DC


Insulin Human Lispro (HumaLOG) 6 units Q6HRS SQ  Last administered on 8/22/20at 

06:11;  Start 8/21/20 at 12:00;  Stop 8/22/20 at 11:25;  Status DC


Insulin Glargine (Lantus Syringe) 10 unit 1X  ONCE SQ  Last administered on 

8/21/20at 11:40;  Start 8/21/20 at 11:00;  Stop 8/21/20 at 11:01;  Status DC


Enoxaparin Sodium (Lovenox 60mg Syringe) 60 mg BID SQ  Last administered on 

8/24/20at 08:55;  Start 8/22/20 at 09:00;  Stop 8/24/20 at 16:45;  Status DC


Vecuronium Bromide (Norcuron Bolus) 6 mg PRN Q6HRS  PRN IV SEDATION Last 

administered on 8/24/20at 16:36;  Start 8/22/20 at 09:30


Insulin Glargine (Lantus Syringe) 14 unit BID SQ  Last administered on 8/24/20at

09:24;  Start 8/22/20 at 21:00;  Stop 8/24/20 at 12:27;  Status DC


Insulin Human Lispro (HumaLOG) 8 units Q6HRS SQ  Last administered on 8/24/20at 

12:11;  Start 8/22/20 at 12:00;  Stop 8/24/20 at 12:27;  Status DC


Fentanyl Citrate 55 ml @ 0 mls/hr CONT  PRN IV SEE PROTOCOL Last administered on

8/26/20at 03:45;  Start 8/22/20 at 14:49


Lorazepam (Ativan Inj) 1 mg PRN Q1HR  PRN IVP ANXIETY / AGITATION- MODERATE;  

Start 8/22/20 at 16:15


Lorazepam (Ativan Inj) 2 mg PRN Q1HR  PRN IVP ANXIETY / AGITATION- SEVERE Last 

administered on 8/23/20at 13:10;  Start 8/22/20 at 16:30


Insulin Human Lispro (HumaLOG) 6 units 1X  ONCE SQ  Last administered on 

8/23/20at 06:20;  Start 8/23/20 at 06:15;  Stop 8/23/20 at 06:16;  Status DC


Hydralazine HCl (Apresoline Inj) 10 mg PRN Q4HRS  PRN IVP ELEVATED BP, SEE 

COMMENTS Last administered on 8/25/20at 18:10;  Start 8/24/20 at 09:15


Alteplase, Recombinant (Cathflo For Central Catheter Clearance) 1 mg 1X  ONCE 

INT CAT  Last administered on 8/24/20at 12:05;  Start 8/24/20 at 11:00;  Stop 

8/24/20 at 11:01;  Status DC


Insulin Glargine (Lantus Syringe) 18 unit BID SQ  Last administered on 8/25/20at

08:30;  Start 8/24/20 at 21:00;  Stop 8/25/20 at 10:03;  Status DC


Insulin Human Lispro (HumaLOG) 10 units Q6HRS SQ  Last administered on 8/25/20at

05:53;  Start 8/24/20 at 18:00;  Stop 8/25/20 at 10:03;  Status DC


Vecuronium Bromide 50 mg/ Miscellaneous 50 ml @  4.027 mls/ hr CONT  PRN IV SEE 

I/O RECORD Last administered on 8/26/20at 01:10;  Start 8/24/20 at 16:15


Enoxaparin Sodium (Lovenox Per Pharmacy Treatment Dosing) 1 each PRN DAILY  PRN 

MC SEE COMMENTS;  Start 8/24/20 at 16:45


Enoxaparin Sodium (Lovenox 80mg Syringe) 80 mg Q12HR SQ  Last administered on 

8/26/20at 08:29;  Start 8/24/20 at 21:00


Furosemide (Lasix) 40 mg 1X  ONCE IVP  Last administered on 8/25/20at 10:19;  

Start 8/25/20 at 10:00;  Stop 8/25/20 at 10:01;  Status DC


Insulin Glargine (Lantus Syringe) 24 unit BID SQ  Last administered on 8/26/20at

10:45;  Start 8/25/20 at 10:30


Insulin Human Lispro (HumaLOG) 14 units Q6HRS SQ  Last administered on 8/26/20at

06:07;  Start 8/25/20 at 12:00


Info (Anti-Coagulation Monitoring By Pharmacy) 1 each PRN DAILY  PRN MC SEE 

COMMENTS Last administered on 8/26/20at 09:59;  Start 8/26/20 at 10:00


Vitals/I & O





Vital Sign - Last 24 Hours








 8/25/20 8/25/20 8/25/20 8/25/20





 12:00 12:00 12:03 13:00


 


Temp 99.9   





 99.9   


 


Pulse 104   100


 


Resp 22   22


 


B/P (MAP) 143/63 (89)   144/63 (90)


 


Pulse Ox 100  96 100


 


O2 Delivery Ventilator Mechanical Ventilator Ventilator Ventilator


 


    





    





 8/25/20 8/25/20 8/25/20 8/25/20





 13:58 14:00 14:28 15:00


 


Pulse  98  98


 


Resp 22 22  22


 


B/P (MAP)  141/70 (93)  147/70 (95)


 


Pulse Ox  100 100 99


 


O2 Delivery  Ventilator  Ventilator


 


O2 Flow Rate   18.0 





 8/25/20 8/25/20 8/25/20 8/25/20





 15:41 16:00 16:00 17:00


 


Temp    99.3





    99.3


 


Pulse  112  100


 


Resp  22  22


 


B/P (MAP)  148/71 (96)  160/77 (104)


 


Pulse Ox 99 100  100


 


O2 Delivery Ventilator Ventilator Mechanical Ventilator Ventilator


 


    





    





 8/25/20 8/25/20 8/25/20 8/25/20





 17:00 18:00 18:10 20:00


 


Pulse 120 100 116 


 


Resp 22 22  


 


B/P (MAP) 220/100 (140) 220/93 (135) 207/97 


 


Pulse Ox 100 100  


 


O2 Delivery Ventilator Ventilator  Mechanical Ventilator





 8/25/20 8/25/20 8/25/20 8/25/20





 20:00 20:15 21:00 22:00


 


Temp 100.4   





 100.4   


 


Pulse 114  110 94


 


Resp 22  22 22


 


B/P (MAP) 157/71 (99)  144/65 (91) 134/64 (87)


 


Pulse Ox 100 100 100 99


 


O2 Delivery Ventilator Ventilator Ventilator Ventilator


 


    





    





 8/25/20 8/25/20 8/26/20 8/26/20





 23:00 23:44 00:00 00:00


 


Temp    99.9





    99.9


 


Pulse 86   86


 


Resp 22   22


 


B/P (MAP) 133/64 (87)   135/64 (87)


 


Pulse Ox 99 99  99


 


O2 Delivery Ventilator Ventilator Mechanical Ventilator Ventilator


 


    





    





 8/26/20 8/26/20 8/26/20 8/26/20





 01:00 02:00 03:00 03:45


 


Pulse 94 106 94 


 


Resp 22 22 22 


 


B/P (MAP) 149/71 (97) 169/78 (108) 135/67 (89) 


 


Pulse Ox 100 100 100 100


 


O2 Delivery Ventilator Ventilator Ventilator Ventilator





 8/26/20 8/26/20 8/26/20 8/26/20





 03:49 04:00 04:00 04:20


 


Temp   99.7 





   99.7 


 


Pulse   82 


 


Resp   22 22


 


B/P (MAP)   117/66 (83) 


 


Pulse Ox 99  99 100


 


O2 Delivery Ventilator Mechanical Ventilator Ventilator 


 


    





    





 8/26/20 8/26/20 8/26/20 





 05:00 06:00 07:09 


 


Pulse 74 80  


 


Resp 22 22  


 


B/P (MAP) 105/61 (76) 106/63 (77)  


 


Pulse Ox 100 100 98 


 


O2 Delivery Ventilator Ventilator Ventilator 














Intake and Output   


 


 8/25/20 8/25/20 8/26/20





 14:59 22:59 06:59


 


Intake Total 250 ml 605 ml 1647 ml


 


Output Total 985 ml 1475 ml 830 ml


 


Balance -735 ml -870 ml 817 ml











Justicifation of Admission Dx:


Justifications for Admission:


Justification of Admission Dx:  Yes











MARGARITA LAU MD            Aug 26, 2020 11:08

## 2020-08-26 NOTE — PDOC
PULMONARY PROGRESS NOTES


DATE: 8/26/20 


TIME: 10:34


Subjective


remains intubated/sedated


AC mode ,100%FIO2/12 PEEP


nursing report hypoxia with minimal stimulation 


Full dose lovenox added 8/24 due to increase in D-dimer to 20, now down to 8


Vitals





Vital Signs








  Date Time  Temp Pulse Resp B/P (MAP) Pulse Ox O2 Delivery O2 Flow Rate FiO2


 


8/26/20 07:09     98 Ventilator  


 


8/26/20 06:00  80 22 106/63 (77)    


 


8/26/20 04:00 99.7       





 99.7       


 


8/25/20 14:28       18.0 








Comments


visual exam done due to COVID-Pandemia


no resp distess


VENT 100% and PEEP 12 


no skin rash


lower extremity edema


Labs





Laboratory Tests








Test


 8/24/20


11:53 8/24/20


18:34 8/25/20


00:29 8/25/20


05:30


 


Glucose (Fingerstick)


 243 mg/dL


(70-99) 317 mg/dL


(70-99) 304 mg/dL


(70-99) 





 


White Blood Count


 


 


 


 12.3 x10^3/uL


(4.0-11.0)


 


Red Blood Count


 


 


 


 3.28 x10^6/uL


(4.30-5.70)


 


Hemoglobin


 


 


 


 10.0 g/dL


(13.0-17.5)


 


Hematocrit


 


 


 


 30.3 %


(39.0-53.0)


 


Mean Corpuscular Volume    92 fL () 


 


Mean Corpuscular Hemoglobin    31 pg (25-35) 


 


Mean Corpuscular Hemoglobin


Concent 


 


 


 33 g/dL


(31-37)


 


Red Cell Distribution Width


 


 


 


 13.6 %


(11.5-14.5)


 


Platelet Count


 


 


 


 179 x10^3/uL


(140-400)


 


Neutrophils (%) (Auto)    95 % (31-73) 


 


Lymphocytes (%) (Auto)    4 % (24-48) 


 


Monocytes (%) (Auto)    2 % (0-9) 


 


Eosinophils (%) (Auto)    0 % (0-3) 


 


Basophils (%) (Auto)    0 % (0-3) 


 


Neutrophils # (Auto)


 


 


 


 11.7 x10^3/uL


(1.8-7.7)


 


Lymphocytes # (Auto)


 


 


 


 0.4 x10^3/uL


(1.0-4.8)


 


Monocytes # (Auto)


 


 


 


 0.2 x10^3/uL


(0.0-1.1)


 


Eosinophils # (Auto)


 


 


 


 0.0 x10^3/uL


(0.0-0.7)


 


Basophils # (Auto)


 


 


 


 0.0 x10^3/uL


(0.0-0.2)


 


Sodium Level


 


 


 


 148 mmol/L


(136-145)


 


Potassium Level


 


 


 


 4.8 mmol/L


(3.5-5.1)


 


Chloride Level


 


 


 


 114 mmol/L


()


 


Carbon Dioxide Level


 


 


 


 26 mmol/L


(21-32)


 


Anion Gap    8 (6-14) 


 


Blood Urea Nitrogen


 


 


 


 39 mg/dL


(8-26)


 


Creatinine


 


 


 


 1.0 mg/dL


(0.7-1.3)


 


Estimated GFR


(Cockcroft-Gault) 


 


 


 75.0 





 


BUN/Creatinine Ratio    39 (6-20) 


 


Glucose Level


 


 


 


 342 mg/dL


(70-99)


 


Calcium Level


 


 


 


 7.1 mg/dL


(8.5-10.1)


 


Total Bilirubin


 


 


 


 0.6 mg/dL


(0.2-1.0)


 


Aspartate Amino Transf


(AST/SGOT) 


 


 


 51 U/L (15-37) 





 


Alanine Aminotransferase


(ALT/SGPT) 


 


 


 55 U/L (16-63) 





 


Alkaline Phosphatase


 


 


 


 111 U/L


()


 


Total Protein


 


 


 


 5.4 g/dL


(6.4-8.2)


 


Albumin


 


 


 


 1.4 g/dL


(3.4-5.0)


 


Albumin/Globulin Ratio    0.4 (1.0-1.7) 


 


Test


 8/25/20


05:41 8/25/20


08:00 8/25/20


11:29 8/25/20


16:54


 


Glucose (Fingerstick)


 327 mg/dL


(70-99) 


 327 mg/dL


(70-99) 303 mg/dL


(70-99)


 


O2 Saturation  90 % (92-99)   


 


Arterial Blood pH


 


 7.34


(7.35-7.45) 


 





 


Arterial Blood pCO2 at


Patient Temp 


 45 mmHg


(35-46) 


 





 


Arterial Blood pO2 at Patient


Temp 


 63 mmHg


() 


 





 


Arterial Blood HCO3


 


 23 mmol/L


(21-28) 


 





 


Arterial Blood Base Excess


 


 -2 mmol/L


(-3-3) 


 





 


FiO2  100% vent   


 


Test


 8/26/20


01:00 8/26/20


06:02 8/26/20


07:45 8/26/20


07:50


 


Glucose (Fingerstick)


 288 mg/dL


(70-99) 245 mg/dL


(70-99) 


 





 


O2 Saturation   87 % (92-99)  


 


Arterial Blood pH


 


 


 7.37


(7.35-7.45) 





 


Arterial Blood pH (Temp


corrected) 


 


 7.36 


 





 


Arterial Blood pCO2 at


Patient Temp 


 


 45 mmHg


(35-46) 





 


Arterial Blood pCO2 (Temp


correct) 


 


 47 mmHg 


 





 


Arterial Blood pO2 at Patient


Temp 


 


 55 mmHg


() 





 


Arterial Blood pO2 (Temp


corrected) 


 


 58 mmHg 


 





 


Arterial Blood HCO3


 


 


 26 mmol/L


(21-28) 





 


Arterial Blood Base Excess


 


 


 0 mmol/L


(-3-3) 





 


FiO2   100  


 


D-Dimer (Lexii)


 


 


 


 8.10 ug/mlFEU


(0.00-0.50)


 


Test


 8/26/20


08:15 


 


 





 


Glucose (Fingerstick)


 203 mg/dL


(70-99) 


 


 











Laboratory Tests








Test


 8/25/20


11:29 8/25/20


16:54 8/26/20


01:00 8/26/20


06:02


 


Glucose (Fingerstick)


 327 mg/dL


(70-99) 303 mg/dL


(70-99) 288 mg/dL


(70-99) 245 mg/dL


(70-99)


 


Test


 8/26/20


07:45 8/26/20


07:50 8/26/20


08:15 





 


O2 Saturation 87 % (92-99)    


 


Arterial Blood pH


 7.37


(7.35-7.45) 


 


 





 


Arterial Blood pH (Temp


corrected) 7.36 


 


 


 





 


Arterial Blood pCO2 at


Patient Temp 45 mmHg


(35-46) 


 


 





 


Arterial Blood pCO2 (Temp


correct) 47 mmHg 


 


 


 





 


Arterial Blood pO2 at Patient


Temp 55 mmHg


() 


 


 





 


Arterial Blood pO2 (Temp


corrected) 58 mmHg 


 


 


 





 


Arterial Blood HCO3


 26 mmol/L


(21-28) 


 


 





 


Arterial Blood Base Excess


 0 mmol/L


(-3-3) 


 


 





 


FiO2 100    


 


D-Dimer (Lexii)


 


 8.10 ug/mlFEU


(0.00-0.50) 


 





 


Glucose (Fingerstick)


 


 


 203 mg/dL


(70-99) 











Comments


 CXR 8/21 


IMPRESSION: 


1. Bilateral diffuse infiltrates are increased in the left base and mildly


improved elsewhere.





Impression


.


1.  Acute hypoxic respiratory failure secondary to COVID-19 pneumonia/ARDS/acute

lung injury--worsening oxygen requirements 


2.  Abnormal chest x-ray with bilateral infiltrates consistent with COVID-19 

pneumonia.


3.  Hypotension secondary to combination of hypovolemia and sepsis status post 2

liters of IV fluids. no pressors, now hypertensive


4.  Acute kidney injury, improving.


5.  Severe protein-calorie malnutrition.


6.  Abnormal D-dimer related to COVID-19 pneumonia. Full dose lovenox added 8/24

due to increase in D-dimer to 20





Plan


.


RECOMMENDATIONS:


1.  Continue present assist control mode, PEEP of 12 and FiO2 of 100% ABG 

reviewed , wean FIO2/PEEP as tolerated, ensure adequate sedation 


2.  Follow chest x-rays as needed.


3.  Broad spectrum antibiotic.


4. Cont.  IV steroids 


5. Abnormal D-dimer related to COVID-19 pneumonia.  Full dose lovenox added 8/24

due to increase in D-dimer to 20. dopplers neg for DVT, D-dimer now down to 8 


7. Stress ulcer prophylaxis.


8. Discussed with RN and RT.


9. s/p Plasma


10. cont. TF for nutritional support 





Critical care time 30 minutes.











ANGELA HUFFMAN MD                 Aug 26, 2020 10:37

## 2020-08-26 NOTE — NUR
SS following up with discharge planning. SS reviewed pt chart and discussed with pt RN. Pt 
remains on the vent at this time. COVID19 positive. SS will continue to follow for discharge 
planning.

## 2020-08-27 VITALS — DIASTOLIC BLOOD PRESSURE: 56 MMHG | SYSTOLIC BLOOD PRESSURE: 128 MMHG

## 2020-08-27 VITALS — DIASTOLIC BLOOD PRESSURE: 54 MMHG | SYSTOLIC BLOOD PRESSURE: 133 MMHG

## 2020-08-27 VITALS — DIASTOLIC BLOOD PRESSURE: 57 MMHG | SYSTOLIC BLOOD PRESSURE: 117 MMHG

## 2020-08-27 VITALS — SYSTOLIC BLOOD PRESSURE: 135 MMHG | DIASTOLIC BLOOD PRESSURE: 57 MMHG

## 2020-08-27 VITALS — SYSTOLIC BLOOD PRESSURE: 186 MMHG | DIASTOLIC BLOOD PRESSURE: 76 MMHG

## 2020-08-27 VITALS — DIASTOLIC BLOOD PRESSURE: 73 MMHG | SYSTOLIC BLOOD PRESSURE: 169 MMHG

## 2020-08-27 VITALS — DIASTOLIC BLOOD PRESSURE: 70 MMHG | SYSTOLIC BLOOD PRESSURE: 171 MMHG

## 2020-08-27 VITALS — DIASTOLIC BLOOD PRESSURE: 64 MMHG | SYSTOLIC BLOOD PRESSURE: 132 MMHG

## 2020-08-27 VITALS — SYSTOLIC BLOOD PRESSURE: 167 MMHG | DIASTOLIC BLOOD PRESSURE: 66 MMHG

## 2020-08-27 VITALS — DIASTOLIC BLOOD PRESSURE: 80 MMHG | SYSTOLIC BLOOD PRESSURE: 221 MMHG

## 2020-08-27 VITALS — SYSTOLIC BLOOD PRESSURE: 138 MMHG | DIASTOLIC BLOOD PRESSURE: 71 MMHG

## 2020-08-27 VITALS — DIASTOLIC BLOOD PRESSURE: 55 MMHG | SYSTOLIC BLOOD PRESSURE: 133 MMHG

## 2020-08-27 VITALS — DIASTOLIC BLOOD PRESSURE: 57 MMHG | SYSTOLIC BLOOD PRESSURE: 119 MMHG

## 2020-08-27 VITALS — DIASTOLIC BLOOD PRESSURE: 52 MMHG | SYSTOLIC BLOOD PRESSURE: 115 MMHG

## 2020-08-27 VITALS — SYSTOLIC BLOOD PRESSURE: 140 MMHG | DIASTOLIC BLOOD PRESSURE: 65 MMHG

## 2020-08-27 VITALS — DIASTOLIC BLOOD PRESSURE: 60 MMHG | SYSTOLIC BLOOD PRESSURE: 138 MMHG

## 2020-08-27 VITALS — SYSTOLIC BLOOD PRESSURE: 130 MMHG | DIASTOLIC BLOOD PRESSURE: 59 MMHG

## 2020-08-27 VITALS — DIASTOLIC BLOOD PRESSURE: 61 MMHG | SYSTOLIC BLOOD PRESSURE: 145 MMHG

## 2020-08-27 VITALS — SYSTOLIC BLOOD PRESSURE: 117 MMHG | DIASTOLIC BLOOD PRESSURE: 53 MMHG

## 2020-08-27 VITALS — SYSTOLIC BLOOD PRESSURE: 132 MMHG | DIASTOLIC BLOOD PRESSURE: 64 MMHG

## 2020-08-27 VITALS — DIASTOLIC BLOOD PRESSURE: 58 MMHG | SYSTOLIC BLOOD PRESSURE: 134 MMHG

## 2020-08-27 VITALS — DIASTOLIC BLOOD PRESSURE: 59 MMHG | SYSTOLIC BLOOD PRESSURE: 130 MMHG

## 2020-08-27 VITALS — DIASTOLIC BLOOD PRESSURE: 56 MMHG | SYSTOLIC BLOOD PRESSURE: 119 MMHG

## 2020-08-27 VITALS — DIASTOLIC BLOOD PRESSURE: 54 MMHG | SYSTOLIC BLOOD PRESSURE: 110 MMHG

## 2020-08-27 LAB
ANION GAP SERPL CALC-SCNC: 8 MMOL/L (ref 6–14)
BASE EXCESS ABG: -1 MMOL/L (ref -3–3)
BASOPHILS # BLD AUTO: 0.1 X10^3/UL (ref 0–0.2)
BASOPHILS NFR BLD: 1 % (ref 0–3)
BUN SERPL-MCNC: 49 MG/DL (ref 8–26)
CALCIUM SERPL-MCNC: 7.5 MG/DL (ref 8.5–10.1)
CHLORIDE SERPL-SCNC: 116 MMOL/L (ref 98–107)
CO2 SERPL-SCNC: 29 MMOL/L (ref 21–32)
CREAT SERPL-MCNC: 0.9 MG/DL (ref 0.7–1.3)
CRP SERPL-MCNC: 117 MG/L (ref 0–3.3)
EOSINOPHIL NFR BLD: 0.1 X10^3/UL (ref 0–0.7)
EOSINOPHIL NFR BLD: 1 % (ref 0–3)
ERYTHROCYTE [DISTWIDTH] IN BLOOD BY AUTOMATED COUNT: 14.6 % (ref 11.5–14.5)
GFR SERPLBLD BASED ON 1.73 SQ M-ARVRAT: 84.7 ML/MIN
GLUCOSE SERPL-MCNC: 272 MG/DL (ref 70–99)
HCO3 BLDA-SCNC: 24 MMOL/L (ref 21–28)
HCT VFR BLD CALC: 32.1 % (ref 39–53)
HGB BLD-MCNC: 10.4 G/DL (ref 13–17.5)
INSPIRATION SETTING TIME VENT: (no result)
LYMPHOCYTES # BLD: 0.3 X10^3/UL (ref 1–4.8)
LYMPHOCYTES NFR BLD AUTO: 2 % (ref 24–48)
MCH RBC QN AUTO: 31 PG (ref 25–35)
MCHC RBC AUTO-ENTMCNC: 33 G/DL (ref 31–37)
MCV RBC AUTO: 95 FL (ref 79–100)
MONO #: 0.3 X10^3/UL (ref 0–1.1)
MONOCYTES NFR BLD: 2 % (ref 0–9)
NEUT #: 14.2 X10^3/UL (ref 1.8–7.7)
NEUTROPHILS NFR BLD AUTO: 94 % (ref 31–73)
PCO2 BLDA: 41 MMHG (ref 35–46)
PLATELET # BLD AUTO: 201 X10^3/UL (ref 140–400)
PO2 BLDA: 67 MMHG (ref 65–108)
POTASSIUM SERPL-SCNC: 4.9 MMOL/L (ref 3.5–5.1)
RBC # BLD AUTO: 3.39 X10^6/UL (ref 4.3–5.7)
SAO2 % BLDA: 92 % (ref 92–99)
SODIUM SERPL-SCNC: 153 MMOL/L (ref 136–145)
TRIGL SERPL-MCNC: 285 MG/DL (ref 0–150)
WBC # BLD AUTO: 15 X10^3/UL (ref 4–11)

## 2020-08-27 RX ADMIN — INSULIN LISPRO SCH UNITS: 100 INJECTION, SOLUTION INTRAVENOUS; SUBCUTANEOUS at 13:38

## 2020-08-27 RX ADMIN — OXYCODONE HYDROCHLORIDE AND ACETAMINOPHEN SCH MG: 500 TABLET ORAL at 00:52

## 2020-08-27 RX ADMIN — Medication PRN EACH: at 14:14

## 2020-08-27 RX ADMIN — METHYLPREDNISOLONE SODIUM SUCCINATE SCH MG: 40 INJECTION, POWDER, FOR SOLUTION INTRAMUSCULAR; INTRAVENOUS at 05:43

## 2020-08-27 RX ADMIN — PROPOFOL PRN MLS/HR: 10 INJECTION, EMULSION INTRAVENOUS at 06:03

## 2020-08-27 RX ADMIN — INSULIN GLARGINE SCH UNIT: 100 INJECTION, SOLUTION SUBCUTANEOUS at 21:50

## 2020-08-27 RX ADMIN — ENOXAPARIN SODIUM SCH MG: 80 INJECTION SUBCUTANEOUS at 09:14

## 2020-08-27 RX ADMIN — OXYCODONE HYDROCHLORIDE AND ACETAMINOPHEN SCH MG: 500 TABLET ORAL at 23:36

## 2020-08-27 RX ADMIN — PANTOPRAZOLE SODIUM SCH MG: 40 INJECTION, POWDER, FOR SOLUTION INTRAVENOUS at 09:05

## 2020-08-27 RX ADMIN — INSULIN GLARGINE SCH UNIT: 100 INJECTION, SOLUTION SUBCUTANEOUS at 09:20

## 2020-08-27 RX ADMIN — METHYLPREDNISOLONE SODIUM SUCCINATE SCH MG: 40 INJECTION, POWDER, FOR SOLUTION INTRAMUSCULAR; INTRAVENOUS at 14:02

## 2020-08-27 RX ADMIN — PROPOFOL PRN MLS/HR: 10 INJECTION, EMULSION INTRAVENOUS at 00:51

## 2020-08-27 RX ADMIN — PIPERACILLIN SODIUM AND TAZOBACTAM SODIUM SCH MLS/HR: 3; .375 INJECTION, POWDER, LYOPHILIZED, FOR SOLUTION INTRAVENOUS at 05:42

## 2020-08-27 RX ADMIN — DEXMEDETOMIDINE HYDROCHLORIDE PRN MLS/HR: 100 INJECTION, SOLUTION, CONCENTRATE INTRAVENOUS at 03:00

## 2020-08-27 RX ADMIN — DEXMEDETOMIDINE HYDROCHLORIDE PRN MLS/HR: 100 INJECTION, SOLUTION, CONCENTRATE INTRAVENOUS at 19:40

## 2020-08-27 RX ADMIN — ZINC SULFATE CAP 220 MG (50 MG ELEMENTAL ZN) SCH MG: 220 (50 ZN) CAP at 09:06

## 2020-08-27 RX ADMIN — OXYCODONE HYDROCHLORIDE AND ACETAMINOPHEN SCH MG: 500 TABLET ORAL at 11:23

## 2020-08-27 RX ADMIN — PIPERACILLIN SODIUM AND TAZOBACTAM SODIUM SCH MLS/HR: 3; .375 INJECTION, POWDER, LYOPHILIZED, FOR SOLUTION INTRAVENOUS at 17:32

## 2020-08-27 RX ADMIN — PROPOFOL PRN MLS/HR: 10 INJECTION, EMULSION INTRAVENOUS at 22:38

## 2020-08-27 RX ADMIN — PROPOFOL PRN MLS/HR: 10 INJECTION, EMULSION INTRAVENOUS at 10:58

## 2020-08-27 RX ADMIN — MIDAZOLAM PRN MLS/HR: 5 INJECTION, SOLUTION INTRAMUSCULAR; INTRAVENOUS at 02:59

## 2020-08-27 RX ADMIN — DEXMEDETOMIDINE HYDROCHLORIDE PRN MLS/HR: 100 INJECTION, SOLUTION, CONCENTRATE INTRAVENOUS at 14:42

## 2020-08-27 RX ADMIN — PIPERACILLIN SODIUM AND TAZOBACTAM SODIUM SCH MLS/HR: 3; .375 INJECTION, POWDER, LYOPHILIZED, FOR SOLUTION INTRAVENOUS at 11:23

## 2020-08-27 RX ADMIN — PROPOFOL PRN MLS/HR: 10 INJECTION, EMULSION INTRAVENOUS at 05:44

## 2020-08-27 RX ADMIN — INSULIN LISPRO SCH UNITS: 100 INJECTION, SOLUTION INTRAVENOUS; SUBCUTANEOUS at 17:48

## 2020-08-27 RX ADMIN — DEXMEDETOMIDINE HYDROCHLORIDE PRN MLS/HR: 100 INJECTION, SOLUTION, CONCENTRATE INTRAVENOUS at 09:05

## 2020-08-27 RX ADMIN — PROPOFOL PRN MLS/HR: 10 INJECTION, EMULSION INTRAVENOUS at 16:05

## 2020-08-27 RX ADMIN — Medication PRN MLS/HR: at 03:13

## 2020-08-27 RX ADMIN — OXYCODONE HYDROCHLORIDE AND ACETAMINOPHEN SCH MG: 500 TABLET ORAL at 05:43

## 2020-08-27 RX ADMIN — OXYCODONE HYDROCHLORIDE AND ACETAMINOPHEN SCH MG: 500 TABLET ORAL at 17:32

## 2020-08-27 RX ADMIN — INSULIN LISPRO SCH UNITS: 100 INJECTION, SOLUTION INTRAVENOUS; SUBCUTANEOUS at 05:42

## 2020-08-27 RX ADMIN — METHYLPREDNISOLONE SODIUM SUCCINATE SCH MG: 40 INJECTION, POWDER, FOR SOLUTION INTRAMUSCULAR; INTRAVENOUS at 21:51

## 2020-08-27 RX ADMIN — INSULIN LISPRO SCH UNITS: 100 INJECTION, SOLUTION INTRAVENOUS; SUBCUTANEOUS at 00:53

## 2020-08-27 RX ADMIN — MIDAZOLAM PRN MLS/HR: 5 INJECTION, SOLUTION INTRAMUSCULAR; INTRAVENOUS at 14:42

## 2020-08-27 RX ADMIN — PIPERACILLIN SODIUM AND TAZOBACTAM SODIUM SCH MLS/HR: 3; .375 INJECTION, POWDER, LYOPHILIZED, FOR SOLUTION INTRAVENOUS at 00:52

## 2020-08-27 RX ADMIN — HYDRALAZINE HYDROCHLORIDE PRN MG: 20 INJECTION INTRAMUSCULAR; INTRAVENOUS at 02:18

## 2020-08-27 RX ADMIN — Medication PRN MLS/HR: at 17:13

## 2020-08-27 RX ADMIN — INSULIN GLARGINE SCH UNIT: 100 INJECTION, SOLUTION SUBCUTANEOUS at 00:53

## 2020-08-27 RX ADMIN — PROPOFOL PRN MLS/HR: 10 INJECTION, EMULSION INTRAVENOUS at 02:32

## 2020-08-27 RX ADMIN — VECURONIUM BROMIDE PRN MLS/HR: 1 INJECTION, POWDER, LYOPHILIZED, FOR SOLUTION INTRAVENOUS at 13:45

## 2020-08-27 RX ADMIN — PROPOFOL PRN MLS/HR: 10 INJECTION, EMULSION INTRAVENOUS at 19:32

## 2020-08-27 RX ADMIN — ENOXAPARIN SODIUM SCH MG: 80 INJECTION SUBCUTANEOUS at 21:51

## 2020-08-27 RX ADMIN — PIPERACILLIN SODIUM AND TAZOBACTAM SODIUM SCH MLS/HR: 3; .375 INJECTION, POWDER, LYOPHILIZED, FOR SOLUTION INTRAVENOUS at 23:36

## 2020-08-27 NOTE — PDOC
PROGRESS NOTES


Date of Service:


DATE: 8/27/20 


TIME: 10:49





Chief Complaint


Chief Complaint


Assessment/Plan


Acute hypoxemic respiratory failure


Leukocytosis with bandemia secondary to septic process


Hyponatremia secondary to low effective circulatory volume


Acue renal failure due to vasomotor nephropathy most likely 


Hyperglycemia


mild transaminitis 


Uncontrolled hypertension





History of Present Illness


Patient is a 55-year-old gentleman who was diagnosed with COVID-19 infection on 

Monday 5 days prior to his admission to the intensive care unit.  Apparently the

patient reported worsening respiratory distress reason why he came to the 

emergency department for evaluation and treatment.  The story was quite limited 

since the patient was speaking [place] only and his acuity was such that he 

required mechanical ventilation and prompt intubation by the emergency de

partment physician.  At the present time patient is intubated and sedated 

continues to require a lot of support no pertinent past medical history was 

reported he is admitted to the intensive care unit for further treatment





8/24/2020


Continues to require full support, glycemia has been noted in hypertension as 

well.  Discussed with nursing staff, all of concerns were addressed to the best 

of my abilities no acute events reported overnight





8/25/2020


No acute events reported overnight, case discussed with nursing staff patient in

no acute distress  





8/26/2020


Per nursing staff, patient febrile overnight, treated with Tylenol and tepid 

water bath. Case discussed with nursing staff, continue with IV antibiotics and 

full VTE prophylaxis.





8/27/2020


Patient still febrile overnight.  O2 requirement increasing.  Discussed with RN.





Vitals


Vitals





Vital Signs








  Date Time  Temp Pulse Resp B/P (MAP) Pulse Ox O2 Delivery O2 Flow Rate FiO2


 


8/27/20 07:35     92 Ventilator  


 


8/27/20 06:00  89 22 132/64 (86)    


 


8/27/20 04:00 100.1       





 100.1       


 


8/27/20 03:43       18.0 











Physical Exam


General:  No acute distress, Other (Intubated and sedated)


Heart:  Regular rate


Lungs:  Other (Intubated)


Abdomen:  Other (Nondistended)


Extremities:  No clubbing, No cyanosis


Skin:  No rashes, No breakdown





Labs


LABS





Laboratory Tests








Test


 8/26/20


12:54 8/26/20


17:55 8/27/20


00:26 8/27/20


05:20


 


Glucose (Fingerstick)


 254 mg/dL


(70-99) 259 mg/dL


(70-99) 228 mg/dL


(70-99) 





 


White Blood Count


 


 


 


 15.0 x10^3/uL


(4.0-11.0)


 


Red Blood Count


 


 


 


 3.39 x10^6/uL


(4.30-5.70)


 


Hemoglobin


 


 


 


 10.4 g/dL


(13.0-17.5)


 


Hematocrit


 


 


 


 32.1 %


(39.0-53.0)


 


Mean Corpuscular Volume    95 fL () 


 


Mean Corpuscular Hemoglobin    31 pg (25-35) 


 


Mean Corpuscular Hemoglobin


Concent 


 


 


 33 g/dL


(31-37)


 


Red Cell Distribution Width


 


 


 


 14.6 %


(11.5-14.5)


 


Platelet Count


 


 


 


 201 x10^3/uL


(140-400)


 


Neutrophils (%) (Auto)    94 % (31-73) 


 


Lymphocytes (%) (Auto)    2 % (24-48) 


 


Monocytes (%) (Auto)    2 % (0-9) 


 


Eosinophils (%) (Auto)    1 % (0-3) 


 


Basophils (%) (Auto)    1 % (0-3) 


 


Neutrophils # (Auto)


 


 


 


 14.2 x10^3/uL


(1.8-7.7)


 


Lymphocytes # (Auto)


 


 


 


 0.3 x10^3/uL


(1.0-4.8)


 


Monocytes # (Auto)


 


 


 


 0.3 x10^3/uL


(0.0-1.1)


 


Eosinophils # (Auto)


 


 


 


 0.1 x10^3/uL


(0.0-0.7)


 


Basophils # (Auto)


 


 


 


 0.1 x10^3/uL


(0.0-0.2)


 


D-Dimer (Lexii)


 


 


 


 7.66 ug/mlFEU


(0.00-0.50)


 


Sodium Level


 


 


 


 153 mmol/L


(136-145)


 


Potassium Level


 


 


 


 4.9 mmol/L


(3.5-5.1)


 


Chloride Level


 


 


 


 116 mmol/L


()


 


Carbon Dioxide Level


 


 


 


 29 mmol/L


(21-32)


 


Anion Gap    8 (6-14) 


 


Blood Urea Nitrogen


 


 


 


 49 mg/dL


(8-26)


 


Creatinine


 


 


 


 0.9 mg/dL


(0.7-1.3)


 


Estimated GFR


(Cockcroft-Gault) 


 


 


 84.7 





 


Glucose Level


 


 


 


 272 mg/dL


(70-99)


 


Calcium Level


 


 


 


 7.5 mg/dL


(8.5-10.1)


 


Ferritin


 


 


 


 924 ng/mL


()


 


C-Reactive Protein,


Quantitative 


 


 


 117.0 mg/L


(0-3.3)


 


Triglycerides Level


 


 


 


 285 mg/dL


(0-150)


 


Procalcitonin


 


 


 


 0.44 ng/mL


(0.00-0.10)


 


Test


 8/27/20


05:26 8/27/20


07:55 


 





 


Glucose (Fingerstick)


 260 mg/dL


(70-99) 


 


 





 


O2 Saturation  92 % (92-99)   


 


Arterial Blood pH


 


 7.38


(7.35-7.45) 


 





 


Arterial Blood pCO2 at


Patient Temp 


 41 mmHg


(35-46) 


 





 


Arterial Blood pO2 at Patient


Temp 


 67 mmHg


() 


 





 


Arterial Blood HCO3


 


 24 mmol/L


(21-28) 


 





 


Arterial Blood Base Excess


 


 -1 mmol/L


(-3-3) 


 





 


FiO2  100%   











Review of Systems


Review of Systems


Unable to obtain due to clinical condition





Assessment and Plan


Assessmemt and Plan


Continue IV antibiotics, continue IV steroids, continue full dose Lovenox.





Comment


Review of Relevant


I have reviewed the following items jim (where applicable) has been applied.


Labs





Laboratory Tests








Test


 8/25/20


11:29 8/25/20


16:54 8/26/20


01:00 8/26/20


06:02


 


Glucose (Fingerstick)


 327 mg/dL


(70-99) 303 mg/dL


(70-99) 288 mg/dL


(70-99) 245 mg/dL


(70-99)


 


Test


 8/26/20


07:45 8/26/20


07:50 8/26/20


08:15 8/26/20


12:54


 


O2 Saturation 87 % (92-99)    


 


Arterial Blood pH


 7.37


(7.35-7.45) 


 


 





 


Arterial Blood pH (Temp


corrected) 7.36 


 


 


 





 


Arterial Blood pCO2 at


Patient Temp 45 mmHg


(35-46) 


 


 





 


Arterial Blood pCO2 (Temp


correct) 47 mmHg 


 


 


 





 


Arterial Blood pO2 at Patient


Temp 55 mmHg


() 


 


 





 


Arterial Blood pO2 (Temp


corrected) 58 mmHg 


 


 


 





 


Arterial Blood HCO3


 26 mmol/L


(21-28) 


 


 





 


Arterial Blood Base Excess


 0 mmol/L


(-3-3) 


 


 





 


FiO2 100    


 


D-Dimer (Lexii)


 


 8.10 ug/mlFEU


(0.00-0.50) 


 





 


Glucose (Fingerstick)


 


 


 203 mg/dL


(70-99) 254 mg/dL


(70-99)


 


Test


 8/26/20


17:55 8/27/20


00:26 8/27/20


05:20 8/27/20


05:26


 


Glucose (Fingerstick)


 259 mg/dL


(70-99) 228 mg/dL


(70-99) 


 260 mg/dL


(70-99)


 


White Blood Count


 


 


 15.0 x10^3/uL


(4.0-11.0) 





 


Red Blood Count


 


 


 3.39 x10^6/uL


(4.30-5.70) 





 


Hemoglobin


 


 


 10.4 g/dL


(13.0-17.5) 





 


Hematocrit


 


 


 32.1 %


(39.0-53.0) 





 


Mean Corpuscular Volume   95 fL ()  


 


Mean Corpuscular Hemoglobin   31 pg (25-35)  


 


Mean Corpuscular Hemoglobin


Concent 


 


 33 g/dL


(31-37) 





 


Red Cell Distribution Width


 


 


 14.6 %


(11.5-14.5) 





 


Platelet Count


 


 


 201 x10^3/uL


(140-400) 





 


Neutrophils (%) (Auto)   94 % (31-73)  


 


Lymphocytes (%) (Auto)   2 % (24-48)  


 


Monocytes (%) (Auto)   2 % (0-9)  


 


Eosinophils (%) (Auto)   1 % (0-3)  


 


Basophils (%) (Auto)   1 % (0-3)  


 


Neutrophils # (Auto)


 


 


 14.2 x10^3/uL


(1.8-7.7) 





 


Lymphocytes # (Auto)


 


 


 0.3 x10^3/uL


(1.0-4.8) 





 


Monocytes # (Auto)


 


 


 0.3 x10^3/uL


(0.0-1.1) 





 


Eosinophils # (Auto)


 


 


 0.1 x10^3/uL


(0.0-0.7) 





 


Basophils # (Auto)


 


 


 0.1 x10^3/uL


(0.0-0.2) 





 


D-Dimer (Lexii)


 


 


 7.66 ug/mlFEU


(0.00-0.50) 





 


Sodium Level


 


 


 153 mmol/L


(136-145) 





 


Potassium Level


 


 


 4.9 mmol/L


(3.5-5.1) 





 


Chloride Level


 


 


 116 mmol/L


() 





 


Carbon Dioxide Level


 


 


 29 mmol/L


(21-32) 





 


Anion Gap   8 (6-14)  


 


Blood Urea Nitrogen


 


 


 49 mg/dL


(8-26) 





 


Creatinine


 


 


 0.9 mg/dL


(0.7-1.3) 





 


Estimated GFR


(Cockcroft-Gault) 


 


 84.7 


 





 


Glucose Level


 


 


 272 mg/dL


(70-99) 





 


Calcium Level


 


 


 7.5 mg/dL


(8.5-10.1) 





 


Ferritin


 


 


 924 ng/mL


() 





 


C-Reactive Protein,


Quantitative 


 


 117.0 mg/L


(0-3.3) 





 


Triglycerides Level


 


 


 285 mg/dL


(0-150) 





 


Procalcitonin


 


 


 0.44 ng/mL


(0.00-0.10) 





 


Test


 8/27/20


07:55 


 


 





 


O2 Saturation 92 % (92-99)    


 


Arterial Blood pH


 7.38


(7.35-7.45) 


 


 





 


Arterial Blood pCO2 at


Patient Temp 41 mmHg


(35-46) 


 


 





 


Arterial Blood pO2 at Patient


Temp 67 mmHg


() 


 


 





 


Arterial Blood HCO3


 24 mmol/L


(21-28) 


 


 





 


Arterial Blood Base Excess


 -1 mmol/L


(-3-3) 


 


 





 


FiO2 100%    








Laboratory Tests








Test


 8/26/20


12:54 8/26/20


17:55 8/27/20


00:26 8/27/20


05:20


 


Glucose (Fingerstick)


 254 mg/dL


(70-99) 259 mg/dL


(70-99) 228 mg/dL


(70-99) 





 


White Blood Count


 


 


 


 15.0 x10^3/uL


(4.0-11.0)


 


Red Blood Count


 


 


 


 3.39 x10^6/uL


(4.30-5.70)


 


Hemoglobin


 


 


 


 10.4 g/dL


(13.0-17.5)


 


Hematocrit


 


 


 


 32.1 %


(39.0-53.0)


 


Mean Corpuscular Volume    95 fL () 


 


Mean Corpuscular Hemoglobin    31 pg (25-35) 


 


Mean Corpuscular Hemoglobin


Concent 


 


 


 33 g/dL


(31-37)


 


Red Cell Distribution Width


 


 


 


 14.6 %


(11.5-14.5)


 


Platelet Count


 


 


 


 201 x10^3/uL


(140-400)


 


Neutrophils (%) (Auto)    94 % (31-73) 


 


Lymphocytes (%) (Auto)    2 % (24-48) 


 


Monocytes (%) (Auto)    2 % (0-9) 


 


Eosinophils (%) (Auto)    1 % (0-3) 


 


Basophils (%) (Auto)    1 % (0-3) 


 


Neutrophils # (Auto)


 


 


 


 14.2 x10^3/uL


(1.8-7.7)


 


Lymphocytes # (Auto)


 


 


 


 0.3 x10^3/uL


(1.0-4.8)


 


Monocytes # (Auto)


 


 


 


 0.3 x10^3/uL


(0.0-1.1)


 


Eosinophils # (Auto)


 


 


 


 0.1 x10^3/uL


(0.0-0.7)


 


Basophils # (Auto)


 


 


 


 0.1 x10^3/uL


(0.0-0.2)


 


D-Dimer (Lexii)


 


 


 


 7.66 ug/mlFEU


(0.00-0.50)


 


Sodium Level


 


 


 


 153 mmol/L


(136-145)


 


Potassium Level


 


 


 


 4.9 mmol/L


(3.5-5.1)


 


Chloride Level


 


 


 


 116 mmol/L


()


 


Carbon Dioxide Level


 


 


 


 29 mmol/L


(21-32)


 


Anion Gap    8 (6-14) 


 


Blood Urea Nitrogen


 


 


 


 49 mg/dL


(8-26)


 


Creatinine


 


 


 


 0.9 mg/dL


(0.7-1.3)


 


Estimated GFR


(Cockcroft-Gault) 


 


 


 84.7 





 


Glucose Level


 


 


 


 272 mg/dL


(70-99)


 


Calcium Level


 


 


 


 7.5 mg/dL


(8.5-10.1)


 


Ferritin


 


 


 


 924 ng/mL


()


 


C-Reactive Protein,


Quantitative 


 


 


 117.0 mg/L


(0-3.3)


 


Triglycerides Level


 


 


 


 285 mg/dL


(0-150)


 


Procalcitonin


 


 


 


 0.44 ng/mL


(0.00-0.10)


 


Test


 8/27/20


05:26 8/27/20


07:55 


 





 


Glucose (Fingerstick)


 260 mg/dL


(70-99) 


 


 





 


O2 Saturation  92 % (92-99)   


 


Arterial Blood pH


 


 7.38


(7.35-7.45) 


 





 


Arterial Blood pCO2 at


Patient Temp 


 41 mmHg


(35-46) 


 





 


Arterial Blood pO2 at Patient


Temp 


 67 mmHg


() 


 





 


Arterial Blood HCO3


 


 24 mmol/L


(21-28) 


 





 


Arterial Blood Base Excess


 


 -1 mmol/L


(-3-3) 


 





 


FiO2  100%   








Microbiology


8/22/20 Blood Culture - Preliminary, Resulted


          NO GROWTH AFTER 4 DAYS


8/20/20 Urine Culture - Final, Complete


Medications





Current Medications


Etomidate (Amidate) 20 mg STK-MED ONCE IV ;  Start 8/20/20 at 18:54;  Stop 

8/20/20 at 18:54;  Status DC


Succinylcholine Chloride (Anectine) 200 mg STK-MED ONCE .ROUTE ;  Start 8/20/20 

at 18:55;  Stop 8/20/20 at 18:55;  Status DC


Propofol 50 ml @ As Directed STK-MED ONCE IV ;  Start 8/20/20 at 18:55;  Stop 

8/20/20 at 18:56;  Status DC


Propofol 100 ml @ 0 mls/hr CONT  PRN IV SEE PROTOCOL Last administered on 8/27 /20at 06:03;  Start 8/20/20 at 19:00


Fentanyl Citrate (Fentanyl 2ml Vial) 25 mcg PRN Q1HR  PRN IV SEE COMMENTS;  

Start 8/20/20 at 19:00


Fentanyl Citrate (Fentanyl 2ml Vial) 50 mcg PRN Q1HR  PRN IV SEE COMMENTS Last 

administered on 8/20/20at 19:42;  Start 8/20/20 at 19:00


Chlorhexidine Gluconate (Peridex) 15 ml BID MM  Last administered on 8/21/20at 

08:16;  Start 8/20/20 at 21:00;  Stop 8/21/20 at 09:45;  Status DC


Midazolam HCl (Versed) 5 mg STK-MED ONCE .ROUTE ;  Start 8/20/20 at 19:17;  Stop

8/20/20 at 19:18;  Status DC


Midazolam HCl 50 mg/Sodium Chloride 50 ml @ 0 mls/hr 1X  ONCE IV ;  Start 

8/20/20 at 19:30;  Stop 8/20/20 at 19:31;  Status UNV


Albuterol/ Ipratropium (Duoneb) 3 ml STK-MED ONCE .ROUTE ;  Start 8/20/20 at 

19:27;  Stop 8/20/20 at 19:27;  Status DC


Midazolam HCl 100 ml @ 0 mls/hr CONT  PRN IV SEE PROTOCOL Last administered on 

8/27/20at 02:59;  Start 8/20/20 at 19:30


Propofol 50 ml @ 0 mls/hr 1X  ONCE IV  Last administered on 8/20/20at 18:55;  

Start 8/20/20 at 20:00;  Stop 8/20/20 at 20:01;  Status DC


Midazolam HCl (Versed) 5 mg 1X  ONCE NS  Last administered on 8/20/20at 19:18;  

Start 8/20/20 at 19:18;  Stop 8/20/20 at 19:59;  Status DC


Piperacillin Sod/ Tazobactam Sod 3.375 gm/Sodium Chloride 50 ml @  100 mls/hr 1X

 ONCE IV  Last administered on 8/20/20at 21:47;  Start 8/20/20 at 20:45;  Stop 

8/20/20 at 21:14;  Status DC


Dexmedetomidine HCl 400 mcg/ Sodium Chloride 100 ml @ 0 mls/hr CONT  PRN IV 

SEDATION Last administered on 8/27/20at 09:05;  Start 8/20/20 at 20:15


Sodium Chloride 500 ml @  500 mls/hr 1X PRN  PRN IV SEE COMMENTS;  Start 8/20/20

at 20:15


Atropine Sulfate (ATROPINE 0.5mg SYRINGE) 0.5 mg PRN Q5MIN  PRN IV SEE COMMENTS;

 Start 8/20/20 at 20:15


Fentanyl Citrate (Fentanyl 2ml Vial) 100 mcg 1X  ONCE IVP  Last administered on 

8/20/20at 20:17;  Start 8/20/20 at 20:15;  Stop 8/20/20 at 20:18;  Status DC


Methylprednisolone Sodium Succinate (SOLU-Medrol 40MG VIAL) 40 mg Q8HRS IV  Last

administered on 8/27/20at 05:43;  Start 8/20/20 at 22:00


Enoxaparin Sodium (Lovenox 40mg Syringe) 40 mg BID SQ  Last administered on 

8/21/20at 08:15;  Start 8/20/20 at 21:30;  Stop 8/21/20 at 09:47;  Status DC


Potassium Chloride/Dextrose/ Sod Cl 1,000 ml @  125 mls/hr Q8H ONCE IV  Last 

administered on 8/20/20at 22:13;  Start 8/20/20 at 21:30;  Stop 8/21/20 at 05

:29;  Status DC


Zinc Sulfate (Orazinc) 220 mg DAILY PO  Last administered on 8/27/20at 09:06;  

Start 8/21/20 at 09:00


Ondansetron HCl (Zofran) 4 mg PRN Q4HRS  PRN IV NAUSEA/VOMITING;  Start 8/20/20 

at 21:30


Acetaminophen (Tylenol) 650 mg PRN Q4HRS  PRN GT TEMP OVER 100.4F OR MILD PAIN 

Last administered on 8/26/20at 06:05;  Start 8/20/20 at 21:30


Acetaminophen (Tylenol Supp) 650 mg PRN Q4HRS  PRN AL TEMP OVER 100.4F OR MILD 

PAIN;  Start 8/20/20 at 21:30


Docusate Sodium (Colace) 100 mg PRN BID  PRN PO HARD STOOLS Last administered on

8/26/20at 08:26;  Start 8/20/20 at 21:30


Piperacillin Sod/ Tazobactam Sod 3.375 gm/Sodium Chloride 50 ml @  100 mls/hr 

Q6HRS IV  Last administered on 8/27/20at 05:42;  Start 8/21/20 at 00:00


Pantoprazole Sodium (PROTONIX VIAL for IV PUSH) 40 mg DAILYAC IVP  Last 

administered on 8/27/20at 09:05;  Start 8/21/20 at 07:30


Norepinephrine Bitartrate 8 mg/ Dextrose 258 ml @  12.578 mls/ hr CONT  PRN IV 

PER PROTOCOL Last administered on 8/20/20at 22:15;  Start 8/20/20 at 21:45


Sodium Chloride 1,000 ml @  1,000 mls/hr 1X  ONCE IV  Last administered on 

8/20/20at 21:44;  Start 8/20/20 at 21:45;  Stop 8/20/20 at 22:44;  Status DC


Fentanyl Citrate 30 ml @ 0 mls/hr CONT  PRN IV SEE PROTOCOL Last administered on

8/22/20at 10:44;  Start 8/20/20 at 21:45;  Stop 8/22/20 at 14:49;  Status DC


Vecuronium Bromide (Norcuron Bolus) 6 mg PRN Q2HR  PRN IV VENT ASYNCHRONY Last 

administered on 8/22/20at 11:23;  Start 8/20/20 at 21:45;  Stop 8/22/20 at 

14:48;  Status DC


Sodium Chloride 1,000 ml @  1,000 mls/hr 1X  ONCE IV  Last administered on 

8/20/20at 22:36;  Start 8/20/20 at 22:30;  Stop 8/20/20 at 23:29;  Status DC


Methylprednisolone Sodium Succinate (SOLU-Medrol 40MG VIAL) 40 mg Q8HRS IV ;  

Start 8/21/20 at 14:00;  Status UNV


Enoxaparin Sodium (Lovenox 80mg Syringe) 80 mg BID SQ  Last administered on 

8/21/20at 20:53;  Start 8/21/20 at 21:00;  Stop 8/22/20 at 08:57;  Status DC


Insulin Human Lispro (HumaLOG) 6 units TID SQ ;  Start 8/21/20 at 14:00;  Stop 

8/21/20 at 10:05;  Status DC


Insulin Glargine (Lantus Syringe) 10 unit BID SQ  Last administered on 8/22/20at

09:27;  Start 8/21/20 at 21:00;  Stop 8/22/20 at 11:25;  Status DC


Ascorbic Acid (Vitamin C) 250 mg Q6HRS PO  Last administered on 8/27/20at 05:43;

 Start 8/21/20 at 12:00


Sodium Chloride 1,000 ml @  75 mls/hr [id number] IV  Last administered on 8/24/20at

19:33;  Start 8/21/20 at 10:00;  Stop 8/25/20 at 10:23;  Status DC


Insulin Human Lispro (HumaLOG) 6 units Q6HRS SQ  Last administered on 8/22/20at 

06:11;  Start 8/21/20 at 12:00;  Stop 8/22/20 at 11:25;  Status DC


Insulin Glargine (Lantus Syringe) 10 unit 1X  ONCE SQ  Last administered on 

8/21/20at 11:40;  Start 8/21/20 at 11:00;  Stop 8/21/20 at 11:01;  Status DC


Enoxaparin Sodium (Lovenox 60mg Syringe) 60 mg BID SQ  Last administered on 

8/24/20at 08:55;  Start 8/22/20 at 09:00;  Stop 8/24/20 at 16:45;  Status DC


Vecuronium Bromide (Norcuron Bolus) 6 mg PRN Q6HRS  PRN IV SEDATION (2nd Choice)

Last administered on 8/24/20at 16:36;  Start 8/22/20 at 09:30


Insulin Glargine (Lantus Syringe) 14 unit BID SQ  Last administered on 8/24/20at

09:24;  Start 8/22/20 at 21:00;  Stop 8/24/20 at 12:27;  Status DC


Insulin Human Lispro (HumaLOG) 8 units Q6HRS SQ  Last administered on 8/24/20at 

12:11;  Start 8/22/20 at 12:00;  Stop 8/24/20 at 12:27;  Status DC


Fentanyl Citrate 55 ml @ 0 mls/hr CONT  PRN IV SEE PROTOCOL Last administered on

8/27/20at 03:13;  Start 8/22/20 at 14:49


Lorazepam (Ativan Inj) 1 mg PRN Q1HR  PRN IVP ANXIETY / AGITATION- MODERATE;  

Start 8/22/20 at 16:15


Lorazepam (Ativan Inj) 2 mg PRN Q1HR  PRN IVP ANXIETY / AGITATION- SEVERE Last 

administered on 8/23/20at 13:10;  Start 8/22/20 at 16:30


Insulin Human Lispro (HumaLOG) 6 units 1X  ONCE SQ  Last administered on 

8/23/20at 06:20;  Start 8/23/20 at 06:15;  Stop 8/23/20 at 06:16;  Status DC


Hydralazine HCl (Apresoline Inj) 10 mg PRN Q4HRS  PRN IVP ELEVATED BP, SEE 

COMMENTS Last administered on 8/27/20at 02:18;  Start 8/24/20 at 09:15


Alteplase, Recombinant (Cathflo For Central Catheter Clearance) 1 mg 1X  ONCE 

INT CAT  Last administered on 8/24/20at 12:05;  Start 8/24/20 at 11:00;  Stop 

8/24/20 at 11:01;  Status DC


Insulin Glargine (Lantus Syringe) 18 unit BID SQ  Last administered on 8/25/20at

08:30;  Start 8/24/20 at 21:00;  Stop 8/25/20 at 10:03;  Status DC


Insulin Human Lispro (HumaLOG) 10 units Q6HRS SQ  Last administered on 8/25/20at

05:53;  Start 8/24/20 at 18:00;  Stop 8/25/20 at 10:03;  Status DC


Vecuronium Bromide 50 mg/ Miscellaneous 50 ml @  4.027 mls/ hr CONT  PRN IV SEE 

I/O RECORD Last administered on 8/26/20at 16:57;  Start 8/24/20 at 16:15


Enoxaparin Sodium (Lovenox Per Pharmacy Treatment Dosing) 1 each PRN DAILY  PRN 

MC SEE COMMENTS;  Start 8/24/20 at 16:45


Enoxaparin Sodium (Lovenox 80mg Syringe) 80 mg Q12HR SQ  Last administered on 

8/27/20at 09:14;  Start 8/24/20 at 21:00


Furosemide (Lasix) 40 mg 1X  ONCE IVP  Last administered on 8/25/20at 10:19;  

Start 8/25/20 at 10:00;  Stop 8/25/20 at 10:01;  Status DC


Insulin Glargine (Lantus Syringe) 24 unit BID SQ  Last administered on 8/27/20at

09:20;  Start 8/25/20 at 10:30


Insulin Human Lispro (HumaLOG) 14 units Q6HRS SQ  Last administered on 8/27/20at

05:42;  Start 8/25/20 at 12:00


Info (Anti-Coagulation Monitoring By Pharmacy) 1 each PRN DAILY  PRN MC SEE 

COMMENTS Last administered on 8/26/20at 09:59;  Start 8/26/20 at 10:00


Vitals/I & O





Vital Sign - Last 24 Hours








 8/26/20 8/26/20 8/26/20 8/26/20





 11:00 11:46 12:00 12:00


 


Temp    99.9





    99.9


 


Pulse 66   66


 


Resp 22   22


 


B/P (MAP) 135/71 (92)   131/70 (90)


 


Pulse Ox 100 100  99


 


O2 Delivery Ventilator Ventilator Mechanical Ventilator Ventilator


 


    





    





 8/26/20 8/26/20 8/26/20 8/26/20





 13:00 14:00 15:00 15:29


 


Pulse 67 62 62 


 


Resp 22 22 22 


 


B/P (MAP) 128/69 (88) 129/74 (92) 125/67 (86) 


 


Pulse Ox 99 99 99 100


 


O2 Delivery Ventilator Ventilator Ventilator Ventilator





 8/26/20 8/26/20 8/26/20 8/26/20





 16:00 16:00 17:00 18:00


 


Temp  97.4  





  97.4  


 


Pulse  64 62 


 


Resp  22 22 


 


B/P (MAP)  126/68 (87) 120/62 (81) 


 


Pulse Ox  98 99 100


 


O2 Delivery Mechanical Ventilator Ventilator Ventilator 


 


O2 Flow Rate    18.0


 


    





    





 8/26/20 8/26/20 8/26/20 8/26/20





 18:00 18:30 19:00 20:00


 


Pulse 72  92 


 


Resp 22  22 


 


B/P (MAP) 149/63 (91)  167/75 (105) 


 


Pulse Ox 93 100 93 


 


O2 Delivery Ventilator  Ventilator Mechanical Ventilator


 


O2 Flow Rate  18.0  





 8/26/20 8/26/20 8/26/20 8/26/20





 20:00 20:26 21:00 22:00


 


Temp 97.6   





 97.6   


 


Pulse 102  104 94


 


Resp 22 22 22


 


B/P (MAP) 140/66 (90)  204/78 (120) 134/56 (82)


 


Pulse Ox 88 85 80 86


 


O2 Delivery Ventilator Ventilator Ventilator Ventilator


 


    





    





 8/26/20 8/26/20 8/26/20 8/27/20





 23:00 23:52 23:59 00:01


 


Temp    99.8





    99.8


 


Pulse 94   108


 


Resp 25 24


 


B/P (MAP) 266/85 (145)   186/76 (112)


 


Pulse Ox 90 88  89


 


O2 Delivery Ventilator Ventilator Mechanical Ventilator Ventilator


 


    





    





 8/27/20 8/27/20 8/27/20 8/27/20





 01:00 02:00 02:18 03:00


 


Pulse 122 115 115 107


 


Resp 22 22 23


 


B/P (MAP) 221/80 (127) 167/66 (99) 188/71 134/58 (83)


 


Pulse Ox 93 92  92


 


O2 Delivery Ventilator Ventilator  Ventilator





 8/27/20 8/27/20 8/27/20 8/27/20





 03:13 03:26 03:43 04:00


 


Resp 22 22 


 


Pulse Ox 92 92 98 


 


O2 Delivery Ventilator Ventilator  Mechanical Ventilator


 


O2 Flow Rate 18.0  18.0 





 8/27/20 8/27/20 8/27/20 8/27/20





 04:00 05:00 06:00 07:35


 


Temp 100.1   





 100.1   


 


Pulse 107 91 89 


 


Resp 22 22 22 


 


B/P (MAP) 169/73 (105) 171/70 (103) 132/64 (86) 


 


Pulse Ox 98 92 92 92


 


O2 Delivery Ventilator Ventilator Ventilator Ventilator














Intake and Output   


 


 8/26/20 8/26/20 8/27/20





 15:00 23:00 07:00


 


Intake Total 250 ml 2204 ml 1522.2 ml


 


Output Total 900 ml 775 ml 660 ml


 


Balance -650 ml 1429 ml 862.2 ml











Justicifation of Admission Dx:


Justifications for Admission:


Justification of Admission Dx:  Yes











MARGARITA LAU MD            Aug 27, 2020 10:54

## 2020-08-27 NOTE — PDOC
PULMONARY PROGRESS NOTES


DATE: 8/27/20 


TIME: 11:37


Subjective


remains intubated/sedated


AC mode ,100%FIO2/14 PEEP


febrile overnight 


nursing report hypoxia with minimal stimulation 


Full dose lovenox added 8/24 due to elevated ddimer, continues to decrease


Vitals





Vital Signs








  Date Time  Temp Pulse Resp B/P (MAP) Pulse Ox O2 Delivery O2 Flow Rate FiO2


 


8/27/20 08:00      Mechanical Ventilator  


 


8/27/20 07:35     92   


 


8/27/20 06:00  89 22 132/64 (86)    


 


8/27/20 04:00 100.1       





 100.1       


 


8/27/20 03:43       18.0 








Comments


visual exam done due to COVID-Pandemia


no resp distess


VENT 100% and PEEP 12 


no skin rash


lower extremity edema


Lungs:  Other (Intubated)


Labs





Laboratory Tests








Test


 8/25/20


16:54 8/26/20


01:00 8/26/20


06:02 8/26/20


07:45


 


Glucose (Fingerstick)


 303 mg/dL


(70-99) 288 mg/dL


(70-99) 245 mg/dL


(70-99) 





 


O2 Saturation    87 % (92-99) 


 


Arterial Blood pH


 


 


 


 7.37


(7.35-7.45)


 


Arterial Blood pH (Temp


corrected) 


 


 


 7.36 





 


Arterial Blood pCO2 at


Patient Temp 


 


 


 45 mmHg


(35-46)


 


Arterial Blood pCO2 (Temp


correct) 


 


 


 47 mmHg 





 


Arterial Blood pO2 at Patient


Temp 


 


 


 55 mmHg


()


 


Arterial Blood pO2 (Temp


corrected) 


 


 


 58 mmHg 





 


Arterial Blood HCO3


 


 


 


 26 mmol/L


(21-28)


 


Arterial Blood Base Excess


 


 


 


 0 mmol/L


(-3-3)


 


FiO2    100 


 


Test


 8/26/20


07:50 8/26/20


08:15 8/26/20


12:54 8/26/20


17:55


 


D-Dimer (Lexii)


 8.10 ug/mlFEU


(0.00-0.50) 


 


 





 


Glucose (Fingerstick)


 


 203 mg/dL


(70-99) 254 mg/dL


(70-99) 259 mg/dL


(70-99)


 


Test


 8/27/20


00:26 8/27/20


05:20 8/27/20


05:26 8/27/20


07:55


 


Glucose (Fingerstick)


 228 mg/dL


(70-99) 


 260 mg/dL


(70-99) 





 


White Blood Count


 


 15.0 x10^3/uL


(4.0-11.0) 


 





 


Red Blood Count


 


 3.39 x10^6/uL


(4.30-5.70) 


 





 


Hemoglobin


 


 10.4 g/dL


(13.0-17.5) 


 





 


Hematocrit


 


 32.1 %


(39.0-53.0) 


 





 


Mean Corpuscular Volume  95 fL ()   


 


Mean Corpuscular Hemoglobin  31 pg (25-35)   


 


Mean Corpuscular Hemoglobin


Concent 


 33 g/dL


(31-37) 


 





 


Red Cell Distribution Width


 


 14.6 %


(11.5-14.5) 


 





 


Platelet Count


 


 201 x10^3/uL


(140-400) 


 





 


Neutrophils (%) (Auto)  94 % (31-73)   


 


Lymphocytes (%) (Auto)  2 % (24-48)   


 


Monocytes (%) (Auto)  2 % (0-9)   


 


Eosinophils (%) (Auto)  1 % (0-3)   


 


Basophils (%) (Auto)  1 % (0-3)   


 


Neutrophils # (Auto)


 


 14.2 x10^3/uL


(1.8-7.7) 


 





 


Lymphocytes # (Auto)


 


 0.3 x10^3/uL


(1.0-4.8) 


 





 


Monocytes # (Auto)


 


 0.3 x10^3/uL


(0.0-1.1) 


 





 


Eosinophils # (Auto)


 


 0.1 x10^3/uL


(0.0-0.7) 


 





 


Basophils # (Auto)


 


 0.1 x10^3/uL


(0.0-0.2) 


 





 


D-Dimer (Lexii)


 


 7.66 ug/mlFEU


(0.00-0.50) 


 





 


Sodium Level


 


 153 mmol/L


(136-145) 


 





 


Potassium Level


 


 4.9 mmol/L


(3.5-5.1) 


 





 


Chloride Level


 


 116 mmol/L


() 


 





 


Carbon Dioxide Level


 


 29 mmol/L


(21-32) 


 





 


Anion Gap  8 (6-14)   


 


Blood Urea Nitrogen


 


 49 mg/dL


(8-26) 


 





 


Creatinine


 


 0.9 mg/dL


(0.7-1.3) 


 





 


Estimated GFR


(Cockcroft-Gault) 


 84.7 


 


 





 


Glucose Level


 


 272 mg/dL


(70-99) 


 





 


Calcium Level


 


 7.5 mg/dL


(8.5-10.1) 


 





 


Ferritin


 


 924 ng/mL


() 


 





 


C-Reactive Protein,


Quantitative 


 117.0 mg/L


(0-3.3) 


 





 


Triglycerides Level


 


 285 mg/dL


(0-150) 


 





 


Procalcitonin


 


 0.44 ng/mL


(0.00-0.10) 


 





 


O2 Saturation    92 % (92-99) 


 


Arterial Blood pH


 


 


 


 7.38


(7.35-7.45)


 


Arterial Blood pCO2 at


Patient Temp 


 


 


 41 mmHg


(35-46)


 


Arterial Blood pO2 at Patient


Temp 


 


 


 67 mmHg


()


 


Arterial Blood HCO3


 


 


 


 24 mmol/L


(21-28)


 


Arterial Blood Base Excess


 


 


 


 -1 mmol/L


(-3-3)


 


FiO2    100% 








Laboratory Tests








Test


 8/26/20


12:54 8/26/20


17:55 8/27/20


00:26 8/27/20


05:20


 


Glucose (Fingerstick)


 254 mg/dL


(70-99) 259 mg/dL


(70-99) 228 mg/dL


(70-99) 





 


White Blood Count


 


 


 


 15.0 x10^3/uL


(4.0-11.0)


 


Red Blood Count


 


 


 


 3.39 x10^6/uL


(4.30-5.70)


 


Hemoglobin


 


 


 


 10.4 g/dL


(13.0-17.5)


 


Hematocrit


 


 


 


 32.1 %


(39.0-53.0)


 


Mean Corpuscular Volume    95 fL () 


 


Mean Corpuscular Hemoglobin    31 pg (25-35) 


 


Mean Corpuscular Hemoglobin


Concent 


 


 


 33 g/dL


(31-37)


 


Red Cell Distribution Width


 


 


 


 14.6 %


(11.5-14.5)


 


Platelet Count


 


 


 


 201 x10^3/uL


(140-400)


 


Neutrophils (%) (Auto)    94 % (31-73) 


 


Lymphocytes (%) (Auto)    2 % (24-48) 


 


Monocytes (%) (Auto)    2 % (0-9) 


 


Eosinophils (%) (Auto)    1 % (0-3) 


 


Basophils (%) (Auto)    1 % (0-3) 


 


Neutrophils # (Auto)


 


 


 


 14.2 x10^3/uL


(1.8-7.7)


 


Lymphocytes # (Auto)


 


 


 


 0.3 x10^3/uL


(1.0-4.8)


 


Monocytes # (Auto)


 


 


 


 0.3 x10^3/uL


(0.0-1.1)


 


Eosinophils # (Auto)


 


 


 


 0.1 x10^3/uL


(0.0-0.7)


 


Basophils # (Auto)


 


 


 


 0.1 x10^3/uL


(0.0-0.2)


 


D-Dimer (Lexii)


 


 


 


 7.66 ug/mlFEU


(0.00-0.50)


 


Sodium Level


 


 


 


 153 mmol/L


(136-145)


 


Potassium Level


 


 


 


 4.9 mmol/L


(3.5-5.1)


 


Chloride Level


 


 


 


 116 mmol/L


()


 


Carbon Dioxide Level


 


 


 


 29 mmol/L


(21-32)


 


Anion Gap    8 (6-14) 


 


Blood Urea Nitrogen


 


 


 


 49 mg/dL


(8-26)


 


Creatinine


 


 


 


 0.9 mg/dL


(0.7-1.3)


 


Estimated GFR


(Cockcroft-Gault) 


 


 


 84.7 





 


Glucose Level


 


 


 


 272 mg/dL


(70-99)


 


Calcium Level


 


 


 


 7.5 mg/dL


(8.5-10.1)


 


Ferritin


 


 


 


 924 ng/mL


()


 


C-Reactive Protein,


Quantitative 


 


 


 117.0 mg/L


(0-3.3)


 


Triglycerides Level


 


 


 


 285 mg/dL


(0-150)


 


Procalcitonin


 


 


 


 0.44 ng/mL


(0.00-0.10)


 


Test


 8/27/20


05:26 8/27/20


07:55 


 





 


Glucose (Fingerstick)


 260 mg/dL


(70-99) 


 


 





 


O2 Saturation  92 % (92-99)   


 


Arterial Blood pH


 


 7.38


(7.35-7.45) 


 





 


Arterial Blood pCO2 at


Patient Temp 


 41 mmHg


(35-46) 


 





 


Arterial Blood pO2 at Patient


Temp 


 67 mmHg


() 


 





 


Arterial Blood HCO3


 


 24 mmol/L


(21-28) 


 





 


Arterial Blood Base Excess


 


 -1 mmol/L


(-3-3) 


 





 


FiO2  100%   








Comments


 CXR 8/21 


IMPRESSION: 


1. Bilateral diffuse infiltrates are increased in the left base and mildly


improved elsewhere.





Impression


.


1.  Acute hypoxic respiratory failure secondary to COVID-19 pneumonia/ARDS/acute

lung injury--worsening oxygen requirements 


2.  Abnormal chest x-ray with bilateral infiltrates consistent with COVID-19 

pneumonia.


3.  Hypotension secondary to combination of hypovolemia and sepsis status post 2

liters of IV fluids. no pressors, now hypertensive


4.  Acute kidney injury, improving.


5.  Severe protein-calorie malnutrition.


6.  Abnormal D-dimer related to COVID-19 pneumonia. Full dose lovenox added 8/24

due to increase in D-dimer to 20, now decreasing





Plan


.


RECOMMENDATIONS:


Continue present assist control mode, PEEP of 14 and FiO2 of 100% ABG reviewed, 

ensure adequate sedation, ARDS, no improvement clinically


Follow chest x-rays as needed.


Broad spectrum antibiotic.


Cont.  IV steroids 


Abnormal D-dimer related to COVID-19 pneumonia.  Full dose lovenox added 8/24 

due to increase in D-dimer to 20. dopplers neg for DVT, D-dimer continues to 

decrease 


Stress ulcer prophylaxis.


s/p Plasma


cont. TF for nutritional support 








D/W RN and RT 


Will reach out to family to discuss code status 


Prognosis poor 


Critical care time 30 minutes.


 addend: d/w brother. Explained critical illness.and prognosis. He agrees with 

DNR and continuing aggressive care











ANGELA HUFFMAN MD                 Aug 27, 2020 11:41

## 2020-08-27 NOTE — NUR
SS following up with discharge planning. SS reviewed pt chart and discussed with pt RN. 
COVID19 positive. Pt remains on the vent at this time. Self pay. Pt on IV Zosyn. SS will 
continue to follow for discharge planning.

## 2020-08-28 VITALS — SYSTOLIC BLOOD PRESSURE: 131 MMHG | DIASTOLIC BLOOD PRESSURE: 57 MMHG

## 2020-08-28 VITALS — DIASTOLIC BLOOD PRESSURE: 70 MMHG | SYSTOLIC BLOOD PRESSURE: 185 MMHG

## 2020-08-28 VITALS — DIASTOLIC BLOOD PRESSURE: 66 MMHG | SYSTOLIC BLOOD PRESSURE: 175 MMHG

## 2020-08-28 VITALS — SYSTOLIC BLOOD PRESSURE: 141 MMHG | DIASTOLIC BLOOD PRESSURE: 61 MMHG

## 2020-08-28 VITALS — SYSTOLIC BLOOD PRESSURE: 134 MMHG | DIASTOLIC BLOOD PRESSURE: 59 MMHG

## 2020-08-28 VITALS — SYSTOLIC BLOOD PRESSURE: 139 MMHG | DIASTOLIC BLOOD PRESSURE: 61 MMHG

## 2020-08-28 VITALS — DIASTOLIC BLOOD PRESSURE: 68 MMHG | SYSTOLIC BLOOD PRESSURE: 183 MMHG

## 2020-08-28 VITALS — DIASTOLIC BLOOD PRESSURE: 56 MMHG | SYSTOLIC BLOOD PRESSURE: 120 MMHG

## 2020-08-28 VITALS — DIASTOLIC BLOOD PRESSURE: 60 MMHG | SYSTOLIC BLOOD PRESSURE: 145 MMHG

## 2020-08-28 VITALS — DIASTOLIC BLOOD PRESSURE: 61 MMHG | SYSTOLIC BLOOD PRESSURE: 138 MMHG

## 2020-08-28 VITALS — SYSTOLIC BLOOD PRESSURE: 151 MMHG | DIASTOLIC BLOOD PRESSURE: 65 MMHG

## 2020-08-28 VITALS — SYSTOLIC BLOOD PRESSURE: 120 MMHG | DIASTOLIC BLOOD PRESSURE: 58 MMHG

## 2020-08-28 VITALS — SYSTOLIC BLOOD PRESSURE: 150 MMHG | DIASTOLIC BLOOD PRESSURE: 56 MMHG

## 2020-08-28 VITALS — SYSTOLIC BLOOD PRESSURE: 150 MMHG | DIASTOLIC BLOOD PRESSURE: 62 MMHG

## 2020-08-28 VITALS — SYSTOLIC BLOOD PRESSURE: 185 MMHG | DIASTOLIC BLOOD PRESSURE: 70 MMHG

## 2020-08-28 VITALS — DIASTOLIC BLOOD PRESSURE: 67 MMHG | SYSTOLIC BLOOD PRESSURE: 185 MMHG

## 2020-08-28 VITALS — SYSTOLIC BLOOD PRESSURE: 147 MMHG | DIASTOLIC BLOOD PRESSURE: 63 MMHG

## 2020-08-28 VITALS — SYSTOLIC BLOOD PRESSURE: 103 MMHG | DIASTOLIC BLOOD PRESSURE: 53 MMHG

## 2020-08-28 VITALS — SYSTOLIC BLOOD PRESSURE: 168 MMHG | DIASTOLIC BLOOD PRESSURE: 68 MMHG

## 2020-08-28 VITALS — DIASTOLIC BLOOD PRESSURE: 59 MMHG | SYSTOLIC BLOOD PRESSURE: 137 MMHG

## 2020-08-28 VITALS — DIASTOLIC BLOOD PRESSURE: 50 MMHG | SYSTOLIC BLOOD PRESSURE: 104 MMHG

## 2020-08-28 VITALS — SYSTOLIC BLOOD PRESSURE: 121 MMHG | DIASTOLIC BLOOD PRESSURE: 53 MMHG

## 2020-08-28 VITALS — SYSTOLIC BLOOD PRESSURE: 145 MMHG | DIASTOLIC BLOOD PRESSURE: 61 MMHG

## 2020-08-28 VITALS — SYSTOLIC BLOOD PRESSURE: 92 MMHG | DIASTOLIC BLOOD PRESSURE: 46 MMHG

## 2020-08-28 LAB
% LYMPHS: 3 % (ref 24–48)
% MONOS: 4 % (ref 0–10)
% SEGS: 92 % (ref 35–66)
ANION GAP SERPL CALC-SCNC: 8 MMOL/L (ref 6–14)
ANISOCYTOSIS BLD QL SMEAR: SLIGHT
BASE EXCESS ABG: -1 MMOL/L (ref -3–3)
BASOPHILS # BLD AUTO: 0.1 X10^3/UL (ref 0–0.2)
BASOPHILS NFR BLD: 1 % (ref 0–3)
BUN SERPL-MCNC: 49 MG/DL (ref 8–26)
CALCIUM SERPL-MCNC: 7.6 MG/DL (ref 8.5–10.1)
CHLORIDE SERPL-SCNC: 119 MMOL/L (ref 98–107)
CO2 SERPL-SCNC: 27 MMOL/L (ref 21–32)
CREAT SERPL-MCNC: 0.9 MG/DL (ref 0.7–1.3)
EOSINOPHIL NFR BLD AUTO: 1 % (ref 0–5)
EOSINOPHIL NFR BLD: 0.1 X10^3/UL (ref 0–0.7)
EOSINOPHIL NFR BLD: 1 % (ref 0–3)
ERYTHROCYTE [DISTWIDTH] IN BLOOD BY AUTOMATED COUNT: 14.1 % (ref 11.5–14.5)
GFR SERPLBLD BASED ON 1.73 SQ M-ARVRAT: 84.7 ML/MIN
GLUCOSE SERPL-MCNC: 221 MG/DL (ref 70–99)
HCO3 BLDA-SCNC: 25 MMOL/L (ref 21–28)
HCT VFR BLD CALC: 29 % (ref 39–53)
HGB BLD-MCNC: 9.3 G/DL (ref 13–17.5)
INSPIRATION SETTING TIME VENT: 100
LYMPHOCYTES # BLD: 0.4 X10^3/UL (ref 1–4.8)
LYMPHOCYTES NFR BLD AUTO: 4 % (ref 24–48)
MCH RBC QN AUTO: 31 PG (ref 25–35)
MCHC RBC AUTO-ENTMCNC: 32 G/DL (ref 31–37)
MCV RBC AUTO: 95 FL (ref 79–100)
MONO #: 0.2 X10^3/UL (ref 0–1.1)
MONOCYTES NFR BLD: 2 % (ref 0–9)
NEUT #: 10.8 X10^3/UL (ref 1.8–7.7)
NEUTROPHILS NFR BLD AUTO: 93 % (ref 31–73)
PCO2 BLDA: 49 MMHG (ref 35–46)
PLATELET # BLD AUTO: 189 X10^3/UL (ref 140–400)
PLATELET # BLD EST: ADEQUATE 10*3/UL
PO2 BLDA: 51 MMHG (ref 65–108)
POTASSIUM SERPL-SCNC: 4.7 MMOL/L (ref 3.5–5.1)
RBC # BLD AUTO: 3.06 X10^6/UL (ref 4.3–5.7)
SAO2 % BLDA: 80 % (ref 92–99)
SODIUM SERPL-SCNC: 154 MMOL/L (ref 136–145)
WBC # BLD AUTO: 11.7 X10^3/UL (ref 4–11)

## 2020-08-28 RX ADMIN — MIDAZOLAM PRN MLS/HR: 5 INJECTION, SOLUTION INTRAMUSCULAR; INTRAVENOUS at 21:47

## 2020-08-28 RX ADMIN — Medication PRN MLS/HR: at 19:25

## 2020-08-28 RX ADMIN — DEXMEDETOMIDINE HYDROCHLORIDE PRN MLS/HR: 100 INJECTION, SOLUTION, CONCENTRATE INTRAVENOUS at 00:55

## 2020-08-28 RX ADMIN — INSULIN GLARGINE SCH UNIT: 100 INJECTION, SOLUTION SUBCUTANEOUS at 20:42

## 2020-08-28 RX ADMIN — VECURONIUM BROMIDE PRN MLS/HR: 1 INJECTION, POWDER, LYOPHILIZED, FOR SOLUTION INTRAVENOUS at 11:57

## 2020-08-28 RX ADMIN — ENOXAPARIN SODIUM SCH MG: 80 INJECTION SUBCUTANEOUS at 07:44

## 2020-08-28 RX ADMIN — PANTOPRAZOLE SODIUM SCH MG: 40 INJECTION, POWDER, FOR SOLUTION INTRAVENOUS at 07:44

## 2020-08-28 RX ADMIN — INSULIN GLARGINE SCH UNIT: 100 INJECTION, SOLUTION SUBCUTANEOUS at 10:08

## 2020-08-28 RX ADMIN — PROPOFOL PRN MLS/HR: 10 INJECTION, EMULSION INTRAVENOUS at 17:27

## 2020-08-28 RX ADMIN — ENOXAPARIN SODIUM SCH MG: 80 INJECTION SUBCUTANEOUS at 20:41

## 2020-08-28 RX ADMIN — ZINC SULFATE CAP 220 MG (50 MG ELEMENTAL ZN) SCH MG: 220 (50 ZN) CAP at 07:44

## 2020-08-28 RX ADMIN — OXYCODONE HYDROCHLORIDE AND ACETAMINOPHEN SCH MG: 500 TABLET ORAL at 05:51

## 2020-08-28 RX ADMIN — METHYLPREDNISOLONE SODIUM SUCCINATE SCH MG: 40 INJECTION, POWDER, FOR SOLUTION INTRAMUSCULAR; INTRAVENOUS at 05:51

## 2020-08-28 RX ADMIN — VECURONIUM BROMIDE PRN MLS/HR: 1 INJECTION, POWDER, LYOPHILIZED, FOR SOLUTION INTRAVENOUS at 05:55

## 2020-08-28 RX ADMIN — DEXMEDETOMIDINE HYDROCHLORIDE PRN MLS/HR: 100 INJECTION, SOLUTION, CONCENTRATE INTRAVENOUS at 05:55

## 2020-08-28 RX ADMIN — INSULIN LISPRO SCH UNITS: 100 INJECTION, SOLUTION INTRAVENOUS; SUBCUTANEOUS at 17:26

## 2020-08-28 RX ADMIN — HYDRALAZINE HYDROCHLORIDE PRN MG: 20 INJECTION INTRAMUSCULAR; INTRAVENOUS at 18:23

## 2020-08-28 RX ADMIN — INSULIN LISPRO SCH UNITS: 100 INJECTION, SOLUTION INTRAVENOUS; SUBCUTANEOUS at 11:51

## 2020-08-28 RX ADMIN — PIPERACILLIN SODIUM AND TAZOBACTAM SODIUM SCH MLS/HR: 3; .375 INJECTION, POWDER, LYOPHILIZED, FOR SOLUTION INTRAVENOUS at 17:05

## 2020-08-28 RX ADMIN — DEXMEDETOMIDINE HYDROCHLORIDE PRN MLS/HR: 100 INJECTION, SOLUTION, CONCENTRATE INTRAVENOUS at 15:12

## 2020-08-28 RX ADMIN — MIDAZOLAM PRN MLS/HR: 5 INJECTION, SOLUTION INTRAMUSCULAR; INTRAVENOUS at 00:56

## 2020-08-28 RX ADMIN — POLYETHYLENE GLYCOL 3350 SCH GM: 17 POWDER, FOR SOLUTION ORAL at 11:49

## 2020-08-28 RX ADMIN — Medication PRN MLS/HR: at 05:54

## 2020-08-28 RX ADMIN — INSULIN LISPRO SCH UNITS: 100 INJECTION, SOLUTION INTRAVENOUS; SUBCUTANEOUS at 12:11

## 2020-08-28 RX ADMIN — METHYLPREDNISOLONE SODIUM SUCCINATE SCH MG: 40 INJECTION, POWDER, FOR SOLUTION INTRAMUSCULAR; INTRAVENOUS at 14:44

## 2020-08-28 RX ADMIN — PIPERACILLIN SODIUM AND TAZOBACTAM SODIUM SCH MLS/HR: 3; .375 INJECTION, POWDER, LYOPHILIZED, FOR SOLUTION INTRAVENOUS at 05:51

## 2020-08-28 RX ADMIN — PROPOFOL PRN MLS/HR: 10 INJECTION, EMULSION INTRAVENOUS at 04:07

## 2020-08-28 RX ADMIN — PROPOFOL PRN MLS/HR: 10 INJECTION, EMULSION INTRAVENOUS at 20:41

## 2020-08-28 RX ADMIN — PROPOFOL PRN MLS/HR: 10 INJECTION, EMULSION INTRAVENOUS at 11:50

## 2020-08-28 RX ADMIN — OXYCODONE HYDROCHLORIDE AND ACETAMINOPHEN SCH MG: 500 TABLET ORAL at 11:54

## 2020-08-28 RX ADMIN — PROPOFOL PRN MLS/HR: 10 INJECTION, EMULSION INTRAVENOUS at 07:45

## 2020-08-28 RX ADMIN — OXYCODONE HYDROCHLORIDE AND ACETAMINOPHEN SCH MG: 500 TABLET ORAL at 17:05

## 2020-08-28 RX ADMIN — INSULIN LISPRO SCH UNITS: 100 INJECTION, SOLUTION INTRAVENOUS; SUBCUTANEOUS at 00:57

## 2020-08-28 RX ADMIN — MIDAZOLAM PRN MLS/HR: 5 INJECTION, SOLUTION INTRAMUSCULAR; INTRAVENOUS at 11:56

## 2020-08-28 RX ADMIN — INSULIN LISPRO SCH UNITS: 100 INJECTION, SOLUTION INTRAVENOUS; SUBCUTANEOUS at 05:52

## 2020-08-28 RX ADMIN — PIPERACILLIN SODIUM AND TAZOBACTAM SODIUM SCH MLS/HR: 3; .375 INJECTION, POWDER, LYOPHILIZED, FOR SOLUTION INTRAVENOUS at 11:50

## 2020-08-28 RX ADMIN — METHYLPREDNISOLONE SODIUM SUCCINATE SCH MG: 40 INJECTION, POWDER, FOR SOLUTION INTRAMUSCULAR; INTRAVENOUS at 20:41

## 2020-08-28 RX ADMIN — ACETAMINOPHEN PRN MG: 650 SOLUTION ORAL at 05:51

## 2020-08-28 NOTE — NUR
Dr Amin notified of patients decreasing O2 sats. Current ventilator settings are 
22/600/14/100. No new orders continue plan of care.

## 2020-08-28 NOTE — NUR
SS following up with discharge planning. SS reviewed pt chart and discussed with pt RN. Pt 
remains on the vent at this time. Pt DNR now. Pt on IV Zosyn. COVID19 positive. SS will 
continue to follow for discharge planning.

## 2020-08-28 NOTE — PDOC
PULMONARY PROGRESS NOTES


DATE: 8/28/20 


TIME: 08:50


Subjective


remains intubated/sedated


AC mode ,100%FIO2/14 PEEP, low P02 on ABG 


nursing report hypoxia with minimal stimulation


Vitals





Vital Signs








  Date Time  Temp Pulse Resp B/P (MAP) Pulse Ox O2 Delivery O2 Flow Rate FiO2


 


8/28/20 07:22     85 Ventilator  


 


8/28/20 06:24   22    18.0 


 


8/28/20 06:00  82  150/56 (87)    


 


8/28/20 04:00 100.2       





 100.2       








Comments


visual exam done due to COVID-Pandemia


no resp distess


VENT 100% and PEEP 12 


no skin rash


lower extremity edema


Lungs:  Other (Intubated)


Labs





Laboratory Tests








Test


 8/26/20


12:54 8/26/20


17:55 8/27/20


00:26 8/27/20


05:20


 


Glucose (Fingerstick)


 254 mg/dL


(70-99) 259 mg/dL


(70-99) 228 mg/dL


(70-99) 





 


White Blood Count


 


 


 


 15.0 x10^3/uL


(4.0-11.0)


 


Red Blood Count


 


 


 


 3.39 x10^6/uL


(4.30-5.70)


 


Hemoglobin


 


 


 


 10.4 g/dL


(13.0-17.5)


 


Hematocrit


 


 


 


 32.1 %


(39.0-53.0)


 


Mean Corpuscular Volume    95 fL () 


 


Mean Corpuscular Hemoglobin    31 pg (25-35) 


 


Mean Corpuscular Hemoglobin


Concent 


 


 


 33 g/dL


(31-37)


 


Red Cell Distribution Width


 


 


 


 14.6 %


(11.5-14.5)


 


Platelet Count


 


 


 


 201 x10^3/uL


(140-400)


 


Neutrophils (%) (Auto)    94 % (31-73) 


 


Lymphocytes (%) (Auto)    2 % (24-48) 


 


Monocytes (%) (Auto)    2 % (0-9) 


 


Eosinophils (%) (Auto)    1 % (0-3) 


 


Basophils (%) (Auto)    1 % (0-3) 


 


Neutrophils # (Auto)


 


 


 


 14.2 x10^3/uL


(1.8-7.7)


 


Lymphocytes # (Auto)


 


 


 


 0.3 x10^3/uL


(1.0-4.8)


 


Monocytes # (Auto)


 


 


 


 0.3 x10^3/uL


(0.0-1.1)


 


Eosinophils # (Auto)


 


 


 


 0.1 x10^3/uL


(0.0-0.7)


 


Basophils # (Auto)


 


 


 


 0.1 x10^3/uL


(0.0-0.2)


 


D-Dimer (Lexii)


 


 


 


 7.66 ug/mlFEU


(0.00-0.50)


 


Sodium Level


 


 


 


 153 mmol/L


(136-145)


 


Potassium Level


 


 


 


 4.9 mmol/L


(3.5-5.1)


 


Chloride Level


 


 


 


 116 mmol/L


()


 


Carbon Dioxide Level


 


 


 


 29 mmol/L


(21-32)


 


Anion Gap    8 (6-14) 


 


Blood Urea Nitrogen


 


 


 


 49 mg/dL


(8-26)


 


Creatinine


 


 


 


 0.9 mg/dL


(0.7-1.3)


 


Estimated GFR


(Cockcroft-Gault) 


 


 


 84.7 





 


Glucose Level


 


 


 


 272 mg/dL


(70-99)


 


Calcium Level


 


 


 


 7.5 mg/dL


(8.5-10.1)


 


Ferritin


 


 


 


 924 ng/mL


()


 


C-Reactive Protein,


Quantitative 


 


 


 117.0 mg/L


(0-3.3)


 


Triglycerides Level


 


 


 


 285 mg/dL


(0-150)


 


Procalcitonin


 


 


 


 0.44 ng/mL


(0.00-0.10)


 


Test


 8/27/20


05:26 8/27/20


07:55 8/27/20


12:04 8/27/20


17:41


 


Glucose (Fingerstick)


 260 mg/dL


(70-99) 


 292 mg/dL


(70-99) 252 mg/dL


(70-99)


 


O2 Saturation  92 % (92-99)   


 


Arterial Blood pH


 


 7.38


(7.35-7.45) 


 





 


Arterial Blood pCO2 at


Patient Temp 


 41 mmHg


(35-46) 


 





 


Arterial Blood pO2 at Patient


Temp 


 67 mmHg


() 


 





 


Arterial Blood HCO3


 


 24 mmol/L


(21-28) 


 





 


Arterial Blood Base Excess


 


 -1 mmol/L


(-3-3) 


 





 


FiO2  100%   


 


Test


 8/27/20


23:57 8/28/20


05:30 8/28/20


05:35 8/28/20


07:30


 


Glucose (Fingerstick)


 243 mg/dL


(70-99) 


 209 mg/dL


(70-99) 





 


White Blood Count


 


 11.7 x10^3/uL


(4.0-11.0) 


 





 


Red Blood Count


 


 3.06 x10^6/uL


(4.30-5.70) 


 





 


Hemoglobin


 


 9.3 g/dL


(13.0-17.5) 


 





 


Hematocrit


 


 29.0 %


(39.0-53.0) 


 





 


Mean Corpuscular Volume  95 fL ()   


 


Mean Corpuscular Hemoglobin  31 pg (25-35)   


 


Mean Corpuscular Hemoglobin


Concent 


 32 g/dL


(31-37) 


 





 


Red Cell Distribution Width


 


 14.1 %


(11.5-14.5) 


 





 


Platelet Count


 


 189 x10^3/uL


(140-400) 


 





 


Neutrophils (%) (Auto)  93 % (31-73)   


 


Lymphocytes (%) (Auto)  4 % (24-48)   


 


Monocytes (%) (Auto)  2 % (0-9)   


 


Eosinophils (%) (Auto)  1 % (0-3)   


 


Basophils (%) (Auto)  1 % (0-3)   


 


Neutrophils # (Auto)


 


 10.8 x10^3/uL


(1.8-7.7) 


 





 


Lymphocytes # (Auto)


 


 0.4 x10^3/uL


(1.0-4.8) 


 





 


Monocytes # (Auto)


 


 0.2 x10^3/uL


(0.0-1.1) 


 





 


Eosinophils # (Auto)


 


 0.1 x10^3/uL


(0.0-0.7) 


 





 


Basophils # (Auto)


 


 0.1 x10^3/uL


(0.0-0.2) 


 





 


Segmented Neutrophils %  92 % (35-66)   


 


Lymphocytes %  3 % (24-48)   


 


Monocytes %  4 % (0-10)   


 


Eosinophils %  1 % (0-5)   


 


Toxic Granulation     


 


Platelet Estimate


 


 Adequate


(ADEQUATE) 


 





 


Anisocytosis  Slight   


 


Sodium Level


 


 154 mmol/L


(136-145) 


 





 


Potassium Level


 


 4.7 mmol/L


(3.5-5.1) 


 





 


Chloride Level


 


 119 mmol/L


() 


 





 


Carbon Dioxide Level


 


 27 mmol/L


(21-32) 


 





 


Anion Gap  8 (6-14)   


 


Blood Urea Nitrogen


 


 49 mg/dL


(8-26) 


 





 


Creatinine


 


 0.9 mg/dL


(0.7-1.3) 


 





 


Estimated GFR


(Cockcroft-Gault) 


 84.7 


 


 





 


Glucose Level


 


 221 mg/dL


(70-99) 


 





 


Calcium Level


 


 7.6 mg/dL


(8.5-10.1) 


 





 


O2 Saturation    80 % (92-99) 


 


Arterial Blood pH


 


 


 


 7.33


(7.35-7.45)


 


Arterial Blood pCO2 at


Patient Temp 


 


 


 49 mmHg


(35-46)


 


Arterial Blood pO2 at Patient


Temp 


 


 


 51 mmHg


()


 


Arterial Blood HCO3


 


 


 


 25 mmol/L


(21-28)


 


Arterial Blood Base Excess


 


 


 


 -1 mmol/L


(-3-3)


 


FiO2    100 








Laboratory Tests








Test


 8/27/20


12:04 8/27/20


17:41 8/27/20


23:57 8/28/20


05:30


 


Glucose (Fingerstick)


 292 mg/dL


(70-99) 252 mg/dL


(70-99) 243 mg/dL


(70-99) 





 


White Blood Count


 


 


 


 11.7 x10^3/uL


(4.0-11.0)


 


Red Blood Count


 


 


 


 3.06 x10^6/uL


(4.30-5.70)


 


Hemoglobin


 


 


 


 9.3 g/dL


(13.0-17.5)


 


Hematocrit


 


 


 


 29.0 %


(39.0-53.0)


 


Mean Corpuscular Volume    95 fL () 


 


Mean Corpuscular Hemoglobin    31 pg (25-35) 


 


Mean Corpuscular Hemoglobin


Concent 


 


 


 32 g/dL


(31-37)


 


Red Cell Distribution Width


 


 


 


 14.1 %


(11.5-14.5)


 


Platelet Count


 


 


 


 189 x10^3/uL


(140-400)


 


Neutrophils (%) (Auto)    93 % (31-73) 


 


Lymphocytes (%) (Auto)    4 % (24-48) 


 


Monocytes (%) (Auto)    2 % (0-9) 


 


Eosinophils (%) (Auto)    1 % (0-3) 


 


Basophils (%) (Auto)    1 % (0-3) 


 


Neutrophils # (Auto)


 


 


 


 10.8 x10^3/uL


(1.8-7.7)


 


Lymphocytes # (Auto)


 


 


 


 0.4 x10^3/uL


(1.0-4.8)


 


Monocytes # (Auto)


 


 


 


 0.2 x10^3/uL


(0.0-1.1)


 


Eosinophils # (Auto)


 


 


 


 0.1 x10^3/uL


(0.0-0.7)


 


Basophils # (Auto)


 


 


 


 0.1 x10^3/uL


(0.0-0.2)


 


Segmented Neutrophils %    92 % (35-66) 


 


Lymphocytes %    3 % (24-48) 


 


Monocytes %    4 % (0-10) 


 


Eosinophils %    1 % (0-5) 


 


Toxic Granulation     


 


Platelet Estimate


 


 


 


 Adequate


(ADEQUATE)


 


Anisocytosis    Slight 


 


Sodium Level


 


 


 


 154 mmol/L


(136-145)


 


Potassium Level


 


 


 


 4.7 mmol/L


(3.5-5.1)


 


Chloride Level


 


 


 


 119 mmol/L


()


 


Carbon Dioxide Level


 


 


 


 27 mmol/L


(21-32)


 


Anion Gap    8 (6-14) 


 


Blood Urea Nitrogen


 


 


 


 49 mg/dL


(8-26)


 


Creatinine


 


 


 


 0.9 mg/dL


(0.7-1.3)


 


Estimated GFR


(Cockcroft-Gault) 


 


 


 84.7 





 


Glucose Level


 


 


 


 221 mg/dL


(70-99)


 


Calcium Level


 


 


 


 7.6 mg/dL


(8.5-10.1)


 


Test


 8/28/20


05:35 8/28/20


07:30 


 





 


Glucose (Fingerstick)


 209 mg/dL


(70-99) 


 


 





 


O2 Saturation  80 % (92-99)   


 


Arterial Blood pH


 


 7.33


(7.35-7.45) 


 





 


Arterial Blood pCO2 at


Patient Temp 


 49 mmHg


(35-46) 


 





 


Arterial Blood pO2 at Patient


Temp 


 51 mmHg


() 


 





 


Arterial Blood HCO3


 


 25 mmol/L


(21-28) 


 





 


Arterial Blood Base Excess


 


 -1 mmol/L


(-3-3) 


 





 


FiO2  100   








Comments


 CXR 8/21 


IMPRESSION: 


1. Bilateral diffuse infiltrates are increased in the left base and mildly


improved elsewhere.





CXR


8/28/20


Impression: Slight interval increase in the bilateral perihilar 


infiltrates.





Impression


.


1.  Acute hypoxic respiratory failure secondary to COVID-19 pneumonia/ARDS/acute

lung injury--worsening oxygen requirements 


2.  Abnormal chest x-ray with bilateral infiltrates consistent with COVID-19 

pneumonia.


3.  Hypotension secondary to combination of hypovolemia and sepsis status post 2

liters of IV fluids. no pressors, now hypertensive


4.  Acute kidney injury--resolved


5.  Severe protein-calorie malnutrition.


6.  Abnormal D-dimer related to COVID-19 pneumonia. Full dose lovenox added 8/24

due to increase in D-dimer to 20, now decreasing





Plan


.


RECOMMENDATIONS:


Continue present assist control mode, PEEP of 14 and FiO2 of 100% ABG reviewed, 

ensure adequate sedation, ARDS, no improvement clinically


Follow chest x-rays as needed.


Broad spectrum antibiotic.


Cont.  IV steroids, will begin taper, full ten day course id over 8/30/2020


Abnormal D-dimer related to COVID-19 pneumonia.  Full dose lovenox added 8/24 

due to increase in D-dimer to 20. dopplers neg for DVT, D-dimer continues to 

decrease 


Stress ulcer prophylaxis.


s/p Plasma


cont. TF for nutritional support 


Pt doing poorly not expected to survive





D/W RN and RT and discussed with wife via cryacom -- agrees with DNR 

and updated of clinical prognosis


PT. is DNR 


Prognosis poor 


Critical care time 30 minutes.











SG BENJAMIN MD              Aug 28, 2020 08:50

## 2020-08-28 NOTE — PDOC
PROGRESS NOTES


Date of Service:


DATE: 8/28/20 


TIME: 10:47





Chief Complaint


Chief Complaint


Assessment/Plan


Acute hypoxemic respiratory failure


Leukocytosis with bandemia secondary to septic process


Hyponatremia secondary to low effective circulatory volume


Acue renal failure due to vasomotor nephropathy most likely 


Hyperglycemia


mild transaminitis 


Uncontrolled hypertension





History of Present Illness


Patient is a 55-year-old gentleman who was diagnosed with COVID-19 infection on 

Monday 5 days prior to his admission to the intensive care unit.  Apparently the

patient reported worsening respiratory distress reason why he came to the 

emergency department for evaluation and treatment.  The story was quite limited 

since the patient was speaking [place] only and his acuity was such that he 

required mechanical ventilation and prompt intubation by the emergency de

partment physician.  At the present time patient is intubated and sedated 

continues to require a lot of support no pertinent past medical history was 

reported he is admitted to the intensive care unit for further treatment





8/24/2020


Continues to require full support, glycemia has been noted in hypertension as 

well.  Discussed with nursing staff, all of concerns were addressed to the best 

of my abilities no acute events reported overnight





8/25/2020


No acute events reported overnight, case discussed with nursing staff patient in

no acute distress  





8/26/2020


Per nursing staff, patient febrile overnight, treated with Tylenol and tepid 

water bath. Case discussed with nursing staff, continue with IV antibiotics and 

full VTE prophylaxis.





8/27/2020


Patient still febrile overnight.  O2 requirement increasing.  Discussed with RN.





8/28/2020


Patient still intubated and sedated.  Discussed with RN, patient was made DNR.  

Nursing concerned about lack of bowel movements and abdominal distention.  Will 

schedule MiraLAX.





Vitals


Vitals





Vital Signs








  Date Time  Temp Pulse Resp B/P (MAP) Pulse Ox O2 Delivery O2 Flow Rate FiO2


 


8/28/20 10:00  72 22 141/61 (87) 95 Ventilator  


 


8/28/20 08:00 98.7       





 98.7       


 


8/28/20 06:24       18.0 











Physical Exam


General:  No acute distress, Other (Intubated and sedated)


Heart:  Regular rate


Lungs:  Other (Intubated)


Abdomen:  Other (Nondistended)


Extremities:  No clubbing, No cyanosis


Skin:  No rashes, No breakdown





Labs


LABS





Laboratory Tests








Test


 8/27/20


12:04 8/27/20


17:41 8/27/20


23:57 8/28/20


05:30


 


Glucose (Fingerstick)


 292 mg/dL


(70-99) 252 mg/dL


(70-99) 243 mg/dL


(70-99) 





 


White Blood Count


 


 


 


 11.7 x10^3/uL


(4.0-11.0)


 


Red Blood Count


 


 


 


 3.06 x10^6/uL


(4.30-5.70)


 


Hemoglobin


 


 


 


 9.3 g/dL


(13.0-17.5)


 


Hematocrit


 


 


 


 29.0 %


(39.0-53.0)


 


Mean Corpuscular Volume    95 fL () 


 


Mean Corpuscular Hemoglobin    31 pg (25-35) 


 


Mean Corpuscular Hemoglobin


Concent 


 


 


 32 g/dL


(31-37)


 


Red Cell Distribution Width


 


 


 


 14.1 %


(11.5-14.5)


 


Platelet Count


 


 


 


 189 x10^3/uL


(140-400)


 


Neutrophils (%) (Auto)    93 % (31-73) 


 


Lymphocytes (%) (Auto)    4 % (24-48) 


 


Monocytes (%) (Auto)    2 % (0-9) 


 


Eosinophils (%) (Auto)    1 % (0-3) 


 


Basophils (%) (Auto)    1 % (0-3) 


 


Neutrophils # (Auto)


 


 


 


 10.8 x10^3/uL


(1.8-7.7)


 


Lymphocytes # (Auto)


 


 


 


 0.4 x10^3/uL


(1.0-4.8)


 


Monocytes # (Auto)


 


 


 


 0.2 x10^3/uL


(0.0-1.1)


 


Eosinophils # (Auto)


 


 


 


 0.1 x10^3/uL


(0.0-0.7)


 


Basophils # (Auto)


 


 


 


 0.1 x10^3/uL


(0.0-0.2)


 


Segmented Neutrophils %    92 % (35-66) 


 


Lymphocytes %    3 % (24-48) 


 


Monocytes %    4 % (0-10) 


 


Eosinophils %    1 % (0-5) 


 


Toxic Granulation     


 


Platelet Estimate


 


 


 


 Adequate


(ADEQUATE)


 


Anisocytosis    Slight 


 


Sodium Level


 


 


 


 154 mmol/L


(136-145)


 


Potassium Level


 


 


 


 4.7 mmol/L


(3.5-5.1)


 


Chloride Level


 


 


 


 119 mmol/L


()


 


Carbon Dioxide Level


 


 


 


 27 mmol/L


(21-32)


 


Anion Gap    8 (6-14) 


 


Blood Urea Nitrogen


 


 


 


 49 mg/dL


(8-26)


 


Creatinine


 


 


 


 0.9 mg/dL


(0.7-1.3)


 


Estimated GFR


(Cockcroft-Gault) 


 


 


 84.7 





 


Glucose Level


 


 


 


 221 mg/dL


(70-99)


 


Calcium Level


 


 


 


 7.6 mg/dL


(8.5-10.1)


 


Test


 8/28/20


05:35 8/28/20


07:30 


 





 


Glucose (Fingerstick)


 209 mg/dL


(70-99) 


 


 





 


O2 Saturation  80 % (92-99)   


 


Arterial Blood pH


 


 7.33


(7.35-7.45) 


 





 


Arterial Blood pCO2 at


Patient Temp 


 49 mmHg


(35-46) 


 





 


Arterial Blood pO2 at Patient


Temp 


 51 mmHg


() 


 





 


Arterial Blood HCO3


 


 25 mmol/L


(21-28) 


 





 


Arterial Blood Base Excess


 


 -1 mmol/L


(-3-3) 


 





 


FiO2  100   











Review of Systems


Review of Systems


Unable to obtain due to clinical condition





Assessment and Plan


Assessmemt and Plan


COVID-19, respiratory failure





Plan: Continue IV antibiotics, continue IV steroids, continue full dose Lovenox.


Problems:  


(1) Person under investigation for COVID-19


(2) Respiratory failure





Comment


Review of Relevant


I have reviewed the following items jim (where applicable) has been applied.


Labs





Laboratory Tests








Test


 8/26/20


12:54 8/26/20


17:55 8/27/20


00:26 8/27/20


05:20


 


Glucose (Fingerstick)


 254 mg/dL


(70-99) 259 mg/dL


(70-99) 228 mg/dL


(70-99) 





 


White Blood Count


 


 


 


 15.0 x10^3/uL


(4.0-11.0)


 


Red Blood Count


 


 


 


 3.39 x10^6/uL


(4.30-5.70)


 


Hemoglobin


 


 


 


 10.4 g/dL


(13.0-17.5)


 


Hematocrit


 


 


 


 32.1 %


(39.0-53.0)


 


Mean Corpuscular Volume    95 fL () 


 


Mean Corpuscular Hemoglobin    31 pg (25-35) 


 


Mean Corpuscular Hemoglobin


Concent 


 


 


 33 g/dL


(31-37)


 


Red Cell Distribution Width


 


 


 


 14.6 %


(11.5-14.5)


 


Platelet Count


 


 


 


 201 x10^3/uL


(140-400)


 


Neutrophils (%) (Auto)    94 % (31-73) 


 


Lymphocytes (%) (Auto)    2 % (24-48) 


 


Monocytes (%) (Auto)    2 % (0-9) 


 


Eosinophils (%) (Auto)    1 % (0-3) 


 


Basophils (%) (Auto)    1 % (0-3) 


 


Neutrophils # (Auto)


 


 


 


 14.2 x10^3/uL


(1.8-7.7)


 


Lymphocytes # (Auto)


 


 


 


 0.3 x10^3/uL


(1.0-4.8)


 


Monocytes # (Auto)


 


 


 


 0.3 x10^3/uL


(0.0-1.1)


 


Eosinophils # (Auto)


 


 


 


 0.1 x10^3/uL


(0.0-0.7)


 


Basophils # (Auto)


 


 


 


 0.1 x10^3/uL


(0.0-0.2)


 


D-Dimer (Lexii)


 


 


 


 7.66 ug/mlFEU


(0.00-0.50)


 


Sodium Level


 


 


 


 153 mmol/L


(136-145)


 


Potassium Level


 


 


 


 4.9 mmol/L


(3.5-5.1)


 


Chloride Level


 


 


 


 116 mmol/L


()


 


Carbon Dioxide Level


 


 


 


 29 mmol/L


(21-32)


 


Anion Gap    8 (6-14) 


 


Blood Urea Nitrogen


 


 


 


 49 mg/dL


(8-26)


 


Creatinine


 


 


 


 0.9 mg/dL


(0.7-1.3)


 


Estimated GFR


(Cockcroft-Gault) 


 


 


 84.7 





 


Glucose Level


 


 


 


 272 mg/dL


(70-99)


 


Calcium Level


 


 


 


 7.5 mg/dL


(8.5-10.1)


 


Ferritin


 


 


 


 924 ng/mL


()


 


C-Reactive Protein,


Quantitative 


 


 


 117.0 mg/L


(0-3.3)


 


Triglycerides Level


 


 


 


 285 mg/dL


(0-150)


 


Procalcitonin


 


 


 


 0.44 ng/mL


(0.00-0.10)


 


Test


 8/27/20


05:26 8/27/20


07:55 8/27/20


12:04 8/27/20


17:41


 


Glucose (Fingerstick)


 260 mg/dL


(70-99) 


 292 mg/dL


(70-99) 252 mg/dL


(70-99)


 


O2 Saturation  92 % (92-99)   


 


Arterial Blood pH


 


 7.38


(7.35-7.45) 


 





 


Arterial Blood pCO2 at


Patient Temp 


 41 mmHg


(35-46) 


 





 


Arterial Blood pO2 at Patient


Temp 


 67 mmHg


() 


 





 


Arterial Blood HCO3


 


 24 mmol/L


(21-28) 


 





 


Arterial Blood Base Excess


 


 -1 mmol/L


(-3-3) 


 





 


FiO2  100%   


 


Test


 8/27/20


23:57 8/28/20


05:30 8/28/20


05:35 8/28/20


07:30


 


Glucose (Fingerstick)


 243 mg/dL


(70-99) 


 209 mg/dL


(70-99) 





 


White Blood Count


 


 11.7 x10^3/uL


(4.0-11.0) 


 





 


Red Blood Count


 


 3.06 x10^6/uL


(4.30-5.70) 


 





 


Hemoglobin


 


 9.3 g/dL


(13.0-17.5) 


 





 


Hematocrit


 


 29.0 %


(39.0-53.0) 


 





 


Mean Corpuscular Volume  95 fL ()   


 


Mean Corpuscular Hemoglobin  31 pg (25-35)   


 


Mean Corpuscular Hemoglobin


Concent 


 32 g/dL


(31-37) 


 





 


Red Cell Distribution Width


 


 14.1 %


(11.5-14.5) 


 





 


Platelet Count


 


 189 x10^3/uL


(140-400) 


 





 


Neutrophils (%) (Auto)  93 % (31-73)   


 


Lymphocytes (%) (Auto)  4 % (24-48)   


 


Monocytes (%) (Auto)  2 % (0-9)   


 


Eosinophils (%) (Auto)  1 % (0-3)   


 


Basophils (%) (Auto)  1 % (0-3)   


 


Neutrophils # (Auto)


 


 10.8 x10^3/uL


(1.8-7.7) 


 





 


Lymphocytes # (Auto)


 


 0.4 x10^3/uL


(1.0-4.8) 


 





 


Monocytes # (Auto)


 


 0.2 x10^3/uL


(0.0-1.1) 


 





 


Eosinophils # (Auto)


 


 0.1 x10^3/uL


(0.0-0.7) 


 





 


Basophils # (Auto)


 


 0.1 x10^3/uL


(0.0-0.2) 


 





 


Segmented Neutrophils %  92 % (35-66)   


 


Lymphocytes %  3 % (24-48)   


 


Monocytes %  4 % (0-10)   


 


Eosinophils %  1 % (0-5)   


 


Toxic Granulation     


 


Platelet Estimate


 


 Adequate


(ADEQUATE) 


 





 


Anisocytosis  Slight   


 


Sodium Level


 


 154 mmol/L


(136-145) 


 





 


Potassium Level


 


 4.7 mmol/L


(3.5-5.1) 


 





 


Chloride Level


 


 119 mmol/L


() 


 





 


Carbon Dioxide Level


 


 27 mmol/L


(21-32) 


 





 


Anion Gap  8 (6-14)   


 


Blood Urea Nitrogen


 


 49 mg/dL


(8-26) 


 





 


Creatinine


 


 0.9 mg/dL


(0.7-1.3) 


 





 


Estimated GFR


(Cockcroft-Gault) 


 84.7 


 


 





 


Glucose Level


 


 221 mg/dL


(70-99) 


 





 


Calcium Level


 


 7.6 mg/dL


(8.5-10.1) 


 





 


O2 Saturation    80 % (92-99) 


 


Arterial Blood pH


 


 


 


 7.33


(7.35-7.45)


 


Arterial Blood pCO2 at


Patient Temp 


 


 


 49 mmHg


(35-46)


 


Arterial Blood pO2 at Patient


Temp 


 


 


 51 mmHg


()


 


Arterial Blood HCO3


 


 


 


 25 mmol/L


(21-28)


 


Arterial Blood Base Excess


 


 


 


 -1 mmol/L


(-3-3)


 


FiO2    100 








Laboratory Tests








Test


 8/27/20


12:04 8/27/20


17:41 8/27/20


23:57 8/28/20


05:30


 


Glucose (Fingerstick)


 292 mg/dL


(70-99) 252 mg/dL


(70-99) 243 mg/dL


(70-99) 





 


White Blood Count


 


 


 


 11.7 x10^3/uL


(4.0-11.0)


 


Red Blood Count


 


 


 


 3.06 x10^6/uL


(4.30-5.70)


 


Hemoglobin


 


 


 


 9.3 g/dL


(13.0-17.5)


 


Hematocrit


 


 


 


 29.0 %


(39.0-53.0)


 


Mean Corpuscular Volume    95 fL () 


 


Mean Corpuscular Hemoglobin    31 pg (25-35) 


 


Mean Corpuscular Hemoglobin


Concent 


 


 


 32 g/dL


(31-37)


 


Red Cell Distribution Width


 


 


 


 14.1 %


(11.5-14.5)


 


Platelet Count


 


 


 


 189 x10^3/uL


(140-400)


 


Neutrophils (%) (Auto)    93 % (31-73) 


 


Lymphocytes (%) (Auto)    4 % (24-48) 


 


Monocytes (%) (Auto)    2 % (0-9) 


 


Eosinophils (%) (Auto)    1 % (0-3) 


 


Basophils (%) (Auto)    1 % (0-3) 


 


Neutrophils # (Auto)


 


 


 


 10.8 x10^3/uL


(1.8-7.7)


 


Lymphocytes # (Auto)


 


 


 


 0.4 x10^3/uL


(1.0-4.8)


 


Monocytes # (Auto)


 


 


 


 0.2 x10^3/uL


(0.0-1.1)


 


Eosinophils # (Auto)


 


 


 


 0.1 x10^3/uL


(0.0-0.7)


 


Basophils # (Auto)


 


 


 


 0.1 x10^3/uL


(0.0-0.2)


 


Segmented Neutrophils %    92 % (35-66) 


 


Lymphocytes %    3 % (24-48) 


 


Monocytes %    4 % (0-10) 


 


Eosinophils %    1 % (0-5) 


 


Toxic Granulation     


 


Platelet Estimate


 


 


 


 Adequate


(ADEQUATE)


 


Anisocytosis    Slight 


 


Sodium Level


 


 


 


 154 mmol/L


(136-145)


 


Potassium Level


 


 


 


 4.7 mmol/L


(3.5-5.1)


 


Chloride Level


 


 


 


 119 mmol/L


()


 


Carbon Dioxide Level


 


 


 


 27 mmol/L


(21-32)


 


Anion Gap    8 (6-14) 


 


Blood Urea Nitrogen


 


 


 


 49 mg/dL


(8-26)


 


Creatinine


 


 


 


 0.9 mg/dL


(0.7-1.3)


 


Estimated GFR


(Cockcroft-Gault) 


 


 


 84.7 





 


Glucose Level


 


 


 


 221 mg/dL


(70-99)


 


Calcium Level


 


 


 


 7.6 mg/dL


(8.5-10.1)


 


Test


 8/28/20


05:35 8/28/20


07:30 


 





 


Glucose (Fingerstick)


 209 mg/dL


(70-99) 


 


 





 


O2 Saturation  80 % (92-99)   


 


Arterial Blood pH


 


 7.33


(7.35-7.45) 


 





 


Arterial Blood pCO2 at


Patient Temp 


 49 mmHg


(35-46) 


 





 


Arterial Blood pO2 at Patient


Temp 


 51 mmHg


() 


 





 


Arterial Blood HCO3


 


 25 mmol/L


(21-28) 


 





 


Arterial Blood Base Excess


 


 -1 mmol/L


(-3-3) 


 





 


FiO2  100   








Microbiology


8/22/20 Blood Culture - Final, Complete


          NO GROWTH AFTER 5 DAYS


8/20/20 Urine Culture - Final, Complete


Medications





Current Medications


Etomidate (Amidate) 20 mg STK-MED ONCE IV ;  Start 8/20/20 at 18:54;  Stop 

8/20/20 at 18:54;  Status DC


Succinylcholine Chloride (Anectine) 200 mg STK-MED ONCE .ROUTE ;  Start 8/20/20 

at 18:55;  Stop 8/20/20 at 18:55;  Status DC


Propofol 50 ml @ As Directed STK-MED ONCE IV ;  Start 8/20/20 at 18:55;  Stop 

8/20/20 at 18:56;  Status DC


Propofol 100 ml @ 0 mls/hr CONT  PRN IV SEE PROTOCOL Last administered on 

8/28/20at 07:45;  Start 8/20/20 at 19:00


Fentanyl Citrate (Fentanyl 2ml Vial) 25 mcg PRN Q1HR  PRN IV SEE COMMENTS;  

Start 8/20/20 at 19:00


Fentanyl Citrate (Fentanyl 2ml Vial) 50 mcg PRN Q1HR  PRN IV SEE COMMENTS Last 

administered on 8/20/20at 19:42;  Start 8/20/20 at 19:00


Chlorhexidine Gluconate (Peridex) 15 ml BID MM  Last administered on 8/21/20at 

08:16;  Start 8/20/20 at 21:00;  Stop 8/21/20 at 09:45;  Status DC


Midazolam HCl (Versed) 5 mg STK-MED ONCE .ROUTE ;  Start 8/20/20 at 19:17;  Stop

8/20/20 at 19:18;  Status DC


Midazolam HCl 50 mg/Sodium Chloride 50 ml @ 0 mls/hr 1X  ONCE IV ;  Start 

8/20/20 at 19:30;  Stop 8/20/20 at 19:31;  Status UNV


Albuterol/ Ipratropium (Duoneb) 3 ml STK-MED ONCE .ROUTE ;  Start 8/20/20 at 

19:27;  Stop 8/20/20 at 19:27;  Status DC


Midazolam HCl 100 ml @ 0 mls/hr CONT  PRN IV SEE PROTOCOL Last administered on 

8/28/20at 00:56;  Start 8/20/20 at 19:30


Propofol 50 ml @ 0 mls/hr 1X  ONCE IV  Last administered on 8/20/20at 18:55;  

Start 8/20/20 at 20:00;  Stop 8/20/20 at 20:01;  Status DC


Midazolam HCl (Versed) 5 mg 1X  ONCE NS  Last administered on 8/20/20at 19:18;  

Start 8/20/20 at 19:18;  Stop 8/20/20 at 19:59;  Status DC


Piperacillin Sod/ Tazobactam Sod 3.375 gm/Sodium Chloride 50 ml @  100 mls/hr 1X

 ONCE IV  Last administered on 8/20/20at 21:47;  Start 8/20/20 at 20:45;  Stop 

8/20/20 at 21:14;  Status DC


Dexmedetomidine HCl 400 mcg/ Sodium Chloride 100 ml @ 0 mls/hr CONT  PRN IV 

SEDATION Last administered on 8/28/20at 05:55;  Start 8/20/20 at 20:15


Sodium Chloride 500 ml @  500 mls/hr 1X PRN  PRN IV SEE COMMENTS Last 

administered on 8/27/20at 11:22;  Start 8/20/20 at 20:15


Atropine Sulfate (ATROPINE 0.5mg SYRINGE) 0.5 mg PRN Q5MIN  PRN IV SEE COMMENTS;

 Start 8/20/20 at 20:15


Fentanyl Citrate (Fentanyl 2ml Vial) 100 mcg 1X  ONCE IVP  Last administered on 

8/20/20at 20:17;  Start 8/20/20 at 20:15;  Stop 8/20/20 at 20:18;  Status DC


Methylprednisolone Sodium Succinate (SOLU-Medrol 40MG VIAL) 40 mg Q8HRS IV  Last

administered on 8/28/20at 05:51;  Start 8/20/20 at 22:00


Enoxaparin Sodium (Lovenox 40mg Syringe) 40 mg BID SQ  Last administered on 

8/21/20at 08:15;  Start 8/20/20 at 21:30;  Stop 8/21/20 at 09:47;  Status DC


Potassium Chloride/Dextrose/ Sod Cl 1,000 ml @  125 mls/hr Q8H ONCE IV  Last a

dministered on 8/20/20at 22:13;  Start 8/20/20 at 21:30;  Stop 8/21/20 at 05:29;

 Status DC


Zinc Sulfate (Orazinc) 220 mg DAILY PO  Last administered on 8/28/20at 07:44;  

Start 8/21/20 at 09:00


Ondansetron HCl (Zofran) 4 mg PRN Q4HRS  PRN IV NAUSEA/VOMITING;  Start 8/20/20 

at 21:30


Acetaminophen (Tylenol) 650 mg PRN Q4HRS  PRN GT TEMP OVER 100.4F OR MILD PAIN 

Last administered on 8/28/20at 05:51;  Start 8/20/20 at 21:30


Acetaminophen (Tylenol Supp) 650 mg PRN Q4HRS  PRN SD TEMP OVER 100.4F OR MILD 

PAIN;  Start 8/20/20 at 21:30


Docusate Sodium (Colace) 100 mg PRN BID  PRN PO HARD STOOLS Last administered on

8/26/20at 08:26;  Start 8/20/20 at 21:30


Piperacillin Sod/ Tazobactam Sod 3.375 gm/Sodium Chloride 50 ml @  100 mls/hr 

Q6HRS IV  Last administered on 8/28/20at 05:51;  Start 8/21/20 at 00:00


Pantoprazole Sodium (PROTONIX VIAL for IV PUSH) 40 mg DAILYAC IVP  Last 

administered on 8/28/20at 07:44;  Start 8/21/20 at 07:30


Norepinephrine Bitartrate 8 mg/ Dextrose 258 ml @  12.578 mls/ hr CONT  PRN IV 

PER PROTOCOL Last administered on 8/20/20at 22:15;  Start 8/20/20 at 21:45


Sodium Chloride 1,000 ml @  1,000 mls/hr 1X  ONCE IV  Last administered on 

8/20/20at 21:44;  Start 8/20/20 at 21:45;  Stop 8/20/20 at 22:44;  Status DC


Fentanyl Citrate 30 ml @ 0 mls/hr CONT  PRN IV SEE PROTOCOL Last administered on

8/22/20at 10:44;  Start 8/20/20 at 21:45;  Stop 8/22/20 at 14:49;  Status DC


Vecuronium Bromide (Norcuron Bolus) 6 mg PRN Q2HR  PRN IV VENT ASYNCHRONY Last 

administered on 8/22/20at 11:23;  Start 8/20/20 at 21:45;  Stop 8/22/20 at 

14:48;  Status DC


Sodium Chloride 1,000 ml @  1,000 mls/hr 1X  ONCE IV  Last administered on 

8/20/20at 22:36;  Start 8/20/20 at 22:30;  Stop 8/20/20 at 23:29;  Status DC


Methylprednisolone Sodium Succinate (SOLU-Medrol 40MG VIAL) 40 mg Q8HRS IV ;  

Start 8/21/20 at 14:00;  Status UNV


Enoxaparin Sodium (Lovenox 80mg Syringe) 80 mg BID SQ  Last administered on 

8/21/20at 20:53;  Start 8/21/20 at 21:00;  Stop 8/22/20 at 08:57;  Status DC


Insulin Human Lispro (HumaLOG) 6 units TID SQ ;  Start 8/21/20 at 14:00;  Stop 

8/21/20 at 10:05;  Status DC


Insulin Glargine (Lantus Syringe) 10 unit BID SQ  Last administered on 8/22/20at

09:27;  Start 8/21/20 at 21:00;  Stop 8/22/20 at 11:25;  Status DC


Ascorbic Acid (Vitamin C) 250 mg Q6HRS PO  Last administered on 8/28/20at 05:51;

 Start 8/21/20 at 12:00


Sodium Chloride 1,000 ml @  75 mls/hr [id number] IV  Last administered on 8/24/20at

19:33;  Start 8/21/20 at 10:00;  Stop 8/25/20 at 10:23;  Status DC


Insulin Human Lispro (HumaLOG) 6 units Q6HRS SQ  Last administered on 8/22/20at 

06:11;  Start 8/21/20 at 12:00;  Stop 8/22/20 at 11:25;  Status DC


Insulin Glargine (Lantus Syringe) 10 unit 1X  ONCE SQ  Last administered on 

8/21/20at 11:40;  Start 8/21/20 at 11:00;  Stop 8/21/20 at 11:01;  Status DC


Enoxaparin Sodium (Lovenox 60mg Syringe) 60 mg BID SQ  Last administered on 

8/24/20at 08:55;  Start 8/22/20 at 09:00;  Stop 8/24/20 at 16:45;  Status DC


Vecuronium Bromide (Norcuron Bolus) 6 mg PRN Q6HRS  PRN IV SEDATION (2nd Choice)

Last administered on 8/24/20at 16:36;  Start 8/22/20 at 09:30


Insulin Glargine (Lantus Syringe) 14 unit BID SQ  Last administered on 8/24/20at

09:24;  Start 8/22/20 at 21:00;  Stop 8/24/20 at 12:27;  Status DC


Insulin Human Lispro (HumaLOG) 8 units Q6HRS SQ  Last administered on 8/24/20at 

12:11;  Start 8/22/20 at 12:00;  Stop 8/24/20 at 12:27;  Status DC


Fentanyl Citrate 55 ml @ 0 mls/hr CONT  PRN IV SEE PROTOCOL Last administered on

8/28/20at 05:54;  Start 8/22/20 at 14:49


Lorazepam (Ativan Inj) 1 mg PRN Q1HR  PRN IVP ANXIETY / AGITATION- MODERATE;  

Start 8/22/20 at 16:15


Lorazepam (Ativan Inj) 2 mg PRN Q1HR  PRN IVP ANXIETY / AGITATION- SEVERE Last 

administered on 8/23/20at 13:10;  Start 8/22/20 at 16:30


Insulin Human Lispro (HumaLOG) 6 units 1X  ONCE SQ  Last administered on 

8/23/20at 06:20;  Start 8/23/20 at 06:15;  Stop 8/23/20 at 06:16;  Status DC


Hydralazine HCl (Apresoline Inj) 10 mg PRN Q4HRS  PRN IVP ELEVATED BP, SEE 

COMMENTS Last administered on 8/27/20at 02:18;  Start 8/24/20 at 09:15


Alteplase, Recombinant (Cathflo For Central Catheter Clearance) 1 mg 1X  ONCE 

INT CAT  Last administered on 8/24/20at 12:05;  Start 8/24/20 at 11:00;  Stop 

8/24/20 at 11:01;  Status DC


Insulin Glargine (Lantus Syringe) 18 unit BID SQ  Last administered on 8/25/20at

08:30;  Start 8/24/20 at 21:00;  Stop 8/25/20 at 10:03;  Status DC


Insulin Human Lispro (HumaLOG) 10 units Q6HRS SQ  Last administered on 8/25/20at

05:53;  Start 8/24/20 at 18:00;  Stop 8/25/20 at 10:03;  Status DC


Vecuronium Bromide 50 mg/ Miscellaneous 50 ml @  4.027 mls/ hr CONT  PRN IV SEE 

I/O RECORD Last administered on 8/28/20at 05:55;  Start 8/24/20 at 16:15


Enoxaparin Sodium (Lovenox Per Pharmacy Treatment Dosing) 1 each PRN DAILY  PRN 

MC SEE COMMENTS;  Start 8/24/20 at 16:45


Enoxaparin Sodium (Lovenox 80mg Syringe) 80 mg Q12HR SQ  Last administered on 

8/28/20at 07:44;  Start 8/24/20 at 21:00


Furosemide (Lasix) 40 mg 1X  ONCE IVP  Last administered on 8/25/20at 10:19;  

Start 8/25/20 at 10:00;  Stop 8/25/20 at 10:01;  Status DC


Insulin Glargine (Lantus Syringe) 24 unit BID SQ  Last administered on 8/28/20at

10:08;  Start 8/25/20 at 10:30


Insulin Human Lispro (HumaLOG) 14 units Q6HRS SQ  Last administered on 8/28/20at

05:52;  Start 8/25/20 at 12:00


Info (Anti-Coagulation Monitoring By Pharmacy) 1 each PRN DAILY  PRN MC SEE 

COMMENTS Last administered on 8/27/20at 14:14;  Start 8/26/20 at 10:00


Polyethylene Glycol (miraLAX PACKET) 17 gm DAILY PO ;  Start 8/28/20 at 11:00


Vitals/I & O





Vital Sign - Last 24 Hours








 8/27/20 8/27/20 8/27/20 8/27/20





 11:00 11:52 12:00 12:00


 


Temp   98.4 





   98.4 


 


Pulse 76  74 


 


Resp 22  22 


 


B/P (MAP) 117/57 (77)  115/52 (73) 


 


Pulse Ox 92 91 86 


 


O2 Delivery Ventilator Ventilator Ventilator Mechanical Ventilator


 


    





    





 8/27/20 8/27/20 8/27/20 8/27/20





 13:00 14:00 15:00 15:44


 


Pulse 70 70 68 


 


Resp 22 22 22 


 


B/P (MAP) 117/53 (74) 119/56 (77) 119/57 (77) 


 


Pulse Ox 91 92 92 87


 


O2 Delivery Ventilator Ventilator Ventilator Ventilator





 8/27/20 8/27/20 8/27/20 8/27/20





 16:00 16:00 17:00 17:13


 


Temp  98.3  





  98.3  


 


Pulse  68 70 


 


Resp  22 22 22


 


B/P (MAP)  110/54 (72) 138/60 (86) 


 


Pulse Ox  89 86 


 


O2 Delivery Mechanical Ventilator Ventilator Ventilator 


 


    





    





 8/27/20 8/27/20 8/27/20 8/27/20





 18:00 19:00 20:00 20:00


 


Temp   99.4 





   99.4 


 


Pulse 76 71 80 


 


Resp 22 22 22 


 


B/P (MAP) 135/57 (83) 138/71 (93) 133/54 (80) 


 


Pulse Ox 89 89 90 


 


O2 Delivery Ventilator Ventilator Ventilator Mechanical Ventilator


 


    





    





 8/27/20 8/27/20 8/27/20 8/27/20





 20:29 20:45 21:00 22:00


 


Pulse   73 75


 


Resp 22  22 22


 


B/P (MAP)   130/59 (82) 130/59 (82)


 


Pulse Ox 89 91 86 88


 


O2 Delivery Ventilator Ventilator Ventilator Ventilator


 


O2 Flow Rate 18.0   





 8/27/20 8/27/20 8/28/20 8/28/20





 23:00 23:59 00:01 00:31


 


Temp   99.2 





   99.2 


 


Pulse 70  70 


 


Resp 22  22 


 


B/P (MAP) 128/56 (80)  145/60 (88) 


 


Pulse Ox 83  94 92


 


O2 Delivery Ventilator Mechanical Ventilator Ventilator Ventilator


 


    





    





 8/28/20 8/28/20 8/28/20 8/28/20





 01:00 02:00 03:00 04:00


 


Temp    100.2





    100.2


 


Pulse 70 70 69 68


 


Resp 22 22 22 22


 


B/P (MAP) 147/63 (91) 145/61 (89) 139/61 (87) 138/61 (86)


 


Pulse Ox 94 95 95 95


 


O2 Delivery Ventilator Ventilator Ventilator Ventilator


 


    





    





 8/28/20 8/28/20 8/28/20 8/28/20





 04:00 04:24 05:00 05:54


 


Pulse   80 


 


Resp   22 22


 


B/P (MAP)   137/59 (85) 


 


Pulse Ox  94 93 94


 


O2 Delivery Mechanical Ventilator Ventilator Ventilator 


 


O2 Flow Rate    18.0





 8/28/20 8/28/20 8/28/20 8/28/20





 06:00 06:24 07:00 07:22


 


Pulse 82  88 


 


Resp 22 22 22 


 


B/P (MAP) 150/56 (87)  185/67 (106) 


 


Pulse Ox 92 92 87 85


 


O2 Delivery Ventilator  Ventilator Ventilator


 


O2 Flow Rate  18.0  





 8/28/20 8/28/20 8/28/20 8/28/20





 08:00 08:00 09:00 10:00


 


Temp 98.7   





 98.7   


 


Pulse 78  78 72


 


Resp 22  22 22


 


B/P (MAP) 168/68 (101)  150/62 (91) 141/61 (87)


 


Pulse Ox 84  92 95


 


O2 Delivery Ventilator Mechanical Ventilator Ventilator Ventilator














Intake and Output   


 


 8/27/20 8/27/20 8/28/20





 15:00 23:00 07:00


 


Intake Total 250 ml 2143.9 ml 1462.9 ml


 


Output Total 955 ml 730 ml 570 ml


 


Balance -705 ml 1413.9 ml 892.9 ml











Justicifation of Admission Dx:


Justifications for Admission:


Justification of Admission Dx:  Yes











MARGARITA LAU MD            Aug 28, 2020 10:51

## 2020-08-28 NOTE — RAD
AP portable chest  radiograph 8/28/2020

 

Clinical History: Covid 19.

 

An AP erect portable digital radiograph of the chest was obtained.

 

Comparison study is dated 8/22/2020.

 

The right internal jugular central venous catheter and NG tube are 

unchanged in position. The cardiac silhouette is mildly enlarged. The 

thoracic aorta is mildly tortuous. Bilateral perihilar infiltrates are 

seen which appear increased slightly. No pneumothorax or pleural effusion 

is noted. The osseous structures are unchanged.

 

Impression: Slight interval increase in the bilateral perihilar 

infiltrates.

 

Electronically signed by: Shar Qureshi MD (8/28/2020 9:25 AM) [place] [id number]

## 2020-08-29 VITALS — SYSTOLIC BLOOD PRESSURE: 200 MMHG | DIASTOLIC BLOOD PRESSURE: 80 MMHG

## 2020-08-29 VITALS — DIASTOLIC BLOOD PRESSURE: 58 MMHG | SYSTOLIC BLOOD PRESSURE: 109 MMHG

## 2020-08-29 VITALS — SYSTOLIC BLOOD PRESSURE: 112 MMHG | DIASTOLIC BLOOD PRESSURE: 57 MMHG

## 2020-08-29 VITALS — SYSTOLIC BLOOD PRESSURE: 107 MMHG | DIASTOLIC BLOOD PRESSURE: 55 MMHG

## 2020-08-29 VITALS — SYSTOLIC BLOOD PRESSURE: 146 MMHG | DIASTOLIC BLOOD PRESSURE: 65 MMHG

## 2020-08-29 VITALS — DIASTOLIC BLOOD PRESSURE: 66 MMHG | SYSTOLIC BLOOD PRESSURE: 146 MMHG

## 2020-08-29 VITALS — SYSTOLIC BLOOD PRESSURE: 100 MMHG | DIASTOLIC BLOOD PRESSURE: 50 MMHG

## 2020-08-29 VITALS — DIASTOLIC BLOOD PRESSURE: 64 MMHG | SYSTOLIC BLOOD PRESSURE: 146 MMHG

## 2020-08-29 VITALS — SYSTOLIC BLOOD PRESSURE: 136 MMHG | DIASTOLIC BLOOD PRESSURE: 65 MMHG

## 2020-08-29 VITALS — SYSTOLIC BLOOD PRESSURE: 108 MMHG | DIASTOLIC BLOOD PRESSURE: 51 MMHG

## 2020-08-29 VITALS — SYSTOLIC BLOOD PRESSURE: 108 MMHG | DIASTOLIC BLOOD PRESSURE: 61 MMHG

## 2020-08-29 VITALS — DIASTOLIC BLOOD PRESSURE: 57 MMHG | SYSTOLIC BLOOD PRESSURE: 106 MMHG

## 2020-08-29 VITALS — SYSTOLIC BLOOD PRESSURE: 140 MMHG | DIASTOLIC BLOOD PRESSURE: 60 MMHG

## 2020-08-29 VITALS — SYSTOLIC BLOOD PRESSURE: 135 MMHG | DIASTOLIC BLOOD PRESSURE: 55 MMHG

## 2020-08-29 VITALS — DIASTOLIC BLOOD PRESSURE: 60 MMHG | SYSTOLIC BLOOD PRESSURE: 119 MMHG

## 2020-08-29 VITALS — DIASTOLIC BLOOD PRESSURE: 63 MMHG | SYSTOLIC BLOOD PRESSURE: 138 MMHG

## 2020-08-29 VITALS — SYSTOLIC BLOOD PRESSURE: 108 MMHG | DIASTOLIC BLOOD PRESSURE: 58 MMHG

## 2020-08-29 VITALS — SYSTOLIC BLOOD PRESSURE: 139 MMHG | DIASTOLIC BLOOD PRESSURE: 72 MMHG

## 2020-08-29 VITALS — DIASTOLIC BLOOD PRESSURE: 63 MMHG | SYSTOLIC BLOOD PRESSURE: 135 MMHG

## 2020-08-29 VITALS — DIASTOLIC BLOOD PRESSURE: 52 MMHG | SYSTOLIC BLOOD PRESSURE: 198 MMHG

## 2020-08-29 VITALS — DIASTOLIC BLOOD PRESSURE: 53 MMHG | SYSTOLIC BLOOD PRESSURE: 102 MMHG

## 2020-08-29 VITALS — SYSTOLIC BLOOD PRESSURE: 138 MMHG | DIASTOLIC BLOOD PRESSURE: 59 MMHG

## 2020-08-29 VITALS — SYSTOLIC BLOOD PRESSURE: 173 MMHG | DIASTOLIC BLOOD PRESSURE: 69 MMHG

## 2020-08-29 VITALS — DIASTOLIC BLOOD PRESSURE: 62 MMHG | SYSTOLIC BLOOD PRESSURE: 136 MMHG

## 2020-08-29 LAB
ANION GAP SERPL CALC-SCNC: 6 MMOL/L (ref 6–14)
BASE EXCESS ABG: 1 MMOL/L (ref -3–3)
BASOPHILS # BLD AUTO: 0.1 X10^3/UL (ref 0–0.2)
BASOPHILS NFR BLD: 1 % (ref 0–3)
BUN SERPL-MCNC: 59 MG/DL (ref 8–26)
CALCIUM SERPL-MCNC: 7.2 MG/DL (ref 8.5–10.1)
CHLORIDE SERPL-SCNC: 118 MMOL/L (ref 98–107)
CO2 SERPL-SCNC: 29 MMOL/L (ref 21–32)
CREAT SERPL-MCNC: 1 MG/DL (ref 0.7–1.3)
CRP SERPL-MCNC: 135.4 MG/L (ref 0–3.3)
EOSINOPHIL NFR BLD: 0.1 X10^3/UL (ref 0–0.7)
EOSINOPHIL NFR BLD: 1 % (ref 0–3)
ERYTHROCYTE [DISTWIDTH] IN BLOOD BY AUTOMATED COUNT: 14.5 % (ref 11.5–14.5)
GFR SERPLBLD BASED ON 1.73 SQ M-ARVRAT: 75 ML/MIN
GLUCOSE SERPL-MCNC: 176 MG/DL (ref 70–99)
HCO3 BLDA-SCNC: 27 MMOL/L (ref 21–28)
HCT VFR BLD CALC: 29.1 % (ref 39–53)
HGB BLD-MCNC: 9.4 G/DL (ref 13–17.5)
INSPIRATION SETTING TIME VENT: 100
LYMPHOCYTES # BLD: 0.3 X10^3/UL (ref 1–4.8)
LYMPHOCYTES NFR BLD AUTO: 3 % (ref 24–48)
MCH RBC QN AUTO: 31 PG (ref 25–35)
MCHC RBC AUTO-ENTMCNC: 32 G/DL (ref 31–37)
MCV RBC AUTO: 96 FL (ref 79–100)
MONO #: 0.3 X10^3/UL (ref 0–1.1)
MONOCYTES NFR BLD: 3 % (ref 0–9)
NEUT #: 10.1 X10^3/UL (ref 1.8–7.7)
NEUTROPHILS NFR BLD AUTO: 92 % (ref 31–73)
PCO2 BLDA: 49 MMHG (ref 35–46)
PLATELET # BLD AUTO: 185 X10^3/UL (ref 140–400)
PO2 BLDA: 49 MMHG (ref 65–108)
POTASSIUM SERPL-SCNC: 4.7 MMOL/L (ref 3.5–5.1)
RBC # BLD AUTO: 3.05 X10^6/UL (ref 4.3–5.7)
SAO2 % BLDA: 82 % (ref 92–99)
SODIUM SERPL-SCNC: 153 MMOL/L (ref 136–145)
WBC # BLD AUTO: 10.9 X10^3/UL (ref 4–11)

## 2020-08-29 RX ADMIN — ENOXAPARIN SODIUM SCH MG: 80 INJECTION SUBCUTANEOUS at 09:01

## 2020-08-29 RX ADMIN — DEXMEDETOMIDINE HYDROCHLORIDE PRN MLS/HR: 100 INJECTION, SOLUTION, CONCENTRATE INTRAVENOUS at 09:11

## 2020-08-29 RX ADMIN — INSULIN GLARGINE SCH UNIT: 100 INJECTION, SOLUTION SUBCUTANEOUS at 09:02

## 2020-08-29 RX ADMIN — PIPERACILLIN SODIUM AND TAZOBACTAM SODIUM SCH MLS/HR: 3; .375 INJECTION, POWDER, LYOPHILIZED, FOR SOLUTION INTRAVENOUS at 17:57

## 2020-08-29 RX ADMIN — PROPOFOL PRN MLS/HR: 10 INJECTION, EMULSION INTRAVENOUS at 14:11

## 2020-08-29 RX ADMIN — PROPOFOL PRN MLS/HR: 10 INJECTION, EMULSION INTRAVENOUS at 22:56

## 2020-08-29 RX ADMIN — INSULIN LISPRO SCH UNITS: 100 INJECTION, SOLUTION INTRAVENOUS; SUBCUTANEOUS at 00:06

## 2020-08-29 RX ADMIN — INSULIN LISPRO SCH UNITS: 100 INJECTION, SOLUTION INTRAVENOUS; SUBCUTANEOUS at 17:57

## 2020-08-29 RX ADMIN — PROPOFOL PRN MLS/HR: 10 INJECTION, EMULSION INTRAVENOUS at 22:00

## 2020-08-29 RX ADMIN — OXYCODONE HYDROCHLORIDE AND ACETAMINOPHEN SCH MG: 500 TABLET ORAL at 05:48

## 2020-08-29 RX ADMIN — DEXTROSE AND SODIUM CHLORIDE SCH MLS/HR: 5; .2 INJECTION, SOLUTION INTRAVENOUS at 13:12

## 2020-08-29 RX ADMIN — PIPERACILLIN SODIUM AND TAZOBACTAM SODIUM SCH MLS/HR: 3; .375 INJECTION, POWDER, LYOPHILIZED, FOR SOLUTION INTRAVENOUS at 00:06

## 2020-08-29 RX ADMIN — INSULIN LISPRO SCH UNITS: 100 INJECTION, SOLUTION INTRAVENOUS; SUBCUTANEOUS at 05:47

## 2020-08-29 RX ADMIN — MIDAZOLAM PRN MLS/HR: 5 INJECTION, SOLUTION INTRAMUSCULAR; INTRAVENOUS at 07:46

## 2020-08-29 RX ADMIN — PIPERACILLIN SODIUM AND TAZOBACTAM SODIUM SCH MLS/HR: 3; .375 INJECTION, POWDER, LYOPHILIZED, FOR SOLUTION INTRAVENOUS at 05:48

## 2020-08-29 RX ADMIN — INSULIN LISPRO SCH UNITS: 100 INJECTION, SOLUTION INTRAVENOUS; SUBCUTANEOUS at 12:20

## 2020-08-29 RX ADMIN — PANTOPRAZOLE SODIUM SCH MG: 40 INJECTION, POWDER, FOR SOLUTION INTRAVENOUS at 09:01

## 2020-08-29 RX ADMIN — DEXMEDETOMIDINE HYDROCHLORIDE PRN MLS/HR: 100 INJECTION, SOLUTION, CONCENTRATE INTRAVENOUS at 00:11

## 2020-08-29 RX ADMIN — OXYCODONE HYDROCHLORIDE AND ACETAMINOPHEN SCH MG: 500 TABLET ORAL at 17:57

## 2020-08-29 RX ADMIN — PROPOFOL PRN MLS/HR: 10 INJECTION, EMULSION INTRAVENOUS at 10:32

## 2020-08-29 RX ADMIN — INSULIN GLARGINE SCH UNIT: 100 INJECTION, SOLUTION SUBCUTANEOUS at 21:00

## 2020-08-29 RX ADMIN — VECURONIUM BROMIDE PRN MLS/HR: 1 INJECTION, POWDER, LYOPHILIZED, FOR SOLUTION INTRAVENOUS at 03:38

## 2020-08-29 RX ADMIN — ZINC SULFATE CAP 220 MG (50 MG ELEMENTAL ZN) SCH MG: 220 (50 ZN) CAP at 09:01

## 2020-08-29 RX ADMIN — POLYETHYLENE GLYCOL 3350 SCH GM: 17 POWDER, FOR SOLUTION ORAL at 09:02

## 2020-08-29 RX ADMIN — DEXMEDETOMIDINE HYDROCHLORIDE PRN MLS/HR: 100 INJECTION, SOLUTION, CONCENTRATE INTRAVENOUS at 14:05

## 2020-08-29 RX ADMIN — METHYLPREDNISOLONE SODIUM SUCCINATE SCH MG: 40 INJECTION, POWDER, FOR SOLUTION INTRAMUSCULAR; INTRAVENOUS at 14:10

## 2020-08-29 RX ADMIN — METHYLPREDNISOLONE SODIUM SUCCINATE SCH MG: 40 INJECTION, POWDER, FOR SOLUTION INTRAMUSCULAR; INTRAVENOUS at 23:10

## 2020-08-29 RX ADMIN — OXYCODONE HYDROCHLORIDE AND ACETAMINOPHEN SCH MG: 500 TABLET ORAL at 12:15

## 2020-08-29 RX ADMIN — Medication PRN MLS/HR: at 08:50

## 2020-08-29 RX ADMIN — PIPERACILLIN SODIUM AND TAZOBACTAM SODIUM SCH MLS/HR: 3; .375 INJECTION, POWDER, LYOPHILIZED, FOR SOLUTION INTRAVENOUS at 12:15

## 2020-08-29 RX ADMIN — METHYLPREDNISOLONE SODIUM SUCCINATE SCH MG: 40 INJECTION, POWDER, FOR SOLUTION INTRAMUSCULAR; INTRAVENOUS at 05:45

## 2020-08-29 RX ADMIN — PROPOFOL PRN MLS/HR: 10 INJECTION, EMULSION INTRAVENOUS at 05:46

## 2020-08-29 RX ADMIN — PROPOFOL PRN MLS/HR: 10 INJECTION, EMULSION INTRAVENOUS at 00:11

## 2020-08-29 RX ADMIN — MIDAZOLAM PRN MLS/HR: 5 INJECTION, SOLUTION INTRAMUSCULAR; INTRAVENOUS at 18:13

## 2020-08-29 RX ADMIN — DEXMEDETOMIDINE HYDROCHLORIDE PRN MLS/HR: 100 INJECTION, SOLUTION, CONCENTRATE INTRAVENOUS at 22:56

## 2020-08-29 RX ADMIN — HYDRALAZINE HYDROCHLORIDE PRN MG: 20 INJECTION INTRAMUSCULAR; INTRAVENOUS at 23:09

## 2020-08-29 RX ADMIN — OXYCODONE HYDROCHLORIDE AND ACETAMINOPHEN SCH MG: 500 TABLET ORAL at 00:06

## 2020-08-29 RX ADMIN — DEXMEDETOMIDINE HYDROCHLORIDE PRN MLS/HR: 100 INJECTION, SOLUTION, CONCENTRATE INTRAVENOUS at 04:30

## 2020-08-29 RX ADMIN — DEXMEDETOMIDINE HYDROCHLORIDE PRN MLS/HR: 100 INJECTION, SOLUTION, CONCENTRATE INTRAVENOUS at 18:13

## 2020-08-29 RX ADMIN — Medication PRN MLS/HR: at 22:58

## 2020-08-29 RX ADMIN — ENOXAPARIN SODIUM SCH MG: 80 INJECTION SUBCUTANEOUS at 23:08

## 2020-08-29 NOTE — NUR
Dr. Amin discussed with family patient's prognosis.  Family verbally understood.  Family 
members visited patient outside of the glass.  This RN discussed patient's POC and explained 
Comfort Care procedure. Family verbally stated they understood and can not make a decision 
right now. This RN will continue to monitor and care for patient.

-------------------------------------------------------------------------------

Addendum: 08/29/20 at 1827 by SOFIA LARSON RN

-------------------------------------------------------------------------------

When this RN repositioned patient, patient O2 level desated to mid 60's.  Repositioned 
patient to previous position in the bed. Patient unstable to reposition.

## 2020-08-29 NOTE — PDOC
PROGRESS NOTES


Date of Service:


DATE: 8/29/20 


TIME: 09:19





Chief Complaint


Chief Complaint


Assessment/Plan





Acute hypoxemic respiratory failure


Bilateral perihilar infiltrates are  seen which appear increased slightly


Leukocytosis with bandemia secondary to septic process


Hyponatremia secondary to low effective circulatory volume, now hypernatremic


Acue renal failure due to vasomotor nephropathy most likely 


Hyperglycemia


transaminitis 


Uncontrolled hypertension








CONSULT NEPHROLOGY


Hypotonic saline iv support


serum osmolality











33 MIN CC TIME





History of Present Illness


Patient is a 55-year-old gentleman who was diagnosed with COVID-19 infection on 

Monday 5 days prior to his admission to the intensive care unit.  Apparently the

patient reported worsening respiratory distress reason why he came to the 

emergency department for evaluation and treatment.  The story was quite limited 

since the patient was speaking [place] only and his acuity was such that he 

required mechanical ventilation and prompt intubation by the emergency 

department physician.  At the present time patient is intubated and sedated 

continues to require a lot of support no pertinent past medical history was 

reported he is admitted to the intensive care unit for further treatment





8/24/2020


Continues to require full support, glycemia has been noted in hypertension as 

well.  Discussed with nursing staff, all of concerns were addressed to the best 

of my abilities no acute events reported overnight





8/25/2020


No acute events reported overnight, case discussed with nursing staff patient in

no acute distress  





8/26/2020


Per nursing staff, patient febrile overnight, treated with Tylenol and tepid 

water bath. Case discussed with nursing staff, continue with IV antibiotics and 

full VTE prophylaxis.





8/27/2020


Patient still febrile overnight.  O2 requirement increasing.  Discussed with RN.





8/28/2020


Patient still intubated and sedated.  Discussed with RN, patient was made DNR.  

Nursing concerned about lack of bowel movements and abdominal distention.  Will 

schedule MiraLAX.





Vitals


Vitals





Vital Signs








  Date Time  Temp Pulse Resp B/P (MAP) Pulse Ox O2 Delivery O2 Flow Rate FiO2


 


8/29/20 08:50     82  18.0 


 


8/29/20 08:15  67 22 112/57 (75)  Ventilator  


 


8/29/20 04:00 97.7       





 97.7       











Physical Exam


General:  No acute distress, Other (Intubated and sedated)


Heart:  Regular rate


Lungs:  Other (Intubated)


Abdomen:  Other (Nondistended)


Extremities:  No clubbing, No cyanosis


Skin:  No rashes, No breakdown





Labs


LABS





AP portable chest  radiograph 8/28/2020


 


Clinical History: Covid 19.


 


An AP erect portable digital radiograph of the chest was obtained.


 


Comparison study is dated 8/22/2020.


 


The right internal jugular central venous catheter and NG tube are 


unchanged in position. The cardiac silhouette is mildly enlarged. The 


thoracic aorta is mildly tortuous. Bilateral perihilar infiltrates are 


seen which appear increased slightly. No pneumothorax or pleural effusion 


is noted. The osseous structures are unchanged.


 


Impression: Slight interval increase in the bilateral perihilar 


infiltrates.


 


Electronically signed by: Shar Qureshi MD (8/28/2020 9:25 AM) [id number]








Laboratory Tests








Test


 8/28/20


11:47 8/28/20


17:12 8/28/20


23:50 8/29/20


05:30


 


Glucose (Fingerstick)


 177 mg/dL


(70-99) 206 mg/dL


(70-99) 234 mg/dL


(70-99) 





 


White Blood Count


 


 


 


 10.9 x10^3/uL


(4.0-11.0)


 


Red Blood Count


 


 


 


 3.05 x10^6/uL


(4.30-5.70)


 


Hemoglobin


 


 


 


 9.4 g/dL


(13.0-17.5)


 


Hematocrit


 


 


 


 29.1 %


(39.0-53.0)


 


Mean Corpuscular Volume    96 fL () 


 


Mean Corpuscular Hemoglobin    31 pg (25-35) 


 


Mean Corpuscular Hemoglobin


Concent 


 


 


 32 g/dL


(31-37)


 


Red Cell Distribution Width


 


 


 


 14.5 %


(11.5-14.5)


 


Platelet Count


 


 


 


 185 x10^3/uL


(140-400)


 


Neutrophils (%) (Auto)    92 % (31-73) 


 


Lymphocytes (%) (Auto)    3 % (24-48) 


 


Monocytes (%) (Auto)    3 % (0-9) 


 


Eosinophils (%) (Auto)    1 % (0-3) 


 


Basophils (%) (Auto)    1 % (0-3) 


 


Neutrophils # (Auto)


 


 


 


 10.1 x10^3/uL


(1.8-7.7)


 


Lymphocytes # (Auto)


 


 


 


 0.3 x10^3/uL


(1.0-4.8)


 


Monocytes # (Auto)


 


 


 


 0.3 x10^3/uL


(0.0-1.1)


 


Eosinophils # (Auto)


 


 


 


 0.1 x10^3/uL


(0.0-0.7)


 


Basophils # (Auto)


 


 


 


 0.1 x10^3/uL


(0.0-0.2)


 


Sodium Level


 


 


 


 153 mmol/L


(136-145)


 


Potassium Level


 


 


 


 4.7 mmol/L


(3.5-5.1)


 


Chloride Level


 


 


 


 118 mmol/L


()


 


Carbon Dioxide Level


 


 


 


 29 mmol/L


(21-32)


 


Anion Gap    6 (6-14) 


 


Blood Urea Nitrogen


 


 


 


 59 mg/dL


(8-26)


 


Creatinine


 


 


 


 1.0 mg/dL


(0.7-1.3)


 


Estimated GFR


(Cockcroft-Gault) 


 


 


 75.0 





 


Glucose Level


 


 


 


 176 mg/dL


(70-99)


 


Calcium Level


 


 


 


 7.2 mg/dL


(8.5-10.1)


 


Test


 8/29/20


05:35 8/29/20


09:00 


 





 


Glucose (Fingerstick)


 171 mg/dL


(70-99) 


 


 





 


O2 Saturation  82 % (92-99)   


 


Arterial Blood pH


 


 7.35


(7.35-7.45) 


 





 


Arterial Blood pCO2 at


Patient Temp 


 49 mmHg


(35-46) 


 





 


Arterial Blood pO2 at Patient


Temp 


 49 mmHg


() 


 





 


Arterial Blood HCO3


 


 27 mmol/L


(21-28) 


 





 


Arterial Blood Base Excess


 


 1 mmol/L


(-3-3) 


 





 


FiO2  100   











Comment


Review of Relevant


I have reviewed the following items jim (where applicable) has been applied.


Labs





Laboratory Tests








Test


 8/27/20


12:04 8/27/20


17:41 8/27/20


23:57 8/28/20


05:30


 


Glucose (Fingerstick)


 292 mg/dL


(70-99) 252 mg/dL


(70-99) 243 mg/dL


(70-99) 





 


White Blood Count


 


 


 


 11.7 x10^3/uL


(4.0-11.0)


 


Red Blood Count


 


 


 


 3.06 x10^6/uL


(4.30-5.70)


 


Hemoglobin


 


 


 


 9.3 g/dL


(13.0-17.5)


 


Hematocrit


 


 


 


 29.0 %


(39.0-53.0)


 


Mean Corpuscular Volume    95 fL () 


 


Mean Corpuscular Hemoglobin    31 pg (25-35) 


 


Mean Corpuscular Hemoglobin


Concent 


 


 


 32 g/dL


(31-37)


 


Red Cell Distribution Width


 


 


 


 14.1 %


(11.5-14.5)


 


Platelet Count


 


 


 


 189 x10^3/uL


(140-400)


 


Neutrophils (%) (Auto)    93 % (31-73) 


 


Lymphocytes (%) (Auto)    4 % (24-48) 


 


Monocytes (%) (Auto)    2 % (0-9) 


 


Eosinophils (%) (Auto)    1 % (0-3) 


 


Basophils (%) (Auto)    1 % (0-3) 


 


Neutrophils # (Auto)


 


 


 


 10.8 x10^3/uL


(1.8-7.7)


 


Lymphocytes # (Auto)


 


 


 


 0.4 x10^3/uL


(1.0-4.8)


 


Monocytes # (Auto)


 


 


 


 0.2 x10^3/uL


(0.0-1.1)


 


Eosinophils # (Auto)


 


 


 


 0.1 x10^3/uL


(0.0-0.7)


 


Basophils # (Auto)


 


 


 


 0.1 x10^3/uL


(0.0-0.2)


 


Segmented Neutrophils %    92 % (35-66) 


 


Lymphocytes %    3 % (24-48) 


 


Monocytes %    4 % (0-10) 


 


Eosinophils %    1 % (0-5) 


 


Toxic Granulation     


 


Platelet Estimate


 


 


 


 Adequate


(ADEQUATE)


 


Anisocytosis    Slight 


 


Sodium Level


 


 


 


 154 mmol/L


(136-145)


 


Potassium Level


 


 


 


 4.7 mmol/L


(3.5-5.1)


 


Chloride Level


 


 


 


 119 mmol/L


()


 


Carbon Dioxide Level


 


 


 


 27 mmol/L


(21-32)


 


Anion Gap    8 (6-14) 


 


Blood Urea Nitrogen


 


 


 


 49 mg/dL


(8-26)


 


Creatinine


 


 


 


 0.9 mg/dL


(0.7-1.3)


 


Estimated GFR


(Cockcroft-Gault) 


 


 


 84.7 





 


Glucose Level


 


 


 


 221 mg/dL


(70-99)


 


Calcium Level


 


 


 


 7.6 mg/dL


(8.5-10.1)


 


Test


 8/28/20


05:35 8/28/20


07:30 8/28/20


11:47 8/28/20


17:12


 


Glucose (Fingerstick)


 209 mg/dL


(70-99) 


 177 mg/dL


(70-99) 206 mg/dL


(70-99)


 


O2 Saturation  80 % (92-99)   


 


Arterial Blood pH


 


 7.33


(7.35-7.45) 


 





 


Arterial Blood pCO2 at


Patient Temp 


 49 mmHg


(35-46) 


 





 


Arterial Blood pO2 at Patient


Temp 


 51 mmHg


() 


 





 


Arterial Blood HCO3


 


 25 mmol/L


(21-28) 


 





 


Arterial Blood Base Excess


 


 -1 mmol/L


(-3-3) 


 





 


FiO2  100   


 


Test


 8/28/20


23:50 8/29/20


05:30 8/29/20


05:35 8/29/20


09:00


 


Glucose (Fingerstick)


 234 mg/dL


(70-99) 


 171 mg/dL


(70-99) 





 


White Blood Count


 


 10.9 x10^3/uL


(4.0-11.0) 


 





 


Red Blood Count


 


 3.05 x10^6/uL


(4.30-5.70) 


 





 


Hemoglobin


 


 9.4 g/dL


(13.0-17.5) 


 





 


Hematocrit


 


 29.1 %


(39.0-53.0) 


 





 


Mean Corpuscular Volume  96 fL ()   


 


Mean Corpuscular Hemoglobin  31 pg (25-35)   


 


Mean Corpuscular Hemoglobin


Concent 


 32 g/dL


(31-37) 


 





 


Red Cell Distribution Width


 


 14.5 %


(11.5-14.5) 


 





 


Platelet Count


 


 185 x10^3/uL


(140-400) 


 





 


Neutrophils (%) (Auto)  92 % (31-73)   


 


Lymphocytes (%) (Auto)  3 % (24-48)   


 


Monocytes (%) (Auto)  3 % (0-9)   


 


Eosinophils (%) (Auto)  1 % (0-3)   


 


Basophils (%) (Auto)  1 % (0-3)   


 


Neutrophils # (Auto)


 


 10.1 x10^3/uL


(1.8-7.7) 


 





 


Lymphocytes # (Auto)


 


 0.3 x10^3/uL


(1.0-4.8) 


 





 


Monocytes # (Auto)


 


 0.3 x10^3/uL


(0.0-1.1) 


 





 


Eosinophils # (Auto)


 


 0.1 x10^3/uL


(0.0-0.7) 


 





 


Basophils # (Auto)


 


 0.1 x10^3/uL


(0.0-0.2) 


 





 


Sodium Level


 


 153 mmol/L


(136-145) 


 





 


Potassium Level


 


 4.7 mmol/L


(3.5-5.1) 


 





 


Chloride Level


 


 118 mmol/L


() 


 





 


Carbon Dioxide Level


 


 29 mmol/L


(21-32) 


 





 


Anion Gap  6 (6-14)   


 


Blood Urea Nitrogen


 


 59 mg/dL


(8-26) 


 





 


Creatinine


 


 1.0 mg/dL


(0.7-1.3) 


 





 


Estimated GFR


(Cockcroft-Gault) 


 75.0 


 


 





 


Glucose Level


 


 176 mg/dL


(70-99) 


 





 


Calcium Level


 


 7.2 mg/dL


(8.5-10.1) 


 





 


O2 Saturation    82 % (92-99) 


 


Arterial Blood pH


 


 


 


 7.35


(7.35-7.45)


 


Arterial Blood pCO2 at


Patient Temp 


 


 


 49 mmHg


(35-46)


 


Arterial Blood pO2 at Patient


Temp 


 


 


 49 mmHg


()


 


Arterial Blood HCO3


 


 


 


 27 mmol/L


(21-28)


 


Arterial Blood Base Excess


 


 


 


 1 mmol/L


(-3-3)


 


FiO2    100 








Laboratory Tests








Test


 8/28/20


11:47 8/28/20


17:12 8/28/20


23:50 8/29/20


05:30


 


Glucose (Fingerstick)


 177 mg/dL


(70-99) 206 mg/dL


(70-99) 234 mg/dL


(70-99) 





 


White Blood Count


 


 


 


 10.9 x10^3/uL


(4.0-11.0)


 


Red Blood Count


 


 


 


 3.05 x10^6/uL


(4.30-5.70)


 


Hemoglobin


 


 


 


 9.4 g/dL


(13.0-17.5)


 


Hematocrit


 


 


 


 29.1 %


(39.0-53.0)


 


Mean Corpuscular Volume    96 fL () 


 


Mean Corpuscular Hemoglobin    31 pg (25-35) 


 


Mean Corpuscular Hemoglobin


Concent 


 


 


 32 g/dL


(31-37)


 


Red Cell Distribution Width


 


 


 


 14.5 %


(11.5-14.5)


 


Platelet Count


 


 


 


 185 x10^3/uL


(140-400)


 


Neutrophils (%) (Auto)    92 % (31-73) 


 


Lymphocytes (%) (Auto)    3 % (24-48) 


 


Monocytes (%) (Auto)    3 % (0-9) 


 


Eosinophils (%) (Auto)    1 % (0-3) 


 


Basophils (%) (Auto)    1 % (0-3) 


 


Neutrophils # (Auto)


 


 


 


 10.1 x10^3/uL


(1.8-7.7)


 


Lymphocytes # (Auto)


 


 


 


 0.3 x10^3/uL


(1.0-4.8)


 


Monocytes # (Auto)


 


 


 


 0.3 x10^3/uL


(0.0-1.1)


 


Eosinophils # (Auto)


 


 


 


 0.1 x10^3/uL


(0.0-0.7)


 


Basophils # (Auto)


 


 


 


 0.1 x10^3/uL


(0.0-0.2)


 


Sodium Level


 


 


 


 153 mmol/L


(136-145)


 


Potassium Level


 


 


 


 4.7 mmol/L


(3.5-5.1)


 


Chloride Level


 


 


 


 118 mmol/L


()


 


Carbon Dioxide Level


 


 


 


 29 mmol/L


(21-32)


 


Anion Gap    6 (6-14) 


 


Blood Urea Nitrogen


 


 


 


 59 mg/dL


(8-26)


 


Creatinine


 


 


 


 1.0 mg/dL


(0.7-1.3)


 


Estimated GFR


(Cockcroft-Gault) 


 


 


 75.0 





 


Glucose Level


 


 


 


 176 mg/dL


(70-99)


 


Calcium Level


 


 


 


 7.2 mg/dL


(8.5-10.1)


 


Test


 8/29/20


05:35 8/29/20


09:00 


 





 


Glucose (Fingerstick)


 171 mg/dL


(70-99) 


 


 





 


O2 Saturation  82 % (92-99)   


 


Arterial Blood pH


 


 7.35


(7.35-7.45) 


 





 


Arterial Blood pCO2 at


Patient Temp 


 49 mmHg


(35-46) 


 





 


Arterial Blood pO2 at Patient


Temp 


 49 mmHg


() 


 





 


Arterial Blood HCO3


 


 27 mmol/L


(21-28) 


 





 


Arterial Blood Base Excess


 


 1 mmol/L


(-3-3) 


 





 


FiO2  100   








Microbiology


8/22/20 Blood Culture - Final, Complete


          NO GROWTH AFTER 5 DAYS


8/20/20 Urine Culture - Final, Complete


Medications





Current Medications


Etomidate (Amidate) 20 mg STK-MED ONCE IV ;  Start 8/20/20 at 18:54;  Stop 

8/20/20 at 18:54;  Status DC


Succinylcholine Chloride (Anectine) 200 mg STK-MED ONCE .ROUTE ;  Start 8/20/20 

at 18:55;  Stop 8/20/20 at 18:55;  Status DC


Propofol 50 ml @ As Directed STK-MED ONCE IV ;  Start 8/20/20 at 18:55;  Stop 

8/20/20 at 18:56;  Status DC


Propofol 100 ml @ 0 mls/hr CONT  PRN IV SEE PROTOCOL Last administered on 

8/29/20at 05:46;  Start 8/20/20 at 19:00


Fentanyl Citrate (Fentanyl 2ml Vial) 25 mcg PRN Q1HR  PRN IV SEE COMMENTS;  

Start 8/20/20 at 19:00


Fentanyl Citrate (Fentanyl 2ml Vial) 50 mcg PRN Q1HR  PRN IV SEE COMMENTS Last 

administered on 8/20/20at 19:42;  Start 8/20/20 at 19:00


Chlorhexidine Gluconate (Peridex) 15 ml BID MM  Last administered on 8/21/20at 

08:16;  Start 8/20/20 at 21:00;  Stop 8/21/20 at 09:45;  Status DC


Midazolam HCl (Versed) 5 mg STK-MED ONCE .ROUTE ;  Start 8/20/20 at 19:17;  Stop

8/20/20 at 19:18;  Status DC


Midazolam HCl 50 mg/Sodium Chloride 50 ml @ 0 mls/hr 1X  ONCE IV ;  Start 

8/20/20 at 19:30;  Stop 8/20/20 at 19:31;  Status UNV


Albuterol/ Ipratropium (Duoneb) 3 ml STK-MED ONCE .ROUTE ;  Start 8/20/20 at 

19:27;  Stop 8/20/20 at 19:27;  Status DC


Midazolam HCl 100 ml @ 0 mls/hr CONT  PRN IV SEE PROTOCOL Last administered on 

8/29/20at 07:46;  Start 8/20/20 at 19:30


Propofol 50 ml @ 0 mls/hr 1X  ONCE IV  Last administered on 8/20/20at 18:55;  

Start 8/20/20 at 20:00;  Stop 8/20/20 at 20:01;  Status DC


Midazolam HCl (Versed) 5 mg 1X  ONCE NS  Last administered on 8/20/20at 19:18;  

Start 8/20/20 at 19:18;  Stop 8/20/20 at 19:59;  Status DC


Piperacillin Sod/ Tazobactam Sod 3.375 gm/Sodium Chloride 50 ml @  100 mls/hr 1X

 ONCE IV  Last administered on 8/20/20at 21:47;  Start 8/20/20 at 20:45;  Stop 

8/20/20 at 21:14;  Status DC


Dexmedetomidine HCl 400 mcg/ Sodium Chloride 100 ml @ 0 mls/hr CONT  PRN IV 

SEDATION Last administered on 8/29/20at 09:11;  Start 8/20/20 at 20:15


Sodium Chloride 500 ml @  500 mls/hr 1X PRN  PRN IV SEE COMMENTS Last 

administered on 8/27/20at 11:22;  Start 8/20/20 at 20:15


Atropine Sulfate (ATROPINE 0.5mg SYRINGE) 0.5 mg PRN Q5MIN  PRN IV SEE COMMENTS;

 Start 8/20/20 at 20:15


Fentanyl Citrate (Fentanyl 2ml Vial) 100 mcg 1X  ONCE IVP  Last administered on 

8/20/20at 20:17;  Start 8/20/20 at 20:15;  Stop 8/20/20 at 20:18;  Status DC


Methylprednisolone Sodium Succinate (SOLU-Medrol 40MG VIAL) 40 mg Q8HRS IV  Last

administered on 8/29/20at 05:45;  Start 8/20/20 at 22:00


Enoxaparin Sodium (Lovenox 40mg Syringe) 40 mg BID SQ  Last administered on 

8/21/20at 08:15;  Start 8/20/20 at 21:30;  Stop 8/21/20 at 09:47;  Status DC


Potassium Chloride/Dextrose/ Sod Cl 1,000 ml @  125 mls/hr Q8H ONCE IV  Last 

administered on 8/20/20at 22:13;  Start 8/20/20 at 21:30;  Stop 8/21/20 at 

05:29;  Status DC


Zinc Sulfate (Orazinc) 220 mg DAILY PO  Last administered on 8/29/20at 09:01;  

Start 8/21/20 at 09:00


Ondansetron HCl (Zofran) 4 mg PRN Q4HRS  PRN IV NAUSEA/VOMITING;  Start 8/20/20 

at 21:30


Acetaminophen (Tylenol) 650 mg PRN Q4HRS  PRN GT TEMP OVER 100.4F OR MILD PAIN 

Last administered on 8/28/20at 05:51;  Start 8/20/20 at 21:30


Acetaminophen (Tylenol Supp) 650 mg PRN Q4HRS  PRN LA TEMP OVER 100.4F OR MILD 

PAIN;  Start 8/20/20 at 21:30


Docusate Sodium (Colace) 100 mg PRN BID  PRN PO HARD STOOLS Last administered on

8/26/20at 08:26;  Start 8/20/20 at 21:30


Piperacillin Sod/ Tazobactam Sod 3.375 gm/Sodium Chloride 50 ml @  100 mls/hr 

Q6HRS IV  Last administered on 8/29/20at 05:48;  Start 8/21/20 at 00:00


Pantoprazole Sodium (PROTONIX VIAL for IV PUSH) 40 mg DAILYAC IVP  Last 

administered on 8/29/20at 09:01;  Start 8/21/20 at 07:30


Norepinephrine Bitartrate 8 mg/ Dextrose 258 ml @  12.578 mls/ hr CONT  PRN IV 

PER PROTOCOL Last administered on 8/20/20at 22:15;  Start 8/20/20 at 21:45


Sodium Chloride 1,000 ml @  1,000 mls/hr 1X  ONCE IV  Last administered on 

8/20/20at 21:44;  Start 8/20/20 at 21:45;  Stop 8/20/20 at 22:44;  Status DC


Fentanyl Citrate 30 ml @ 0 mls/hr CONT  PRN IV SEE PROTOCOL Last administered on

8/22/20at 10:44;  Start 8/20/20 at 21:45;  Stop 8/22/20 at 14:49;  Status DC


Vecuronium Bromide (Norcuron Bolus) 6 mg PRN Q2HR  PRN IV VENT ASYNCHRONY Last 

administered on 8/22/20at 11:23;  Start 8/20/20 at 21:45;  Stop 8/22/20 at 

14:48;  Status DC


Sodium Chloride 1,000 ml @  1,000 mls/hr 1X  ONCE IV  Last administered on 

8/20/20at 22:36;  Start 8/20/20 at 22:30;  Stop 8/20/20 at 23:29;  Status DC


Methylprednisolone Sodium Succinate (SOLU-Medrol 40MG VIAL) 40 mg Q8HRS IV ;  St

art 8/21/20 at 14:00;  Status UNV


Enoxaparin Sodium (Lovenox 80mg Syringe) 80 mg BID SQ  Last administered on 

8/21/20at 20:53;  Start 8/21/20 at 21:00;  Stop 8/22/20 at 08:57;  Status DC


Insulin Human Lispro (HumaLOG) 6 units TID SQ ;  Start 8/21/20 at 14:00;  Stop 

8/21/20 at 10:05;  Status DC


Insulin Glargine (Lantus Syringe) 10 unit BID SQ  Last administered on 8/22/20at

09:27;  Start 8/21/20 at 21:00;  Stop 8/22/20 at 11:25;  Status DC


Ascorbic Acid (Vitamin C) 250 mg Q6HRS PO  Last administered on 8/29/20at 05:48;

 Start 8/21/20 at 12:00


Sodium Chloride 1,000 ml @  75 mls/hr [id number] IV  Last administered on 8/24/20at

19:33;  Start 8/21/20 at 10:00;  Stop 8/25/20 at 10:23;  Status DC


Insulin Human Lispro (HumaLOG) 6 units Q6HRS SQ  Last administered on 8/22/20at 

06:11;  Start 8/21/20 at 12:00;  Stop 8/22/20 at 11:25;  Status DC


Insulin Glargine (Lantus Syringe) 10 unit 1X  ONCE SQ  Last administered on 

8/21/20at 11:40;  Start 8/21/20 at 11:00;  Stop 8/21/20 at 11:01;  Status DC


Enoxaparin Sodium (Lovenox 60mg Syringe) 60 mg BID SQ  Last administered on 

8/24/20at 08:55;  Start 8/22/20 at 09:00;  Stop 8/24/20 at 16:45;  Status DC


Vecuronium Bromide (Norcuron Bolus) 6 mg PRN Q6HRS  PRN IV SEDATION (2nd Choice)

Last administered on 8/24/20at 16:36;  Start 8/22/20 at 09:30


Insulin Glargine (Lantus Syringe) 14 unit BID SQ  Last administered on 8/24/20at

09:24;  Start 8/22/20 at 21:00;  Stop 8/24/20 at 12:27;  Status DC


Insulin Human Lispro (HumaLOG) 8 units Q6HRS SQ  Last administered on 8/24/20at 

12:11;  Start 8/22/20 at 12:00;  Stop 8/24/20 at 12:27;  Status DC


Fentanyl Citrate 55 ml @ 0 mls/hr CONT  PRN IV SEE PROTOCOL Last administered on

8/29/20at 08:50;  Start 8/22/20 at 14:49


Lorazepam (Ativan Inj) 1 mg PRN Q1HR  PRN IVP ANXIETY / AGITATION- MODERATE;  

Start 8/22/20 at 16:15


Lorazepam (Ativan Inj) 2 mg PRN Q1HR  PRN IVP ANXIETY / AGITATION- SEVERE Last 

administered on 8/23/20at 13:10;  Start 8/22/20 at 16:30


Insulin Human Lispro (HumaLOG) 6 units 1X  ONCE SQ  Last administered on 

8/23/20at 06:20;  Start 8/23/20 at 06:15;  Stop 8/23/20 at 06:16;  Status DC


Hydralazine HCl (Apresoline Inj) 10 mg PRN Q4HRS  PRN IVP ELEVATED BP, SEE COMME

NTS Last administered on 8/28/20at 18:23;  Start 8/24/20 at 09:15


Alteplase, Recombinant (Cathflo For Central Catheter Clearance) 1 mg 1X  ONCE 

INT CAT  Last administered on 8/24/20at 12:05;  Start 8/24/20 at 11:00;  Stop 

8/24/20 at 11:01;  Status DC


Insulin Glargine (Lantus Syringe) 18 unit BID SQ  Last administered on 8/25/20at

08:30;  Start 8/24/20 at 21:00;  Stop 8/25/20 at 10:03;  Status DC


Insulin Human Lispro (HumaLOG) 10 units Q6HRS SQ  Last administered on 8/25/20at

05:53;  Start 8/24/20 at 18:00;  Stop 8/25/20 at 10:03;  Status DC


Vecuronium Bromide 50 mg/ Miscellaneous 50 ml @  4.027 mls/ hr CONT  PRN IV SEE 

I/O RECORD Last administered on 8/29/20at 03:38;  Start 8/24/20 at 16:15


Enoxaparin Sodium (Lovenox Per Pharmacy Treatment Dosing) 1 each PRN DAILY  PRN 

MC SEE COMMENTS;  Start 8/24/20 at 16:45


Enoxaparin Sodium (Lovenox 80mg Syringe) 80 mg Q12HR SQ  Last administered on 

8/29/20at 09:01;  Start 8/24/20 at 21:00


Furosemide (Lasix) 40 mg 1X  ONCE IVP  Last administered on 8/25/20at 10:19;  

Start 8/25/20 at 10:00;  Stop 8/25/20 at 10:01;  Status DC


Insulin Glargine (Lantus Syringe) 24 unit BID SQ  Last administered on 8/29/20at

09:02;  Start 8/25/20 at 10:30


Insulin Human Lispro (HumaLOG) 14 units Q6HRS SQ  Last administered on 8/29/20at

05:47;  Start 8/25/20 at 12:00


Info (Anti-Coagulation Monitoring By Pharmacy) 1 each PRN DAILY  PRN MC SEE 

COMMENTS Last administered on 8/27/20at 14:14;  Start 8/26/20 at 10:00


Polyethylene Glycol (miraLAX PACKET) 17 gm DAILY PO  Last administered on 

8/29/20at 09:02;  Start 8/28/20 at 11:00


Vitals/I & O





Vital Sign - Last 24 Hours








 8/28/20 8/28/20 8/28/20 8/28/20





 10:00 11:00 11:15 12:00


 


Pulse 72 66  


 


Resp 22 22  


 


B/P (MAP) 141/61 (87) 131/57 (81)  


 


Pulse Ox 95 93 93 


 


O2 Delivery Ventilator Ventilator Ventilator Mechanical Ventilator





 8/28/20 8/28/20 8/28/20 8/28/20





 12:00 13:00 14:00 15:00


 


Temp 99.0  98.9 





 99.0  98.9 


 


Pulse 66 84 72 72


 


Resp 22 22 22 22


 


B/P (MAP) 120/56 (77) 121/53 (75) 151/65 (93) 134/59 (84)


 


Pulse Ox 93 90 90 92


 


O2 Delivery Ventilator Ventilator Ventilator Ventilator


 


    





    





 8/28/20 8/28/20 8/28/20 8/28/20





 15:53 16:00 16:00 17:00


 


Temp  98.1  





  98.1  


 


Pulse  68  88


 


Resp  22  22


 


B/P (MAP)  120/58 (78)  175/66 (102)


 


Pulse Ox 93 73  73


 


O2 Delivery Ventilator Ventilator Mechanical Ventilator Ventilator


 


    





    





 8/28/20 8/28/20 8/28/20 8/28/20





 18:00 18:23 19:00 19:25


 


Pulse 94 98 94 


 


Resp 22  22 22


 


B/P (MAP) 185/70 (108) 206/78 185/70 (108) 


 


Pulse Ox 74  74 74


 


O2 Delivery Ventilator  Ventilator Ventilator


 


O2 Flow Rate    18.0





 8/28/20 8/28/20 8/28/20 8/28/20





 19:55 19:55 20:00 20:00


 


Temp    97.6





    97.6


 


Pulse    106


 


Resp 22   22


 


B/P (MAP)    183/68 (106)


 


Pulse Ox 74 74  72


 


O2 Delivery Ventilator Ventilator Mechanical Ventilator Ventilator


 


O2 Flow Rate 18.0   


 


    





    





 8/28/20 8/28/20 8/28/20 8/28/20





 21:00 22:00 23:00 23:30


 


Pulse 82 78 77 


 


Resp 22 22 22 


 


B/P (MAP) 92/46 (61) 103/53 (70) 104/50 (68) 


 


Pulse Ox 78 79 83 84


 


O2 Delivery Ventilator Ventilator Ventilator Ventilator





 8/28/20 8/29/20 8/29/20 8/29/20





 23:59 00:00 01:00 02:00


 


Temp  99.6  





  99.6  


 


Pulse  71 69 68


 


Resp  22 22 22


 


B/P (MAP)  100/50 (67) 102/53 (69) 108/61 (77)


 


Pulse Ox  87 87 87


 


O2 Delivery Mechanical Ventilator Ventilator Ventilator Ventilator


 


    





    





 8/29/20 8/29/20 8/29/20 8/29/20





 03:00 04:00 04:00 04:00


 


Temp    97.7





    97.7


 


Pulse 68   67


 


Resp 22 22


 


B/P (MAP) 108/58 (75)   106/57 (73)


 


Pulse Ox 88  88 88


 


O2 Delivery Ventilator Mechanical Ventilator Ventilator Ventilator


 


    





    





 8/29/20 8/29/20 8/29/20 8/29/20





 05:00 06:00 07:25 08:15


 


Pulse 66 66 72 67


 


Resp 22 22 22 22


 


B/P (MAP) 109/58 (75) 108/51 (70) 107/55 (72) 112/57 (75)


 


Pulse Ox 87 86 79 82


 


O2 Delivery Ventilator Ventilator Ventilator Ventilator





 8/29/20   





 08:50   


 


Pulse Ox 82   


 


O2 Flow Rate 18.0   














Intake and Output   


 


 8/28/20 8/28/20 8/29/20





 15:00 23:00 07:00


 


Intake Total 300 ml 2711.75 ml 1294.8 ml


 


Output Total 630 ml 435 ml 685 ml


 


Balance -330 ml 2276.75 ml 609.8 ml











Justicifation of Admission Dx:


Justifications for Admission:


Justification of Admission Dx:  Yes











TATYANA LLOYD MD          Aug 29, 2020 09:20

## 2020-08-29 NOTE — PDOC
PULMONARY PROGRESS NOTES


DATE: 8/29/20 


TIME: 10:52


Subjective


remains intubated/sedated


AC mode ,100%FIO2/14 PEEP, low P02 on ABG -- continues to having significant 

hypoxia 


no clinical improvement


Vitals





Vital Signs








  Date Time  Temp Pulse Resp B/P (MAP) Pulse Ox O2 Delivery O2 Flow Rate FiO2


 


8/29/20 10:08  80 22 136/62 (86) 76 Ventilator  


 


8/29/20 09:29 98.2       





 98.2       


 


8/29/20 09:20       18.0 








Comments


visual exam done due to COVID-Pandemic


no resp distess


VENT 100% and PEEP 14 


no skin rash


lower extremity edema


Lungs:  Other (Intubated)


Labs





Laboratory Tests








Test


 8/27/20


12:04 8/27/20


17:41 8/27/20


23:57 8/28/20


05:30


 


Glucose (Fingerstick)


 292 mg/dL


(70-99) 252 mg/dL


(70-99) 243 mg/dL


(70-99) 





 


White Blood Count


 


 


 


 11.7 x10^3/uL


(4.0-11.0)


 


Red Blood Count


 


 


 


 3.06 x10^6/uL


(4.30-5.70)


 


Hemoglobin


 


 


 


 9.3 g/dL


(13.0-17.5)


 


Hematocrit


 


 


 


 29.0 %


(39.0-53.0)


 


Mean Corpuscular Volume    95 fL () 


 


Mean Corpuscular Hemoglobin    31 pg (25-35) 


 


Mean Corpuscular Hemoglobin


Concent 


 


 


 32 g/dL


(31-37)


 


Red Cell Distribution Width


 


 


 


 14.1 %


(11.5-14.5)


 


Platelet Count


 


 


 


 189 x10^3/uL


(140-400)


 


Neutrophils (%) (Auto)    93 % (31-73) 


 


Lymphocytes (%) (Auto)    4 % (24-48) 


 


Monocytes (%) (Auto)    2 % (0-9) 


 


Eosinophils (%) (Auto)    1 % (0-3) 


 


Basophils (%) (Auto)    1 % (0-3) 


 


Neutrophils # (Auto)


 


 


 


 10.8 x10^3/uL


(1.8-7.7)


 


Lymphocytes # (Auto)


 


 


 


 0.4 x10^3/uL


(1.0-4.8)


 


Monocytes # (Auto)


 


 


 


 0.2 x10^3/uL


(0.0-1.1)


 


Eosinophils # (Auto)


 


 


 


 0.1 x10^3/uL


(0.0-0.7)


 


Basophils # (Auto)


 


 


 


 0.1 x10^3/uL


(0.0-0.2)


 


Segmented Neutrophils %    92 % (35-66) 


 


Lymphocytes %    3 % (24-48) 


 


Monocytes %    4 % (0-10) 


 


Eosinophils %    1 % (0-5) 


 


Toxic Granulation     


 


Platelet Estimate


 


 


 


 Adequate


(ADEQUATE)


 


Anisocytosis    Slight 


 


Sodium Level


 


 


 


 154 mmol/L


(136-145)


 


Potassium Level


 


 


 


 4.7 mmol/L


(3.5-5.1)


 


Chloride Level


 


 


 


 119 mmol/L


()


 


Carbon Dioxide Level


 


 


 


 27 mmol/L


(21-32)


 


Anion Gap    8 (6-14) 


 


Blood Urea Nitrogen


 


 


 


 49 mg/dL


(8-26)


 


Creatinine


 


 


 


 0.9 mg/dL


(0.7-1.3)


 


Estimated GFR


(Cockcroft-Gault) 


 


 


 84.7 





 


Glucose Level


 


 


 


 221 mg/dL


(70-99)


 


Calcium Level


 


 


 


 7.6 mg/dL


(8.5-10.1)


 


Test


 8/28/20


05:35 8/28/20


07:30 8/28/20


11:47 8/28/20


17:12


 


Glucose (Fingerstick)


 209 mg/dL


(70-99) 


 177 mg/dL


(70-99) 206 mg/dL


(70-99)


 


O2 Saturation  80 % (92-99)   


 


Arterial Blood pH


 


 7.33


(7.35-7.45) 


 





 


Arterial Blood pCO2 at


Patient Temp 


 49 mmHg


(35-46) 


 





 


Arterial Blood pO2 at Patient


Temp 


 51 mmHg


() 


 





 


Arterial Blood HCO3


 


 25 mmol/L


(21-28) 


 





 


Arterial Blood Base Excess


 


 -1 mmol/L


(-3-3) 


 





 


FiO2  100   


 


Test


 8/28/20


23:50 8/29/20


05:30 8/29/20


05:35 8/29/20


09:00


 


Glucose (Fingerstick)


 234 mg/dL


(70-99) 


 171 mg/dL


(70-99) 





 


White Blood Count


 


 10.9 x10^3/uL


(4.0-11.0) 


 





 


Red Blood Count


 


 3.05 x10^6/uL


(4.30-5.70) 


 





 


Hemoglobin


 


 9.4 g/dL


(13.0-17.5) 


 





 


Hematocrit


 


 29.1 %


(39.0-53.0) 


 





 


Mean Corpuscular Volume  96 fL ()   


 


Mean Corpuscular Hemoglobin  31 pg (25-35)   


 


Mean Corpuscular Hemoglobin


Concent 


 32 g/dL


(31-37) 


 





 


Red Cell Distribution Width


 


 14.5 %


(11.5-14.5) 


 





 


Platelet Count


 


 185 x10^3/uL


(140-400) 


 





 


Neutrophils (%) (Auto)  92 % (31-73)   


 


Lymphocytes (%) (Auto)  3 % (24-48)   


 


Monocytes (%) (Auto)  3 % (0-9)   


 


Eosinophils (%) (Auto)  1 % (0-3)   


 


Basophils (%) (Auto)  1 % (0-3)   


 


Neutrophils # (Auto)


 


 10.1 x10^3/uL


(1.8-7.7) 


 





 


Lymphocytes # (Auto)


 


 0.3 x10^3/uL


(1.0-4.8) 


 





 


Monocytes # (Auto)


 


 0.3 x10^3/uL


(0.0-1.1) 


 





 


Eosinophils # (Auto)


 


 0.1 x10^3/uL


(0.0-0.7) 


 





 


Basophils # (Auto)


 


 0.1 x10^3/uL


(0.0-0.2) 


 





 


Sodium Level


 


 153 mmol/L


(136-145) 


 





 


Potassium Level


 


 4.7 mmol/L


(3.5-5.1) 


 





 


Chloride Level


 


 118 mmol/L


() 


 





 


Carbon Dioxide Level


 


 29 mmol/L


(21-32) 


 





 


Anion Gap  6 (6-14)   


 


Blood Urea Nitrogen


 


 59 mg/dL


(8-26) 


 





 


Creatinine


 


 1.0 mg/dL


(0.7-1.3) 


 





 


Estimated GFR


(Cockcroft-Gault) 


 75.0 


 


 





 


Glucose Level


 


 176 mg/dL


(70-99) 


 





 


Calcium Level


 


 7.2 mg/dL


(8.5-10.1) 


 





 


O2 Saturation    82 % (92-99) 


 


Arterial Blood pH


 


 


 


 7.35


(7.35-7.45)


 


Arterial Blood pCO2 at


Patient Temp 


 


 


 49 mmHg


(35-46)


 


Arterial Blood pO2 at Patient


Temp 


 


 


 49 mmHg


()


 


Arterial Blood HCO3


 


 


 


 27 mmol/L


(21-28)


 


Arterial Blood Base Excess


 


 


 


 1 mmol/L


(-3-3)


 


FiO2    100 








Laboratory Tests








Test


 8/28/20


11:47 8/28/20


17:12 8/28/20


23:50 8/29/20


05:30


 


Glucose (Fingerstick)


 177 mg/dL


(70-99) 206 mg/dL


(70-99) 234 mg/dL


(70-99) 





 


White Blood Count


 


 


 


 10.9 x10^3/uL


(4.0-11.0)


 


Red Blood Count


 


 


 


 3.05 x10^6/uL


(4.30-5.70)


 


Hemoglobin


 


 


 


 9.4 g/dL


(13.0-17.5)


 


Hematocrit


 


 


 


 29.1 %


(39.0-53.0)


 


Mean Corpuscular Volume    96 fL () 


 


Mean Corpuscular Hemoglobin    31 pg (25-35) 


 


Mean Corpuscular Hemoglobin


Concent 


 


 


 32 g/dL


(31-37)


 


Red Cell Distribution Width


 


 


 


 14.5 %


(11.5-14.5)


 


Platelet Count


 


 


 


 185 x10^3/uL


(140-400)


 


Neutrophils (%) (Auto)    92 % (31-73) 


 


Lymphocytes (%) (Auto)    3 % (24-48) 


 


Monocytes (%) (Auto)    3 % (0-9) 


 


Eosinophils (%) (Auto)    1 % (0-3) 


 


Basophils (%) (Auto)    1 % (0-3) 


 


Neutrophils # (Auto)


 


 


 


 10.1 x10^3/uL


(1.8-7.7)


 


Lymphocytes # (Auto)


 


 


 


 0.3 x10^3/uL


(1.0-4.8)


 


Monocytes # (Auto)


 


 


 


 0.3 x10^3/uL


(0.0-1.1)


 


Eosinophils # (Auto)


 


 


 


 0.1 x10^3/uL


(0.0-0.7)


 


Basophils # (Auto)


 


 


 


 0.1 x10^3/uL


(0.0-0.2)


 


Sodium Level


 


 


 


 153 mmol/L


(136-145)


 


Potassium Level


 


 


 


 4.7 mmol/L


(3.5-5.1)


 


Chloride Level


 


 


 


 118 mmol/L


()


 


Carbon Dioxide Level


 


 


 


 29 mmol/L


(21-32)


 


Anion Gap    6 (6-14) 


 


Blood Urea Nitrogen


 


 


 


 59 mg/dL


(8-26)


 


Creatinine


 


 


 


 1.0 mg/dL


(0.7-1.3)


 


Estimated GFR


(Cockcroft-Gault) 


 


 


 75.0 





 


Glucose Level


 


 


 


 176 mg/dL


(70-99)


 


Calcium Level


 


 


 


 7.2 mg/dL


(8.5-10.1)


 


Test


 8/29/20


05:35 8/29/20


09:00 


 





 


Glucose (Fingerstick)


 171 mg/dL


(70-99) 


 


 





 


O2 Saturation  82 % (92-99)   


 


Arterial Blood pH


 


 7.35


(7.35-7.45) 


 





 


Arterial Blood pCO2 at


Patient Temp 


 49 mmHg


(35-46) 


 





 


Arterial Blood pO2 at Patient


Temp 


 49 mmHg


() 


 





 


Arterial Blood HCO3


 


 27 mmol/L


(21-28) 


 





 


Arterial Blood Base Excess


 


 1 mmol/L


(-3-3) 


 





 


FiO2  100   








Comments


 CXR 8/21 


IMPRESSION: 


1. Bilateral diffuse infiltrates are increased in the left base and mildly


improved elsewhere.





CXR


8/28/20


Impression: Slight interval increase in the bilateral perihilar 


infiltrates.





Impression


.


1.  Acute hypoxic respiratory failure secondary to COVID-19 pneumonia/ARDS/acute

lung injury--worsening oxygen requirements


2.  Abnormal chest x-ray with bilateral infiltrates consistent with COVID-19 

pneumonia.


3.  Hypotension secondary to combination of hypovolemia and sepsis status post 2

liters of IV fluids. no pressors, now hypertensive


4.  Acute kidney injury--resolved


5.  Severe protein-calorie malnutrition.


6.  Abnormal D-dimer related to COVID-19 pneumonia. Full dose lovenox added 8/24

due to increase in D-dimer to 20, now decreasing





Plan


.


RECOMMENDATIONS:


Continue present assist control mode, PEEP of 14 and FiO2 of 100% ABG reviewed, 

ensure adequate sedation, ARDS,worsening hypoxia, avoid increasing PEEP as could

result in barotrauma/ Pneumothorax


Follow chest x-rays as needed.


Broad spectrum antibiotic.


Cont.  IV steroids, with taper, 


Abnormal D-dimer related to COVID-19 pneumonia.  Full dose lovenox added 8/24 

due to increase in D-dimer to 20. dopplers neg for DVT, D-dimer continues to 

decrease 


Stress ulcer prophylaxis.


s/p Plasma


cont. TF for nutritional support 


Pt doing poorly not expected to survive





D/W RN and RT, I personally spoke with the wife, made her aware that  

will not survive.  She will coming to visit.


PT. is DNR 


Prognosis poor 


Critical care time 30 minutes.











SG BENJAMIN MD              Aug 29, 2020 10:56

## 2020-08-30 VITALS — SYSTOLIC BLOOD PRESSURE: 125 MMHG | DIASTOLIC BLOOD PRESSURE: 60 MMHG

## 2020-08-30 VITALS — SYSTOLIC BLOOD PRESSURE: 112 MMHG | DIASTOLIC BLOOD PRESSURE: 58 MMHG

## 2020-08-30 VITALS — DIASTOLIC BLOOD PRESSURE: 57 MMHG | SYSTOLIC BLOOD PRESSURE: 112 MMHG

## 2020-08-30 VITALS — SYSTOLIC BLOOD PRESSURE: 169 MMHG | DIASTOLIC BLOOD PRESSURE: 80 MMHG

## 2020-08-30 VITALS — SYSTOLIC BLOOD PRESSURE: 126 MMHG | DIASTOLIC BLOOD PRESSURE: 61 MMHG

## 2020-08-30 VITALS — SYSTOLIC BLOOD PRESSURE: 110 MMHG | DIASTOLIC BLOOD PRESSURE: 55 MMHG

## 2020-08-30 VITALS — SYSTOLIC BLOOD PRESSURE: 110 MMHG | DIASTOLIC BLOOD PRESSURE: 57 MMHG

## 2020-08-30 VITALS — SYSTOLIC BLOOD PRESSURE: 109 MMHG | DIASTOLIC BLOOD PRESSURE: 54 MMHG

## 2020-08-30 VITALS — DIASTOLIC BLOOD PRESSURE: 60 MMHG | SYSTOLIC BLOOD PRESSURE: 113 MMHG

## 2020-08-30 VITALS — DIASTOLIC BLOOD PRESSURE: 55 MMHG | SYSTOLIC BLOOD PRESSURE: 107 MMHG

## 2020-08-30 VITALS — DIASTOLIC BLOOD PRESSURE: 56 MMHG | SYSTOLIC BLOOD PRESSURE: 109 MMHG

## 2020-08-30 VITALS — SYSTOLIC BLOOD PRESSURE: 111 MMHG | DIASTOLIC BLOOD PRESSURE: 57 MMHG

## 2020-08-30 VITALS — DIASTOLIC BLOOD PRESSURE: 65 MMHG | SYSTOLIC BLOOD PRESSURE: 121 MMHG

## 2020-08-30 VITALS — SYSTOLIC BLOOD PRESSURE: 148 MMHG | DIASTOLIC BLOOD PRESSURE: 67 MMHG

## 2020-08-30 VITALS — SYSTOLIC BLOOD PRESSURE: 122 MMHG | DIASTOLIC BLOOD PRESSURE: 63 MMHG

## 2020-08-30 VITALS — SYSTOLIC BLOOD PRESSURE: 137 MMHG | DIASTOLIC BLOOD PRESSURE: 61 MMHG

## 2020-08-30 VITALS — SYSTOLIC BLOOD PRESSURE: 116 MMHG | DIASTOLIC BLOOD PRESSURE: 55 MMHG

## 2020-08-30 VITALS — DIASTOLIC BLOOD PRESSURE: 56 MMHG | SYSTOLIC BLOOD PRESSURE: 110 MMHG

## 2020-08-30 VITALS — SYSTOLIC BLOOD PRESSURE: 141 MMHG | DIASTOLIC BLOOD PRESSURE: 66 MMHG

## 2020-08-30 VITALS — SYSTOLIC BLOOD PRESSURE: 113 MMHG | DIASTOLIC BLOOD PRESSURE: 55 MMHG

## 2020-08-30 VITALS — DIASTOLIC BLOOD PRESSURE: 70 MMHG | SYSTOLIC BLOOD PRESSURE: 200 MMHG

## 2020-08-30 LAB
ANION GAP SERPL CALC-SCNC: 2 MMOL/L (ref 6–14)
BASE EXCESS ABG: 0 MMOL/L (ref -3–3)
BASOPHILS # BLD AUTO: 0.1 X10^3/UL (ref 0–0.2)
BASOPHILS NFR BLD: 1 % (ref 0–3)
BUN SERPL-MCNC: 51 MG/DL (ref 8–26)
CALCIUM SERPL-MCNC: 7.3 MG/DL (ref 8.5–10.1)
CHLORIDE SERPL-SCNC: 118 MMOL/L (ref 98–107)
CO2 SERPL-SCNC: 31 MMOL/L (ref 21–32)
CREAT SERPL-MCNC: 0.9 MG/DL (ref 0.7–1.3)
EOSINOPHIL NFR BLD: 0.2 X10^3/UL (ref 0–0.7)
EOSINOPHIL NFR BLD: 2 % (ref 0–3)
ERYTHROCYTE [DISTWIDTH] IN BLOOD BY AUTOMATED COUNT: 14.8 % (ref 11.5–14.5)
GFR SERPLBLD BASED ON 1.73 SQ M-ARVRAT: 84.7 ML/MIN
GLUCOSE SERPL-MCNC: 112 MG/DL (ref 70–99)
HCO3 BLDA-SCNC: 26 MMOL/L (ref 21–28)
HCT VFR BLD CALC: 27.3 % (ref 39–53)
HGB BLD-MCNC: 8.8 G/DL (ref 13–17.5)
INSPIRATION SETTING TIME VENT: 100
LYMPHOCYTES # BLD: 0.3 X10^3/UL (ref 1–4.8)
LYMPHOCYTES NFR BLD AUTO: 3 % (ref 24–48)
MCH RBC QN AUTO: 31 PG (ref 25–35)
MCHC RBC AUTO-ENTMCNC: 32 G/DL (ref 31–37)
MCV RBC AUTO: 96 FL (ref 79–100)
MONO #: 0.5 X10^3/UL (ref 0–1.1)
MONOCYTES NFR BLD: 5 % (ref 0–9)
NEUT #: 9 X10^3/UL (ref 1.8–7.7)
NEUTROPHILS NFR BLD AUTO: 90 % (ref 31–73)
PCO2 BLDA: 48 MMHG (ref 35–46)
PLATELET # BLD AUTO: 192 X10^3/UL (ref 140–400)
PO2 BLDA: 56 MMHG (ref 65–108)
POTASSIUM SERPL-SCNC: 4.5 MMOL/L (ref 3.5–5.1)
RBC # BLD AUTO: 2.84 X10^6/UL (ref 4.3–5.7)
SAO2 % BLDA: 87 % (ref 92–99)
SODIUM SERPL-SCNC: 151 MMOL/L (ref 136–145)
WBC # BLD AUTO: 10.1 X10^3/UL (ref 4–11)

## 2020-08-30 RX ADMIN — INSULIN LISPRO SCH UNITS: 100 INJECTION, SOLUTION INTRAVENOUS; SUBCUTANEOUS at 12:00

## 2020-08-30 RX ADMIN — PIPERACILLIN SODIUM AND TAZOBACTAM SODIUM SCH MLS/HR: 3; .375 INJECTION, POWDER, LYOPHILIZED, FOR SOLUTION INTRAVENOUS at 12:38

## 2020-08-30 RX ADMIN — OXYCODONE HYDROCHLORIDE AND ACETAMINOPHEN SCH MG: 500 TABLET ORAL at 12:00

## 2020-08-30 RX ADMIN — Medication PRN MLS/HR: at 10:47

## 2020-08-30 RX ADMIN — DEXTROSE AND SODIUM CHLORIDE SCH MLS/HR: 5; .2 INJECTION, SOLUTION INTRAVENOUS at 04:05

## 2020-08-30 RX ADMIN — PIPERACILLIN SODIUM AND TAZOBACTAM SODIUM SCH MLS/HR: 3; .375 INJECTION, POWDER, LYOPHILIZED, FOR SOLUTION INTRAVENOUS at 17:42

## 2020-08-30 RX ADMIN — DEXMEDETOMIDINE HYDROCHLORIDE PRN MLS/HR: 100 INJECTION, SOLUTION, CONCENTRATE INTRAVENOUS at 21:25

## 2020-08-30 RX ADMIN — MIDAZOLAM PRN MLS/HR: 5 INJECTION, SOLUTION INTRAMUSCULAR; INTRAVENOUS at 16:25

## 2020-08-30 RX ADMIN — DEXMEDETOMIDINE HYDROCHLORIDE PRN MLS/HR: 100 INJECTION, SOLUTION, CONCENTRATE INTRAVENOUS at 01:44

## 2020-08-30 RX ADMIN — ENOXAPARIN SODIUM SCH MG: 80 INJECTION SUBCUTANEOUS at 07:52

## 2020-08-30 RX ADMIN — PIPERACILLIN SODIUM AND TAZOBACTAM SODIUM SCH MLS/HR: 3; .375 INJECTION, POWDER, LYOPHILIZED, FOR SOLUTION INTRAVENOUS at 06:26

## 2020-08-30 RX ADMIN — POLYETHYLENE GLYCOL 3350 SCH GM: 17 POWDER, FOR SOLUTION ORAL at 07:53

## 2020-08-30 RX ADMIN — METHYLPREDNISOLONE SODIUM SUCCINATE SCH MG: 40 INJECTION, POWDER, FOR SOLUTION INTRAMUSCULAR; INTRAVENOUS at 12:21

## 2020-08-30 RX ADMIN — ZINC SULFATE CAP 220 MG (50 MG ELEMENTAL ZN) SCH MG: 220 (50 ZN) CAP at 07:52

## 2020-08-30 RX ADMIN — PROPOFOL PRN MLS/HR: 10 INJECTION, EMULSION INTRAVENOUS at 12:38

## 2020-08-30 RX ADMIN — INSULIN GLARGINE SCH UNIT: 100 INJECTION, SOLUTION SUBCUTANEOUS at 07:52

## 2020-08-30 RX ADMIN — DEXMEDETOMIDINE HYDROCHLORIDE PRN MLS/HR: 100 INJECTION, SOLUTION, CONCENTRATE INTRAVENOUS at 07:18

## 2020-08-30 RX ADMIN — PANTOPRAZOLE SODIUM SCH MG: 40 INJECTION, POWDER, FOR SOLUTION INTRAVENOUS at 07:52

## 2020-08-30 RX ADMIN — PROPOFOL PRN MLS/HR: 10 INJECTION, EMULSION INTRAVENOUS at 22:55

## 2020-08-30 RX ADMIN — PIPERACILLIN SODIUM AND TAZOBACTAM SODIUM SCH MLS/HR: 3; .375 INJECTION, POWDER, LYOPHILIZED, FOR SOLUTION INTRAVENOUS at 01:37

## 2020-08-30 RX ADMIN — DEXMEDETOMIDINE HYDROCHLORIDE PRN MLS/HR: 100 INJECTION, SOLUTION, CONCENTRATE INTRAVENOUS at 16:27

## 2020-08-30 RX ADMIN — PROPOFOL PRN MLS/HR: 10 INJECTION, EMULSION INTRAVENOUS at 17:42

## 2020-08-30 RX ADMIN — OXYCODONE HYDROCHLORIDE AND ACETAMINOPHEN SCH MG: 500 TABLET ORAL at 17:23

## 2020-08-30 RX ADMIN — INSULIN LISPRO SCH UNITS: 100 INJECTION, SOLUTION INTRAVENOUS; SUBCUTANEOUS at 17:42

## 2020-08-30 RX ADMIN — PROPOFOL PRN MLS/HR: 10 INJECTION, EMULSION INTRAVENOUS at 07:23

## 2020-08-30 RX ADMIN — OXYCODONE HYDROCHLORIDE AND ACETAMINOPHEN SCH MG: 500 TABLET ORAL at 06:27

## 2020-08-30 RX ADMIN — ENOXAPARIN SODIUM SCH MG: 80 INJECTION SUBCUTANEOUS at 22:09

## 2020-08-30 RX ADMIN — METHYLPREDNISOLONE SODIUM SUCCINATE SCH MG: 40 INJECTION, POWDER, FOR SOLUTION INTRAMUSCULAR; INTRAVENOUS at 22:09

## 2020-08-30 RX ADMIN — INSULIN GLARGINE SCH UNIT: 100 INJECTION, SOLUTION SUBCUTANEOUS at 22:10

## 2020-08-30 RX ADMIN — OXYCODONE HYDROCHLORIDE AND ACETAMINOPHEN SCH MG: 500 TABLET ORAL at 23:48

## 2020-08-30 RX ADMIN — INSULIN LISPRO SCH UNITS: 100 INJECTION, SOLUTION INTRAVENOUS; SUBCUTANEOUS at 06:00

## 2020-08-30 RX ADMIN — MIDAZOLAM PRN MLS/HR: 5 INJECTION, SOLUTION INTRAMUSCULAR; INTRAVENOUS at 07:17

## 2020-08-30 RX ADMIN — DEXTROSE AND SODIUM CHLORIDE SCH MLS/HR: 5; .2 INJECTION, SOLUTION INTRAVENOUS at 18:11

## 2020-08-30 RX ADMIN — INSULIN LISPRO SCH UNITS: 100 INJECTION, SOLUTION INTRAVENOUS; SUBCUTANEOUS at 00:00

## 2020-08-30 RX ADMIN — DEXMEDETOMIDINE HYDROCHLORIDE PRN MLS/HR: 100 INJECTION, SOLUTION, CONCENTRATE INTRAVENOUS at 12:56

## 2020-08-30 RX ADMIN — PROPOFOL PRN MLS/HR: 10 INJECTION, EMULSION INTRAVENOUS at 01:04

## 2020-08-30 RX ADMIN — OXYCODONE HYDROCHLORIDE AND ACETAMINOPHEN SCH MG: 500 TABLET ORAL at 01:37

## 2020-08-30 RX ADMIN — METHYLPREDNISOLONE SODIUM SUCCINATE SCH MG: 40 INJECTION, POWDER, FOR SOLUTION INTRAMUSCULAR; INTRAVENOUS at 06:27

## 2020-08-30 RX ADMIN — VECURONIUM BROMIDE PRN MLS/HR: 1 INJECTION, POWDER, LYOPHILIZED, FOR SOLUTION INTRAVENOUS at 07:18

## 2020-08-30 RX ADMIN — PROPOFOL PRN MLS/HR: 10 INJECTION, EMULSION INTRAVENOUS at 01:46

## 2020-08-30 NOTE — PDOC
PROGRESS NOTES


Date of Service:


DATE: 8/30/20 


TIME: 11:04





Chief Complaint


Chief Complaint


Assessment/Plan





Acute hypoxemic respiratory failure


Bilateral perihilar infiltrates are  seen which appear increased slightly


Leukocytosis with bandemia secondary to septic process


Hyponatremia secondary to low effective circulatory volume, now hypernatremic


Acue renal failure due to vasomotor nephropathy most likely 


Hyperglycemia


transaminitis 


Uncontrolled hypertension








CONSULT NEPHROLOGY


Hypotonic saline iv support


serum osmolality


100%FIO2/14 PEEP,











33 MIN CC TIME





History of Present Illness


Patient is a 55-year-old gentleman who was diagnosed with COVID-19 infection on 

Monday 5 days prior to his admission to the intensive care unit.  Apparently the

patient reported worsening respiratory distress reason why he came to the 

emergency department for evaluation and treatment.  The story was quite limited 

since the patient was speaking [place] only and his acuity was such that he 

required mechanical ventilation and prompt intubation by the emergency 

department physician.  At the present time patient is intubated and sedated 

continues to require a lot of support no pertinent past medical history was 

reported he is admitted to the intensive care unit for further treatment





8/24/2020


Continues to require full support, glycemia has been noted in hypertension as 

well.  Discussed with nursing staff, all of concerns were addressed to the best 

of my abilities no acute events reported overnight





8/25/2020


No acute events reported overnight, case discussed with nursing staff patient in

no acute distress  





8/26/2020


Per nursing staff, patient febrile overnight, treated with Tylenol and tepid 

water bath. Case discussed with nursing staff, continue with IV antibiotics and 

full VTE prophylaxis.





8/27/2020


Patient still febrile overnight.  O2 requirement increasing.  Discussed with RN.





8/28/2020


Patient still intubated and sedated.  Discussed with RN, patient was made DNR.  

Nursing concerned about lack of bowel movements and abdominal distention.  Will 

schedule MiraLAX.





Vitals


Vitals





Vital Signs








  Date Time  Temp Pulse Resp B/P (MAP) Pulse Ox O2 Delivery O2 Flow Rate FiO2


 


8/30/20 10:47   22  86 Ventilator 18.0 


 


8/30/20 10:09  83  141/66 (91)    


 


8/30/20 08:00 99.9       





 99.9       











Physical Exam


General:  No acute distress, Other (Intubated and sedated)


Heart:  Regular rate


Lungs:  Other (Intubated)


Abdomen:  Other (Nondistended)


Extremities:  No clubbing, No cyanosis


Skin:  No rashes, No breakdown





Labs


LABS





Laboratory Tests








Test


 8/29/20


12:19 8/29/20


17:47 8/29/20


22:34 8/30/20


05:45


 


Glucose (Fingerstick)


 162 mg/dL


(70-99) 127 mg/dL


(70-99) 89 mg/dL


(70-99) 





 


White Blood Count


 


 


 


 10.1 x10^3/uL


(4.0-11.0)


 


Red Blood Count


 


 


 


 2.84 x10^6/uL


(4.30-5.70)


 


Hemoglobin


 


 


 


 8.8 g/dL


(13.0-17.5)


 


Hematocrit


 


 


 


 27.3 %


(39.0-53.0)


 


Mean Corpuscular Volume    96 fL () 


 


Mean Corpuscular Hemoglobin    31 pg (25-35) 


 


Mean Corpuscular Hemoglobin


Concent 


 


 


 32 g/dL


(31-37)


 


Red Cell Distribution Width


 


 


 


 14.8 %


(11.5-14.5)


 


Platelet Count


 


 


 


 192 x10^3/uL


(140-400)


 


Neutrophils (%) (Auto)    90 % (31-73) 


 


Lymphocytes (%) (Auto)    3 % (24-48) 


 


Monocytes (%) (Auto)    5 % (0-9) 


 


Eosinophils (%) (Auto)    2 % (0-3) 


 


Basophils (%) (Auto)    1 % (0-3) 


 


Neutrophils # (Auto)


 


 


 


 9.0 x10^3/uL


(1.8-7.7)


 


Lymphocytes # (Auto)


 


 


 


 0.3 x10^3/uL


(1.0-4.8)


 


Monocytes # (Auto)


 


 


 


 0.5 x10^3/uL


(0.0-1.1)


 


Eosinophils # (Auto)


 


 


 


 0.2 x10^3/uL


(0.0-0.7)


 


Basophils # (Auto)


 


 


 


 0.1 x10^3/uL


(0.0-0.2)


 


Sodium Level


 


 


 


 151 mmol/L


(136-145)


 


Potassium Level


 


 


 


 4.5 mmol/L


(3.5-5.1)


 


Chloride Level


 


 


 


 118 mmol/L


()


 


Carbon Dioxide Level


 


 


 


 31 mmol/L


(21-32)


 


Anion Gap    2 (6-14) 


 


Blood Urea Nitrogen


 


 


 


 51 mg/dL


(8-26)


 


Creatinine


 


 


 


 0.9 mg/dL


(0.7-1.3)


 


Estimated GFR


(Cockcroft-Gault) 


 


 


 84.7 





 


Glucose Level


 


 


 


 112 mg/dL


(70-99)


 


Calcium Level


 


 


 


 7.3 mg/dL


(8.5-10.1)


 


Test


 8/30/20


09:15 


 


 





 


O2 Saturation 87 % (92-99)    


 


Arterial Blood pH


 7.36


(7.35-7.45) 


 


 





 


Arterial Blood pCO2 at


Patient Temp 48 mmHg


(35-46) 


 


 





 


Arterial Blood pO2 at Patient


Temp 56 mmHg


() 


 


 





 


Arterial Blood HCO3


 26 mmol/L


(21-28) 


 


 





 


Arterial Blood Base Excess


 0 mmol/L


(-3-3) 


 


 





 


FiO2 100    











Comment


Review of Relevant


I have reviewed the following items jmi (where applicable) has been applied.


Labs





Laboratory Tests








Test


 8/28/20


11:47 8/28/20


17:12 8/28/20


23:50 8/29/20


05:30


 


Glucose (Fingerstick)


 177 mg/dL


(70-99) 206 mg/dL


(70-99) 234 mg/dL


(70-99) 





 


White Blood Count


 


 


 


 10.9 x10^3/uL


(4.0-11.0)


 


Red Blood Count


 


 


 


 3.05 x10^6/uL


(4.30-5.70)


 


Hemoglobin


 


 


 


 9.4 g/dL


(13.0-17.5)


 


Hematocrit


 


 


 


 29.1 %


(39.0-53.0)


 


Mean Corpuscular Volume    96 fL () 


 


Mean Corpuscular Hemoglobin    31 pg (25-35) 


 


Mean Corpuscular Hemoglobin


Concent 


 


 


 32 g/dL


(31-37)


 


Red Cell Distribution Width


 


 


 


 14.5 %


(11.5-14.5)


 


Platelet Count


 


 


 


 185 x10^3/uL


(140-400)


 


Neutrophils (%) (Auto)    92 % (31-73) 


 


Lymphocytes (%) (Auto)    3 % (24-48) 


 


Monocytes (%) (Auto)    3 % (0-9) 


 


Eosinophils (%) (Auto)    1 % (0-3) 


 


Basophils (%) (Auto)    1 % (0-3) 


 


Neutrophils # (Auto)


 


 


 


 10.1 x10^3/uL


(1.8-7.7)


 


Lymphocytes # (Auto)


 


 


 


 0.3 x10^3/uL


(1.0-4.8)


 


Monocytes # (Auto)


 


 


 


 0.3 x10^3/uL


(0.0-1.1)


 


Eosinophils # (Auto)


 


 


 


 0.1 x10^3/uL


(0.0-0.7)


 


Basophils # (Auto)


 


 


 


 0.1 x10^3/uL


(0.0-0.2)


 


Sodium Level


 


 


 


 153 mmol/L


(136-145)


 


Potassium Level


 


 


 


 4.7 mmol/L


(3.5-5.1)


 


Chloride Level


 


 


 


 118 mmol/L


()


 


Carbon Dioxide Level


 


 


 


 29 mmol/L


(21-32)


 


Anion Gap    6 (6-14) 


 


Blood Urea Nitrogen


 


 


 


 59 mg/dL


(8-26)


 


Creatinine


 


 


 


 1.0 mg/dL


(0.7-1.3)


 


Estimated GFR


(Cockcroft-Gault) 


 


 


 75.0 





 


Glucose Level


 


 


 


 176 mg/dL


(70-99)


 


Calcium Level


 


 


 


 7.2 mg/dL


(8.5-10.1)


 


Ferritin


 


 


 


 757 ng/mL


()


 


C-Reactive Protein,


Quantitative 


 


 


 135.4 mg/L


(0-3.3)


 


Test


 8/29/20


05:35 8/29/20


09:00 8/29/20


12:19 8/29/20


17:47


 


Glucose (Fingerstick)


 171 mg/dL


(70-99) 


 162 mg/dL


(70-99) 127 mg/dL


(70-99)


 


O2 Saturation  82 % (92-99)   


 


Arterial Blood pH


 


 7.35


(7.35-7.45) 


 





 


Arterial Blood pCO2 at


Patient Temp 


 49 mmHg


(35-46) 


 





 


Arterial Blood pO2 at Patient


Temp 


 49 mmHg


() 


 





 


Arterial Blood HCO3


 


 27 mmol/L


(21-28) 


 





 


Arterial Blood Base Excess


 


 1 mmol/L


(-3-3) 


 





 


FiO2  100   


 


Test


 8/29/20


22:34 8/30/20


05:45 8/30/20


09:15 





 


Glucose (Fingerstick)


 89 mg/dL


(70-99) 


 


 





 


White Blood Count


 


 10.1 x10^3/uL


(4.0-11.0) 


 





 


Red Blood Count


 


 2.84 x10^6/uL


(4.30-5.70) 


 





 


Hemoglobin


 


 8.8 g/dL


(13.0-17.5) 


 





 


Hematocrit


 


 27.3 %


(39.0-53.0) 


 





 


Mean Corpuscular Volume  96 fL ()   


 


Mean Corpuscular Hemoglobin  31 pg (25-35)   


 


Mean Corpuscular Hemoglobin


Concent 


 32 g/dL


(31-37) 


 





 


Red Cell Distribution Width


 


 14.8 %


(11.5-14.5) 


 





 


Platelet Count


 


 192 x10^3/uL


(140-400) 


 





 


Neutrophils (%) (Auto)  90 % (31-73)   


 


Lymphocytes (%) (Auto)  3 % (24-48)   


 


Monocytes (%) (Auto)  5 % (0-9)   


 


Eosinophils (%) (Auto)  2 % (0-3)   


 


Basophils (%) (Auto)  1 % (0-3)   


 


Neutrophils # (Auto)


 


 9.0 x10^3/uL


(1.8-7.7) 


 





 


Lymphocytes # (Auto)


 


 0.3 x10^3/uL


(1.0-4.8) 


 





 


Monocytes # (Auto)


 


 0.5 x10^3/uL


(0.0-1.1) 


 





 


Eosinophils # (Auto)


 


 0.2 x10^3/uL


(0.0-0.7) 


 





 


Basophils # (Auto)


 


 0.1 x10^3/uL


(0.0-0.2) 


 





 


Sodium Level


 


 151 mmol/L


(136-145) 


 





 


Potassium Level


 


 4.5 mmol/L


(3.5-5.1) 


 





 


Chloride Level


 


 118 mmol/L


() 


 





 


Carbon Dioxide Level


 


 31 mmol/L


(21-32) 


 





 


Anion Gap  2 (6-14)   


 


Blood Urea Nitrogen


 


 51 mg/dL


(8-26) 


 





 


Creatinine


 


 0.9 mg/dL


(0.7-1.3) 


 





 


Estimated GFR


(Cockcroft-Gault) 


 84.7 


 


 





 


Glucose Level


 


 112 mg/dL


(70-99) 


 





 


Calcium Level


 


 7.3 mg/dL


(8.5-10.1) 


 





 


O2 Saturation   87 % (92-99)  


 


Arterial Blood pH


 


 


 7.36


(7.35-7.45) 





 


Arterial Blood pCO2 at


Patient Temp 


 


 48 mmHg


(35-46) 





 


Arterial Blood pO2 at Patient


Temp 


 


 56 mmHg


() 





 


Arterial Blood HCO3


 


 


 26 mmol/L


(21-28) 





 


Arterial Blood Base Excess


 


 


 0 mmol/L


(-3-3) 





 


FiO2   100  








Laboratory Tests








Test


 8/29/20


12:19 8/29/20


17:47 8/29/20


22:34 8/30/20


05:45


 


Glucose (Fingerstick)


 162 mg/dL


(70-99) 127 mg/dL


(70-99) 89 mg/dL


(70-99) 





 


White Blood Count


 


 


 


 10.1 x10^3/uL


(4.0-11.0)


 


Red Blood Count


 


 


 


 2.84 x10^6/uL


(4.30-5.70)


 


Hemoglobin


 


 


 


 8.8 g/dL


(13.0-17.5)


 


Hematocrit


 


 


 


 27.3 %


(39.0-53.0)


 


Mean Corpuscular Volume    96 fL () 


 


Mean Corpuscular Hemoglobin    31 pg (25-35) 


 


Mean Corpuscular Hemoglobin


Concent 


 


 


 32 g/dL


(31-37)


 


Red Cell Distribution Width


 


 


 


 14.8 %


(11.5-14.5)


 


Platelet Count


 


 


 


 192 x10^3/uL


(140-400)


 


Neutrophils (%) (Auto)    90 % (31-73) 


 


Lymphocytes (%) (Auto)    3 % (24-48) 


 


Monocytes (%) (Auto)    5 % (0-9) 


 


Eosinophils (%) (Auto)    2 % (0-3) 


 


Basophils (%) (Auto)    1 % (0-3) 


 


Neutrophils # (Auto)


 


 


 


 9.0 x10^3/uL


(1.8-7.7)


 


Lymphocytes # (Auto)


 


 


 


 0.3 x10^3/uL


(1.0-4.8)


 


Monocytes # (Auto)


 


 


 


 0.5 x10^3/uL


(0.0-1.1)


 


Eosinophils # (Auto)


 


 


 


 0.2 x10^3/uL


(0.0-0.7)


 


Basophils # (Auto)


 


 


 


 0.1 x10^3/uL


(0.0-0.2)


 


Sodium Level


 


 


 


 151 mmol/L


(136-145)


 


Potassium Level


 


 


 


 4.5 mmol/L


(3.5-5.1)


 


Chloride Level


 


 


 


 118 mmol/L


()


 


Carbon Dioxide Level


 


 


 


 31 mmol/L


(21-32)


 


Anion Gap    2 (6-14) 


 


Blood Urea Nitrogen


 


 


 


 51 mg/dL


(8-26)


 


Creatinine


 


 


 


 0.9 mg/dL


(0.7-1.3)


 


Estimated GFR


(Cockcroft-Gault) 


 


 


 84.7 





 


Glucose Level


 


 


 


 112 mg/dL


(70-99)


 


Calcium Level


 


 


 


 7.3 mg/dL


(8.5-10.1)


 


Test


 8/30/20


09:15 


 


 





 


O2 Saturation 87 % (92-99)    


 


Arterial Blood pH


 7.36


(7.35-7.45) 


 


 





 


Arterial Blood pCO2 at


Patient Temp 48 mmHg


(35-46) 


 


 





 


Arterial Blood pO2 at Patient


Temp 56 mmHg


() 


 


 





 


Arterial Blood HCO3


 26 mmol/L


(21-28) 


 


 





 


Arterial Blood Base Excess


 0 mmol/L


(-3-3) 


 


 





 


FiO2 100    








Microbiology


8/22/20 Blood Culture - Final, Complete


          NO GROWTH AFTER 5 DAYS


8/20/20 Urine Culture - Final, Complete


Medications





Current Medications


Etomidate (Amidate) 20 mg STK-MED ONCE IV ;  Start 8/20/20 at 18:54;  Stop 

8/20/20 at 18:54;  Status DC


Succinylcholine Chloride (Anectine) 200 mg STK-MED ONCE .ROUTE ;  Start 8/20/20 

at 18:55;  Stop 8/20/20 at 18:55;  Status DC


Propofol 50 ml @ As Directed STK-MED ONCE IV ;  Start 8/20/20 at 18:55;  Stop 

8/20/20 at 18:56;  Status DC


Propofol 100 ml @ 0 mls/hr CONT  PRN IV SEE PROTOCOL Last administered on 

8/30/20at 07:23;  Start 8/20/20 at 19:00


Fentanyl Citrate (Fentanyl 2ml Vial) 25 mcg PRN Q1HR  PRN IV SEE COMMENTS;  

Start 8/20/20 at 19:00


Fentanyl Citrate (Fentanyl 2ml Vial) 50 mcg PRN Q1HR  PRN IV SEE COMMENTS Last 

administered on 8/20/20at 19:42;  Start 8/20/20 at 19:00


Chlorhexidine Gluconate (Peridex) 15 ml BID MM  Last administered on 8/21/20at 

08:16;  Start 8/20/20 at 21:00;  Stop 8/21/20 at 09:45;  Status DC


Midazolam HCl (Versed) 5 mg STK-MED ONCE .ROUTE ;  Start 8/20/20 at 19:17;  Stop

8/20/20 at 19:18;  Status DC


Midazolam HCl 50 mg/Sodium Chloride 50 ml @ 0 mls/hr 1X  ONCE IV ;  Start 

8/20/20 at 19:30;  Stop 8/20/20 at 19:31;  Status UNV


Albuterol/ Ipratropium (Duoneb) 3 ml STK-MED ONCE .ROUTE ;  Start 8/20/20 at 

19:27;  Stop 8/20/20 at 19:27;  Status DC


Midazolam HCl 100 ml @ 0 mls/hr CONT  PRN IV SEE PROTOCOL Last administered on 

8/30/20at 07:17;  Start 8/20/20 at 19:30


Propofol 50 ml @ 0 mls/hr 1X  ONCE IV  Last administered on 8/20/20at 18:55;  

Start 8/20/20 at 20:00;  Stop 8/20/20 at 20:01;  Status DC


Midazolam HCl (Versed) 5 mg 1X  ONCE NS  Last administered on 8/20/20at 19:18;  

Start 8/20/20 at 19:18;  Stop 8/20/20 at 19:59;  Status DC


Piperacillin Sod/ Tazobactam Sod 3.375 gm/Sodium Chloride 50 ml @  100 mls/hr 1X

 ONCE IV  Last administered on 8/20/20at 21:47;  Start 8/20/20 at 20:45;  Stop 

8/20/20 at 21:14;  Status DC


Dexmedetomidine HCl 400 mcg/ Sodium Chloride 100 ml @ 0 mls/hr CONT  PRN IV 

SEDATION Last administered on 8/30/20at 07:18;  Start 8/20/20 at 20:15


Sodium Chloride 500 ml @  500 mls/hr 1X PRN  PRN IV SEE COMMENTS Last 

administered on 8/27/20at 11:22;  Start 8/20/20 at 20:15


Atropine Sulfate (ATROPINE 0.5mg SYRINGE) 0.5 mg PRN Q5MIN  PRN IV SEE COMMENTS;

 Start 8/20/20 at 20:15


Fentanyl Citrate (Fentanyl 2ml Vial) 100 mcg 1X  ONCE IVP  Last administered on 

8/20/20at 20:17;  Start 8/20/20 at 20:15;  Stop 8/20/20 at 20:18;  Status DC


Methylprednisolone Sodium Succinate (SOLU-Medrol 40MG VIAL) 40 mg Q8HRS IV  Last

administered on 8/30/20at 06:27;  Start 8/20/20 at 22:00


Enoxaparin Sodium (Lovenox 40mg Syringe) 40 mg BID SQ  Last administered on 

8/21/20at 08:15;  Start 8/20/20 at 21:30;  Stop 8/21/20 at 09:47;  Status DC


Potassium Chloride/Dextrose/ Sod Cl 1,000 ml @  125 mls/hr Q8H ONCE IV  Last 

administered on 8/20/20at 22:13;  Start 8/20/20 at 21:30;  Stop 8/21/20 at 

05:29;  Status DC


Zinc Sulfate (Orazinc) 220 mg DAILY PO  Last administered on 8/30/20at 07:52;  

Start 8/21/20 at 09:00


Ondansetron HCl (Zofran) 4 mg PRN Q4HRS  PRN IV NAUSEA/VOMITING;  Start 8/20/20 

at 21:30


Acetaminophen (Tylenol) 650 mg PRN Q4HRS  PRN GT TEMP OVER 100.4F OR MILD PAIN 

Last administered on 8/28/20at 05:51;  Start 8/20/20 at 21:30


Acetaminophen (Tylenol Supp) 650 mg PRN Q4HRS  PRN NV TEMP OVER 100.4F OR MILD 

PAIN;  Start 8/20/20 at 21:30


Docusate Sodium (Colace) 100 mg PRN BID  PRN PO HARD STOOLS Last administered on

8/26/20at 08:26;  Start 8/20/20 at 21:30


Piperacillin Sod/ Tazobactam Sod 3.375 gm/Sodium Chloride 50 ml @  100 mls/hr 

Q6HRS IV  Last administered on 8/30/20at 06:26;  Start 8/21/20 at 00:00


Pantoprazole Sodium (PROTONIX VIAL for IV PUSH) 40 mg DAILYAC IVP  Last 

administered on 8/30/20at 07:52;  Start 8/21/20 at 07:30


Norepinephrine Bitartrate 8 mg/ Dextrose 258 ml @  12.578 mls/ hr CONT  PRN IV 

PER PROTOCOL Last administered on 8/20/20at 22:15;  Start 8/20/20 at 21:45


Sodium Chloride 1,000 ml @  1,000 mls/hr 1X  ONCE IV  Last administered on 

8/20/20at 21:44;  Start 8/20/20 at 21:45;  Stop 8/20/20 at 22:44;  Status DC


Fentanyl Citrate 30 ml @ 0 mls/hr CONT  PRN IV SEE PROTOCOL Last administered on

8/22/20at 10:44;  Start 8/20/20 at 21:45;  Stop 8/22/20 at 14:49;  Status DC


Vecuronium Bromide (Norcuron Bolus) 6 mg PRN Q2HR  PRN IV VENT ASYNCHRONY Last 

administered on 8/22/20at 11:23;  Start 8/20/20 at 21:45;  Stop 8/22/20 at 

14:48;  Status DC


Sodium Chloride 1,000 ml @  1,000 mls/hr 1X  ONCE IV  Last administered on 

8/20/20at 22:36;  Start 8/20/20 at 22:30;  Stop 8/20/20 at 23:29;  Status DC


Methylprednisolone Sodium Succinate (SOLU-Medrol 40MG VIAL) 40 mg Q8HRS IV ;  

Start 8/21/20 at 14:00;  Status UNV


Enoxaparin Sodium (Lovenox 80mg Syringe) 80 mg BID SQ  Last administered on 

8/21/20at 20:53;  Start 8/21/20 at 21:00;  Stop 8/22/20 at 08:57;  Status DC


Insulin Human Lispro (HumaLOG) 6 units TID SQ ;  Start 8/21/20 at 14:00;  Stop 

8/21/20 at 10:05;  Status DC


Insulin Glargine (Lantus Syringe) 10 unit BID SQ  Last administered on 8/22/20at

09:27;  Start 8/21/20 at 21:00;  Stop 8/22/20 at 11:25;  Status DC


Ascorbic Acid (Vitamin C) 250 mg Q6HRS PO  Last administered on 8/30/20at 06:27;

 Start 8/21/20 at 12:00


Sodium Chloride 1,000 ml @  75 mls/hr [id number] IV  Last administered on 8/24/20at

19:33;  Start 8/21/20 at 10:00;  Stop 8/25/20 at 10:23;  Status DC


Insulin Human Lispro (HumaLOG) 6 units Q6HRS SQ  Last administered on 8/22/20at 

06:11;  Start 8/21/20 at 12:00;  Stop 8/22/20 at 11:25;  Status DC


Insulin Glargine (Lantus Syringe) 10 unit 1X  ONCE SQ  Last administered on 

8/21/20at 11:40;  Start 8/21/20 at 11:00;  Stop 8/21/20 at 11:01;  Status DC


Enoxaparin Sodium (Lovenox 60mg Syringe) 60 mg BID SQ  Last administered on 

8/24/20at 08:55;  Start 8/22/20 at 09:00;  Stop 8/24/20 at 16:45;  Status DC


Vecuronium Bromide (Norcuron Bolus) 6 mg PRN Q6HRS  PRN IV SEDATION (2nd Choice)

Last administered on 8/24/20at 16:36;  Start 8/22/20 at 09:30


Insulin Glargine (Lantus Syringe) 14 unit BID SQ  Last administered on 8/24/20at

09:24;  Start 8/22/20 at 21:00;  Stop 8/24/20 at 12:27;  Status DC


Insulin Human Lispro (HumaLOG) 8 units Q6HRS SQ  Last administered on 8/24/20at 

12:11;  Start 8/22/20 at 12:00;  Stop 8/24/20 at 12:27;  Status DC


Fentanyl Citrate 55 ml @ 0 mls/hr CONT  PRN IV SEE PROTOCOL Last administered on

8/30/20at 10:47;  Start 8/22/20 at 14:49


Lorazepam (Ativan Inj) 1 mg PRN Q1HR  PRN IVP ANXIETY / AGITATION- MODERATE;  

Start 8/22/20 at 16:15


Lorazepam (Ativan Inj) 2 mg PRN Q1HR  PRN IVP ANXIETY / AGITATION- SEVERE Last 

administered on 8/23/20at 13:10;  Start 8/22/20 at 16:30


Insulin Human Lispro (HumaLOG) 6 units 1X  ONCE SQ  Last administered on 

8/23/20at 06:20;  Start 8/23/20 at 06:15;  Stop 8/23/20 at 06:16;  Status DC


Hydralazine HCl (Apresoline Inj) 10 mg PRN Q4HRS  PRN IVP ELEVATED BP, SEE 

COMMENTS Last administered on 8/29/20at 23:09;  Start 8/24/20 at 09:15


Alteplase, Recombinant (Cathflo For Central Catheter Clearance) 1 mg 1X  ONCE 

INT CAT  Last administered on 8/24/20at 12:05;  Start 8/24/20 at 11:00;  Stop 

8/24/20 at 11:01;  Status DC


Insulin Glargine (Lantus Syringe) 18 unit BID SQ  Last administered on 8/25/20at

08:30;  Start 8/24/20 at 21:00;  Stop 8/25/20 at 10:03;  Status DC


Insulin Human Lispro (HumaLOG) 10 units Q6HRS SQ  Last administered on 8/25/20at

05:53;  Start 8/24/20 at 18:00;  Stop 8/25/20 at 10:03;  Status DC


Vecuronium Bromide 50 mg/ Miscellaneous 50 ml @  4.027 mls/ hr CONT  PRN IV SEE 

I/O RECORD Last administered on 8/30/20at 07:18;  Start 8/24/20 at 16:15


Enoxaparin Sodium (Lovenox Per Pharmacy Treatment Dosing) 1 each PRN DAILY  PRN 

MC SEE COMMENTS;  Start 8/24/20 at 16:45


Enoxaparin Sodium (Lovenox 80mg Syringe) 80 mg Q12HR SQ  Last administered on 

8/30/20at 07:52;  Start 8/24/20 at 21:00


Furosemide (Lasix) 40 mg 1X  ONCE IVP  Last administered on 8/25/20at 10:19;  

Start 8/25/20 at 10:00;  Stop 8/25/20 at 10:01;  Status DC


Insulin Glargine (Lantus Syringe) 24 unit BID SQ  Last administered on 8/30/20at

07:52;  Start 8/25/20 at 10:30


Insulin Human Lispro (HumaLOG) 14 units Q6HRS SQ  Last administered on 8/29/20at

12:20;  Start 8/25/20 at 12:00


Info (Anti-Coagulation Monitoring By Pharmacy) 1 each PRN DAILY  PRN MC SEE 

COMMENTS Last administered on 8/27/20at 14:14;  Start 8/26/20 at 10:00


Polyethylene Glycol (miraLAX PACKET) 17 gm DAILY PO  Last administered on 

8/29/20at 09:02;  Start 8/28/20 at 11:00


Dextrose/Sodium Chloride 1,000 ml @  75 mls/hr [id number] IV  Last administered on 

8/30/20at 04:05;  Start 8/29/20 at 09:30


Vitals/I & O





Vital Sign - Last 24 Hours








 8/29/20 8/29/20 8/29/20 8/29/20





 12:00 12:34 12:45 13:11


 


Temp   99.5 





   99.5 


 


Pulse   85 87


 


Resp   22 22


 


B/P (MAP)   139/72 (94) 140/60 (86)


 


Pulse Ox  79 71 70


 


O2 Delivery Mechanical Ventilator Ventilator Ventilator Ventilator


 


    





    





 8/29/20 8/29/20 8/29/20 8/29/20





 14:06 15:30 16:00 16:14


 


Pulse 92 88 85 


 


Resp 22 22 22 


 


B/P (MAP) 146/64 (91) 138/59 (85) 138/63 (88) 


 


Pulse Ox 74 77 79 


 


O2 Delivery Ventilator Ventilator Ventilator Mechanical Ventilator





 8/29/20 8/29/20 8/29/20 8/29/20





 16:30 17:45 18:03 19:00


 


Temp  99.5  





  99.5  


 


Pulse  83 83 85


 


Resp  22 22 22


 


B/P (MAP)  135/63 (87) 136/65 (88) 146/65 (92)


 


Pulse Ox 79 81 81 80


 


O2 Delivery Ventilator Ventilator Ventilator Ventilator


 


    





    





 8/29/20 8/29/20 8/29/20 8/29/20





 20:00 20:00 20:10 21:00


 


Temp  99.4  





  99.4  


 


Pulse  87  88


 


Resp  22 22


 


B/P (MAP)  146/66 (92)  173/69 (103)


 


Pulse Ox  80 81 80


 


O2 Delivery Mechanical Ventilator Ventilator Ventilator Ventilator


 


    





    





 8/29/20 8/29/20 8/29/20 8/29/20





 22:00 22:58 23:00 23:09


 


Pulse 102  103 83


 


Resp 22 22 22 


 


B/P (MAP) 198/52 (100)  200/80 (120) 200/80


 


Pulse Ox 79 81 81 


 


O2 Delivery Ventilator  Ventilator 


 


O2 Flow Rate  18.0  





 8/29/20 8/29/20 8/30/20 8/30/20





 23:28 23:59 00:01 00:24


 


Temp   99.9 





   99.9 


 


Pulse   106 


 


Resp 22 22 


 


B/P (MAP)   200/70 (113) 


 


Pulse Ox 83  83 83


 


O2 Delivery  Mechanical Ventilator Ventilator Ventilator


 


O2 Flow Rate 18.0   


 


    





    





 8/30/20 8/30/20 8/30/20 8/30/20





 01:00 02:00 03:00 03:40


 


Pulse 110 106 92 


 


Resp 22 22 22 


 


B/P (MAP) 169/80 (109) 122/63 (82) 121/65 (83) 


 


Pulse Ox 85 88 90 89


 


O2 Delivery Ventilator Ventilator Ventilator Ventilator





 8/30/20 8/30/20 8/30/20 8/30/20





 04:00 04:00 07:09 08:00


 


Temp  101.1  99.9





  101.1  99.9


 


Pulse  91 79 78


 


Resp  22 22 22


 


B/P (MAP)  148/67 (94) 110/56 (74) 126/61 (82)


 


Pulse Ox  90 88 88


 


O2 Delivery Mechanical Ventilator Ventilator Ventilator Ventilator


 


    





    





 8/30/20 8/30/20 8/30/20 8/30/20





 08:00 09:13 09:13 10:09


 


Pulse  80  83


 


Resp  22  22


 


B/P (MAP)  125/60 (81)  141/66 (91)


 


Pulse Ox  88 88 86


 


O2 Delivery Mechanical Ventilator Ventilator Ventilator Ventilator





 8/30/20   





 10:47   


 


Resp 22   


 


Pulse Ox 86   


 


O2 Delivery Ventilator   


 


O2 Flow Rate 18.0   














Intake and Output   


 


 8/29/20 8/29/20 8/30/20





 15:00 23:00 07:00


 


Intake Total 250 ml 250 ml 250 ml


 


Output Total 510 ml 1050 ml 625 ml


 


Balance -260 ml -800 ml -375 ml











Justicifation of Admission Dx:


Justifications for Admission:


Justification of Admission Dx:  Yes











TATYANA LLOYD MD          Aug 30, 2020 11:04

## 2020-08-30 NOTE — PDOC
PULMONARY PROGRESS NOTES


DATE: 8/30/20 


TIME: 10:44


Subjective


remains intubated/sedated


AC mode ,100%FIO2/14 PEEP, low P02 on ABG -- continues to  hypoxia 


Fever overnight 


Not tolerating Tube feedings 


no clinical improvement, family visited overnight


Vitals





Vital Signs








  Date Time  Temp Pulse Resp B/P (MAP) Pulse Ox O2 Delivery O2 Flow Rate FiO2


 


8/30/20 10:09  83 22 141/66 (91) 86 Ventilator  


 


8/30/20 08:00 99.9       





 99.9       


 


8/29/20 23:28       18.0 








Comments


visual exam done due to COVID-Pandemic


no resp distess


VENT 100% and PEEP 14 


no skin rash


lower extremity edema


Lungs:  Other (Intubated)


Labs





Laboratory Tests








Test


 8/28/20


11:47 8/28/20


17:12 8/28/20


23:50 8/29/20


05:30


 


Glucose (Fingerstick)


 177 mg/dL


(70-99) 206 mg/dL


(70-99) 234 mg/dL


(70-99) 





 


White Blood Count


 


 


 


 10.9 x10^3/uL


(4.0-11.0)


 


Red Blood Count


 


 


 


 3.05 x10^6/uL


(4.30-5.70)


 


Hemoglobin


 


 


 


 9.4 g/dL


(13.0-17.5)


 


Hematocrit


 


 


 


 29.1 %


(39.0-53.0)


 


Mean Corpuscular Volume    96 fL () 


 


Mean Corpuscular Hemoglobin    31 pg (25-35) 


 


Mean Corpuscular Hemoglobin


Concent 


 


 


 32 g/dL


(31-37)


 


Red Cell Distribution Width


 


 


 


 14.5 %


(11.5-14.5)


 


Platelet Count


 


 


 


 185 x10^3/uL


(140-400)


 


Neutrophils (%) (Auto)    92 % (31-73) 


 


Lymphocytes (%) (Auto)    3 % (24-48) 


 


Monocytes (%) (Auto)    3 % (0-9) 


 


Eosinophils (%) (Auto)    1 % (0-3) 


 


Basophils (%) (Auto)    1 % (0-3) 


 


Neutrophils # (Auto)


 


 


 


 10.1 x10^3/uL


(1.8-7.7)


 


Lymphocytes # (Auto)


 


 


 


 0.3 x10^3/uL


(1.0-4.8)


 


Monocytes # (Auto)


 


 


 


 0.3 x10^3/uL


(0.0-1.1)


 


Eosinophils # (Auto)


 


 


 


 0.1 x10^3/uL


(0.0-0.7)


 


Basophils # (Auto)


 


 


 


 0.1 x10^3/uL


(0.0-0.2)


 


Sodium Level


 


 


 


 153 mmol/L


(136-145)


 


Potassium Level


 


 


 


 4.7 mmol/L


(3.5-5.1)


 


Chloride Level


 


 


 


 118 mmol/L


()


 


Carbon Dioxide Level


 


 


 


 29 mmol/L


(21-32)


 


Anion Gap    6 (6-14) 


 


Blood Urea Nitrogen


 


 


 


 59 mg/dL


(8-26)


 


Creatinine


 


 


 


 1.0 mg/dL


(0.7-1.3)


 


Estimated GFR


(Cockcroft-Gault) 


 


 


 75.0 





 


Glucose Level


 


 


 


 176 mg/dL


(70-99)


 


Calcium Level


 


 


 


 7.2 mg/dL


(8.5-10.1)


 


Ferritin


 


 


 


 757 ng/mL


()


 


C-Reactive Protein,


Quantitative 


 


 


 135.4 mg/L


(0-3.3)


 


Test


 8/29/20


05:35 8/29/20


09:00 8/29/20


12:19 8/29/20


17:47


 


Glucose (Fingerstick)


 171 mg/dL


(70-99) 


 162 mg/dL


(70-99) 127 mg/dL


(70-99)


 


O2 Saturation  82 % (92-99)   


 


Arterial Blood pH


 


 7.35


(7.35-7.45) 


 





 


Arterial Blood pCO2 at


Patient Temp 


 49 mmHg


(35-46) 


 





 


Arterial Blood pO2 at Patient


Temp 


 49 mmHg


() 


 





 


Arterial Blood HCO3


 


 27 mmol/L


(21-28) 


 





 


Arterial Blood Base Excess


 


 1 mmol/L


(-3-3) 


 





 


FiO2  100   


 


Test


 8/29/20


22:34 8/30/20


05:45 8/30/20


09:15 





 


Glucose (Fingerstick)


 89 mg/dL


(70-99) 


 


 





 


White Blood Count


 


 10.1 x10^3/uL


(4.0-11.0) 


 





 


Red Blood Count


 


 2.84 x10^6/uL


(4.30-5.70) 


 





 


Hemoglobin


 


 8.8 g/dL


(13.0-17.5) 


 





 


Hematocrit


 


 27.3 %


(39.0-53.0) 


 





 


Mean Corpuscular Volume  96 fL ()   


 


Mean Corpuscular Hemoglobin  31 pg (25-35)   


 


Mean Corpuscular Hemoglobin


Concent 


 32 g/dL


(31-37) 


 





 


Red Cell Distribution Width


 


 14.8 %


(11.5-14.5) 


 





 


Platelet Count


 


 192 x10^3/uL


(140-400) 


 





 


Neutrophils (%) (Auto)  90 % (31-73)   


 


Lymphocytes (%) (Auto)  3 % (24-48)   


 


Monocytes (%) (Auto)  5 % (0-9)   


 


Eosinophils (%) (Auto)  2 % (0-3)   


 


Basophils (%) (Auto)  1 % (0-3)   


 


Neutrophils # (Auto)


 


 9.0 x10^3/uL


(1.8-7.7) 


 





 


Lymphocytes # (Auto)


 


 0.3 x10^3/uL


(1.0-4.8) 


 





 


Monocytes # (Auto)


 


 0.5 x10^3/uL


(0.0-1.1) 


 





 


Eosinophils # (Auto)


 


 0.2 x10^3/uL


(0.0-0.7) 


 





 


Basophils # (Auto)


 


 0.1 x10^3/uL


(0.0-0.2) 


 





 


Sodium Level


 


 151 mmol/L


(136-145) 


 





 


Potassium Level


 


 4.5 mmol/L


(3.5-5.1) 


 





 


Chloride Level


 


 118 mmol/L


() 


 





 


Carbon Dioxide Level


 


 31 mmol/L


(21-32) 


 





 


Anion Gap  2 (6-14)   


 


Blood Urea Nitrogen


 


 51 mg/dL


(8-26) 


 





 


Creatinine


 


 0.9 mg/dL


(0.7-1.3) 


 





 


Estimated GFR


(Cockcroft-Gault) 


 84.7 


 


 





 


Glucose Level


 


 112 mg/dL


(70-99) 


 





 


Calcium Level


 


 7.3 mg/dL


(8.5-10.1) 


 





 


O2 Saturation   87 % (92-99)  


 


Arterial Blood pH


 


 


 7.36


(7.35-7.45) 





 


Arterial Blood pCO2 at


Patient Temp 


 


 48 mmHg


(35-46) 





 


Arterial Blood pO2 at Patient


Temp 


 


 56 mmHg


() 





 


Arterial Blood HCO3


 


 


 26 mmol/L


(21-28) 





 


Arterial Blood Base Excess


 


 


 0 mmol/L


(-3-3) 





 


FiO2   100  








Laboratory Tests








Test


 8/29/20


12:19 8/29/20


17:47 8/29/20


22:34 8/30/20


05:45


 


Glucose (Fingerstick)


 162 mg/dL


(70-99) 127 mg/dL


(70-99) 89 mg/dL


(70-99) 





 


White Blood Count


 


 


 


 10.1 x10^3/uL


(4.0-11.0)


 


Red Blood Count


 


 


 


 2.84 x10^6/uL


(4.30-5.70)


 


Hemoglobin


 


 


 


 8.8 g/dL


(13.0-17.5)


 


Hematocrit


 


 


 


 27.3 %


(39.0-53.0)


 


Mean Corpuscular Volume    96 fL () 


 


Mean Corpuscular Hemoglobin    31 pg (25-35) 


 


Mean Corpuscular Hemoglobin


Concent 


 


 


 32 g/dL


(31-37)


 


Red Cell Distribution Width


 


 


 


 14.8 %


(11.5-14.5)


 


Platelet Count


 


 


 


 192 x10^3/uL


(140-400)


 


Neutrophils (%) (Auto)    90 % (31-73) 


 


Lymphocytes (%) (Auto)    3 % (24-48) 


 


Monocytes (%) (Auto)    5 % (0-9) 


 


Eosinophils (%) (Auto)    2 % (0-3) 


 


Basophils (%) (Auto)    1 % (0-3) 


 


Neutrophils # (Auto)


 


 


 


 9.0 x10^3/uL


(1.8-7.7)


 


Lymphocytes # (Auto)


 


 


 


 0.3 x10^3/uL


(1.0-4.8)


 


Monocytes # (Auto)


 


 


 


 0.5 x10^3/uL


(0.0-1.1)


 


Eosinophils # (Auto)


 


 


 


 0.2 x10^3/uL


(0.0-0.7)


 


Basophils # (Auto)


 


 


 


 0.1 x10^3/uL


(0.0-0.2)


 


Sodium Level


 


 


 


 151 mmol/L


(136-145)


 


Potassium Level


 


 


 


 4.5 mmol/L


(3.5-5.1)


 


Chloride Level


 


 


 


 118 mmol/L


()


 


Carbon Dioxide Level


 


 


 


 31 mmol/L


(21-32)


 


Anion Gap    2 (6-14) 


 


Blood Urea Nitrogen


 


 


 


 51 mg/dL


(8-26)


 


Creatinine


 


 


 


 0.9 mg/dL


(0.7-1.3)


 


Estimated GFR


(Cockcroft-Gault) 


 


 


 84.7 





 


Glucose Level


 


 


 


 112 mg/dL


(70-99)


 


Calcium Level


 


 


 


 7.3 mg/dL


(8.5-10.1)


 


Test


 8/30/20


09:15 


 


 





 


O2 Saturation 87 % (92-99)    


 


Arterial Blood pH


 7.36


(7.35-7.45) 


 


 





 


Arterial Blood pCO2 at


Patient Temp 48 mmHg


(35-46) 


 


 





 


Arterial Blood pO2 at Patient


Temp 56 mmHg


() 


 


 





 


Arterial Blood HCO3


 26 mmol/L


(21-28) 


 


 





 


Arterial Blood Base Excess


 0 mmol/L


(-3-3) 


 


 





 


FiO2 100    








Comments


 CXR 8/21 


IMPRESSION: 


1. Bilateral diffuse infiltrates are increased in the left base and mildly


improved elsewhere.





CXR


8/28/20


Impression: Slight interval increase in the bilateral perihilar 


infiltrates.





Impression


.


1.  Acute hypoxic respiratory failure secondary to COVID-19 pneumonia/ARDS/acute

lung injury--worsening oxygen requirements


2.  Abnormal chest x-ray with bilateral infiltrates consistent with COVID-19 

pneumonia.


3.  Hypotension secondary to combination of hypovolemia and sepsis status post 2

liters of IV fluids. no pressors, now hypertensive


4.  Acute kidney injury--resolved


5.  Severe protein-calorie malnutrition.


6.  Abnormal D-dimer related to COVID-19 pneumonia. Full dose lovenox added 8/24

due to increase in D-dimer to 20, now decreasing 


7. Fevers





Plan


.


RECOMMENDATIONS:


Continue present assist control mode, PEEP of 14 and FiO2 of 100% ABG reviewed, 

ensure adequate sedation, ARDS,worsening hypoxia, avoid increasing PEEP as could

result in barotrauma/ Pneumothorax


Broad spectrum antibiotic.


Cont.  IV steroids, with taper, 


Abnormal D-dimer related to COVID-19 pneumonia.  Full dose lovenox added 8/24 

due to increase in D-dimer to 20. dopplers neg for DVT, D-dimer continues to 

decrease 


Stress ulcer prophylaxis.


s/p Plasma 


Pt doing poorly not expected to survive





D/W RN and RT  


PT. is DNR 


Prognosis poor 


Critical care time 30 minutes, family aware and visited overnight and aware of 

pt. clinical condition, I spoke with wife yesterday











SG BENJAMIN MD              Aug 30, 2020 10:49

## 2020-08-30 NOTE — NUR
Patient's family called to request to talk to patient.  This RN held phone to patient's ear 
so his family could talk to him.  Family is tearful and not able to make a decision to 
transition to comfort care.  Will continue monitor and care for patient.

## 2020-08-31 VITALS — SYSTOLIC BLOOD PRESSURE: 115 MMHG | DIASTOLIC BLOOD PRESSURE: 61 MMHG

## 2020-08-31 VITALS — SYSTOLIC BLOOD PRESSURE: 118 MMHG | DIASTOLIC BLOOD PRESSURE: 61 MMHG

## 2020-08-31 VITALS — SYSTOLIC BLOOD PRESSURE: 122 MMHG | DIASTOLIC BLOOD PRESSURE: 61 MMHG

## 2020-08-31 VITALS — SYSTOLIC BLOOD PRESSURE: 109 MMHG | DIASTOLIC BLOOD PRESSURE: 57 MMHG

## 2020-08-31 VITALS — SYSTOLIC BLOOD PRESSURE: 107 MMHG | DIASTOLIC BLOOD PRESSURE: 51 MMHG

## 2020-08-31 VITALS — SYSTOLIC BLOOD PRESSURE: 114 MMHG | DIASTOLIC BLOOD PRESSURE: 59 MMHG

## 2020-08-31 VITALS — DIASTOLIC BLOOD PRESSURE: 63 MMHG | SYSTOLIC BLOOD PRESSURE: 117 MMHG

## 2020-08-31 VITALS — DIASTOLIC BLOOD PRESSURE: 62 MMHG | SYSTOLIC BLOOD PRESSURE: 112 MMHG

## 2020-08-31 VITALS — SYSTOLIC BLOOD PRESSURE: 111 MMHG | DIASTOLIC BLOOD PRESSURE: 60 MMHG

## 2020-08-31 VITALS — DIASTOLIC BLOOD PRESSURE: 58 MMHG | SYSTOLIC BLOOD PRESSURE: 113 MMHG

## 2020-08-31 VITALS — SYSTOLIC BLOOD PRESSURE: 114 MMHG | DIASTOLIC BLOOD PRESSURE: 58 MMHG

## 2020-08-31 VITALS — SYSTOLIC BLOOD PRESSURE: 107 MMHG | DIASTOLIC BLOOD PRESSURE: 57 MMHG

## 2020-08-31 VITALS — SYSTOLIC BLOOD PRESSURE: 116 MMHG | DIASTOLIC BLOOD PRESSURE: 62 MMHG

## 2020-08-31 VITALS — DIASTOLIC BLOOD PRESSURE: 62 MMHG | SYSTOLIC BLOOD PRESSURE: 115 MMHG

## 2020-08-31 VITALS — SYSTOLIC BLOOD PRESSURE: 106 MMHG | DIASTOLIC BLOOD PRESSURE: 60 MMHG

## 2020-08-31 VITALS — DIASTOLIC BLOOD PRESSURE: 58 MMHG | SYSTOLIC BLOOD PRESSURE: 108 MMHG

## 2020-08-31 VITALS — SYSTOLIC BLOOD PRESSURE: 119 MMHG | DIASTOLIC BLOOD PRESSURE: 59 MMHG

## 2020-08-31 VITALS — DIASTOLIC BLOOD PRESSURE: 59 MMHG | SYSTOLIC BLOOD PRESSURE: 109 MMHG

## 2020-08-31 VITALS — SYSTOLIC BLOOD PRESSURE: 109 MMHG | DIASTOLIC BLOOD PRESSURE: 56 MMHG

## 2020-08-31 VITALS — DIASTOLIC BLOOD PRESSURE: 58 MMHG | SYSTOLIC BLOOD PRESSURE: 116 MMHG

## 2020-08-31 VITALS — SYSTOLIC BLOOD PRESSURE: 114 MMHG | DIASTOLIC BLOOD PRESSURE: 57 MMHG

## 2020-08-31 VITALS — DIASTOLIC BLOOD PRESSURE: 55 MMHG | SYSTOLIC BLOOD PRESSURE: 106 MMHG

## 2020-08-31 LAB
ANION GAP SERPL CALC-SCNC: 5 MMOL/L (ref 6–14)
BASE EXCESS ABG: -2 MMOL/L (ref -3–3)
BASOPHILS # BLD AUTO: 0 X10^3/UL (ref 0–0.2)
BASOPHILS NFR BLD: 0 % (ref 0–3)
BUN SERPL-MCNC: 54 MG/DL (ref 8–26)
CALCIUM SERPL-MCNC: 6.9 MG/DL (ref 8.5–10.1)
CHLORIDE SERPL-SCNC: 115 MMOL/L (ref 98–107)
CO2 SERPL-SCNC: 29 MMOL/L (ref 21–32)
CREAT SERPL-MCNC: 1 MG/DL (ref 0.7–1.3)
CRP SERPL-MCNC: 167.1 MG/L (ref 0–3.3)
EOSINOPHIL NFR BLD: 0.1 X10^3/UL (ref 0–0.7)
EOSINOPHIL NFR BLD: 1 % (ref 0–3)
ERYTHROCYTE [DISTWIDTH] IN BLOOD BY AUTOMATED COUNT: 14.7 % (ref 11.5–14.5)
GFR SERPLBLD BASED ON 1.73 SQ M-ARVRAT: 75 ML/MIN
GLUCOSE SERPL-MCNC: 226 MG/DL (ref 70–99)
HCO3 BLDA-SCNC: 24 MMOL/L (ref 21–28)
HCT VFR BLD CALC: 27.2 % (ref 39–53)
HGB BLD-MCNC: 8.7 G/DL (ref 13–17.5)
INSPIRATION SETTING TIME VENT: 100
LYMPHOCYTES # BLD: 0.4 X10^3/UL (ref 1–4.8)
LYMPHOCYTES NFR BLD AUTO: 4 % (ref 24–48)
MCH RBC QN AUTO: 31 PG (ref 25–35)
MCHC RBC AUTO-ENTMCNC: 32 G/DL (ref 31–37)
MCV RBC AUTO: 96 FL (ref 79–100)
MONO #: 0.4 X10^3/UL (ref 0–1.1)
MONOCYTES NFR BLD: 4 % (ref 0–9)
NEUT #: 8 X10^3/UL (ref 1.8–7.7)
NEUTROPHILS NFR BLD AUTO: 91 % (ref 31–73)
PCO2 BLDA: 45 MMHG (ref 35–46)
PLATELET # BLD AUTO: 195 X10^3/UL (ref 140–400)
PO2 BLDA: 50 MMHG (ref 65–108)
POTASSIUM SERPL-SCNC: 4.2 MMOL/L (ref 3.5–5.1)
RBC # BLD AUTO: 2.83 X10^6/UL (ref 4.3–5.7)
SAO2 % BLDA: 82 % (ref 92–99)
SODIUM SERPL-SCNC: 149 MMOL/L (ref 136–145)
WBC # BLD AUTO: 8.9 X10^3/UL (ref 4–11)

## 2020-08-31 RX ADMIN — Medication PRN MLS/HR: at 13:51

## 2020-08-31 RX ADMIN — INSULIN LISPRO SCH UNITS: 100 INJECTION, SOLUTION INTRAVENOUS; SUBCUTANEOUS at 06:06

## 2020-08-31 RX ADMIN — DEXMEDETOMIDINE HYDROCHLORIDE PRN MLS/HR: 100 INJECTION, SOLUTION, CONCENTRATE INTRAVENOUS at 19:00

## 2020-08-31 RX ADMIN — ENOXAPARIN SODIUM SCH MG: 80 INJECTION SUBCUTANEOUS at 22:08

## 2020-08-31 RX ADMIN — INSULIN LISPRO SCH UNITS: 100 INJECTION, SOLUTION INTRAVENOUS; SUBCUTANEOUS at 18:00

## 2020-08-31 RX ADMIN — PIPERACILLIN SODIUM AND TAZOBACTAM SODIUM SCH MLS/HR: 3; .375 INJECTION, POWDER, LYOPHILIZED, FOR SOLUTION INTRAVENOUS at 06:06

## 2020-08-31 RX ADMIN — PIPERACILLIN SODIUM AND TAZOBACTAM SODIUM SCH MLS/HR: 3; .375 INJECTION, POWDER, LYOPHILIZED, FOR SOLUTION INTRAVENOUS at 18:00

## 2020-08-31 RX ADMIN — INSULIN GLARGINE SCH UNIT: 100 INJECTION, SOLUTION SUBCUTANEOUS at 22:06

## 2020-08-31 RX ADMIN — DEXMEDETOMIDINE HYDROCHLORIDE PRN MLS/HR: 100 INJECTION, SOLUTION, CONCENTRATE INTRAVENOUS at 10:11

## 2020-08-31 RX ADMIN — PROPOFOL PRN MLS/HR: 10 INJECTION, EMULSION INTRAVENOUS at 05:58

## 2020-08-31 RX ADMIN — INSULIN LISPRO SCH UNITS: 100 INJECTION, SOLUTION INTRAVENOUS; SUBCUTANEOUS at 00:00

## 2020-08-31 RX ADMIN — DEXTROSE AND SODIUM CHLORIDE SCH MLS/HR: 5; .2 INJECTION, SOLUTION INTRAVENOUS at 22:07

## 2020-08-31 RX ADMIN — OXYCODONE HYDROCHLORIDE AND ACETAMINOPHEN SCH MG: 500 TABLET ORAL at 18:00

## 2020-08-31 RX ADMIN — PANTOPRAZOLE SODIUM SCH MG: 40 INJECTION, POWDER, FOR SOLUTION INTRAVENOUS at 07:24

## 2020-08-31 RX ADMIN — ZINC SULFATE CAP 220 MG (50 MG ELEMENTAL ZN) SCH MG: 220 (50 ZN) CAP at 07:24

## 2020-08-31 RX ADMIN — DEXMEDETOMIDINE HYDROCHLORIDE PRN MLS/HR: 100 INJECTION, SOLUTION, CONCENTRATE INTRAVENOUS at 14:23

## 2020-08-31 RX ADMIN — VECURONIUM BROMIDE PRN MLS/HR: 1 INJECTION, POWDER, LYOPHILIZED, FOR SOLUTION INTRAVENOUS at 06:35

## 2020-08-31 RX ADMIN — MIDAZOLAM PRN MLS/HR: 5 INJECTION, SOLUTION INTRAMUSCULAR; INTRAVENOUS at 05:58

## 2020-08-31 RX ADMIN — METHYLPREDNISOLONE SODIUM SUCCINATE SCH MG: 40 INJECTION, POWDER, FOR SOLUTION INTRAMUSCULAR; INTRAVENOUS at 06:05

## 2020-08-31 RX ADMIN — PIPERACILLIN SODIUM AND TAZOBACTAM SODIUM SCH MLS/HR: 3; .375 INJECTION, POWDER, LYOPHILIZED, FOR SOLUTION INTRAVENOUS at 00:24

## 2020-08-31 RX ADMIN — DEXMEDETOMIDINE HYDROCHLORIDE PRN MLS/HR: 100 INJECTION, SOLUTION, CONCENTRATE INTRAVENOUS at 01:37

## 2020-08-31 RX ADMIN — OXYCODONE HYDROCHLORIDE AND ACETAMINOPHEN SCH MG: 500 TABLET ORAL at 23:35

## 2020-08-31 RX ADMIN — MIDAZOLAM PRN MLS/HR: 5 INJECTION, SOLUTION INTRAMUSCULAR; INTRAVENOUS at 15:25

## 2020-08-31 RX ADMIN — ENOXAPARIN SODIUM SCH MG: 80 INJECTION SUBCUTANEOUS at 07:24

## 2020-08-31 RX ADMIN — OXYCODONE HYDROCHLORIDE AND ACETAMINOPHEN SCH MG: 500 TABLET ORAL at 11:46

## 2020-08-31 RX ADMIN — PIPERACILLIN SODIUM AND TAZOBACTAM SODIUM SCH MLS/HR: 3; .375 INJECTION, POWDER, LYOPHILIZED, FOR SOLUTION INTRAVENOUS at 23:35

## 2020-08-31 RX ADMIN — PIPERACILLIN SODIUM AND TAZOBACTAM SODIUM SCH MLS/HR: 3; .375 INJECTION, POWDER, LYOPHILIZED, FOR SOLUTION INTRAVENOUS at 11:46

## 2020-08-31 RX ADMIN — PROPOFOL PRN MLS/HR: 10 INJECTION, EMULSION INTRAVENOUS at 15:12

## 2020-08-31 RX ADMIN — INSULIN LISPRO SCH UNITS: 100 INJECTION, SOLUTION INTRAVENOUS; SUBCUTANEOUS at 12:00

## 2020-08-31 RX ADMIN — PROPOFOL PRN MLS/HR: 10 INJECTION, EMULSION INTRAVENOUS at 23:35

## 2020-08-31 RX ADMIN — PROPOFOL PRN MLS/HR: 10 INJECTION, EMULSION INTRAVENOUS at 10:15

## 2020-08-31 RX ADMIN — Medication PRN MLS/HR: at 00:16

## 2020-08-31 RX ADMIN — METHYLPREDNISOLONE SODIUM SUCCINATE SCH MG: 40 INJECTION, POWDER, FOR SOLUTION INTRAMUSCULAR; INTRAVENOUS at 14:11

## 2020-08-31 RX ADMIN — DEXTROSE AND SODIUM CHLORIDE SCH MLS/HR: 5; .2 INJECTION, SOLUTION INTRAVENOUS at 10:12

## 2020-08-31 RX ADMIN — METHYLPREDNISOLONE SODIUM SUCCINATE SCH MG: 40 INJECTION, POWDER, FOR SOLUTION INTRAMUSCULAR; INTRAVENOUS at 22:06

## 2020-08-31 RX ADMIN — PROPOFOL PRN MLS/HR: 10 INJECTION, EMULSION INTRAVENOUS at 22:07

## 2020-08-31 RX ADMIN — DEXMEDETOMIDINE HYDROCHLORIDE PRN MLS/HR: 100 INJECTION, SOLUTION, CONCENTRATE INTRAVENOUS at 22:07

## 2020-08-31 RX ADMIN — INSULIN GLARGINE SCH UNIT: 100 INJECTION, SOLUTION SUBCUTANEOUS at 07:23

## 2020-08-31 RX ADMIN — OXYCODONE HYDROCHLORIDE AND ACETAMINOPHEN SCH MG: 500 TABLET ORAL at 06:00

## 2020-08-31 RX ADMIN — DEXMEDETOMIDINE HYDROCHLORIDE PRN MLS/HR: 100 INJECTION, SOLUTION, CONCENTRATE INTRAVENOUS at 06:07

## 2020-08-31 RX ADMIN — POLYETHYLENE GLYCOL 3350 SCH GM: 17 POWDER, FOR SOLUTION ORAL at 07:24

## 2020-08-31 RX ADMIN — PROPOFOL PRN MLS/HR: 10 INJECTION, EMULSION INTRAVENOUS at 01:39

## 2020-08-31 NOTE — PDOC
PULMONARY PROGRESS NOTES


DATE: 8/31/20 


TIME: 08:53


Subjective


remains intubated/sedated


AC mode ,100%FIO2/14 PEEP, low P02 on ABG -- continues to  hypoxia 


Fever overnight


Vitals





Vital Signs








  Date Time  Temp Pulse Resp B/P (MAP) Pulse Ox O2 Delivery O2 Flow Rate FiO2


 


8/31/20 08:37      Mechanical Ventilator  


 


8/31/20 08:18 98.4 58 22 122/61 (81) 82   





 98.4       


 


8/30/20 11:17       18.0 








Comments


visual exam done due to COVID-Pandemic


no resp distess


VENT 100% and PEEP 14 


no skin rash


lower extremity edema


Lungs:  Other (Intubated)


Labs





Laboratory Tests








Test


 8/29/20


09:00 8/29/20


12:19 8/29/20


17:47 8/29/20


22:34


 


O2 Saturation 82 % (92-99)    


 


Arterial Blood pH


 7.35


(7.35-7.45) 


 


 





 


Arterial Blood pCO2 at


Patient Temp 49 mmHg


(35-46) 


 


 





 


Arterial Blood pO2 at Patient


Temp 49 mmHg


() 


 


 





 


Arterial Blood HCO3


 27 mmol/L


(21-28) 


 


 





 


Arterial Blood Base Excess


 1 mmol/L


(-3-3) 


 


 





 


FiO2 100    


 


Glucose (Fingerstick)


 


 162 mg/dL


(70-99) 127 mg/dL


(70-99) 89 mg/dL


(70-99)


 


Test


 8/30/20


05:45 8/30/20


09:15 8/30/20


12:24 8/30/20


22:02


 


White Blood Count


 10.1 x10^3/uL


(4.0-11.0) 


 


 





 


Red Blood Count


 2.84 x10^6/uL


(4.30-5.70) 


 


 





 


Hemoglobin


 8.8 g/dL


(13.0-17.5) 


 


 





 


Hematocrit


 27.3 %


(39.0-53.0) 


 


 





 


Mean Corpuscular Volume 96 fL ()    


 


Mean Corpuscular Hemoglobin 31 pg (25-35)    


 


Mean Corpuscular Hemoglobin


Concent 32 g/dL


(31-37) 


 


 





 


Red Cell Distribution Width


 14.8 %


(11.5-14.5) 


 


 





 


Platelet Count


 192 x10^3/uL


(140-400) 


 


 





 


Neutrophils (%) (Auto) 90 % (31-73)    


 


Lymphocytes (%) (Auto) 3 % (24-48)    


 


Monocytes (%) (Auto) 5 % (0-9)    


 


Eosinophils (%) (Auto) 2 % (0-3)    


 


Basophils (%) (Auto) 1 % (0-3)    


 


Neutrophils # (Auto)


 9.0 x10^3/uL


(1.8-7.7) 


 


 





 


Lymphocytes # (Auto)


 0.3 x10^3/uL


(1.0-4.8) 


 


 





 


Monocytes # (Auto)


 0.5 x10^3/uL


(0.0-1.1) 


 


 





 


Eosinophils # (Auto)


 0.2 x10^3/uL


(0.0-0.7) 


 


 





 


Basophils # (Auto)


 0.1 x10^3/uL


(0.0-0.2) 


 


 





 


Sodium Level


 151 mmol/L


(136-145) 


 


 





 


Potassium Level


 4.5 mmol/L


(3.5-5.1) 


 


 





 


Chloride Level


 118 mmol/L


() 


 


 





 


Carbon Dioxide Level


 31 mmol/L


(21-32) 


 


 





 


Anion Gap 2 (6-14)    


 


Blood Urea Nitrogen


 51 mg/dL


(8-26) 


 


 





 


Creatinine


 0.9 mg/dL


(0.7-1.3) 


 


 





 


Estimated GFR


(Cockcroft-Gault) 84.7 


 


 


 





 


Glucose Level


 112 mg/dL


(70-99) 


 


 





 


Calcium Level


 7.3 mg/dL


(8.5-10.1) 


 


 





 


O2 Saturation  87 % (92-99)   


 


Arterial Blood pH


 


 7.36


(7.35-7.45) 


 





 


Arterial Blood pCO2 at


Patient Temp 


 48 mmHg


(35-46) 


 





 


Arterial Blood pO2 at Patient


Temp 


 56 mmHg


() 


 





 


Arterial Blood HCO3


 


 26 mmol/L


(21-28) 


 





 


Arterial Blood Base Excess


 


 0 mmol/L


(-3-3) 


 





 


FiO2  100   


 


Glucose (Fingerstick)


 


 


 130 mg/dL


(70-99) 200 mg/dL


(70-99)


 


Test


 8/31/20


00:23 8/31/20


05:30 8/31/20


05:32 8/31/20


08:05


 


Glucose (Fingerstick)


 186 mg/dL


(70-99) 


 202 mg/dL


(70-99) 





 


White Blood Count


 


 8.9 x10^3/uL


(4.0-11.0) 


 





 


Red Blood Count


 


 2.83 x10^6/uL


(4.30-5.70) 


 





 


Hemoglobin


 


 8.7 g/dL


(13.0-17.5) 


 





 


Hematocrit


 


 27.2 %


(39.0-53.0) 


 





 


Mean Corpuscular Volume  96 fL ()   


 


Mean Corpuscular Hemoglobin  31 pg (25-35)   


 


Mean Corpuscular Hemoglobin


Concent 


 32 g/dL


(31-37) 


 





 


Red Cell Distribution Width


 


 14.7 %


(11.5-14.5) 


 





 


Platelet Count


 


 195 x10^3/uL


(140-400) 


 





 


Neutrophils (%) (Auto)  91 % (31-73)   


 


Lymphocytes (%) (Auto)  4 % (24-48)   


 


Monocytes (%) (Auto)  4 % (0-9)   


 


Eosinophils (%) (Auto)  1 % (0-3)   


 


Basophils (%) (Auto)  0 % (0-3)   


 


Neutrophils # (Auto)


 


 8.0 x10^3/uL


(1.8-7.7) 


 





 


Lymphocytes # (Auto)


 


 0.4 x10^3/uL


(1.0-4.8) 


 





 


Monocytes # (Auto)


 


 0.4 x10^3/uL


(0.0-1.1) 


 





 


Eosinophils # (Auto)


 


 0.1 x10^3/uL


(0.0-0.7) 


 





 


Basophils # (Auto)


 


 0.0 x10^3/uL


(0.0-0.2) 


 





 


Sodium Level


 


 149 mmol/L


(136-145) 


 





 


Potassium Level


 


 4.2 mmol/L


(3.5-5.1) 


 





 


Chloride Level


 


 115 mmol/L


() 


 





 


Carbon Dioxide Level


 


 29 mmol/L


(21-32) 


 





 


Anion Gap  5 (6-14)   


 


Blood Urea Nitrogen


 


 54 mg/dL


(8-26) 


 





 


Creatinine


 


 1.0 mg/dL


(0.7-1.3) 


 





 


Estimated GFR


(Cockcroft-Gault) 


 75.0 


 


 





 


Glucose Level


 


 226 mg/dL


(70-99) 


 





 


Calcium Level


 


 6.9 mg/dL


(8.5-10.1) 


 





 


Ferritin


 


 769 ng/mL


() 


 





 


C-Reactive Protein,


Quantitative 


 167.1 mg/L


(0-3.3) 


 





 


O2 Saturation    82 % (92-99) 


 


Arterial Blood pH


 


 


 


 7.34


(7.35-7.45)


 


Arterial Blood pCO2 at


Patient Temp 


 


 


 45 mmHg


(35-46)


 


Arterial Blood pO2 at Patient


Temp 


 


 


 50 mmHg


()


 


Arterial Blood HCO3


 


 


 


 24 mmol/L


(21-28)


 


Arterial Blood Base Excess


 


 


 


 -2 mmol/L


(-3-3)


 


FiO2    100 








Laboratory Tests








Test


 8/30/20


09:15 8/30/20


12:24 8/30/20


22:02 8/31/20


00:23


 


O2 Saturation 87 % (92-99)    


 


Arterial Blood pH


 7.36


(7.35-7.45) 


 


 





 


Arterial Blood pCO2 at


Patient Temp 48 mmHg


(35-46) 


 


 





 


Arterial Blood pO2 at Patient


Temp 56 mmHg


() 


 


 





 


Arterial Blood HCO3


 26 mmol/L


(21-28) 


 


 





 


Arterial Blood Base Excess


 0 mmol/L


(-3-3) 


 


 





 


FiO2 100    


 


Glucose (Fingerstick)


 


 130 mg/dL


(70-99) 200 mg/dL


(70-99) 186 mg/dL


(70-99)


 


Test


 8/31/20


05:30 8/31/20


05:32 8/31/20


08:05 





 


White Blood Count


 8.9 x10^3/uL


(4.0-11.0) 


 


 





 


Red Blood Count


 2.83 x10^6/uL


(4.30-5.70) 


 


 





 


Hemoglobin


 8.7 g/dL


(13.0-17.5) 


 


 





 


Hematocrit


 27.2 %


(39.0-53.0) 


 


 





 


Mean Corpuscular Volume 96 fL ()    


 


Mean Corpuscular Hemoglobin 31 pg (25-35)    


 


Mean Corpuscular Hemoglobin


Concent 32 g/dL


(31-37) 


 


 





 


Red Cell Distribution Width


 14.7 %


(11.5-14.5) 


 


 





 


Platelet Count


 195 x10^3/uL


(140-400) 


 


 





 


Neutrophils (%) (Auto) 91 % (31-73)    


 


Lymphocytes (%) (Auto) 4 % (24-48)    


 


Monocytes (%) (Auto) 4 % (0-9)    


 


Eosinophils (%) (Auto) 1 % (0-3)    


 


Basophils (%) (Auto) 0 % (0-3)    


 


Neutrophils # (Auto)


 8.0 x10^3/uL


(1.8-7.7) 


 


 





 


Lymphocytes # (Auto)


 0.4 x10^3/uL


(1.0-4.8) 


 


 





 


Monocytes # (Auto)


 0.4 x10^3/uL


(0.0-1.1) 


 


 





 


Eosinophils # (Auto)


 0.1 x10^3/uL


(0.0-0.7) 


 


 





 


Basophils # (Auto)


 0.0 x10^3/uL


(0.0-0.2) 


 


 





 


Sodium Level


 149 mmol/L


(136-145) 


 


 





 


Potassium Level


 4.2 mmol/L


(3.5-5.1) 


 


 





 


Chloride Level


 115 mmol/L


() 


 


 





 


Carbon Dioxide Level


 29 mmol/L


(21-32) 


 


 





 


Anion Gap 5 (6-14)    


 


Blood Urea Nitrogen


 54 mg/dL


(8-26) 


 


 





 


Creatinine


 1.0 mg/dL


(0.7-1.3) 


 


 





 


Estimated GFR


(Cockcroft-Gault) 75.0 


 


 


 





 


Glucose Level


 226 mg/dL


(70-99) 


 


 





 


Calcium Level


 6.9 mg/dL


(8.5-10.1) 


 


 





 


Ferritin


 769 ng/mL


() 


 


 





 


C-Reactive Protein,


Quantitative 167.1 mg/L


(0-3.3) 


 


 





 


Glucose (Fingerstick)


 


 202 mg/dL


(70-99) 


 





 


O2 Saturation   82 % (92-99)  


 


Arterial Blood pH


 


 


 7.34


(7.35-7.45) 





 


Arterial Blood pCO2 at


Patient Temp 


 


 45 mmHg


(35-46) 





 


Arterial Blood pO2 at Patient


Temp 


 


 50 mmHg


() 





 


Arterial Blood HCO3


 


 


 24 mmol/L


(21-28) 





 


Arterial Blood Base Excess


 


 


 -2 mmol/L


(-3-3) 





 


FiO2   100  








Comments


 CXR 8/21 


IMPRESSION: 


1. Bilateral diffuse infiltrates are increased in the left base and mildly


improved elsewhere.





CXR


8/28/20


Impression: Slight interval increase in the bilateral perihilar 


infiltrates.





Impression


.


1.  Acute hypoxic respiratory failure secondary to COVID-19 pneumonia/ARDS/acute

lung injury--worsening oxygen requirements


2.  Abnormal chest x-ray with bilateral infiltrates consistent with COVID-19 

pneumonia.


3.  Hypotension secondary to combination of hypovolemia and sepsis status post 2

liters of IV fluids. no pressors, now hypertensive


4.  Acute kidney injury--resolved


5.  Severe protein-calorie malnutrition.


6.  Abnormal D-dimer related to COVID-19 pneumonia. Full dose lovenox added 8/24

due to increase in D-dimer to 20, now decreasing 


7. Fevers





Plan


.


RECOMMENDATIONS:


ABG noted, will continue current assist control, and PEEP


Full dose anticoagulation


Broad spectrum antibiotic.


Cont.  IV steroids, with taper, 





Stress ulcer prophylaxis.


s/p Plasma 


Pt doing poorly not expected to survive





D/W RN and RT  


PT. is DNR 


Prognosis poor 


Critical care time 30 minutes, family aware and visited overnight and aware of 

pt. clinical condition,








I spoke with wife, informed her that patient may not survive











SG BENJAMIN MD              Aug 31, 2020 08:53

## 2020-08-31 NOTE — PDOC
PROGRESS NOTES


Date of Service:


DATE: 8/31/20 


TIME: 10:32





Chief Complaint


Chief Complaint


Assessment/Plan





Acute hypoxemic respiratory failure


Bilateral perihilar infiltrates are  seen which appear increased slightly


Leukocytosis with bandemia secondary to septic process


Hyponatremia secondary to low effective circulatory volume, now hypernatremic


Acue renal failure due to vasomotor nephropathy most likely 


Hyperglycemia


transaminitis 


Uncontrolled hypertension


AC mode ,100%FIO2/14 


Fever overnight 8/30 








plan===========================================








CONSULT NEPHROLOGY


Hypotonic saline iv support


serum osmolality


100%FIO2/14 PEEP,


dnr status 











35 MIN CC TIME





History of Present Illness


Patient is a 65-year-old gentleman who was diagnosed with COVID-19 infection on 

Monday 5 days prior to his admission to the intensive care unit.  Apparently the

patient reported worsening respiratory distress reason why he came to the 

emergency department for evaluation and treatment.  The story was quite limited 

since the patient was speaking Malay only and his acuity was such that he 

required mechanical ventilation and prompt intubation by the emergency 

department physician.  At the present time patient is intubated and sedated 

continues to require a lot of support no pertinent past medical history was 

reported he is admitted to the intensive care unit for further treatment





8/24/2020


Continues to require full support, glycemia has been noted in hypertension as 

well.  Discussed with nursing staff, all of concerns were addressed to the best 

of my abilities no acute events reported overnight





8/25/2020


No acute events reported overnight, case discussed with nursing staff patient in

no acute distress  





8/26/2020


Per nursing staff, patient febrile overnight, treated with Tylenol and tepid 

water bath. Case discussed with nursing staff, continue with IV antibiotics and 

full VTE prophylaxis.





8/27/2020


Patient still febrile overnight.  O2 requirement increasing.  Discussed with RN.





8/31 /2020


Patient still intubated and sedated.  Discussed with RN, patient was made DNR.  

schedule MiraLAX.





Vitals


Vitals





Vital Signs








  Date Time  Temp Pulse Resp B/P (MAP) Pulse Ox O2 Delivery O2 Flow Rate FiO2


 


8/31/20 10:12  56 22 106/55 (72) 87 Ventilator  


 


8/31/20 08:18 98.4       





 98.4       


 


8/30/20 11:17       18.0 











Physical Exam


Physical Exam


sedated on vent


General:  No acute distress, Other (Intubated and sedated)


Heart:  Regular rate


Lungs:  Other (Intubated)


Abdomen:  Other (Nondistended)


Extremities:  No clubbing, No cyanosis


Skin:  No rashes, No breakdown





Labs


LABS





AP portable chest  radiograph 8/28/2020


 


Clinical History: Covid 19.


 


An AP erect portable digital radiograph of the chest was obtained.


 


Comparison study is dated 8/22/2020.


 


The right internal jugular central venous catheter and NG tube are 


unchanged in position. The cardiac silhouette is mildly enlarged. The 


thoracic aorta is mildly tortuous. Bilateral perihilar infiltrates are 


seen which appear increased slightly. No pneumothorax or pleural effusion 


is noted. The osseous structures are unchanged.


 


Impression: Slight interval increase in the bilateral perihilar 


infiltrates.


 


Electronically signed by: Shar Tovar MD (8/28/2020 9:25 AM) [id number]














DICTATED and SIGNED BY:     SHAR TOVAR MD


DATE:     08/28/20 0925


Laboratory Tests








Test


 8/30/20


12:24 8/30/20


22:02 8/31/20


00:23 8/31/20


05:30


 


Glucose (Fingerstick)


 130 mg/dL


(70-99) 200 mg/dL


(70-99) 186 mg/dL


(70-99) 





 


White Blood Count


 


 


 


 8.9 x10^3/uL


(4.0-11.0)


 


Red Blood Count


 


 


 


 2.83 x10^6/uL


(4.30-5.70)


 


Hemoglobin


 


 


 


 8.7 g/dL


(13.0-17.5)


 


Hematocrit


 


 


 


 27.2 %


(39.0-53.0)


 


Mean Corpuscular Volume    96 fL () 


 


Mean Corpuscular Hemoglobin    31 pg (25-35) 


 


Mean Corpuscular Hemoglobin


Concent 


 


 


 32 g/dL


(31-37)


 


Red Cell Distribution Width


 


 


 


 14.7 %


(11.5-14.5)


 


Platelet Count


 


 


 


 195 x10^3/uL


(140-400)


 


Neutrophils (%) (Auto)    91 % (31-73) 


 


Lymphocytes (%) (Auto)    4 % (24-48) 


 


Monocytes (%) (Auto)    4 % (0-9) 


 


Eosinophils (%) (Auto)    1 % (0-3) 


 


Basophils (%) (Auto)    0 % (0-3) 


 


Neutrophils # (Auto)


 


 


 


 8.0 x10^3/uL


(1.8-7.7)


 


Lymphocytes # (Auto)


 


 


 


 0.4 x10^3/uL


(1.0-4.8)


 


Monocytes # (Auto)


 


 


 


 0.4 x10^3/uL


(0.0-1.1)


 


Eosinophils # (Auto)


 


 


 


 0.1 x10^3/uL


(0.0-0.7)


 


Basophils # (Auto)


 


 


 


 0.0 x10^3/uL


(0.0-0.2)


 


Sodium Level


 


 


 


 149 mmol/L


(136-145)


 


Potassium Level


 


 


 


 4.2 mmol/L


(3.5-5.1)


 


Chloride Level


 


 


 


 115 mmol/L


()


 


Carbon Dioxide Level


 


 


 


 29 mmol/L


(21-32)


 


Anion Gap    5 (6-14) 


 


Blood Urea Nitrogen


 


 


 


 54 mg/dL


(8-26)


 


Creatinine


 


 


 


 1.0 mg/dL


(0.7-1.3)


 


Estimated GFR


(Cockcroft-Gault) 


 


 


 75.0 





 


Glucose Level


 


 


 


 226 mg/dL


(70-99)


 


Calcium Level


 


 


 


 6.9 mg/dL


(8.5-10.1)


 


Ferritin


 


 


 


 769 ng/mL


()


 


C-Reactive Protein,


Quantitative 


 


 


 167.1 mg/L


(0-3.3)


 


Test


 8/31/20


05:32 8/31/20


08:05 


 





 


Glucose (Fingerstick)


 202 mg/dL


(70-99) 


 


 





 


O2 Saturation  82 % (92-99)   


 


Arterial Blood pH


 


 7.34


(7.35-7.45) 


 





 


Arterial Blood pCO2 at


Patient Temp 


 45 mmHg


(35-46) 


 





 


Arterial Blood pO2 at Patient


Temp 


 50 mmHg


() 


 





 


Arterial Blood HCO3


 


 24 mmol/L


(21-28) 


 





 


Arterial Blood Base Excess


 


 -2 mmol/L


(-3-3) 


 





 


FiO2  100   











Comment


Review of Relevant


I have reviewed the following items jim (where applicable) has been applied.


Labs





Laboratory Tests








Test


 8/29/20


12:19 8/29/20


17:47 8/29/20


22:34 8/30/20


05:45


 


Glucose (Fingerstick)


 162 mg/dL


(70-99) 127 mg/dL


(70-99) 89 mg/dL


(70-99) 





 


White Blood Count


 


 


 


 10.1 x10^3/uL


(4.0-11.0)


 


Red Blood Count


 


 


 


 2.84 x10^6/uL


(4.30-5.70)


 


Hemoglobin


 


 


 


 8.8 g/dL


(13.0-17.5)


 


Hematocrit


 


 


 


 27.3 %


(39.0-53.0)


 


Mean Corpuscular Volume    96 fL () 


 


Mean Corpuscular Hemoglobin    31 pg (25-35) 


 


Mean Corpuscular Hemoglobin


Concent 


 


 


 32 g/dL


(31-37)


 


Red Cell Distribution Width


 


 


 


 14.8 %


(11.5-14.5)


 


Platelet Count


 


 


 


 192 x10^3/uL


(140-400)


 


Neutrophils (%) (Auto)    90 % (31-73) 


 


Lymphocytes (%) (Auto)    3 % (24-48) 


 


Monocytes (%) (Auto)    5 % (0-9) 


 


Eosinophils (%) (Auto)    2 % (0-3) 


 


Basophils (%) (Auto)    1 % (0-3) 


 


Neutrophils # (Auto)


 


 


 


 9.0 x10^3/uL


(1.8-7.7)


 


Lymphocytes # (Auto)


 


 


 


 0.3 x10^3/uL


(1.0-4.8)


 


Monocytes # (Auto)


 


 


 


 0.5 x10^3/uL


(0.0-1.1)


 


Eosinophils # (Auto)


 


 


 


 0.2 x10^3/uL


(0.0-0.7)


 


Basophils # (Auto)


 


 


 


 0.1 x10^3/uL


(0.0-0.2)


 


Sodium Level


 


 


 


 151 mmol/L


(136-145)


 


Potassium Level


 


 


 


 4.5 mmol/L


(3.5-5.1)


 


Chloride Level


 


 


 


 118 mmol/L


()


 


Carbon Dioxide Level


 


 


 


 31 mmol/L


(21-32)


 


Anion Gap    2 (6-14) 


 


Blood Urea Nitrogen


 


 


 


 51 mg/dL


(8-26)


 


Creatinine


 


 


 


 0.9 mg/dL


(0.7-1.3)


 


Estimated GFR


(Cockcroft-Gault) 


 


 


 84.7 





 


Glucose Level


 


 


 


 112 mg/dL


(70-99)


 


Calcium Level


 


 


 


 7.3 mg/dL


(8.5-10.1)


 


Test


 8/30/20


09:15 8/30/20


12:24 8/30/20


22:02 8/31/20


00:23


 


O2 Saturation 87 % (92-99)    


 


Arterial Blood pH


 7.36


(7.35-7.45) 


 


 





 


Arterial Blood pCO2 at


Patient Temp 48 mmHg


(35-46) 


 


 





 


Arterial Blood pO2 at Patient


Temp 56 mmHg


() 


 


 





 


Arterial Blood HCO3


 26 mmol/L


(21-28) 


 


 





 


Arterial Blood Base Excess


 0 mmol/L


(-3-3) 


 


 





 


FiO2 100    


 


Glucose (Fingerstick)


 


 130 mg/dL


(70-99) 200 mg/dL


(70-99) 186 mg/dL


(70-99)


 


Test


 8/31/20


05:30 8/31/20


05:32 8/31/20


08:05 





 


White Blood Count


 8.9 x10^3/uL


(4.0-11.0) 


 


 





 


Red Blood Count


 2.83 x10^6/uL


(4.30-5.70) 


 


 





 


Hemoglobin


 8.7 g/dL


(13.0-17.5) 


 


 





 


Hematocrit


 27.2 %


(39.0-53.0) 


 


 





 


Mean Corpuscular Volume 96 fL ()    


 


Mean Corpuscular Hemoglobin 31 pg (25-35)    


 


Mean Corpuscular Hemoglobin


Concent 32 g/dL


(31-37) 


 


 





 


Red Cell Distribution Width


 14.7 %


(11.5-14.5) 


 


 





 


Platelet Count


 195 x10^3/uL


(140-400) 


 


 





 


Neutrophils (%) (Auto) 91 % (31-73)    


 


Lymphocytes (%) (Auto) 4 % (24-48)    


 


Monocytes (%) (Auto) 4 % (0-9)    


 


Eosinophils (%) (Auto) 1 % (0-3)    


 


Basophils (%) (Auto) 0 % (0-3)    


 


Neutrophils # (Auto)


 8.0 x10^3/uL


(1.8-7.7) 


 


 





 


Lymphocytes # (Auto)


 0.4 x10^3/uL


(1.0-4.8) 


 


 





 


Monocytes # (Auto)


 0.4 x10^3/uL


(0.0-1.1) 


 


 





 


Eosinophils # (Auto)


 0.1 x10^3/uL


(0.0-0.7) 


 


 





 


Basophils # (Auto)


 0.0 x10^3/uL


(0.0-0.2) 


 


 





 


Sodium Level


 149 mmol/L


(136-145) 


 


 





 


Potassium Level


 4.2 mmol/L


(3.5-5.1) 


 


 





 


Chloride Level


 115 mmol/L


() 


 


 





 


Carbon Dioxide Level


 29 mmol/L


(21-32) 


 


 





 


Anion Gap 5 (6-14)    


 


Blood Urea Nitrogen


 54 mg/dL


(8-26) 


 


 





 


Creatinine


 1.0 mg/dL


(0.7-1.3) 


 


 





 


Estimated GFR


(Cockcroft-Gault) 75.0 


 


 


 





 


Glucose Level


 226 mg/dL


(70-99) 


 


 





 


Calcium Level


 6.9 mg/dL


(8.5-10.1) 


 


 





 


Ferritin


 769 ng/mL


() 


 


 





 


C-Reactive Protein,


Quantitative 167.1 mg/L


(0-3.3) 


 


 





 


Glucose (Fingerstick)


 


 202 mg/dL


(70-99) 


 





 


O2 Saturation   82 % (92-99)  


 


Arterial Blood pH


 


 


 7.34


(7.35-7.45) 





 


Arterial Blood pCO2 at


Patient Temp 


 


 45 mmHg


(35-46) 





 


Arterial Blood pO2 at Patient


Temp 


 


 50 mmHg


() 





 


Arterial Blood HCO3


 


 


 24 mmol/L


(21-28) 





 


Arterial Blood Base Excess


 


 


 -2 mmol/L


(-3-3) 





 


FiO2   100  








Laboratory Tests








Test


 8/30/20


12:24 8/30/20


22:02 8/31/20


00:23 8/31/20


05:30


 


Glucose (Fingerstick)


 130 mg/dL


(70-99) 200 mg/dL


(70-99) 186 mg/dL


(70-99) 





 


White Blood Count


 


 


 


 8.9 x10^3/uL


(4.0-11.0)


 


Red Blood Count


 


 


 


 2.83 x10^6/uL


(4.30-5.70)


 


Hemoglobin


 


 


 


 8.7 g/dL


(13.0-17.5)


 


Hematocrit


 


 


 


 27.2 %


(39.0-53.0)


 


Mean Corpuscular Volume    96 fL () 


 


Mean Corpuscular Hemoglobin    31 pg (25-35) 


 


Mean Corpuscular Hemoglobin


Concent 


 


 


 32 g/dL


(31-37)


 


Red Cell Distribution Width


 


 


 


 14.7 %


(11.5-14.5)


 


Platelet Count


 


 


 


 195 x10^3/uL


(140-400)


 


Neutrophils (%) (Auto)    91 % (31-73) 


 


Lymphocytes (%) (Auto)    4 % (24-48) 


 


Monocytes (%) (Auto)    4 % (0-9) 


 


Eosinophils (%) (Auto)    1 % (0-3) 


 


Basophils (%) (Auto)    0 % (0-3) 


 


Neutrophils # (Auto)


 


 


 


 8.0 x10^3/uL


(1.8-7.7)


 


Lymphocytes # (Auto)


 


 


 


 0.4 x10^3/uL


(1.0-4.8)


 


Monocytes # (Auto)


 


 


 


 0.4 x10^3/uL


(0.0-1.1)


 


Eosinophils # (Auto)


 


 


 


 0.1 x10^3/uL


(0.0-0.7)


 


Basophils # (Auto)


 


 


 


 0.0 x10^3/uL


(0.0-0.2)


 


Sodium Level


 


 


 


 149 mmol/L


(136-145)


 


Potassium Level


 


 


 


 4.2 mmol/L


(3.5-5.1)


 


Chloride Level


 


 


 


 115 mmol/L


()


 


Carbon Dioxide Level


 


 


 


 29 mmol/L


(21-32)


 


Anion Gap    5 (6-14) 


 


Blood Urea Nitrogen


 


 


 


 54 mg/dL


(8-26)


 


Creatinine


 


 


 


 1.0 mg/dL


(0.7-1.3)


 


Estimated GFR


(Cockcroft-Gault) 


 


 


 75.0 





 


Glucose Level


 


 


 


 226 mg/dL


(70-99)


 


Calcium Level


 


 


 


 6.9 mg/dL


(8.5-10.1)


 


Ferritin


 


 


 


 769 ng/mL


()


 


C-Reactive Protein,


Quantitative 


 


 


 167.1 mg/L


(0-3.3)


 


Test


 8/31/20


05:32 8/31/20


08:05 


 





 


Glucose (Fingerstick)


 202 mg/dL


(70-99) 


 


 





 


O2 Saturation  82 % (92-99)   


 


Arterial Blood pH


 


 7.34


(7.35-7.45) 


 





 


Arterial Blood pCO2 at


Patient Temp 


 45 mmHg


(35-46) 


 





 


Arterial Blood pO2 at Patient


Temp 


 50 mmHg


() 


 





 


Arterial Blood HCO3


 


 24 mmol/L


(21-28) 


 





 


Arterial Blood Base Excess


 


 -2 mmol/L


(-3-3) 


 





 


FiO2  100   








Microbiology


8/22/20 Blood Culture - Final, Complete


          NO GROWTH AFTER 5 DAYS


8/20/20 Urine Culture - Final, Complete


Medications





Current Medications


Etomidate (Amidate) 20 mg STK-MED ONCE IV ;  Start 8/20/20 at 18:54;  Stop 

8/20/20 at 18:54;  Status DC


Succinylcholine Chloride (Anectine) 200 mg STK-MED ONCE .ROUTE ;  Start 8/20/20 

at 18:55;  Stop 8/20/20 at 18:55;  Status DC


Propofol 50 ml @ As Directed STK-MED ONCE IV ;  Start 8/20/20 at 18:55;  Stop 

8/20/20 at 18:56;  Status DC


Propofol 100 ml @ 0 mls/hr CONT  PRN IV SEE PROTOCOL Last administered on 

8/31/20at 10:15;  Start 8/20/20 at 19:00


Fentanyl Citrate (Fentanyl 2ml Vial) 25 mcg PRN Q1HR  PRN IV SEE COMMENTS;  

Start 8/20/20 at 19:00


Fentanyl Citrate (Fentanyl 2ml Vial) 50 mcg PRN Q1HR  PRN IV SEE COMMENTS Last 

administered on 8/20/20at 19:42;  Start 8/20/20 at 19:00


Chlorhexidine Gluconate (Peridex) 15 ml BID MM  Last administered on 8/21/20at 

08:16;  Start 8/20/20 at 21:00;  Stop 8/21/20 at 09:45;  Status DC


Midazolam HCl (Versed) 5 mg STK-MED ONCE .ROUTE ;  Start 8/20/20 at 19:17;  Stop

8/20/20 at 19:18;  Status DC


Midazolam HCl 50 mg/Sodium Chloride 50 ml @ 0 mls/hr 1X  ONCE IV ;  Start 

8/20/20 at 19:30;  Stop 8/20/20 at 19:31;  Status UNV


Albuterol/ Ipratropium (Duoneb) 3 ml STK-MED ONCE .ROUTE ;  Start 8/20/20 at 

19:27;  Stop 8/20/20 at 19:27;  Status DC


Midazolam HCl 100 ml @ 0 mls/hr CONT  PRN IV SEE PROTOCOL Last administered on 

8/31/20at 05:58;  Start 8/20/20 at 19:30


Propofol 50 ml @ 0 mls/hr 1X  ONCE IV  Last administered on 8/20/20at 18:55;  

Start 8/20/20 at 20:00;  Stop 8/20/20 at 20:01;  Status DC


Midazolam HCl (Versed) 5 mg 1X  ONCE NS  Last administered on 8/20/20at 19:18;  

Start 8/20/20 at 19:18;  Stop 8/20/20 at 19:59;  Status DC


Piperacillin Sod/ Tazobactam Sod 3.375 gm/Sodium Chloride 50 ml @  100 mls/hr 1X

 ONCE IV  Last administered on 8/20/20at 21:47;  Start 8/20/20 at 20:45;  Stop 

8/20/20 at 21:14;  Status DC


Dexmedetomidine HCl 400 mcg/ Sodium Chloride 100 ml @ 0 mls/hr CONT  PRN IV 

SEDATION Last administered on 8/31/20at 10:11;  Start 8/20/20 at 20:15


Sodium Chloride 500 ml @  500 mls/hr 1X PRN  PRN IV SEE COMMENTS Last 

administered on 8/27/20at 11:22;  Start 8/20/20 at 20:15


Atropine Sulfate (ATROPINE 0.5mg SYRINGE) 0.5 mg PRN Q5MIN  PRN IV SEE COMMENTS;

 Start 8/20/20 at 20:15


Fentanyl Citrate (Fentanyl 2ml Vial) 100 mcg 1X  ONCE IVP  Last administered on 

8/20/20at 20:17;  Start 8/20/20 at 20:15;  Stop 8/20/20 at 20:18;  Status DC


Methylprednisolone Sodium Succinate (SOLU-Medrol 40MG VIAL) 40 mg Q8HRS IV  Last

administered on 8/31/20at 06:05;  Start 8/20/20 at 22:00


Enoxaparin Sodium (Lovenox 40mg Syringe) 40 mg BID SQ  Last administered on 

8/21/20at 08:15;  Start 8/20/20 at 21:30;  Stop 8/21/20 at 09:47;  Status DC


Potassium Chloride/Dextrose/ Sod Cl 1,000 ml @  125 mls/hr Q8H ONCE IV  Last 

administered on 8/20/20at 22:13;  Start 8/20/20 at 21:30;  Stop 8/21/20 at 

05:29;  Status DC


Zinc Sulfate (Orazinc) 220 mg DAILY PO  Last administered on 8/31/20at 07:24;  

Start 8/21/20 at 09:00


Ondansetron HCl (Zofran) 4 mg PRN Q4HRS  PRN IV NAUSEA/VOMITING;  Start 8/20/20 

at 21:30


Acetaminophen (Tylenol) 650 mg PRN Q4HRS  PRN GT TEMP OVER 100.4F OR MILD PAIN 

Last administered on 8/28/20at 05:51;  Start 8/20/20 at 21:30


Acetaminophen (Tylenol Supp) 650 mg PRN Q4HRS  PRN SD TEMP OVER 100.4F OR MILD 

PAIN;  Start 8/20/20 at 21:30


Docusate Sodium (Colace) 100 mg PRN BID  PRN PO HARD STOOLS Last administered on

8/26/20at 08:26;  Start 8/20/20 at 21:30


Piperacillin Sod/ Tazobactam Sod 3.375 gm/Sodium Chloride 50 ml @  100 mls/hr 

Q6HRS IV  Last administered on 8/31/20at 06:06;  Start 8/21/20 at 00:00


Pantoprazole Sodium (PROTONIX VIAL for IV PUSH) 40 mg DAILYAC IVP  Last 

administered on 8/31/20at 07:24;  Start 8/21/20 at 07:30


Norepinephrine Bitartrate 8 mg/ Dextrose 258 ml @  12.578 mls/ hr CONT  PRN IV 

PER PROTOCOL Last administered on 8/20/20at 22:15;  Start 8/20/20 at 21:45


Sodium Chloride 1,000 ml @  1,000 mls/hr 1X  ONCE IV  Last administered on 

8/20/20at 21:44;  Start 8/20/20 at 21:45;  Stop 8/20/20 at 22:44;  Status DC


Fentanyl Citrate 30 ml @ 0 mls/hr CONT  PRN IV SEE PROTOCOL Last administered on

8/22/20at 10:44;  Start 8/20/20 at 21:45;  Stop 8/22/20 at 14:49;  Status DC


Vecuronium Bromide (Norcuron Bolus) 6 mg PRN Q2HR  PRN IV VENT ASYNCHRONY Last 

administered on 8/22/20at 11:23;  Start 8/20/20 at 21:45;  Stop 8/22/20 at 

14:48;  Status DC


Sodium Chloride 1,000 ml @  1,000 mls/hr 1X  ONCE IV  Last administered on 

8/20/20at 22:36;  Start 8/20/20 at 22:30;  Stop 8/20/20 at 23:29;  Status DC


Methylprednisolone Sodium Succinate (SOLU-Medrol 40MG VIAL) 40 mg Q8HRS IV ;  

Start 8/21/20 at 14:00;  Status UNV


Enoxaparin Sodium (Lovenox 80mg Syringe) 80 mg BID SQ  Last administered on 

8/21/20at 20:53;  Start 8/21/20 at 21:00;  Stop 8/22/20 at 08:57;  Status DC


Insulin Human Lispro (HumaLOG) 6 units TID SQ ;  Start 8/21/20 at 14:00;  Stop 

8/21/20 at 10:05;  Status DC


Insulin Glargine (Lantus Syringe) 10 unit BID SQ  Last administered on 8/22/20at

09:27;  Start 8/21/20 at 21:00;  Stop 8/22/20 at 11:25;  Status DC


Ascorbic Acid (Vitamin C) 250 mg Q6HRS PO  Last administered on 8/30/20at 06:27;

 Start 8/21/20 at 12:00


Sodium Chloride 1,000 ml @  75 mls/hr [id number] IV  Last administered on 8/24/20at

19:33;  Start 8/21/20 at 10:00;  Stop 8/25/20 at 10:23;  Status DC


Insulin Human Lispro (HumaLOG) 6 units Q6HRS SQ  Last administered on 8/22/20at 

06:11;  Start 8/21/20 at 12:00;  Stop 8/22/20 at 11:25;  Status DC


Insulin Glargine (Lantus Syringe) 10 unit 1X  ONCE SQ  Last administered on 

8/21/20at 11:40;  Start 8/21/20 at 11:00;  Stop 8/21/20 at 11:01;  Status DC


Enoxaparin Sodium (Lovenox 60mg Syringe) 60 mg BID SQ  Last administered on 

8/24/20at 08:55;  Start 8/22/20 at 09:00;  Stop 8/24/20 at 16:45;  Status DC


Vecuronium Bromide (Norcuron Bolus) 6 mg PRN Q6HRS  PRN IV SEDATION (2nd Choice)

Last administered on 8/24/20at 16:36;  Start 8/22/20 at 09:30


Insulin Glargine (Lantus Syringe) 14 unit BID SQ  Last administered on 8/24/20at

09:24;  Start 8/22/20 at 21:00;  Stop 8/24/20 at 12:27;  Status DC


Insulin Human Lispro (HumaLOG) 8 units Q6HRS SQ  Last administered on 8/24/20at 

12:11;  Start 8/22/20 at 12:00;  Stop 8/24/20 at 12:27;  Status DC


Fentanyl Citrate 55 ml @ 0 mls/hr CONT  PRN IV SEE PROTOCOL Last administered on

8/31/20at 00:16;  Start 8/22/20 at 14:49


Lorazepam (Ativan Inj) 1 mg PRN Q1HR  PRN IVP ANXIETY / AGITATION- MODERATE;  

Start 8/22/20 at 16:15


Lorazepam (Ativan Inj) 2 mg PRN Q1HR  PRN IVP ANXIETY / AGITATION- SEVERE Last 

administered on 8/23/20at 13:10;  Start 8/22/20 at 16:30


Insulin Human Lispro (HumaLOG) 6 units 1X  ONCE SQ  Last administered on 

8/23/20at 06:20;  Start 8/23/20 at 06:15;  Stop 8/23/20 at 06:16;  Status DC


Hydralazine HCl (Apresoline Inj) 10 mg PRN Q4HRS  PRN IVP ELEVATED BP, SEE 

COMMENTS Last administered on 8/29/20at 23:09;  Start 8/24/20 at 09:15


Alteplase, Recombinant (Cathflo For Central Catheter Clearance) 1 mg 1X  ONCE 

INT CAT  Last administered on 8/24/20at 12:05;  Start 8/24/20 at 11:00;  Stop 

8/24/20 at 11:01;  Status DC


Insulin Glargine (Lantus Syringe) 18 unit BID SQ  Last administered on 8/25/20at

08:30;  Start 8/24/20 at 21:00;  Stop 8/25/20 at 10:03;  Status DC


Insulin Human Lispro (HumaLOG) 10 units Q6HRS SQ  Last administered on 8/25/20at

05:53;  Start 8/24/20 at 18:00;  Stop 8/25/20 at 10:03;  Status DC


Vecuronium Bromide 50 mg/ Miscellaneous 50 ml @  4.027 mls/ hr CONT  PRN IV SEE 

I/O RECORD Last administered on 8/31/20at 06:35;  Start 8/24/20 at 16:15


Enoxaparin Sodium (Lovenox Per Pharmacy Treatment Dosing) 1 each PRN DAILY  PRN 

MC SEE COMMENTS;  Start 8/24/20 at 16:45


Enoxaparin Sodium (Lovenox 80mg Syringe) 80 mg Q12HR SQ  Last administered on 

8/31/20at 07:24;  Start 8/24/20 at 21:00


Furosemide (Lasix) 40 mg 1X  ONCE IVP  Last administered on 8/25/20at 10:19;  

Start 8/25/20 at 10:00;  Stop 8/25/20 at 10:01;  Status DC


Insulin Glargine (Lantus Syringe) 24 unit BID SQ  Last administered on 8/31/20at

07:23;  Start 8/25/20 at 10:30


Insulin Human Lispro (HumaLOG) 14 units Q6HRS SQ  Last administered on 8/31/20at

06:06;  Start 8/25/20 at 12:00


Info (Anti-Coagulation Monitoring By Pharmacy) 1 each PRN DAILY  PRN MC SEE 

COMMENTS Last administered on 8/27/20at 14:14;  Start 8/26/20 at 10:00


Polyethylene Glycol (miraLAX PACKET) 17 gm DAILY PO  Last administered on 

8/29/20at 09:02;  Start 8/28/20 at 11:00


Dextrose/Sodium Chloride 1,000 ml @  75 mls/hr [id number] IV  Last administered on 

8/31/20at 10:12;  Start 8/29/20 at 09:30


Vitals/I & O





Vital Sign - Last 24 Hours








 8/30/20 8/30/20 8/30/20 8/30/20





 10:47 11:17 11:20 12:00


 


Pulse   79 


 


Resp 22 22 22 


 


B/P (MAP)   137/61 (86) 


 


Pulse Ox 86 86 86 


 


O2 Delivery Ventilator Ventilator Ventilator Mechanical Ventilator


 


O2 Flow Rate 18.0 18.0  





 8/30/20 8/30/20 8/30/20 8/30/20





 12:22 12:33 13:26 14:20


 


Temp  99.3  





  99.3  


 


Pulse  72 68 66


 


Resp  22 22 22


 


B/P (MAP)  116/55 (75) 109/54 (72) 110/56 (74)


 


Pulse Ox 88 97 85 83


 


O2 Delivery Ventilator Ventilator Ventilator Ventilator


 


    





    





 8/30/20 8/30/20 8/30/20 8/30/20





 15:38 16:00 16:29 16:30


 


Temp    98.9





    98.9


 


Pulse 65   64


 


Resp 22   22


 


B/P (MAP) 112/57 (75)   112/58 (76)


 


Pulse Ox 86  86 86


 


O2 Delivery Ventilator Mechanical Ventilator Ventilator Ventilator


 


    





    





 8/30/20 8/30/20 8/30/20 8/30/20





 17:12 17:57 19:00 20:00


 


Pulse 65 64 64 


 


Resp 22 22 22 


 


B/P (MAP) 113/60 (77) 111/57 (75) 109/56 (73) 


 


Pulse Ox 85 85 83 


 


O2 Delivery Ventilator Ventilator Ventilator Mechanical Ventilator





 8/30/20 8/30/20 8/30/20 8/30/20





 20:00 20:34 21:00 22:00


 


Temp 98.1   





 98.1   


 


Pulse 62  62 62


 


Resp 22  22 22


 


B/P (MAP) 110/57 (74)  113/55 (74) 107/55 (72)


 


Pulse Ox 84 84 84 84


 


O2 Delivery Ventilator Ventilator Ventilator Ventilator


 


    





    





 8/30/20 8/30/20 8/31/20 8/31/20





 23:00 23:30 00:00 00:21


 


Temp   98.3 





   98.3 


 


Pulse 62  63 


 


Resp 22 22 


 


B/P (MAP) 110/55 (73)  119/59 (79) 


 


Pulse Ox 85  79 80


 


O2 Delivery Ventilator Mechanical Ventilator Ventilator Ventilator


 


    





    





 8/31/20 8/31/20 8/31/20 8/31/20





 01:00 02:00 03:00 04:00


 


Temp    98.4





    98.4


 


Pulse 62 60 60 60


 


Resp 22 22 22 22


 


B/P (MAP) 118/61 (80) 114/58 (76) 113/58 (76) 114/57 (76)


 


Pulse Ox 80 84 85 85


 


O2 Delivery Ventilator Ventilator Ventilator Ventilator


 


    





    





 8/31/20 8/31/20 8/31/20 8/31/20





 04:00 04:57 05:00 06:00


 


Pulse   59 58


 


Resp   22 22


 


B/P (MAP)   114/59 (77) 116/58 (77)


 


Pulse Ox  85 86 84


 


O2 Delivery Mechanical Ventilator Ventilator Ventilator Ventilator





 8/31/20 8/31/20 8/31/20 8/31/20





 06:53 08:05 08:18 08:37


 


Temp   98.4 





   98.4 


 


Pulse 58  58 


 


Resp 22  22 


 


B/P (MAP) 111/60 (77)  122/61 (81) 


 


Pulse Ox 84 85 82 


 


O2 Delivery Ventilator Ventilator Ventilator Mechanical Ventilator


 


    





    





 8/31/20 8/31/20  





 09:01 10:12  


 


Pulse 57 56  


 


Resp 22 22  


 


B/P (MAP) 107/51 (69) 106/55 (72)  


 


Pulse Ox 85 87  


 


O2 Delivery Ventilator Ventilator  














Intake and Output   


 


 8/30/20 8/30/20 8/31/20





 15:00 23:00 07:00


 


Intake Total 300 ml 1874 ml 2047 ml


 


Output Total 750 ml 300 ml 450 ml


 


Balance -450 ml 1574 ml 1597 ml











Justicifation of Admission Dx:


Justifications for Admission:


Justification of Admission Dx:  Yes











TATYANA LLOYD MD          Aug 31, 2020 10:35

## 2020-08-31 NOTE — RAD
CHEST AP ONLY 

 

INDICATION: Reason: covid  f/u  ICU#116 / Spl. Instructions:  / History: .

 

 

COMPARISON STUDY: 8/28/2020.

 

FINDINGS:

 

Life Support Devices: Stable endotracheal tube, enteric tube, right IJ 

central venous catheter.

 

Lungs: Normal lung volume. Stable bilateral perihilar and basilar 

heterogeneous opacities. Indistinct pulmonary vasculature.

 

Pleura: No apical pneumothorax. Lung bases beyond the field-of-view.

 

Heart and Mediastinum: Stable cardiomediastinal silhouette and great 

vessels. 

 

IMPRESSION:  

1. Stable bilateral perihilar and basilar regions opacities.

2. Stable life support devices.

 

Electronically signed by: Eddie Bae MD (8/31/2020 11:22 AM) 

[id number]

## 2020-08-31 NOTE — PDOC2
CONSULT


Date of Consult


Date of Consult


DATE: 8/31/20 


TIME: 11:19





Reason for Consult


Reason for Consult:


Electrolyte abnormality





Referring Physician


Referring Physician:


Dr. Gottlieb





Identification/Chief Complaint


Chief Complaint


Unable to Obtain 2/2 MV





Source


Source:  Chart review





History of Present Illness


Reason for Visit:


Hx Obtained from Chart review as Pt is Intubated  





Pt is a 66 yo male, admitted to [facility] on 8/21  with diagnosis  of COVID-19 

infection  5 days prior to his admission to the intensive care unit.  Apparently

he reported worsening respiratory distress and came to the emergency department 

for evaluation and treatment.   


He required mechanical ventilation and prompt intubation by the emergency 

department physician.  At the present time  he is intubated and sedated 


Renal consulted for "electrolyte abnormality" by Dr. Gottlieb 


Had fever, Not tolerating TF  .





Past Medical History


Past Medical History


Unable to Obtain from Pt, per Chart review - note by Hospitalist, No significant

PMHX





Past Surgical History


Past Surgical History:  No pertinent history





Family History


Family History


Unable to Obtain from patient


Family History:  No Significant





Social History


Social History


Unable to Obtain


No


ALCOHOL:  none


Drugs:  None





Current Medications


Current Medications





Current Medications


Etomidate (Amidate) 20 mg STK-MED ONCE IV ;  Start 8/20/20 at 18:54;  Stop 

8/20/20 at 18:54;  Status DC


Succinylcholine Chloride (Anectine) 200 mg STK-MED ONCE .ROUTE ;  Start 8/20/20 

at 18:55;  Stop 8/20/20 at 18:55;  Status DC


Propofol 50 ml @ As Directed STK-MED ONCE IV ;  Start 8/20/20 at 18:55;  Stop 

8/20/20 at 18:56;  Status DC


Propofol 100 ml @ 0 mls/hr CONT  PRN IV SEE PROTOCOL Last administered on 

8/31/20at 10:15;  Start 8/20/20 at 19:00


Fentanyl Citrate (Fentanyl 2ml Vial) 25 mcg PRN Q1HR  PRN IV SEE COMMENTS;  

Start 8/20/20 at 19:00


Fentanyl Citrate (Fentanyl 2ml Vial) 50 mcg PRN Q1HR  PRN IV SEE COMMENTS Last 

administered on 8/20/20at 19:42;  Start 8/20/20 at 19:00


Chlorhexidine Gluconate (Peridex) 15 ml BID MM  Last administered on 8/21/20at 

08:16;  Start 8/20/20 at 21:00;  Stop 8/21/20 at 09:45;  Status DC


Midazolam HCl (Versed) 5 mg STK-MED ONCE .ROUTE ;  Start 8/20/20 at 19:17;  Stop

8/20/20 at 19:18;  Status DC


Midazolam HCl 50 mg/Sodium Chloride 50 ml @ 0 mls/hr 1X  ONCE IV ;  Start 

8/20/20 at 19:30;  Stop 8/20/20 at 19:31;  Status UNV


Albuterol/ Ipratropium (Duoneb) 3 ml STK-MED ONCE .ROUTE ;  Start 8/20/20 at 

19:27;  Stop 8/20/20 at 19:27;  Status DC


Midazolam HCl 100 ml @ 0 mls/hr CONT  PRN IV SEE PROTOCOL Last administered on 

8/31/20at 05:58;  Start 8/20/20 at 19:30


Propofol 50 ml @ 0 mls/hr 1X  ONCE IV  Last administered on 8/20/20at 18:55;  

Start 8/20/20 at 20:00;  Stop 8/20/20 at 20:01;  Status DC


Midazolam HCl (Versed) 5 mg 1X  ONCE NS  Last administered on 8/20/20at 19:18;  

Start 8/20/20 at 19:18;  Stop 8/20/20 at 19:59;  Status DC


Piperacillin Sod/ Tazobactam Sod 3.375 gm/Sodium Chloride 50 ml @  100 mls/hr 1X

 ONCE IV  Last administered on 8/20/20at 21:47;  Start 8/20/20 at 20:45;  Stop 

8/20/20 at 21:14;  Status DC


Dexmedetomidine HCl 400 mcg/ Sodium Chloride 100 ml @ 0 mls/hr CONT  PRN IV 

SEDATION Last administered on 8/31/20at 10:11;  Start 8/20/20 at 20:15


Sodium Chloride 500 ml @  500 mls/hr 1X PRN  PRN IV SEE COMMENTS Last 

administered on 8/27/20at 11:22;  Start 8/20/20 at 20:15


Atropine Sulfate (ATROPINE 0.5mg SYRINGE) 0.5 mg PRN Q5MIN  PRN IV SEE COMMENTS;

 Start 8/20/20 at 20:15


Fentanyl Citrate (Fentanyl 2ml Vial) 100 mcg 1X  ONCE IVP  Last administered on 

8/20/20at 20:17;  Start 8/20/20 at 20:15;  Stop 8/20/20 at 20:18;  Status DC


Methylprednisolone Sodium Succinate (SOLU-Medrol 40MG VIAL) 40 mg Q8HRS IV  Last

administered on 8/31/20at 06:05;  Start 8/20/20 at 22:00


Enoxaparin Sodium (Lovenox 40mg Syringe) 40 mg BID SQ  Last administered on 

8/21/20at 08:15;  Start 8/20/20 at 21:30;  Stop 8/21/20 at 09:47;  Status DC


Potassium Chloride/Dextrose/ Sod Cl 1,000 ml @  125 mls/hr Q8H ONCE IV  Last 

administered on 8/20/20at 22:13;  Start 8/20/20 at 21:30;  Stop 8/21/20 at 

05:29;  Status DC


Zinc Sulfate (Orazinc) 220 mg DAILY PO  Last administered on 8/31/20at 07:24;  

Start 8/21/20 at 09:00


Ondansetron HCl (Zofran) 4 mg PRN Q4HRS  PRN IV NAUSEA/VOMITING;  Start 8/20/20 

at 21:30


Acetaminophen (Tylenol) 650 mg PRN Q4HRS  PRN GT TEMP OVER 100.4F OR MILD PAIN 

Last administered on 8/28/20at 05:51;  Start 8/20/20 at 21:30


Acetaminophen (Tylenol Supp) 650 mg PRN Q4HRS  PRN [place] TEMP OVER 100.4F OR MILD 

PAIN;  Start 8/20/20 at 21:30


Docusate Sodium (Colace) 100 mg PRN BID  PRN PO HARD STOOLS Last administered on

8/26/20at 08:26;  Start 8/20/20 at 21:30


Piperacillin Sod/ Tazobactam Sod 3.375 gm/Sodium Chloride 50 ml @  100 mls/hr 

Q6HRS IV  Last administered on 8/31/20at 06:06;  Start 8/21/20 at 00:00


Pantoprazole Sodium (PROTONIX VIAL for IV PUSH) 40 mg DAILYAC IVP  Last 

administered on 8/31/20at 07:24;  Start 8/21/20 at 07:30


Norepinephrine Bitartrate 8 mg/ Dextrose 258 ml @  12.578 mls/ hr CONT  PRN IV 

PER PROTOCOL Last administered on 8/20/20at 22:15;  Start 8/20/20 at 21:45


Sodium Chloride 1,000 ml @  1,000 mls/hr 1X  ONCE IV  Last administered on 

8/20/20at 21:44;  Start 8/20/20 at 21:45;  Stop 8/20/20 at 22:44;  Status DC


Fentanyl Citrate 30 ml @ 0 mls/hr CONT  PRN IV SEE PROTOCOL Last administered on

8/22/20at 10:44;  Start 8/20/20 at 21:45;  Stop 8/22/20 at 14:49;  Status DC


Vecuronium Bromide (Norcuron Bolus) 6 mg PRN Q2HR  PRN IV VENT ASYNCHRONY Last 

administered on 8/22/20at 11:23;  Start 8/20/20 at 21:45;  Stop 8/22/20 at 

14:48;  Status DC


Sodium Chloride 1,000 ml @  1,000 mls/hr 1X  ONCE IV  Last administered on 

8/20/20at 22:36;  Start 8/20/20 at 22:30;  Stop 8/20/20 at 23:29;  Status DC


Methylprednisolone Sodium Succinate (SOLU-Medrol 40MG VIAL) 40 mg Q8HRS IV ;  

Start 8/21/20 at 14:00;  Status UNV


Enoxaparin Sodium (Lovenox 80mg Syringe) 80 mg BID SQ  Last administered on 

8/21/20at 20:53;  Start 8/21/20 at 21:00;  Stop 8/22/20 at 08:57;  Status DC


Insulin Human Lispro (HumaLOG) 6 units TID SQ ;  Start 8/21/20 at 14:00;  Stop 

8/21/20 at 10:05;  Status DC


Insulin Glargine (Lantus Syringe) 10 unit BID SQ  Last administered on 8/22/20at

09:27;  Start 8/21/20 at 21:00;  Stop 8/22/20 at 11:25;  Status DC


Ascorbic Acid (Vitamin C) 250 mg Q6HRS PO  Last administered on 8/30/20at 06:27;

 Start 8/21/20 at 12:00


Sodium Chloride 1,000 ml @  75 mls/hr [id number] IV  Last administered on 8/24/20at

19:33;  Start 8/21/20 at 10:00;  Stop 8/25/20 at 10:23;  Status DC


Insulin Human Lispro (HumaLOG) 6 units Q6HRS SQ  Last administered on 8/22/20at 

06:11;  Start 8/21/20 at 12:00;  Stop 8/22/20 at 11:25;  Status DC


Insulin Glargine (Lantus Syringe) 10 unit 1X  ONCE SQ  Last administered on 

8/21/20at 11:40;  Start 8/21/20 at 11:00;  Stop 8/21/20 at 11:01;  Status DC


Enoxaparin Sodium (Lovenox 60mg Syringe) 60 mg BID SQ  Last administered on 

8/24/20at 08:55;  Start 8/22/20 at 09:00;  Stop 8/24/20 at 16:45;  Status DC


Vecuronium Bromide (Norcuron Bolus) 6 mg PRN Q6HRS  PRN IV SEDATION (2nd Choice)

Last administered on 8/24/20at 16:36;  Start 8/22/20 at 09:30


Insulin Glargine (Lantus Syringe) 14 unit BID SQ  Last administered on 8/24/20at

09:24;  Start 8/22/20 at 21:00;  Stop 8/24/20 at 12:27;  Status DC


Insulin Human Lispro (HumaLOG) 8 units Q6HRS SQ  Last administered on 8/24/20at 

12:11;  Start 8/22/20 at 12:00;  Stop 8/24/20 at 12:27;  Status DC


Fentanyl Citrate 55 ml @ 0 mls/hr CONT  PRN IV SEE PROTOCOL Last administered on

8/31/20at 00:16;  Start 8/22/20 at 14:49


Lorazepam (Ativan Inj) 1 mg PRN Q1HR  PRN IVP ANXIETY / AGITATION- MODERATE;  

Start 8/22/20 at 16:15


Lorazepam (Ativan Inj) 2 mg PRN Q1HR  PRN IVP ANXIETY / AGITATION- SEVERE Last 

administered on 8/23/20at 13:10;  Start 8/22/20 at 16:30


Insulin Human Lispro (HumaLOG) 6 units 1X  ONCE SQ  Last administered on 

8/23/20at 06:20;  Start 8/23/20 at 06:15;  Stop 8/23/20 at 06:16;  Status DC


Hydralazine HCl (Apresoline Inj) 10 mg PRN Q4HRS  PRN IVP ELEVATED BP, SEE 

COMMENTS Last administered on 8/29/20at 23:09;  Start 8/24/20 at 09:15


Alteplase, Recombinant (Cathflo For Central Catheter Clearance) 1 mg 1X  ONCE 

INT CAT  Last administered on 8/24/20at 12:05;  Start 8/24/20 at 11:00;  Stop 

8/24/20 at 11:01;  Status DC


Insulin Glargine (Lantus Syringe) 18 unit BID SQ  Last administered on 8/25/20at

08:30;  Start 8/24/20 at 21:00;  Stop 8/25/20 at 10:03;  Status DC


Insulin Human Lispro (HumaLOG) 10 units Q6HRS SQ  Last administered on 8/25/20at

05:53;  Start 8/24/20 at 18:00;  Stop 8/25/20 at 10:03;  Status DC


Vecuronium Bromide 50 mg/ Miscellaneous 50 ml @  4.027 mls/ hr CONT  PRN IV SEE 

I/O RECORD Last administered on 8/31/20at 06:35;  Start 8/24/20 at 16:15


Enoxaparin Sodium (Lovenox Per Pharmacy Treatment Dosing) 1 each PRN DAILY  PRN 

MC SEE COMMENTS;  Start 8/24/20 at 16:45


Enoxaparin Sodium (Lovenox 80mg Syringe) 80 mg Q12HR SQ  Last administered on 

8/31/20at 07:24;  Start 8/24/20 at 21:00


Furosemide (Lasix) 40 mg 1X  ONCE IVP  Last administered on 8/25/20at 10:19;  

Start 8/25/20 at 10:00;  Stop 8/25/20 at 10:01;  Status DC


Insulin Glargine (Lantus Syringe) 24 unit BID SQ  Last administered on 8/31/20at

07:23;  Start 8/25/20 at 10:30


Insulin Human Lispro (HumaLOG) 14 units Q6HRS SQ  Last administered on 8/31/20at

06:06;  Start 8/25/20 at 12:00


Info (Anti-Coagulation Monitoring By Pharmacy) 1 each PRN DAILY  PRN MC SEE 

COMMENTS Last administered on 8/27/20at 14:14;  Start 8/26/20 at 10:00


Polyethylene Glycol (miraLAX PACKET) 17 gm DAILY PO  Last administered on 

8/29/20at 09:02;  Start 8/28/20 at 11:00


Dextrose/Sodium Chloride 1,000 ml @  75 mls/hr [id number] IV  Last administered on 

8/31/20at 10:12;  Start 8/29/20 at 09:30





Allergies


Allergies:  


Coded Allergies:  


     No Known Drug Allergies (Unverified , 8/20/20)





ROS


Review of System


   Unable to Obtain





Physical Exam


Physical Exam


Visual 2/2 CoVid-19 positive 





GEN.:     Intubated on MV  


HEENT:   atraumatic


NECK:    Supple.  


LUNGS:    Non labored  


HEART:    RRR, 


ABDOMEN:    Soft,


EXTREMITIES:    No cyanosis.    


NEUROLOGIC:    Sedated


SKIN:   No rash


                   [place] +


Vital Signs





Vital Signs








  Date Time  Temp Pulse Resp B/P (MAP) Pulse Ox O2 Delivery O2 Flow Rate FiO2


 


8/31/20 11:04  56 22 106/60 (75) 88 Ventilator  


 


8/31/20 08:18 98.4       





 98.4       


 


8/30/20 11:17       18.0 








Assessment & Plan


Hypernatremia - Low Na at presentation 


Mildly better today  , continue IV Hypotonic fluid (Free water flushes if 

tolerates TF) 


Monitor  





Acue renal failure due to vasomotor nephropathy - resolved 





Acute hypoxemic respiratory failure- On IV steroids, with taper, s/p Plasma 





Abnormal D-dimer related to COVID-19 pneumonia.  On lovenox added 8/24 neg for 

DVT, 





CoViD-19 positive  





Transaminitis 





Hypertension- Currently low BP  





Critically ill





Labs


Labs





Laboratory Tests








Test


 8/29/20


12:19 8/29/20


17:47 8/29/20


22:34 8/30/20


05:45


 


Glucose (Fingerstick)


 162 mg/dL


(70-99) 127 mg/dL


(70-99) 89 mg/dL


(70-99) 





 


White Blood Count


 


 


 


 10.1 x10^3/uL


(4.0-11.0)


 


Red Blood Count


 


 


 


 2.84 x10^6/uL


(4.30-5.70)


 


Hemoglobin


 


 


 


 8.8 g/dL


(13.0-17.5)


 


Hematocrit


 


 


 


 27.3 %


(39.0-53.0)


 


Mean Corpuscular Volume    96 fL () 


 


Mean Corpuscular Hemoglobin    31 pg (25-35) 


 


Mean Corpuscular Hemoglobin


Concent 


 


 


 32 g/dL


(31-37)


 


Red Cell Distribution Width


 


 


 


 14.8 %


(11.5-14.5)


 


Platelet Count


 


 


 


 192 x10^3/uL


(140-400)


 


Neutrophils (%) (Auto)    90 % (31-73) 


 


Lymphocytes (%) (Auto)    3 % (24-48) 


 


Monocytes (%) (Auto)    5 % (0-9) 


 


Eosinophils (%) (Auto)    2 % (0-3) 


 


Basophils (%) (Auto)    1 % (0-3) 


 


Neutrophils # (Auto)


 


 


 


 9.0 x10^3/uL


(1.8-7.7)


 


Lymphocytes # (Auto)


 


 


 


 0.3 x10^3/uL


(1.0-4.8)


 


Monocytes # (Auto)


 


 


 


 0.5 x10^3/uL


(0.0-1.1)


 


Eosinophils # (Auto)


 


 


 


 0.2 x10^3/uL


(0.0-0.7)


 


Basophils # (Auto)


 


 


 


 0.1 x10^3/uL


(0.0-0.2)


 


Sodium Level


 


 


 


 151 mmol/L


(136-145)


 


Potassium Level


 


 


 


 4.5 mmol/L


(3.5-5.1)


 


Chloride Level


 


 


 


 118 mmol/L


()


 


Carbon Dioxide Level


 


 


 


 31 mmol/L


(21-32)


 


Anion Gap    2 (6-14) 


 


Blood Urea Nitrogen


 


 


 


 51 mg/dL


(8-26)


 


Creatinine


 


 


 


 0.9 mg/dL


(0.7-1.3)


 


Estimated GFR


(Cockcroft-Gault) 


 


 


 84.7 





 


Glucose Level


 


 


 


 112 mg/dL


(70-99)


 


Calcium Level


 


 


 


 7.3 mg/dL


(8.5-10.1)


 


Test


 8/30/20


09:15 8/30/20


12:24 8/30/20


22:02 8/31/20


00:23


 


O2 Saturation 87 % (92-99)    


 


Arterial Blood pH


 7.36


(7.35-7.45) 


 


 





 


Arterial Blood pCO2 at


Patient Temp 48 mmHg


(35-46) 


 


 





 


Arterial Blood pO2 at Patient


Temp 56 mmHg


() 


 


 





 


Arterial Blood HCO3


 26 mmol/L


(21-28) 


 


 





 


Arterial Blood Base Excess


 0 mmol/L


(-3-3) 


 


 





 


FiO2 100    


 


Glucose (Fingerstick)


 


 130 mg/dL


(70-99) 200 mg/dL


(70-99) 186 mg/dL


(70-99)


 


Test


 8/31/20


05:30 8/31/20


05:32 8/31/20


08:05 





 


White Blood Count


 8.9 x10^3/uL


(4.0-11.0) 


 


 





 


Red Blood Count


 2.83 x10^6/uL


(4.30-5.70) 


 


 





 


Hemoglobin


 8.7 g/dL


(13.0-17.5) 


 


 





 


Hematocrit


 27.2 %


(39.0-53.0) 


 


 





 


Mean Corpuscular Volume 96 fL ()    


 


Mean Corpuscular Hemoglobin 31 pg (25-35)    


 


Mean Corpuscular Hemoglobin


Concent 32 g/dL


(31-37) 


 


 





 


Red Cell Distribution Width


 14.7 %


(11.5-14.5) 


 


 





 


Platelet Count


 195 x10^3/uL


(140-400) 


 


 





 


Neutrophils (%) (Auto) 91 % (31-73)    


 


Lymphocytes (%) (Auto) 4 % (24-48)    


 


Monocytes (%) (Auto) 4 % (0-9)    


 


Eosinophils (%) (Auto) 1 % (0-3)    


 


Basophils (%) (Auto) 0 % (0-3)    


 


Neutrophils # (Auto)


 8.0 x10^3/uL


(1.8-7.7) 


 


 





 


Lymphocytes # (Auto)


 0.4 x10^3/uL


(1.0-4.8) 


 


 





 


Monocytes # (Auto)


 0.4 x10^3/uL


(0.0-1.1) 


 


 





 


Eosinophils # (Auto)


 0.1 x10^3/uL


(0.0-0.7) 


 


 





 


Basophils # (Auto)


 0.0 x10^3/uL


(0.0-0.2) 


 


 





 


Sodium Level


 149 mmol/L


(136-145) 


 


 





 


Potassium Level


 4.2 mmol/L


(3.5-5.1) 


 


 





 


Chloride Level


 115 mmol/L


() 


 


 





 


Carbon Dioxide Level


 29 mmol/L


(21-32) 


 


 





 


Anion Gap 5 (6-14)    


 


Blood Urea Nitrogen


 54 mg/dL


(8-26) 


 


 





 


Creatinine


 1.0 mg/dL


(0.7-1.3) 


 


 





 


Estimated GFR


(Cockcroft-Gault) 75.0 


 


 


 





 


Glucose Level


 226 mg/dL


(70-99) 


 


 





 


Calcium Level


 6.9 mg/dL


(8.5-10.1) 


 


 





 


Ferritin


 769 ng/mL


() 


 


 





 


C-Reactive Protein,


Quantitative 167.1 mg/L


(0-3.3) 


 


 





 


Glucose (Fingerstick)


 


 202 mg/dL


(70-99) 


 





 


O2 Saturation   82 % (92-99)  


 


Arterial Blood pH


 


 


 7.34


(7.35-7.45) 





 


Arterial Blood pCO2 at


Patient Temp 


 


 45 mmHg


(35-46) 





 


Arterial Blood pO2 at Patient


Temp 


 


 50 mmHg


() 





 


Arterial Blood HCO3


 


 


 24 mmol/L


(21-28) 





 


Arterial Blood Base Excess


 


 


 -2 mmol/L


(-3-3) 





 


FiO2   100  








Laboratory Tests








Test


 8/30/20


12:24 8/30/20


22:02 8/31/20


00:23 8/31/20


05:30


 


Glucose (Fingerstick)


 130 mg/dL


(70-99) 200 mg/dL


(70-99) 186 mg/dL


(70-99) 





 


White Blood Count


 


 


 


 8.9 x10^3/uL


(4.0-11.0)


 


Red Blood Count


 


 


 


 2.83 x10^6/uL


(4.30-5.70)


 


Hemoglobin


 


 


 


 8.7 g/dL


(13.0-17.5)


 


Hematocrit


 


 


 


 27.2 %


(39.0-53.0)


 


Mean Corpuscular Volume    96 fL () 


 


Mean Corpuscular Hemoglobin    31 pg (25-35) 


 


Mean Corpuscular Hemoglobin


Concent 


 


 


 32 g/dL


(31-37)


 


Red Cell Distribution Width


 


 


 


 14.7 %


(11.5-14.5)


 


Platelet Count


 


 


 


 195 x10^3/uL


(140-400)


 


Neutrophils (%) (Auto)    91 % (31-73) 


 


Lymphocytes (%) (Auto)    4 % (24-48) 


 


Monocytes (%) (Auto)    4 % (0-9) 


 


Eosinophils (%) (Auto)    1 % (0-3) 


 


Basophils (%) (Auto)    0 % (0-3) 


 


Neutrophils # (Auto)


 


 


 


 8.0 x10^3/uL


(1.8-7.7)


 


Lymphocytes # (Auto)


 


 


 


 0.4 x10^3/uL


(1.0-4.8)


 


Monocytes # (Auto)


 


 


 


 0.4 x10^3/uL


(0.0-1.1)


 


Eosinophils # (Auto)


 


 


 


 0.1 x10^3/uL


(0.0-0.7)


 


Basophils # (Auto)


 


 


 


 0.0 x10^3/uL


(0.0-0.2)


 


Sodium Level


 


 


 


 149 mmol/L


(136-145)


 


Potassium Level


 


 


 


 4.2 mmol/L


(3.5-5.1)


 


Chloride Level


 


 


 


 115 mmol/L


()


 


Carbon Dioxide Level


 


 


 


 29 mmol/L


(21-32)


 


Anion Gap    5 (6-14) 


 


Blood Urea Nitrogen


 


 


 


 54 mg/dL


(8-26)


 


Creatinine


 


 


 


 1.0 mg/dL


(0.7-1.3)


 


Estimated GFR


(Cockcroft-Gault) 


 


 


 75.0 





 


Glucose Level


 


 


 


 226 mg/dL


(70-99)


 


Calcium Level


 


 


 


 6.9 mg/dL


(8.5-10.1)


 


Ferritin


 


 


 


 769 ng/mL


()


 


C-Reactive Protein,


Quantitative 


 


 


 167.1 mg/L


(0-3.3)


 


Test


 8/31/20


05:32 8/31/20


08:05 


 





 


Glucose (Fingerstick)


 202 mg/dL


(70-99) 


 


 





 


O2 Saturation  82 % (92-99)   


 


Arterial Blood pH


 


 7.34


(7.35-7.45) 


 





 


Arterial Blood pCO2 at


Patient Temp 


 45 mmHg


(35-46) 


 





 


Arterial Blood pO2 at Patient


Temp 


 50 mmHg


() 


 





 


Arterial Blood HCO3


 


 24 mmol/L


(21-28) 


 





 


Arterial Blood Base Excess


 


 -2 mmol/L


(-3-3) 


 





 


FiO2  100   








Review


All relevant outside records, renal labs, imaging studies, telemetry/EKG's were 

reviewed.











VERITO LOU MD                Aug 31, 2020 11:35

## 2020-08-31 NOTE — NUR
SS following up with discharge planning. SS reviewed pt chart and discussed with pt RN. Pt 
remains on the vent at this time. COVID19 positive. DNR. Pt on IV Zosyn. Pt remains self 
pay. SS will continue to follow for discharge planning.

## 2020-09-01 VITALS — SYSTOLIC BLOOD PRESSURE: 131 MMHG | DIASTOLIC BLOOD PRESSURE: 79 MMHG

## 2020-09-01 VITALS — DIASTOLIC BLOOD PRESSURE: 59 MMHG | SYSTOLIC BLOOD PRESSURE: 113 MMHG

## 2020-09-01 VITALS — SYSTOLIC BLOOD PRESSURE: 123 MMHG | DIASTOLIC BLOOD PRESSURE: 70 MMHG

## 2020-09-01 VITALS — SYSTOLIC BLOOD PRESSURE: 119 MMHG | DIASTOLIC BLOOD PRESSURE: 70 MMHG

## 2020-09-01 VITALS — DIASTOLIC BLOOD PRESSURE: 70 MMHG | SYSTOLIC BLOOD PRESSURE: 118 MMHG

## 2020-09-01 VITALS — SYSTOLIC BLOOD PRESSURE: 115 MMHG | DIASTOLIC BLOOD PRESSURE: 61 MMHG

## 2020-09-01 VITALS — DIASTOLIC BLOOD PRESSURE: 72 MMHG | SYSTOLIC BLOOD PRESSURE: 126 MMHG

## 2020-09-01 VITALS — SYSTOLIC BLOOD PRESSURE: 125 MMHG | DIASTOLIC BLOOD PRESSURE: 75 MMHG

## 2020-09-01 VITALS — DIASTOLIC BLOOD PRESSURE: 67 MMHG | SYSTOLIC BLOOD PRESSURE: 117 MMHG

## 2020-09-01 VITALS — DIASTOLIC BLOOD PRESSURE: 83 MMHG | SYSTOLIC BLOOD PRESSURE: 116 MMHG

## 2020-09-01 VITALS — SYSTOLIC BLOOD PRESSURE: 112 MMHG | DIASTOLIC BLOOD PRESSURE: 62 MMHG

## 2020-09-01 VITALS — SYSTOLIC BLOOD PRESSURE: 129 MMHG | DIASTOLIC BLOOD PRESSURE: 74 MMHG

## 2020-09-01 VITALS — SYSTOLIC BLOOD PRESSURE: 121 MMHG | DIASTOLIC BLOOD PRESSURE: 70 MMHG

## 2020-09-01 VITALS — DIASTOLIC BLOOD PRESSURE: 68 MMHG | SYSTOLIC BLOOD PRESSURE: 122 MMHG

## 2020-09-01 VITALS — SYSTOLIC BLOOD PRESSURE: 118 MMHG | DIASTOLIC BLOOD PRESSURE: 64 MMHG

## 2020-09-01 VITALS — SYSTOLIC BLOOD PRESSURE: 132 MMHG | DIASTOLIC BLOOD PRESSURE: 71 MMHG

## 2020-09-01 VITALS — DIASTOLIC BLOOD PRESSURE: 62 MMHG | SYSTOLIC BLOOD PRESSURE: 116 MMHG

## 2020-09-01 VITALS — SYSTOLIC BLOOD PRESSURE: 112 MMHG | DIASTOLIC BLOOD PRESSURE: 64 MMHG

## 2020-09-01 VITALS — SYSTOLIC BLOOD PRESSURE: 113 MMHG | DIASTOLIC BLOOD PRESSURE: 61 MMHG

## 2020-09-01 VITALS — SYSTOLIC BLOOD PRESSURE: 119 MMHG | DIASTOLIC BLOOD PRESSURE: 69 MMHG

## 2020-09-01 VITALS — DIASTOLIC BLOOD PRESSURE: 73 MMHG | SYSTOLIC BLOOD PRESSURE: 124 MMHG

## 2020-09-01 VITALS — DIASTOLIC BLOOD PRESSURE: 69 MMHG | SYSTOLIC BLOOD PRESSURE: 117 MMHG

## 2020-09-01 VITALS — DIASTOLIC BLOOD PRESSURE: 69 MMHG | SYSTOLIC BLOOD PRESSURE: 123 MMHG

## 2020-09-01 LAB
ALBUMIN SERPL-MCNC: 1.1 G/DL (ref 3.4–5)
ALBUMIN/GLOB SERPL: 0.3 {RATIO} (ref 1–1.7)
ALP SERPL-CCNC: 78 U/L (ref 46–116)
ALT SERPL-CCNC: 36 U/L (ref 16–63)
ANION GAP SERPL CALC-SCNC: 5 MMOL/L (ref 6–14)
AST SERPL-CCNC: 31 U/L (ref 15–37)
BASE EXCESS ABG: -3 MMOL/L (ref -3–3)
BILIRUB SERPL-MCNC: 0.5 MG/DL (ref 0.2–1)
BUN SERPL-MCNC: 43 MG/DL (ref 8–26)
BUN/CREAT SERPL: 54 (ref 6–20)
CALCIUM SERPL-MCNC: 7.1 MG/DL (ref 8.5–10.1)
CHLORIDE SERPL-SCNC: 115 MMOL/L (ref 98–107)
CO2 SERPL-SCNC: 27 MMOL/L (ref 21–32)
CREAT SERPL-MCNC: 0.8 MG/DL (ref 0.7–1.3)
GFR SERPLBLD BASED ON 1.73 SQ M-ARVRAT: 97 ML/MIN
GLOBULIN SER-MCNC: 3.8 G/DL (ref 2.2–3.8)
GLUCOSE SERPL-MCNC: 191 MG/DL (ref 70–99)
HCO3 BLDA-SCNC: 22 MMOL/L (ref 21–28)
INSPIRATION SETTING TIME VENT: (no result)
PCO2 BLDA: 42 MMHG (ref 35–46)
PO2 BLDA: 58 MMHG (ref 65–108)
POTASSIUM SERPL-SCNC: 4.1 MMOL/L (ref 3.5–5.1)
PROT SERPL-MCNC: 4.9 G/DL (ref 6.4–8.2)
SAO2 % BLDA: 87 % (ref 92–99)
SODIUM SERPL-SCNC: 147 MMOL/L (ref 136–145)

## 2020-09-01 RX ADMIN — PROPOFOL PRN MLS/HR: 10 INJECTION, EMULSION INTRAVENOUS at 02:40

## 2020-09-01 RX ADMIN — Medication PRN MLS/HR: at 16:01

## 2020-09-01 RX ADMIN — DEXMEDETOMIDINE HYDROCHLORIDE PRN MLS/HR: 100 INJECTION, SOLUTION, CONCENTRATE INTRAVENOUS at 20:57

## 2020-09-01 RX ADMIN — OXYCODONE HYDROCHLORIDE AND ACETAMINOPHEN SCH MG: 500 TABLET ORAL at 17:15

## 2020-09-01 RX ADMIN — PROPOFOL PRN MLS/HR: 10 INJECTION, EMULSION INTRAVENOUS at 19:54

## 2020-09-01 RX ADMIN — DEXMEDETOMIDINE HYDROCHLORIDE PRN MLS/HR: 100 INJECTION, SOLUTION, CONCENTRATE INTRAVENOUS at 17:17

## 2020-09-01 RX ADMIN — ZINC SULFATE CAP 220 MG (50 MG ELEMENTAL ZN) SCH MG: 220 (50 ZN) CAP at 07:33

## 2020-09-01 RX ADMIN — OXYCODONE HYDROCHLORIDE AND ACETAMINOPHEN SCH MG: 500 TABLET ORAL at 05:32

## 2020-09-01 RX ADMIN — DEXTROSE AND SODIUM CHLORIDE SCH MLS/HR: 5; .2 INJECTION, SOLUTION INTRAVENOUS at 14:55

## 2020-09-01 RX ADMIN — INSULIN GLARGINE SCH UNIT: 100 INJECTION, SOLUTION SUBCUTANEOUS at 20:55

## 2020-09-01 RX ADMIN — INSULIN LISPRO SCH UNITS: 100 INJECTION, SOLUTION INTRAVENOUS; SUBCUTANEOUS at 00:00

## 2020-09-01 RX ADMIN — DEXTROSE AND SODIUM CHLORIDE SCH MLS/HR: 5; .2 INJECTION, SOLUTION INTRAVENOUS at 17:30

## 2020-09-01 RX ADMIN — DEXMEDETOMIDINE HYDROCHLORIDE PRN MLS/HR: 100 INJECTION, SOLUTION, CONCENTRATE INTRAVENOUS at 07:33

## 2020-09-01 RX ADMIN — VECURONIUM BROMIDE PRN MLS/HR: 1 INJECTION, POWDER, LYOPHILIZED, FOR SOLUTION INTRAVENOUS at 07:30

## 2020-09-01 RX ADMIN — METHYLPREDNISOLONE SODIUM SUCCINATE SCH MG: 40 INJECTION, POWDER, FOR SOLUTION INTRAMUSCULAR; INTRAVENOUS at 22:28

## 2020-09-01 RX ADMIN — PROPOFOL PRN MLS/HR: 10 INJECTION, EMULSION INTRAVENOUS at 12:38

## 2020-09-01 RX ADMIN — Medication PRN MLS/HR: at 02:37

## 2020-09-01 RX ADMIN — PIPERACILLIN SODIUM AND TAZOBACTAM SODIUM SCH MLS/HR: 3; .375 INJECTION, POWDER, LYOPHILIZED, FOR SOLUTION INTRAVENOUS at 17:15

## 2020-09-01 RX ADMIN — INSULIN LISPRO SCH UNITS: 100 INJECTION, SOLUTION INTRAVENOUS; SUBCUTANEOUS at 12:00

## 2020-09-01 RX ADMIN — METHYLPREDNISOLONE SODIUM SUCCINATE SCH MG: 40 INJECTION, POWDER, FOR SOLUTION INTRAMUSCULAR; INTRAVENOUS at 05:32

## 2020-09-01 RX ADMIN — INSULIN GLARGINE SCH UNIT: 100 INJECTION, SOLUTION SUBCUTANEOUS at 07:34

## 2020-09-01 RX ADMIN — MIDAZOLAM PRN MLS/HR: 5 INJECTION, SOLUTION INTRAMUSCULAR; INTRAVENOUS at 20:58

## 2020-09-01 RX ADMIN — DEXMEDETOMIDINE HYDROCHLORIDE PRN MLS/HR: 100 INJECTION, SOLUTION, CONCENTRATE INTRAVENOUS at 12:39

## 2020-09-01 RX ADMIN — POLYETHYLENE GLYCOL 3350 SCH GM: 17 POWDER, FOR SOLUTION ORAL at 07:34

## 2020-09-01 RX ADMIN — MIDAZOLAM PRN MLS/HR: 5 INJECTION, SOLUTION INTRAMUSCULAR; INTRAVENOUS at 13:01

## 2020-09-01 RX ADMIN — INSULIN LISPRO SCH UNITS: 100 INJECTION, SOLUTION INTRAVENOUS; SUBCUTANEOUS at 05:32

## 2020-09-01 RX ADMIN — PANTOPRAZOLE SODIUM SCH MG: 40 INJECTION, POWDER, FOR SOLUTION INTRAVENOUS at 07:33

## 2020-09-01 RX ADMIN — OXYCODONE HYDROCHLORIDE AND ACETAMINOPHEN SCH MG: 500 TABLET ORAL at 11:59

## 2020-09-01 RX ADMIN — PIPERACILLIN SODIUM AND TAZOBACTAM SODIUM SCH MLS/HR: 3; .375 INJECTION, POWDER, LYOPHILIZED, FOR SOLUTION INTRAVENOUS at 05:31

## 2020-09-01 RX ADMIN — DEXMEDETOMIDINE HYDROCHLORIDE PRN MLS/HR: 100 INJECTION, SOLUTION, CONCENTRATE INTRAVENOUS at 02:36

## 2020-09-01 RX ADMIN — PIPERACILLIN SODIUM AND TAZOBACTAM SODIUM SCH MLS/HR: 3; .375 INJECTION, POWDER, LYOPHILIZED, FOR SOLUTION INTRAVENOUS at 11:59

## 2020-09-01 RX ADMIN — PROPOFOL PRN MLS/HR: 10 INJECTION, EMULSION INTRAVENOUS at 07:30

## 2020-09-01 RX ADMIN — MIDAZOLAM PRN MLS/HR: 5 INJECTION, SOLUTION INTRAMUSCULAR; INTRAVENOUS at 02:35

## 2020-09-01 RX ADMIN — ENOXAPARIN SODIUM SCH MG: 80 INJECTION SUBCUTANEOUS at 07:33

## 2020-09-01 RX ADMIN — METHYLPREDNISOLONE SODIUM SUCCINATE SCH MG: 40 INJECTION, POWDER, FOR SOLUTION INTRAMUSCULAR; INTRAVENOUS at 11:59

## 2020-09-01 RX ADMIN — INSULIN LISPRO SCH UNITS: 100 INJECTION, SOLUTION INTRAVENOUS; SUBCUTANEOUS at 17:15

## 2020-09-01 RX ADMIN — ENOXAPARIN SODIUM SCH MG: 80 INJECTION SUBCUTANEOUS at 20:55

## 2020-09-01 NOTE — PDOC
DATE OF SERVICE


DATE: 9/1/20 


TIME: 10:56





SUBJECTIVE


ROS


Remains intubated  


Had fever overnight





OBJECTIVE


Vital Signs





Vital Signs








  Date Time  Temp Pulse Resp B/P (MAP) Pulse Ox O2 Delivery O2 Flow Rate FiO2


 


9/1/20 09:05  54 22 117/67 (84) 88 Ventilator  


 


9/1/20 08:26 97.8       





 97.8       


 


8/31/20 14:21       18.0 








I & 0











Intake and Output 


 


 9/1/20





 07:00


 


Intake Total 3398.3 ml


 


Output Total 1515 ml


 


Balance 1883.3 ml


 


 


 


Intake IV Total 3273.3 ml


 


Tube Feeding 125 ml


 


Output Urine Total 1515 ml











PHYSICAL EXAM


Physical Exam


Visual 2/2 CoVid-19 positive 





GEN.:     Intubated on MV  


HEENT:   atraumatic


NECK:    Supple.  


LUNGS:    Non labored  


HEART:    RRR, 


ABDOMEN:    Soft,


EXTREMITIES:    No cyanosis.    


NEUROLOGIC:    Sedated


SKIN:   No rash


                   [place] +





DIAGNOSIS/ASSESSMENT


Assessment & Plan





Hypernatremia - Low Na at presentation , Now Hypernatremia  


Improving   , continue IV Hypotonic fluid (Free water flushes if tolerates TF) 


Monitor  





Acue renal failure due to vasomotor nephropathy - resolved 





Acute hypoxemic respiratory failure- On IV steroids, with taper, s/p Plasma 





Abnormal D-dimer related to COVID-19 pneumonia.  On lovenox added 8/24 neg for 

DVT, 





CoViD-19 positive  





Transaminitis 





Hypertension- Currently low BP  





Critically ill





COMMENT/RELEVANT DATA


Meds





Current Medications








 Medications


  (Trade)  Dose


 Ordered  Sig/Breanne  Start Time


 Stop Time Status Last Admin


Dose Admin


 


 Acetaminophen


  (Tylenol Supp)  650 mg  PRN Q4HRS  PRN  8/20/20 21:30


     





 


 Acetaminophen


  (Tylenol)  650 mg  PRN Q4HRS  PRN  8/20/20 21:30


    8/28/20 05:51


650 MG


 


 Albuterol/


 Ipratropium


  (Duoneb)  3 ml  STK-MED ONCE  8/20/20 19:27


 8/20/20 19:27 DC  





 


 Alteplase,


 Recombinant


  (Cathflo For


 Central Catheter


 Clearance)  1 mg  1X  ONCE  8/24/20 11:00


 8/24/20 11:01 DC 8/24/20 12:05


1 MG


 


 Ascorbic Acid


  (Vitamin C)  250 mg  Q6HRS  8/21/20 12:00


    9/1/20 05:32


250 MG


 


 Atropine Sulfate


  (ATROPINE 0.5mg


 SYRINGE)  0.5 mg  PRN Q5MIN  PRN  8/20/20 20:15


     





 


 Chlorhexidine


 Gluconate


  (Peridex)  15 ml  BID  8/20/20 21:00


 8/21/20 09:45 DC 8/21/20 08:16


15 ML


 


 Dexmedetomidine


 HCl 400 mcg/


 Sodium Chloride  100 ml @ 0


 mls/hr  CONT  PRN  8/20/20 20:15


    9/1/20 07:33


23.3 MLS/HR


 


 Dextrose


  (Dextrose


 50%-Water Syringe)  12.5 gm  PRN Q15MIN  PRN  8/31/20 12:00


     





 


 Dextrose/Sodium


 Chloride  1,000 ml @ 


 75 mls/hr  [id number]  8/29/20 09:30


    8/31/20 22:07


75 MLS/HR


 


 Docusate Sodium


  (Colace)  100 mg  PRN BID  PRN  8/20/20 21:30


    8/26/20 08:26


100 MG


 


 Enoxaparin Sodium


  (Lovenox 40mg


 Syringe)  40 mg  BID  8/20/20 21:30


 8/21/20 09:47 DC 8/21/20 08:15


40 MG


 


 Enoxaparin Sodium


  (Lovenox 60mg


 Syringe)  60 mg  BID  8/22/20 09:00


 8/24/20 16:45 DC 8/24/20 08:55


60 MG


 


 Enoxaparin Sodium


  (Lovenox 80mg


 Syringe)  80 mg  Q12HR  8/24/20 21:00


    9/1/20 07:33


80 MG


 


 Enoxaparin Sodium


  (Lovenox Per


 Pharmacy


 Treatment Dosing)  1 each  PRN DAILY  PRN  8/24/20 16:45


     





 


 Etomidate


  (Amidate)  20 mg  STK-MED ONCE  8/20/20 18:54


 8/20/20 18:54 DC  





 


 Fentanyl Citrate  55 ml @ 0


 mls/hr  CONT  PRN  8/22/20 14:49


    9/1/20 02:37


4 MLS/HR


 


 Fentanyl Citrate


  (Fentanyl 2ml


 Vial)  100 mcg  1X  ONCE  8/20/20 20:15


 8/20/20 20:18 DC 8/20/20 20:17


100 MCG


 


 Furosemide


  (Lasix)  40 mg  1X  ONCE  8/25/20 10:00


 8/25/20 10:01 DC 8/25/20 10:19


40 MG


 


 Hydralazine HCl


  (Apresoline Inj)  10 mg  PRN Q4HRS  PRN  8/24/20 09:15


    8/29/20 23:09


10 MG


 


 Info


  (Anti-Coagulation


 Monitoring By


 Pharmacy)  1 each  PRN DAILY  PRN  8/26/20 10:00


    8/27/20 14:14


1 EACH


 


 Insulin Glargine


  (Lantus Syringe)  24 unit  BID  8/25/20 10:30


    9/1/20 07:34


24 UNIT


 


 Insulin Human


 Lispro


  (HumaLOG)  0-5 UNITS  Q6HRS  8/31/20 12:00


     





 


 Lorazepam


  (Ativan Inj)  2 mg  PRN Q1HR  PRN  8/22/20 16:30


    8/23/20 13:10


2 MG


 


 Methylprednisolone


 Sodium Succinate


  (SOLU-Medrol


 40MG VIAL)  40 mg  Q8HRS  8/21/20 14:00


   UNV  





 


 Midazolam HCl


  (Versed)  5 mg  1X  ONCE  8/20/20 19:18


 8/20/20 19:59 DC 8/20/20 19:18


5 MG


 


 Midazolam HCl 50


 mg/Sodium Chloride  50 ml @ 0


 mls/hr  1X  ONCE  8/20/20 19:30


 8/20/20 19:31 UNV  





 


 Norepinephrine


 Bitartrate 8 mg/


 Dextrose  258 ml @ 


 12.578 mls/


 hr  CONT  PRN  8/20/20 21:45


    8/20/20 22:15


6.289 MLS/HR


 


 Ondansetron HCl


  (Zofran)  4 mg  PRN Q4HRS  PRN  8/20/20 21:30


     





 


 Pantoprazole


 Sodium


  (PROTONIX VIAL


 for IV PUSH)  40 mg  DAILYAC  8/21/20 07:30


    9/1/20 07:33


40 MG


 


 Piperacillin Sod/


 Tazobactam Sod


 3.375 gm/Sodium


 Chloride  50 ml @ 


 100 mls/hr  Q6HRS  8/21/20 00:00


    9/1/20 05:31


100 MLS/HR


 


 Polyethylene


 Glycol


  (miraLAX PACKET)  17 gm  DAILY  8/28/20 11:00


    8/29/20 09:02


17 GM


 


 Potassium


 Chloride/Dextrose/


 Sod Cl  1,000 ml @ 


 125 mls/hr  Q8H ONCE  8/20/20 21:30


 8/21/20 05:29 DC 8/20/20 22:13


125 MLS/HR


 


 Propofol  50 ml @ 0


 mls/hr  1X  ONCE  8/20/20 20:00


 8/20/20 20:01 DC 8/20/20 18:55


3.9 MLS/HR


 


 Sodium Chloride  1,000 ml @ 


 75 mls/hr  [id number]  8/21/20 10:00


 8/25/20 10:23 DC 8/24/20 19:33


75 MLS/HR


 


 Succinylcholine


 Chloride


  (Anectine)  200 mg  STK-MED ONCE  8/20/20 18:55


 8/20/20 18:55 DC  





 


 Vecuronium


 Bromide 50 mg/


 Miscellaneous  50 ml @ 


 4.027 mls/


 hr  CONT  PRN  8/24/20 16:15


    9/1/20 07:30


2.3 MLS/HR


 


 Vecuronium Bromide


  (Norcuron Bolus)  6 mg  PRN Q6HRS  PRN  8/22/20 09:30


    8/24/20 16:36


6 MG


 


 Zinc Sulfate


  (Orazinc)  220 mg  DAILY  8/21/20 09:00


    9/1/20 07:33


220 MG








Lab





Laboratory Tests








Test


 8/31/20


12:02 8/31/20


17:27 9/1/20


00:38 9/1/20


04:10


 


Glucose (Fingerstick)


 193 mg/dL


(70-99) 191 mg/dL


(70-99) 212 mg/dL


(70-99) 





 


Sodium Level


 


 


 


 147 mmol/L


(136-145)


 


Potassium Level


 


 


 


 4.1 mmol/L


(3.5-5.1)


 


Chloride Level


 


 


 


 115 mmol/L


()


 


Carbon Dioxide Level


 


 


 


 27 mmol/L


(21-32)


 


Anion Gap    5 (6-14) 


 


Blood Urea Nitrogen


 


 


 


 43 mg/dL


(8-26)


 


Creatinine


 


 


 


 0.8 mg/dL


(0.7-1.3)


 


Estimated GFR


(Cockcroft-Gault) 


 


 


 97.0 





 


BUN/Creatinine Ratio    54 (6-20) 


 


Glucose Level


 


 


 


 191 mg/dL


(70-99)


 


Calcium Level


 


 


 


 7.1 mg/dL


(8.5-10.1)


 


Total Bilirubin


 


 


 


 0.5 mg/dL


(0.2-1.0)


 


Aspartate Amino Transf


(AST/SGOT) 


 


 


 31 U/L (15-37) 





 


Alanine Aminotransferase


(ALT/SGPT) 


 


 


 36 U/L (16-63) 





 


Alkaline Phosphatase


 


 


 


 78 U/L


()


 


Total Protein


 


 


 


 4.9 g/dL


(6.4-8.2)


 


Albumin


 


 


 


 1.1 g/dL


(3.4-5.0)


 


Albumin/Globulin Ratio    0.3 (1.0-1.7) 


 


Test


 9/1/20


04:13 9/1/20


08:00 


 





 


Glucose (Fingerstick)


 185 mg/dL


(70-99) 


 


 





 


O2 Saturation  87 % (92-99)   


 


Arterial Blood pH


 


 7.35


(7.35-7.45) 


 





 


Arterial Blood pCO2 at


Patient Temp 


 42 mmHg


(35-46) 


 





 


Arterial Blood pO2 at Patient


Temp 


 58 mmHg


() 


 





 


Arterial Blood HCO3


 


 22 mmol/L


(21-28) 


 





 


Arterial Blood Base Excess


 


 -3 mmol/L


(-3-3) 


 





 


FiO2  100/vent   








Results


All relevant outside records, renal labs, imaging studies, telemetry/EKG's were 

reviewed.





Justicifation of Admission Dx:


Justifications for Admission:


Justification of Admission Dx:  Yes











VERITO LOU MD                 Sep 1, 2020 10:59

## 2020-09-01 NOTE — NUR
SS following up with discharge planning. SS reviewed pt chart and discussed with pt RN. Pt 
remains on the vent at this time. COVID19 positive. Pt on IV Zosyn. DNR. Pt self pay. SS 
will continue to follow for discharge planning.

## 2020-09-01 NOTE — PDOC
PULMONARY PROGRESS NOTES


DATE: 9/1/20 


TIME: 10:48


Subjective


remains intubated/sedated


AC mode ,100%FIO2/14 PEEP, low P02 on ABG -- continues to  hypoxia 


Fever overnight


Vitals





Vital Signs








  Date Time  Temp Pulse Resp B/P (MAP) Pulse Ox O2 Delivery O2 Flow Rate FiO2


 


9/1/20 09:05  54 22 117/67 (84) 88 Ventilator  


 


9/1/20 08:26 97.8       





 97.8       


 


8/31/20 14:21       18.0 








Comments


visual exam done due to COVID-Pandemic


no resp distess


VENT 100% and PEEP 14 


no skin rash


lower extremity edema


Lungs:  Other (Intubated)


Labs





Laboratory Tests








Test


 8/30/20


12:24 8/30/20


22:02 8/31/20


00:23 8/31/20


05:30


 


Glucose (Fingerstick)


 130 mg/dL


(70-99) 200 mg/dL


(70-99) 186 mg/dL


(70-99) 





 


White Blood Count


 


 


 


 8.9 x10^3/uL


(4.0-11.0)


 


Red Blood Count


 


 


 


 2.83 x10^6/uL


(4.30-5.70)


 


Hemoglobin


 


 


 


 8.7 g/dL


(13.0-17.5)


 


Hematocrit


 


 


 


 27.2 %


(39.0-53.0)


 


Mean Corpuscular Volume    96 fL () 


 


Mean Corpuscular Hemoglobin    31 pg (25-35) 


 


Mean Corpuscular Hemoglobin


Concent 


 


 


 32 g/dL


(31-37)


 


Red Cell Distribution Width


 


 


 


 14.7 %


(11.5-14.5)


 


Platelet Count


 


 


 


 195 x10^3/uL


(140-400)


 


Neutrophils (%) (Auto)    91 % (31-73) 


 


Lymphocytes (%) (Auto)    4 % (24-48) 


 


Monocytes (%) (Auto)    4 % (0-9) 


 


Eosinophils (%) (Auto)    1 % (0-3) 


 


Basophils (%) (Auto)    0 % (0-3) 


 


Neutrophils # (Auto)


 


 


 


 8.0 x10^3/uL


(1.8-7.7)


 


Lymphocytes # (Auto)


 


 


 


 0.4 x10^3/uL


(1.0-4.8)


 


Monocytes # (Auto)


 


 


 


 0.4 x10^3/uL


(0.0-1.1)


 


Eosinophils # (Auto)


 


 


 


 0.1 x10^3/uL


(0.0-0.7)


 


Basophils # (Auto)


 


 


 


 0.0 x10^3/uL


(0.0-0.2)


 


Sodium Level


 


 


 


 149 mmol/L


(136-145)


 


Potassium Level


 


 


 


 4.2 mmol/L


(3.5-5.1)


 


Chloride Level


 


 


 


 115 mmol/L


()


 


Carbon Dioxide Level


 


 


 


 29 mmol/L


(21-32)


 


Anion Gap    5 (6-14) 


 


Blood Urea Nitrogen


 


 


 


 54 mg/dL


(8-26)


 


Creatinine


 


 


 


 1.0 mg/dL


(0.7-1.3)


 


Estimated GFR


(Cockcroft-Gault) 


 


 


 75.0 





 


Glucose Level


 


 


 


 226 mg/dL


(70-99)


 


Calcium Level


 


 


 


 6.9 mg/dL


(8.5-10.1)


 


Ferritin


 


 


 


 769 ng/mL


()


 


C-Reactive Protein,


Quantitative 


 


 


 167.1 mg/L


(0-3.3)


 


Test


 8/31/20


05:32 8/31/20


08:05 8/31/20


12:02 8/31/20


17:27


 


Glucose (Fingerstick)


 202 mg/dL


(70-99) 


 193 mg/dL


(70-99) 191 mg/dL


(70-99)


 


O2 Saturation  82 % (92-99)   


 


Arterial Blood pH


 


 7.34


(7.35-7.45) 


 





 


Arterial Blood pCO2 at


Patient Temp 


 45 mmHg


(35-46) 


 





 


Arterial Blood pO2 at Patient


Temp 


 50 mmHg


() 


 





 


Arterial Blood HCO3


 


 24 mmol/L


(21-28) 


 





 


Arterial Blood Base Excess


 


 -2 mmol/L


(-3-3) 


 





 


FiO2  100   


 


Test


 9/1/20


00:38 9/1/20


04:10 9/1/20


04:13 9/1/20


08:00


 


Glucose (Fingerstick)


 212 mg/dL


(70-99) 


 185 mg/dL


(70-99) 





 


Sodium Level


 


 147 mmol/L


(136-145) 


 





 


Potassium Level


 


 4.1 mmol/L


(3.5-5.1) 


 





 


Chloride Level


 


 115 mmol/L


() 


 





 


Carbon Dioxide Level


 


 27 mmol/L


(21-32) 


 





 


Anion Gap  5 (6-14)   


 


Blood Urea Nitrogen


 


 43 mg/dL


(8-26) 


 





 


Creatinine


 


 0.8 mg/dL


(0.7-1.3) 


 





 


Estimated GFR


(Cockcroft-Gault) 


 97.0 


 


 





 


BUN/Creatinine Ratio  54 (6-20)   


 


Glucose Level


 


 191 mg/dL


(70-99) 


 





 


Calcium Level


 


 7.1 mg/dL


(8.5-10.1) 


 





 


Total Bilirubin


 


 0.5 mg/dL


(0.2-1.0) 


 





 


Aspartate Amino Transf


(AST/SGOT) 


 31 U/L (15-37) 


 


 





 


Alanine Aminotransferase


(ALT/SGPT) 


 36 U/L (16-63) 


 


 





 


Alkaline Phosphatase


 


 78 U/L


() 


 





 


Total Protein


 


 4.9 g/dL


(6.4-8.2) 


 





 


Albumin


 


 1.1 g/dL


(3.4-5.0) 


 





 


Albumin/Globulin Ratio  0.3 (1.0-1.7)   


 


O2 Saturation    87 % (92-99) 


 


Arterial Blood pH


 


 


 


 7.35


(7.35-7.45)


 


Arterial Blood pCO2 at


Patient Temp 


 


 


 42 mmHg


(35-46)


 


Arterial Blood pO2 at Patient


Temp 


 


 


 58 mmHg


()


 


Arterial Blood HCO3


 


 


 


 22 mmol/L


(21-28)


 


Arterial Blood Base Excess


 


 


 


 -3 mmol/L


(-3-3)


 


FiO2    100/vent 








Laboratory Tests








Test


 8/31/20


12:02 8/31/20


17:27 9/1/20


00:38 9/1/20


04:10


 


Glucose (Fingerstick)


 193 mg/dL


(70-99) 191 mg/dL


(70-99) 212 mg/dL


(70-99) 





 


Sodium Level


 


 


 


 147 mmol/L


(136-145)


 


Potassium Level


 


 


 


 4.1 mmol/L


(3.5-5.1)


 


Chloride Level


 


 


 


 115 mmol/L


()


 


Carbon Dioxide Level


 


 


 


 27 mmol/L


(21-32)


 


Anion Gap    5 (6-14) 


 


Blood Urea Nitrogen


 


 


 


 43 mg/dL


(8-26)


 


Creatinine


 


 


 


 0.8 mg/dL


(0.7-1.3)


 


Estimated GFR


(Cockcroft-Gault) 


 


 


 97.0 





 


BUN/Creatinine Ratio    54 (6-20) 


 


Glucose Level


 


 


 


 191 mg/dL


(70-99)


 


Calcium Level


 


 


 


 7.1 mg/dL


(8.5-10.1)


 


Total Bilirubin


 


 


 


 0.5 mg/dL


(0.2-1.0)


 


Aspartate Amino Transf


(AST/SGOT) 


 


 


 31 U/L (15-37) 





 


Alanine Aminotransferase


(ALT/SGPT) 


 


 


 36 U/L (16-63) 





 


Alkaline Phosphatase


 


 


 


 78 U/L


()


 


Total Protein


 


 


 


 4.9 g/dL


(6.4-8.2)


 


Albumin


 


 


 


 1.1 g/dL


(3.4-5.0)


 


Albumin/Globulin Ratio    0.3 (1.0-1.7) 


 


Test


 9/1/20


04:13 9/1/20


08:00 


 





 


Glucose (Fingerstick)


 185 mg/dL


(70-99) 


 


 





 


O2 Saturation  87 % (92-99)   


 


Arterial Blood pH


 


 7.35


(7.35-7.45) 


 





 


Arterial Blood pCO2 at


Patient Temp 


 42 mmHg


(35-46) 


 





 


Arterial Blood pO2 at Patient


Temp 


 58 mmHg


() 


 





 


Arterial Blood HCO3


 


 22 mmol/L


(21-28) 


 





 


Arterial Blood Base Excess


 


 -3 mmol/L


(-3-3) 


 





 


FiO2  100/vent   








Comments


 CXR 8/21 


IMPRESSION: 


1. Bilateral diffuse infiltrates are increased in the left base and mildly


improved elsewhere.





CXR


8/28/20


Impression: Slight interval increase in the bilateral perihilar 


infiltrates.





Impression


.


1.  Acute hypoxic respiratory failure secondary to COVID-19 pneumonia/ARDS/acute

lung injury--worsening oxygen requirements


2.  Abnormal chest x-ray with bilateral infiltrates consistent with COVID-19 

pneumonia.


3.  Hypotension secondary to combination of hypovolemia and sepsis status post 2

liters of IV fluids. no pressors, now hypertensive


4.  Acute kidney injury--resolved


5.  Severe protein-calorie malnutrition.


6.  Abnormal D-dimer related to COVID-19 pneumonia. Full dose lovenox added 8/24

due to increase in D-dimer to 20, now decreasing 


7. Fevers





Plan


.


RECOMMENDATIONS:


ABG noted, will continue current assist control, and PEEP of 14


will not escalate any further PEEP


Full dose anticoagulation


Broad spectrum antibiotic.


Cont.  IV steroids, with taper, 


Stress ulcer prophylaxis.


s/p Plasma 


Pt doing poorly not expected to survive





D/W RN and RT  


PT. is DNR 


Prognosis poor 


Critical care time 30 minutes, family aware and visited overnight and aware of 

pt. clinical condition,


Dr Amin spoke with wife











ANGELA HUFFMAN MD                  Sep 1, 2020 10:49

## 2020-09-02 VITALS — SYSTOLIC BLOOD PRESSURE: 119 MMHG | DIASTOLIC BLOOD PRESSURE: 67 MMHG

## 2020-09-02 VITALS — DIASTOLIC BLOOD PRESSURE: 63 MMHG | SYSTOLIC BLOOD PRESSURE: 113 MMHG

## 2020-09-02 VITALS — DIASTOLIC BLOOD PRESSURE: 65 MMHG | SYSTOLIC BLOOD PRESSURE: 114 MMHG

## 2020-09-02 VITALS — SYSTOLIC BLOOD PRESSURE: 117 MMHG | DIASTOLIC BLOOD PRESSURE: 68 MMHG

## 2020-09-02 VITALS — SYSTOLIC BLOOD PRESSURE: 118 MMHG | DIASTOLIC BLOOD PRESSURE: 66 MMHG

## 2020-09-02 VITALS — DIASTOLIC BLOOD PRESSURE: 67 MMHG | SYSTOLIC BLOOD PRESSURE: 120 MMHG

## 2020-09-02 VITALS — DIASTOLIC BLOOD PRESSURE: 67 MMHG | SYSTOLIC BLOOD PRESSURE: 119 MMHG

## 2020-09-02 VITALS — DIASTOLIC BLOOD PRESSURE: 64 MMHG | SYSTOLIC BLOOD PRESSURE: 114 MMHG

## 2020-09-02 VITALS — DIASTOLIC BLOOD PRESSURE: 75 MMHG | SYSTOLIC BLOOD PRESSURE: 117 MMHG

## 2020-09-02 VITALS — SYSTOLIC BLOOD PRESSURE: 122 MMHG | DIASTOLIC BLOOD PRESSURE: 69 MMHG

## 2020-09-02 VITALS — DIASTOLIC BLOOD PRESSURE: 67 MMHG | SYSTOLIC BLOOD PRESSURE: 118 MMHG

## 2020-09-02 VITALS — DIASTOLIC BLOOD PRESSURE: 71 MMHG | SYSTOLIC BLOOD PRESSURE: 116 MMHG

## 2020-09-02 VITALS — SYSTOLIC BLOOD PRESSURE: 118 MMHG | DIASTOLIC BLOOD PRESSURE: 65 MMHG

## 2020-09-02 VITALS — SYSTOLIC BLOOD PRESSURE: 115 MMHG | DIASTOLIC BLOOD PRESSURE: 70 MMHG

## 2020-09-02 VITALS — DIASTOLIC BLOOD PRESSURE: 65 MMHG | SYSTOLIC BLOOD PRESSURE: 118 MMHG

## 2020-09-02 VITALS — SYSTOLIC BLOOD PRESSURE: 117 MMHG | DIASTOLIC BLOOD PRESSURE: 67 MMHG

## 2020-09-02 VITALS — SYSTOLIC BLOOD PRESSURE: 112 MMHG | DIASTOLIC BLOOD PRESSURE: 65 MMHG

## 2020-09-02 VITALS — SYSTOLIC BLOOD PRESSURE: 125 MMHG | DIASTOLIC BLOOD PRESSURE: 68 MMHG

## 2020-09-02 VITALS — SYSTOLIC BLOOD PRESSURE: 113 MMHG | DIASTOLIC BLOOD PRESSURE: 64 MMHG

## 2020-09-02 VITALS — DIASTOLIC BLOOD PRESSURE: 64 MMHG | SYSTOLIC BLOOD PRESSURE: 116 MMHG

## 2020-09-02 VITALS — DIASTOLIC BLOOD PRESSURE: 71 MMHG | SYSTOLIC BLOOD PRESSURE: 118 MMHG

## 2020-09-02 VITALS — DIASTOLIC BLOOD PRESSURE: 67 MMHG | SYSTOLIC BLOOD PRESSURE: 115 MMHG

## 2020-09-02 LAB
BASE EXCESS ABG: -3 MMOL/L (ref -3–3)
HCO3 BLDA-SCNC: 23 MMOL/L (ref 21–28)
INSPIRATION SETTING TIME VENT: 100
PCO2 BLDA: 43 MMHG (ref 35–46)
PO2 BLDA: 54 MMHG (ref 65–108)
SAO2 % BLDA: 85 % (ref 92–99)

## 2020-09-02 RX ADMIN — PIPERACILLIN SODIUM AND TAZOBACTAM SODIUM SCH MLS/HR: 3; .375 INJECTION, POWDER, LYOPHILIZED, FOR SOLUTION INTRAVENOUS at 06:08

## 2020-09-02 RX ADMIN — Medication PRN MLS/HR: at 03:41

## 2020-09-02 RX ADMIN — DEXMEDETOMIDINE HYDROCHLORIDE PRN MLS/HR: 100 INJECTION, SOLUTION, CONCENTRATE INTRAVENOUS at 18:16

## 2020-09-02 RX ADMIN — INSULIN LISPRO SCH UNITS: 100 INJECTION, SOLUTION INTRAVENOUS; SUBCUTANEOUS at 11:37

## 2020-09-02 RX ADMIN — OXYCODONE HYDROCHLORIDE AND ACETAMINOPHEN SCH MG: 500 TABLET ORAL at 00:29

## 2020-09-02 RX ADMIN — VECURONIUM BROMIDE PRN MLS/HR: 1 INJECTION, POWDER, LYOPHILIZED, FOR SOLUTION INTRAVENOUS at 00:30

## 2020-09-02 RX ADMIN — DEXTROSE AND SODIUM CHLORIDE SCH MLS/HR: 5; .2 INJECTION, SOLUTION INTRAVENOUS at 20:10

## 2020-09-02 RX ADMIN — OXYCODONE HYDROCHLORIDE AND ACETAMINOPHEN SCH MG: 500 TABLET ORAL at 11:37

## 2020-09-02 RX ADMIN — ZINC SULFATE CAP 220 MG (50 MG ELEMENTAL ZN) SCH MG: 220 (50 ZN) CAP at 08:26

## 2020-09-02 RX ADMIN — POLYETHYLENE GLYCOL 3350 SCH GM: 17 POWDER, FOR SOLUTION ORAL at 09:00

## 2020-09-02 RX ADMIN — PROPOFOL PRN MLS/HR: 10 INJECTION, EMULSION INTRAVENOUS at 08:27

## 2020-09-02 RX ADMIN — MIDAZOLAM PRN MLS/HR: 5 INJECTION, SOLUTION INTRAMUSCULAR; INTRAVENOUS at 08:31

## 2020-09-02 RX ADMIN — ENOXAPARIN SODIUM SCH MG: 80 INJECTION SUBCUTANEOUS at 08:26

## 2020-09-02 RX ADMIN — METHYLPREDNISOLONE SODIUM SUCCINATE SCH MG: 40 INJECTION, POWDER, FOR SOLUTION INTRAMUSCULAR; INTRAVENOUS at 06:08

## 2020-09-02 RX ADMIN — DEXMEDETOMIDINE HYDROCHLORIDE PRN MLS/HR: 100 INJECTION, SOLUTION, CONCENTRATE INTRAVENOUS at 22:58

## 2020-09-02 RX ADMIN — DEXMEDETOMIDINE HYDROCHLORIDE PRN MLS/HR: 100 INJECTION, SOLUTION, CONCENTRATE INTRAVENOUS at 10:13

## 2020-09-02 RX ADMIN — PROPOFOL PRN MLS/HR: 10 INJECTION, EMULSION INTRAVENOUS at 17:56

## 2020-09-02 RX ADMIN — Medication PRN EACH: at 12:39

## 2020-09-02 RX ADMIN — DEXMEDETOMIDINE HYDROCHLORIDE PRN MLS/HR: 100 INJECTION, SOLUTION, CONCENTRATE INTRAVENOUS at 14:40

## 2020-09-02 RX ADMIN — INSULIN LISPRO SCH UNITS: 100 INJECTION, SOLUTION INTRAVENOUS; SUBCUTANEOUS at 17:05

## 2020-09-02 RX ADMIN — DEXMEDETOMIDINE HYDROCHLORIDE PRN MLS/HR: 100 INJECTION, SOLUTION, CONCENTRATE INTRAVENOUS at 02:05

## 2020-09-02 RX ADMIN — INSULIN LISPRO SCH UNITS: 100 INJECTION, SOLUTION INTRAVENOUS; SUBCUTANEOUS at 06:00

## 2020-09-02 RX ADMIN — OXYCODONE HYDROCHLORIDE AND ACETAMINOPHEN SCH MG: 500 TABLET ORAL at 17:05

## 2020-09-02 RX ADMIN — METHYLPREDNISOLONE SODIUM SUCCINATE SCH MG: 40 INJECTION, POWDER, FOR SOLUTION INTRAMUSCULAR; INTRAVENOUS at 23:15

## 2020-09-02 RX ADMIN — INSULIN LISPRO SCH UNITS: 100 INJECTION, SOLUTION INTRAVENOUS; SUBCUTANEOUS at 00:00

## 2020-09-02 RX ADMIN — PROPOFOL PRN MLS/HR: 10 INJECTION, EMULSION INTRAVENOUS at 00:29

## 2020-09-02 RX ADMIN — PROPOFOL PRN MLS/HR: 10 INJECTION, EMULSION INTRAVENOUS at 13:02

## 2020-09-02 RX ADMIN — ENOXAPARIN SODIUM SCH MG: 40 INJECTION SUBCUTANEOUS at 23:14

## 2020-09-02 RX ADMIN — DEXTROSE AND SODIUM CHLORIDE SCH MLS/HR: 5; .2 INJECTION, SOLUTION INTRAVENOUS at 07:25

## 2020-09-02 RX ADMIN — Medication PRN MLS/HR: at 18:14

## 2020-09-02 RX ADMIN — PROPOFOL PRN MLS/HR: 10 INJECTION, EMULSION INTRAVENOUS at 22:59

## 2020-09-02 RX ADMIN — PIPERACILLIN SODIUM AND TAZOBACTAM SODIUM SCH MLS/HR: 3; .375 INJECTION, POWDER, LYOPHILIZED, FOR SOLUTION INTRAVENOUS at 00:28

## 2020-09-02 RX ADMIN — INSULIN GLARGINE SCH UNIT: 100 INJECTION, SOLUTION SUBCUTANEOUS at 10:13

## 2020-09-02 RX ADMIN — PROPOFOL PRN MLS/HR: 10 INJECTION, EMULSION INTRAVENOUS at 03:46

## 2020-09-02 RX ADMIN — PANTOPRAZOLE SODIUM SCH MG: 40 INJECTION, POWDER, FOR SOLUTION INTRAVENOUS at 08:25

## 2020-09-02 RX ADMIN — DEXMEDETOMIDINE HYDROCHLORIDE PRN MLS/HR: 100 INJECTION, SOLUTION, CONCENTRATE INTRAVENOUS at 06:27

## 2020-09-02 RX ADMIN — VECURONIUM BROMIDE PRN MLS/HR: 1 INJECTION, POWDER, LYOPHILIZED, FOR SOLUTION INTRAVENOUS at 18:16

## 2020-09-02 RX ADMIN — PIPERACILLIN SODIUM AND TAZOBACTAM SODIUM SCH MLS/HR: 3; .375 INJECTION, POWDER, LYOPHILIZED, FOR SOLUTION INTRAVENOUS at 12:16

## 2020-09-02 RX ADMIN — OXYCODONE HYDROCHLORIDE AND ACETAMINOPHEN SCH MG: 500 TABLET ORAL at 06:08

## 2020-09-02 RX ADMIN — MIDAZOLAM PRN MLS/HR: 5 INJECTION, SOLUTION INTRAMUSCULAR; INTRAVENOUS at 18:16

## 2020-09-02 RX ADMIN — METHYLPREDNISOLONE SODIUM SUCCINATE SCH MG: 40 INJECTION, POWDER, FOR SOLUTION INTRAMUSCULAR; INTRAVENOUS at 14:39

## 2020-09-02 RX ADMIN — INSULIN GLARGINE SCH UNIT: 100 INJECTION, SOLUTION SUBCUTANEOUS at 21:00

## 2020-09-02 NOTE — PDOC
[facility] PROGRESS NOTE


Date of Service


DOS:


DATE: 9/2/20 


TIME: 10:55





Chief Complaint


Chief Complaint


Acute hypoxemic respiratory failure


Bilateral perihilar infiltrates are  seen which appear increased slightly


Leukocytosis with bandemia secondary to septic process


Hyponatremia secondary to low effective circulatory volume, now hypernatremic


Acue renal failure due to vasomotor nephropathy most likely 


Hyperglycemia


transaminitis 


Uncontrolled hypertension


AC mode ,100%FIO2/14 


Fever overnight 8/30





History of Present Illness


History of Present Illness


9/2/2020


Patient seen and examined in the COVID-19 ICU


He is intubated


AC/22/6 100/100% with 14 of PEEP


He is sedated with Versed propofol fentanyl and Precedex he is also paralyzed 

with vecuronium


Chart reviewed


Discussed with RN


Discussed with 








History of Present Illness


Patient is a 65-year-old gentleman who was diagnosed with COVID-19 infection on 

Monday 5 days prior to his admission to the intensive care unit.  Apparently the

patient reported worsening respiratory distress reason why he came to the 

emergency department for evaluation and treatment.  The story was quite limited 

since the patient was speaking [place] only and his acuity was such that he 

required mechanical ventilation and prompt intubation by the emergency 

department physician.  At the present time patient is intubated and sedated 

continues to require a lot of support no pertinent past medical history was 

reported he is admitted to the intensive care unit for further treatment





8/24/2020


Continues to require full support, glycemia has been noted in hypertension as 

well.  Discussed with nursing staff, all of concerns were addressed to the best 

of my abilities no acute events reported overnight





8/25/2020


No acute events reported overnight, case discussed with nursing staff patient in

no acute distress  





8/26/2020


Per nursing staff, patient febrile overnight, treated with Tylenol and tepid 

water bath. Case discussed with nursing staff, continue with IV antibiotics and 

full VTE prophylaxis.





8/27/2020


Patient still febrile overnight.  O2 requirement increasing.  Discussed with RN.





8/31 /2020


Patient still intubated and sedated.  Discussed with RN, patient was made DNR.  

schedule MiraLAX.





Vitals/I&O


Vitals/I&O:





                                   Vital Signs








  Date Time  Temp Pulse Resp B/P (MAP) Pulse Ox O2 Delivery O2 Flow Rate FiO2


 


9/2/20 10:10  51 22 119/67 (84) 89 Ventilator  


 


9/2/20 08:00 96.7       





 96.7       


 


9/2/20 04:11       18.0 














                                    I & O   


 


 9/1/20 9/1/20 9/2/20





 15:00 23:00 07:00


 


Intake Total 50 ml 1453 ml 988.41 ml


 


Output Total 450 ml 635 ml 425 ml


 


Balance -400 ml 818 ml 563.41 ml











Physical Exam


Physical Exam:


sedated on vent


General:  No acute distress, Other (Intubated and sedated)


Heart:  Regular rate


Lungs:  Other (Intubated)


Abdomen:  Other (Nondistended)


Extremities:  No clubbing, No cyanosis


Skin:  No rashes, No breakdown





Labs


Labs:





Laboratory Tests








Test


 9/1/20


12:06 9/1/20


21:09 9/2/20


09:25 9/2/20


10:21


 


Glucose (Fingerstick)


 179 mg/dL


(70-99) 165 mg/dL


(70-99) 


 144 mg/dL


(70-99)


 


O2 Saturation   85 % (92-99)  


 


Arterial Blood pH


 


 


 7.35


(7.35-7.45) 





 


Arterial Blood pCO2 at


Patient Temp 


 


 43 mmHg


(35-46) 





 


Arterial Blood pO2 at Patient


Temp 


 


 54 mmHg


() 





 


Arterial Blood HCO3


 


 


 23 mmol/L


(21-28) 





 


Arterial Blood Base Excess


 


 


 -3 mmol/L


(-3-3) 





 


FiO2   100  











Assessment and Plan


Assessmemt and Plan


COVID-19


Acute hypoxemic respiratory failure


Bilateral perihilar infiltrates are  seen which appear increased slightly


Leukocytosis with bandemia secondary to septic process


Hyponatremia secondary to low effective circulatory volume, now hypernatremic


Acue renal failure due to vasomotor nephropathy most likely 


Hyperglycemia


transaminitis 


Uncontrolled hypertension


AC mode ,100%FIO2/14 


Fever overnight 8/30 





Plan


ICU monitoring


COVID-19 protocol


Nephrology following


Pulmonary following


Vent weaning


Home meds


DVT prophylaxis


He is DNR


I discussed the case with 


He is extremely critically ill on 100% oxygen his prognosis is very guarded at 

best


31 minutes cc time








CONSULT NEPHROLOGY


Hypotonic saline iv support


serum osmolality


100%FIO2/14 PEEP,


dnr status 











35 MIN CC TIME





Comment


Review of Relevant


I have reviewed the following items jim (where applicable) has been applied.





Justifications for Admission


Other Justification














CECELIA ZACARIAS III DO            Sep 2, 2020 10:58

## 2020-09-02 NOTE — NUR
Pts oxygen saturation remained between 85-88% thru most of the day.  at 1700 sat fell to 
78%.  RT notified.  Pt suctioned with minimal  sputum.  currently sats remain at 80%.  
Sedation still infusing.  



Family called and requested to talk to the patient.  cell phone put on speaker and placed by 
patient ear so the wife could talk to the patient.

## 2020-09-02 NOTE — PDOC
DATE OF SERVICE


DATE: 9/2/20 


TIME: 12:37





SUBJECTIVE


ROS


Remains intubated





OBJECTIVE


Vital Signs





Vital Signs








  Date Time  Temp Pulse Resp B/P (MAP) Pulse Ox O2 Delivery O2 Flow Rate FiO2


 


9/2/20 12:00      Mechanical Ventilator  


 


9/2/20 12:00 96.8 52 22 120/67 (84) 89   





 96.8       


 


9/2/20 04:11       18.0 








I & 0











Intake and Output 


 


 9/2/20





 07:00


 


Intake Total 2491.41 ml


 


Output Total 1510 ml


 


Balance 981.41 ml


 


 


 


Intake IV Total 2491.41 ml


 


Output Urine Total 1510 ml











PHYSICAL EXAM


Physical Exam


Visual 2/2 CoVid-19 positive 





GEN.:     Intubated on MV  


HEENT:   atraumatic


NECK:    Supple.  


LUNGS:    Non labored  


HEART:    RRR, 


ABDOMEN:    Soft,


EXTREMITIES:    No cyanosis.    


NEUROLOGIC:    Sedated


SKIN:   No rash


                   [place] +





DIAGNOSIS/ASSESSMENT


Assessment & Plan





Hypernatremia - Low Na at presentation , Now Hypernatremia  


Improving   , continue IV Hypotonic fluid (Free water flushes if tolerates TF) 


Monitor  , No labs this am 





Acue renal failure due to vasomotor nephropathy - resolved 





Acute hypoxemic respiratory failure- On IV steroids, with taper, s/p Plasma 





Abnormal D-dimer related to COVID-19 pneumonia.  On lovenox added 8/24 neg for 

DVT, 





CoViD-19 positive  





Transaminitis 





Hypertension- Currently low BP  





Critically ill





COMMENT/RELEVANT DATA


Meds





Current Medications








 Medications


  (Trade)  Dose


 Ordered  Sig/Breanne  Start Time


 Stop Time Status Last Admin


Dose Admin


 


 Acetaminophen


  (Tylenol Supp)  650 mg  PRN Q4HRS  PRN  8/20/20 21:30


     





 


 Acetaminophen


  (Tylenol)  650 mg  PRN Q4HRS  PRN  8/20/20 21:30


    8/28/20 05:51


650 MG


 


 Albuterol/


 Ipratropium


  (Duoneb)  3 ml  STK-MED ONCE  8/20/20 19:27


 8/20/20 19:27 DC  





 


 Alteplase,


 Recombinant


  (Cathflo For


 Central Catheter


 Clearance)  1 mg  1X  ONCE  8/24/20 11:00


 8/24/20 11:01 DC 8/24/20 12:05


1 MG


 


 Ascorbic Acid


  (Vitamin C)  250 mg  Q6HRS  8/21/20 12:00


    9/2/20 06:08


250 MG


 


 Atropine Sulfate


  (ATROPINE 0.5mg


 SYRINGE)  0.5 mg  PRN Q5MIN  PRN  8/20/20 20:15


     





 


 Chlorhexidine


 Gluconate


  (Peridex)  15 ml  BID  8/20/20 21:00


 8/21/20 09:45 DC 8/21/20 08:16


15 ML


 


 Dexmedetomidine


 HCl 400 mcg/


 Sodium Chloride  100 ml @ 0


 mls/hr  CONT  PRN  8/20/20 20:15


    9/2/20 10:13


23.3 MLS/HR


 


 Dextrose


  (Dextrose


 50%-Water Syringe)  12.5 gm  PRN Q15MIN  PRN  8/31/20 12:00


     





 


 Dextrose/Sodium


 Chloride  1,000 ml @ 


 75 mls/hr  [id number]  8/29/20 09:30


    9/2/20 07:25


75 MLS/HR


 


 Docusate Sodium


  (Colace)  100 mg  PRN BID  PRN  8/20/20 21:30


    8/26/20 08:26


100 MG


 


 Enoxaparin Sodium


  (Lovenox 40mg


 Syringe)  40 mg  BID  8/20/20 21:30


 8/21/20 09:47 DC 8/21/20 08:15


40 MG


 


 Enoxaparin Sodium


  (Lovenox 60mg


 Syringe)  60 mg  BID  8/22/20 09:00


 8/24/20 16:45 DC 8/24/20 08:55


60 MG


 


 Enoxaparin Sodium


  (Lovenox 80mg


 Syringe)  80 mg  Q12HR  8/24/20 21:00


    9/2/20 08:26


80 MG


 


 Enoxaparin Sodium


  (Lovenox Per


 Pharmacy


 Treatment Dosing)  1 each  PRN DAILY  PRN  8/24/20 16:45


     





 


 Etomidate


  (Amidate)  20 mg  STK-MED ONCE  8/20/20 18:54


 8/20/20 18:54 DC  





 


 Fentanyl Citrate  55 ml @ 0


 mls/hr  CONT  PRN  8/22/20 14:49


    9/2/20 03:41


4 MLS/HR


 


 Fentanyl Citrate


  (Fentanyl 2ml


 Vial)  100 mcg  1X  ONCE  8/20/20 20:15


 8/20/20 20:18 DC 8/20/20 20:17


100 MCG


 


 Furosemide


  (Lasix)  40 mg  1X  ONCE  8/25/20 10:00


 8/25/20 10:01 DC 8/25/20 10:19


40 MG


 


 Hydralazine HCl


  (Apresoline Inj)  10 mg  PRN Q4HRS  PRN  8/24/20 09:15


    8/29/20 23:09


10 MG


 


 Info


  (Anti-Coagulation


 Monitoring By


 Pharmacy)  1 each  PRN DAILY  PRN  8/26/20 10:00


    8/27/20 14:14


1 EACH


 


 Insulin Glargine


  (Lantus Syringe)  24 unit  BID  8/25/20 10:30


    9/2/20 10:13


24 UNIT


 


 Insulin Human


 Lispro


  (HumaLOG)  0-5 UNITS  Q6HRS  8/31/20 12:00


     





 


 Lorazepam


  (Ativan Inj)  2 mg  PRN Q1HR  PRN  8/22/20 16:30


    8/23/20 13:10


2 MG


 


 Methylprednisolone


 Sodium Succinate


  (SOLU-Medrol


 40MG VIAL)  40 mg  Q8HRS  8/21/20 14:00


   UNV  





 


 Midazolam HCl


  (Versed)  5 mg  1X  ONCE  8/20/20 19:18


 8/20/20 19:59 DC 8/20/20 19:18


5 MG


 


 Midazolam HCl 50


 mg/Sodium Chloride  50 ml @ 0


 mls/hr  1X  ONCE  8/20/20 19:30


 8/20/20 19:31 UNV  





 


 Norepinephrine


 Bitartrate 8 mg/


 Dextrose  258 ml @ 


 12.578 mls/


 hr  CONT  PRN  8/20/20 21:45


    8/20/20 22:15


6.289 MLS/HR


 


 Ondansetron HCl


  (Zofran)  4 mg  PRN Q4HRS  PRN  8/20/20 21:30


     





 


 Pantoprazole


 Sodium


  (PROTONIX VIAL


 for IV PUSH)  40 mg  DAILYAC  8/21/20 07:30


    9/2/20 08:25


40 MG


 


 Piperacillin Sod/


 Tazobactam Sod


 3.375 gm/Sodium


 Chloride  50 ml @ 


 100 mls/hr  Q6HRS  8/21/20 00:00


    9/2/20 12:16


100 MLS/HR


 


 Polyethylene


 Glycol


  (miraLAX PACKET)  17 gm  DAILY  8/28/20 11:00


    8/29/20 09:02


17 GM


 


 Potassium


 Chloride/Dextrose/


 Sod Cl  1,000 ml @ 


 125 mls/hr  Q8H ONCE  8/20/20 21:30


 8/21/20 05:29 DC 8/20/20 22:13


125 MLS/HR


 


 Propofol  50 ml @ 0


 mls/hr  1X  ONCE  8/20/20 20:00


 8/20/20 20:01 DC 8/20/20 18:55


3.9 MLS/HR


 


 Sodium Chloride  1,000 ml @ 


 75 mls/hr  [id number]  8/21/20 10:00


 8/25/20 10:23 DC 8/24/20 19:33


75 MLS/HR


 


 Succinylcholine


 Chloride


  (Anectine)  200 mg  STK-MED ONCE  8/20/20 18:55


 8/20/20 18:55 DC  





 


 Vecuronium


 Bromide 50 mg/


 Miscellaneous  50 ml @ 


 4.027 mls/


 hr  CONT  PRN  8/24/20 16:15


    9/2/20 00:30


2.517 MLS/HR


 


 Vecuronium Bromide


  (Norcuron Bolus)  6 mg  PRN Q6HRS  PRN  8/22/20 09:30


    8/24/20 16:36


6 MG


 


 Zinc Sulfate


  (Orazinc)  220 mg  DAILY  8/21/20 09:00


    9/2/20 08:26


220 MG








Lab





Laboratory Tests








Test


 9/1/20


21:09 9/2/20


09:25 9/2/20


10:21


 


Glucose (Fingerstick)


 165 mg/dL


(70-99) 


 144 mg/dL


(70-99)


 


O2 Saturation  85 % (92-99)  


 


Arterial Blood pH


 


 7.35


(7.35-7.45) 





 


Arterial Blood pCO2 at


Patient Temp 


 43 mmHg


(35-46) 





 


Arterial Blood pO2 at Patient


Temp 


 54 mmHg


() 





 


Arterial Blood HCO3


 


 23 mmol/L


(21-28) 





 


Arterial Blood Base Excess


 


 -3 mmol/L


(-3-3) 





 


FiO2  100  








Results


All relevant outside records, renal labs, imaging studies, telemetry/EKG's were 

reviewed.





Justicifation of Admission Dx:


Justifications for Admission:


Justification of Admission Dx:  Yes











VERITO LOU MD                 Sep 2, 2020 12:38

## 2020-09-02 NOTE — NUR
SS following up with discharge planning. SS reviewed pt chart and discussed with pt RN. Pt 
remains on the vent at this time. COVID19 positive. Pt on IV Zosyn. DNR. Self pay. SS will 
continue to follow for discharge planning.

## 2020-09-02 NOTE — PDOC
PULMONARY PROGRESS NOTES


DATE: 9/2/20 


TIME: 11:04


Subjective


remains intubated/sedated


AC mode ,100%FIO2/14 PEEP, low P02 on ABG -- continues to  hypoxia 


Fever overnight


Vitals





Vital Signs








  Date Time  Temp Pulse Resp B/P (MAP) Pulse Ox O2 Delivery O2 Flow Rate FiO2


 


9/2/20 10:10  51 22 119/67 (84) 89 Ventilator  


 


9/2/20 08:00 96.7       





 96.7       


 


9/2/20 04:11       18.0 








Comments


visual exam done due to COVID-Pandemic


no resp distess


VENT 100% and PEEP 14 


no skin rash


lower extremity edema


Lungs:  Other (Intubated)


Labs





Laboratory Tests








Test


 8/31/20


12:02 8/31/20


14:20 8/31/20


17:27 9/1/20


00:38


 


Glucose (Fingerstick)


 193 mg/dL


(70-99) 


 191 mg/dL


(70-99) 212 mg/dL


(70-99)


 


Urine Osmolality


 


 803 mOsmol/kg


(.) 


 





 


Test


 9/1/20


04:10 9/1/20


04:13 9/1/20


08:00 9/1/20


12:06


 


Sodium Level


 147 mmol/L


(136-145) 


 


 





 


Potassium Level


 4.1 mmol/L


(3.5-5.1) 


 


 





 


Chloride Level


 115 mmol/L


() 


 


 





 


Carbon Dioxide Level


 27 mmol/L


(21-32) 


 


 





 


Anion Gap 5 (6-14)    


 


Blood Urea Nitrogen


 43 mg/dL


(8-26) 


 


 





 


Creatinine


 0.8 mg/dL


(0.7-1.3) 


 


 





 


Estimated GFR


(Cockcroft-Gault) 97.0 


 


 


 





 


BUN/Creatinine Ratio 54 (6-20)    


 


Glucose Level


 191 mg/dL


(70-99) 


 


 





 


Calcium Level


 7.1 mg/dL


(8.5-10.1) 


 


 





 


Total Bilirubin


 0.5 mg/dL


(0.2-1.0) 


 


 





 


Aspartate Amino Transf


(AST/SGOT) 31 U/L (15-37) 


 


 


 





 


Alanine Aminotransferase


(ALT/SGPT) 36 U/L (16-63) 


 


 


 





 


Alkaline Phosphatase


 78 U/L


() 


 


 





 


Total Protein


 4.9 g/dL


(6.4-8.2) 


 


 





 


Albumin


 1.1 g/dL


(3.4-5.0) 


 


 





 


Albumin/Globulin Ratio 0.3 (1.0-1.7)    


 


Glucose (Fingerstick)


 


 185 mg/dL


(70-99) 


 179 mg/dL


(70-99)


 


O2 Saturation   87 % (92-99)  


 


Arterial Blood pH


 


 


 7.35


(7.35-7.45) 





 


Arterial Blood pCO2 at


Patient Temp 


 


 42 mmHg


(35-46) 





 


Arterial Blood pO2 at Patient


Temp 


 


 58 mmHg


() 





 


Arterial Blood HCO3


 


 


 22 mmol/L


(21-28) 





 


Arterial Blood Base Excess


 


 


 -3 mmol/L


(-3-3) 





 


FiO2   100/vent  


 


Test


 9/1/20


21:09 9/2/20


09:25 9/2/20


10:21 





 


Glucose (Fingerstick)


 165 mg/dL


(70-99) 


 144 mg/dL


(70-99) 





 


O2 Saturation  85 % (92-99)   


 


Arterial Blood pH


 


 7.35


(7.35-7.45) 


 





 


Arterial Blood pCO2 at


Patient Temp 


 43 mmHg


(35-46) 


 





 


Arterial Blood pO2 at Patient


Temp 


 54 mmHg


() 


 





 


Arterial Blood HCO3


 


 23 mmol/L


(21-28) 


 





 


Arterial Blood Base Excess


 


 -3 mmol/L


(-3-3) 


 





 


FiO2  100   








Laboratory Tests








Test


 9/1/20


12:06 9/1/20


21:09 9/2/20


09:25 9/2/20


10:21


 


Glucose (Fingerstick)


 179 mg/dL


(70-99) 165 mg/dL


(70-99) 


 144 mg/dL


(70-99)


 


O2 Saturation   85 % (92-99)  


 


Arterial Blood pH


 


 


 7.35


(7.35-7.45) 





 


Arterial Blood pCO2 at


Patient Temp 


 


 43 mmHg


(35-46) 





 


Arterial Blood pO2 at Patient


Temp 


 


 54 mmHg


() 





 


Arterial Blood HCO3


 


 


 23 mmol/L


(21-28) 





 


Arterial Blood Base Excess


 


 


 -3 mmol/L


(-3-3) 





 


FiO2   100  








Comments


 CXR 8/21 


IMPRESSION: 


1. Bilateral diffuse infiltrates are increased in the left base and mildly


improved elsewhere.





CXR


8/28/20


Impression: Slight interval increase in the bilateral perihilar 


infiltrates.





Impression


.


1.  Acute hypoxic respiratory failure secondary to COVID-19 pneumonia/ARDS/acute

lung injury--worsening oxygen requirements


2.  Abnormal chest x-ray with bilateral infiltrates consistent with COVID-19 

pneumonia.


3.  Hypotension secondary to combination of hypovolemia and sepsis status post 2

liters of IV fluids. no pressors, 


4.  Acute kidney injury--resolved


5.  Severe protein-calorie malnutrition.


6.  Abnormal D-dimer related to COVID-19 pneumonia. Full dose lovenox added 8/24

due to increase in D-dimer to 20, now decreasing 


7. Fevers





Plan


.


RECOMMENDATIONS:


ABG noted, will continue current assist control, 100%FIO2 and PEEP of 14


will not escalate any further PEEP


Full dose anticoagulation


Broad spectrum antibiotic.


Cont.  IV steroids, with taper, 


Stress ulcer prophylaxis.


s/p Plasma 


Pt doing poorly not expected to survive





D/W RN and RT  


PT. is DNR 


Prognosis poor 


Critical care time 30 minutes, family aware of pt. clinical condition,


Dr Amin spoke with wife











ANGELA HUFFMAN MD                  Sep 2, 2020 11:07

## 2020-09-03 VITALS — SYSTOLIC BLOOD PRESSURE: 115 MMHG | DIASTOLIC BLOOD PRESSURE: 71 MMHG

## 2020-09-03 VITALS — DIASTOLIC BLOOD PRESSURE: 70 MMHG | SYSTOLIC BLOOD PRESSURE: 120 MMHG

## 2020-09-03 VITALS — DIASTOLIC BLOOD PRESSURE: 70 MMHG | SYSTOLIC BLOOD PRESSURE: 124 MMHG

## 2020-09-03 VITALS — DIASTOLIC BLOOD PRESSURE: 67 MMHG | SYSTOLIC BLOOD PRESSURE: 123 MMHG

## 2020-09-03 VITALS — DIASTOLIC BLOOD PRESSURE: 72 MMHG | SYSTOLIC BLOOD PRESSURE: 125 MMHG

## 2020-09-03 VITALS — SYSTOLIC BLOOD PRESSURE: 125 MMHG | DIASTOLIC BLOOD PRESSURE: 70 MMHG

## 2020-09-03 VITALS — DIASTOLIC BLOOD PRESSURE: 69 MMHG | SYSTOLIC BLOOD PRESSURE: 123 MMHG

## 2020-09-03 VITALS — SYSTOLIC BLOOD PRESSURE: 121 MMHG | DIASTOLIC BLOOD PRESSURE: 68 MMHG

## 2020-09-03 VITALS — SYSTOLIC BLOOD PRESSURE: 128 MMHG | DIASTOLIC BLOOD PRESSURE: 70 MMHG

## 2020-09-03 VITALS — SYSTOLIC BLOOD PRESSURE: 122 MMHG | DIASTOLIC BLOOD PRESSURE: 69 MMHG

## 2020-09-03 VITALS — SYSTOLIC BLOOD PRESSURE: 125 MMHG | DIASTOLIC BLOOD PRESSURE: 69 MMHG

## 2020-09-03 VITALS — SYSTOLIC BLOOD PRESSURE: 124 MMHG | DIASTOLIC BLOOD PRESSURE: 70 MMHG

## 2020-09-03 VITALS — SYSTOLIC BLOOD PRESSURE: 129 MMHG | DIASTOLIC BLOOD PRESSURE: 72 MMHG

## 2020-09-03 VITALS — SYSTOLIC BLOOD PRESSURE: 124 MMHG | DIASTOLIC BLOOD PRESSURE: 66 MMHG

## 2020-09-03 VITALS — SYSTOLIC BLOOD PRESSURE: 123 MMHG | DIASTOLIC BLOOD PRESSURE: 47 MMHG

## 2020-09-03 VITALS — SYSTOLIC BLOOD PRESSURE: 116 MMHG | DIASTOLIC BLOOD PRESSURE: 71 MMHG

## 2020-09-03 VITALS — DIASTOLIC BLOOD PRESSURE: 72 MMHG | SYSTOLIC BLOOD PRESSURE: 133 MMHG

## 2020-09-03 VITALS — DIASTOLIC BLOOD PRESSURE: 70 MMHG | SYSTOLIC BLOOD PRESSURE: 126 MMHG

## 2020-09-03 VITALS — SYSTOLIC BLOOD PRESSURE: 128 MMHG | DIASTOLIC BLOOD PRESSURE: 76 MMHG

## 2020-09-03 VITALS — DIASTOLIC BLOOD PRESSURE: 70 MMHG | SYSTOLIC BLOOD PRESSURE: 119 MMHG

## 2020-09-03 VITALS — DIASTOLIC BLOOD PRESSURE: 67 MMHG | SYSTOLIC BLOOD PRESSURE: 119 MMHG

## 2020-09-03 LAB
ANION GAP SERPL CALC-SCNC: 4 MMOL/L (ref 6–14)
BUN SERPL-MCNC: 22 MG/DL (ref 8–26)
CALCIUM SERPL-MCNC: 7 MG/DL (ref 8.5–10.1)
CHLORIDE SERPL-SCNC: 112 MMOL/L (ref 98–107)
CO2 SERPL-SCNC: 27 MMOL/L (ref 21–32)
CREAT SERPL-MCNC: 0.5 MG/DL (ref 0.7–1.3)
GFR SERPLBLD BASED ON 1.73 SQ M-ARVRAT: 166.9 ML/MIN
GLUCOSE SERPL-MCNC: 149 MG/DL (ref 70–99)
POTASSIUM SERPL-SCNC: 4.1 MMOL/L (ref 3.5–5.1)
SODIUM SERPL-SCNC: 143 MMOL/L (ref 136–145)

## 2020-09-03 RX ADMIN — OXYCODONE HYDROCHLORIDE AND ACETAMINOPHEN SCH MG: 500 TABLET ORAL at 12:58

## 2020-09-03 RX ADMIN — METHYLPREDNISOLONE SODIUM SUCCINATE SCH MG: 40 INJECTION, POWDER, FOR SOLUTION INTRAMUSCULAR; INTRAVENOUS at 05:46

## 2020-09-03 RX ADMIN — DEXTROSE AND SODIUM CHLORIDE SCH MLS/HR: 5; .2 INJECTION, SOLUTION INTRAVENOUS at 09:44

## 2020-09-03 RX ADMIN — VECURONIUM BROMIDE PRN MLS/HR: 1 INJECTION, POWDER, LYOPHILIZED, FOR SOLUTION INTRAVENOUS at 09:44

## 2020-09-03 RX ADMIN — PANTOPRAZOLE SODIUM SCH MG: 40 INJECTION, POWDER, FOR SOLUTION INTRAVENOUS at 08:56

## 2020-09-03 RX ADMIN — DEXMEDETOMIDINE HYDROCHLORIDE PRN MLS/HR: 100 INJECTION, SOLUTION, CONCENTRATE INTRAVENOUS at 04:01

## 2020-09-03 RX ADMIN — OXYCODONE HYDROCHLORIDE AND ACETAMINOPHEN SCH MG: 500 TABLET ORAL at 20:02

## 2020-09-03 RX ADMIN — PROPOFOL PRN MLS/HR: 10 INJECTION, EMULSION INTRAVENOUS at 16:57

## 2020-09-03 RX ADMIN — DEXMEDETOMIDINE HYDROCHLORIDE PRN MLS/HR: 100 INJECTION, SOLUTION, CONCENTRATE INTRAVENOUS at 09:11

## 2020-09-03 RX ADMIN — DEXMEDETOMIDINE HYDROCHLORIDE PRN MLS/HR: 100 INJECTION, SOLUTION, CONCENTRATE INTRAVENOUS at 20:14

## 2020-09-03 RX ADMIN — PROPOFOL PRN MLS/HR: 10 INJECTION, EMULSION INTRAVENOUS at 12:13

## 2020-09-03 RX ADMIN — PROPOFOL PRN MLS/HR: 10 INJECTION, EMULSION INTRAVENOUS at 23:59

## 2020-09-03 RX ADMIN — OXYCODONE HYDROCHLORIDE AND ACETAMINOPHEN SCH MG: 500 TABLET ORAL at 17:09

## 2020-09-03 RX ADMIN — MIDAZOLAM PRN MLS/HR: 5 INJECTION, SOLUTION INTRAMUSCULAR; INTRAVENOUS at 20:00

## 2020-09-03 RX ADMIN — INSULIN LISPRO SCH UNITS: 100 INJECTION, SOLUTION INTRAVENOUS; SUBCUTANEOUS at 00:00

## 2020-09-03 RX ADMIN — INSULIN LISPRO SCH UNITS: 100 INJECTION, SOLUTION INTRAVENOUS; SUBCUTANEOUS at 11:05

## 2020-09-03 RX ADMIN — Medication PRN MLS/HR: at 20:16

## 2020-09-03 RX ADMIN — METHYLPREDNISOLONE SODIUM SUCCINATE SCH MG: 40 INJECTION, POWDER, FOR SOLUTION INTRAMUSCULAR; INTRAVENOUS at 12:59

## 2020-09-03 RX ADMIN — POLYETHYLENE GLYCOL 3350 SCH GM: 17 POWDER, FOR SOLUTION ORAL at 09:00

## 2020-09-03 RX ADMIN — MIDAZOLAM PRN MLS/HR: 5 INJECTION, SOLUTION INTRAMUSCULAR; INTRAVENOUS at 08:26

## 2020-09-03 RX ADMIN — PROPOFOL PRN MLS/HR: 10 INJECTION, EMULSION INTRAVENOUS at 20:15

## 2020-09-03 RX ADMIN — INSULIN GLARGINE SCH UNIT: 100 INJECTION, SOLUTION SUBCUTANEOUS at 08:59

## 2020-09-03 RX ADMIN — PROPOFOL PRN MLS/HR: 10 INJECTION, EMULSION INTRAVENOUS at 02:54

## 2020-09-03 RX ADMIN — ENOXAPARIN SODIUM SCH MG: 40 INJECTION SUBCUTANEOUS at 08:56

## 2020-09-03 RX ADMIN — OXYCODONE HYDROCHLORIDE AND ACETAMINOPHEN SCH MG: 500 TABLET ORAL at 00:00

## 2020-09-03 RX ADMIN — DEXTROSE AND SODIUM CHLORIDE SCH MLS/HR: 5; .2 INJECTION, SOLUTION INTRAVENOUS at 22:34

## 2020-09-03 RX ADMIN — ENOXAPARIN SODIUM SCH MG: 40 INJECTION SUBCUTANEOUS at 22:35

## 2020-09-03 RX ADMIN — METHYLPREDNISOLONE SODIUM SUCCINATE SCH MG: 40 INJECTION, POWDER, FOR SOLUTION INTRAMUSCULAR; INTRAVENOUS at 22:34

## 2020-09-03 RX ADMIN — INSULIN LISPRO SCH UNITS: 100 INJECTION, SOLUTION INTRAVENOUS; SUBCUTANEOUS at 17:06

## 2020-09-03 RX ADMIN — DEXMEDETOMIDINE HYDROCHLORIDE PRN MLS/HR: 100 INJECTION, SOLUTION, CONCENTRATE INTRAVENOUS at 11:46

## 2020-09-03 RX ADMIN — OXYCODONE HYDROCHLORIDE AND ACETAMINOPHEN SCH MG: 500 TABLET ORAL at 20:38

## 2020-09-03 RX ADMIN — OXYCODONE HYDROCHLORIDE AND ACETAMINOPHEN SCH MG: 500 TABLET ORAL at 04:02

## 2020-09-03 RX ADMIN — ZINC SULFATE CAP 220 MG (50 MG ELEMENTAL ZN) SCH MG: 220 (50 ZN) CAP at 08:56

## 2020-09-03 RX ADMIN — INSULIN LISPRO SCH UNITS: 100 INJECTION, SOLUTION INTRAVENOUS; SUBCUTANEOUS at 05:47

## 2020-09-03 RX ADMIN — Medication PRN MLS/HR: at 07:50

## 2020-09-03 RX ADMIN — INSULIN GLARGINE SCH UNIT: 100 INJECTION, SOLUTION SUBCUTANEOUS at 21:00

## 2020-09-03 NOTE — NUR
SS following up with discharge planning. SS reviewed pt chart and discussed with pt RN. Pt 
remains on the vent at this time. COVID19 positive. Pt DNR. SS will continue to follow for 
discharge planning.

## 2020-09-03 NOTE — PDOC
DATE OF SERVICE


DATE: 9/3/20 


TIME: 10:18





SUBJECTIVE


ROS


Remains intubated  , clinically worsening per nursing





OBJECTIVE


Vital Signs





Vital Signs








  Date Time  Temp Pulse Resp B/P (MAP) Pulse Ox O2 Delivery O2 Flow Rate FiO2


 


9/3/20 09:00  52 22 133/72 (92) 90 Ventilator  


 


9/3/20 08:20       18.0 


 


9/3/20 08:00 97.5       





 97.5       








I & 0











Intake and Output 


 


 9/3/20





 07:00


 


Intake Total 2740.1 ml


 


Output Total 1460 ml


 


Balance 1280.1 ml


 


 


 


Intake IV Total 2740.1 ml


 


Output Urine Total 1460 ml











PHYSICAL EXAM


Physical Exam


Visual 2/2 CoVid-19 positive 





GEN.:     Intubated on MV  


HEENT:   atraumatic


NECK:    Supple.  


LUNGS:    Non labored  


HEART:    RRR, 


ABDOMEN:    Soft,


EXTREMITIES:    No cyanosis.    


NEUROLOGIC:    Sedated


SKIN:   No rash


                   [place] +





DIAGNOSIS/ASSESSMENT


Assessment & Plan


Hypernatremia - Low Na at presentation , Now Hypernatremia  


Resolved  ,Monitor 





Acue renal failure due to vasomotor nephropathy - resolved 





Acute hypoxemic respiratory failure- On IV steroids, with taper, s/p Plasma 





Abnormal D-dimer related to COVID-19 pneumonia.  On lovenox added 8/24 neg for 

DVT, 





CoViD-19 positive  





Transaminitis 





Hypertension- Currently low BP  





Will sign off





COMMENT/RELEVANT DATA


Meds





Current Medications








 Medications


  (Trade)  Dose


 Ordered  Sig/Breanne  Start Time


 Stop Time Status Last Admin


Dose Admin


 


 Acetaminophen


  (Tylenol Supp)  650 mg  PRN Q4HRS  PRN  8/20/20 21:30


     





 


 Acetaminophen


  (Tylenol)  650 mg  PRN Q4HRS  PRN  8/20/20 21:30


    8/28/20 05:51


650 MG


 


 Albuterol/


 Ipratropium


  (Duoneb)  3 ml  STK-MED ONCE  8/20/20 19:27


 8/20/20 19:27 DC  





 


 Alteplase,


 Recombinant


  (Cathflo For


 Central Catheter


 Clearance)  1 mg  1X  ONCE  8/24/20 11:00


 8/24/20 11:01 DC 8/24/20 12:05


1 MG


 


 Ascorbic Acid


  (Vitamin C)  250 mg  Q6HRS  8/21/20 12:00


    9/2/20 06:08


250 MG


 


 Atropine Sulfate


  (ATROPINE 0.5mg


 SYRINGE)  0.5 mg  PRN Q5MIN  PRN  8/20/20 20:15


     





 


 Chlorhexidine


 Gluconate


  (Peridex)  15 ml  BID  8/20/20 21:00


 8/21/20 09:45 DC 8/21/20 08:16


15 ML


 


 Dexmedetomidine


 HCl 400 mcg/


 Sodium Chloride  100 ml @ 0


 mls/hr  CONT  PRN  8/20/20 20:15


    9/3/20 09:11


23.3 MLS/HR


 


 Dextrose


  (Dextrose


 50%-Water Syringe)  12.5 gm  PRN Q15MIN  PRN  8/31/20 12:00


     





 


 Dextrose/Sodium


 Chloride  1,000 ml @ 


 75 mls/hr  [id number]  8/29/20 09:30


    9/3/20 09:44


75 MLS/HR


 


 Docusate Sodium


  (Colace)  100 mg  PRN BID  PRN  8/20/20 21:30


    8/26/20 08:26


100 MG


 


 Enoxaparin Sodium


  (Lovenox 40mg


 Syringe)  40 mg  Q12HR  9/2/20 21:00


    9/3/20 08:56


40 MG


 


 Enoxaparin Sodium


  (Lovenox 60mg


 Syringe)  60 mg  BID  8/22/20 09:00


 8/24/20 16:45 DC 8/24/20 08:55


60 MG


 


 Enoxaparin Sodium


  (Lovenox 80mg


 Syringe)  80 mg  Q12HR  8/24/20 21:00


 9/2/20 18:10 DC 9/2/20 08:26


80 MG


 


 Enoxaparin Sodium


  (Lovenox Per


 Pharmacy


 Treatment Dosing)  1 each  PRN DAILY  PRN  8/24/20 16:45


 9/2/20 18:12 DC  





 


 Etomidate


  (Amidate)  20 mg  STK-MED ONCE  8/20/20 18:54


 8/20/20 18:54 DC  





 


 Fentanyl Citrate  55 ml @ 0


 mls/hr  CONT  PRN  8/22/20 14:49


    9/3/20 07:50


4 MLS/HR


 


 Fentanyl Citrate


  (Fentanyl 2ml


 Vial)  100 mcg  1X  ONCE  8/20/20 20:15


 8/20/20 20:18 DC 8/20/20 20:17


100 MCG


 


 Furosemide


  (Lasix)  40 mg  1X  ONCE  8/25/20 10:00


 8/25/20 10:01 DC 8/25/20 10:19


40 MG


 


 Hydralazine HCl


  (Apresoline Inj)  10 mg  PRN Q4HRS  PRN  8/24/20 09:15


    8/29/20 23:09


10 MG


 


 Info


  (Anti-Coagulation


 Monitoring By


 Pharmacy)  1 each  PRN DAILY  PRN  8/26/20 10:00


    9/2/20 12:39


1 EACH


 


 Insulin Glargine


  (Lantus Syringe)  24 unit  BID  8/25/20 10:30


    9/3/20 08:59


24 UNIT


 


 Insulin Human


 Lispro


  (HumaLOG)  0-5 UNITS  Q6HRS  8/31/20 12:00


     





 


 Lorazepam


  (Ativan Inj)  2 mg  PRN Q1HR  PRN  8/22/20 16:30


    8/23/20 13:10


2 MG


 


 Methylprednisolone


 Sodium Succinate


  (SOLU-Medrol


 40MG VIAL)  40 mg  Q8HRS  8/21/20 14:00


   UNV  





 


 Midazolam HCl


  (Versed)  5 mg  1X  ONCE  8/20/20 19:18


 8/20/20 19:59 DC 8/20/20 19:18


5 MG


 


 Midazolam HCl 50


 mg/Sodium Chloride  50 ml @ 0


 mls/hr  1X  ONCE  8/20/20 19:30


 8/20/20 19:31 UNV  





 


 Norepinephrine


 Bitartrate 8 mg/


 Dextrose  258 ml @ 


 12.578 mls/


 hr  CONT  PRN  8/20/20 21:45


    8/20/20 22:15


6.289 MLS/HR


 


 Ondansetron HCl


  (Zofran)  4 mg  PRN Q4HRS  PRN  8/20/20 21:30


     





 


 Pantoprazole


 Sodium


  (PROTONIX VIAL


 for IV PUSH)  40 mg  DAILYAC  8/21/20 07:30


    9/3/20 08:56


40 MG


 


 Piperacillin Sod/


 Tazobactam Sod


 3.375 gm/Sodium


 Chloride  50 ml @ 


 100 mls/hr  Q6HRS  8/21/20 00:00


 9/2/20 17:00 DC 9/2/20 12:16


100 MLS/HR


 


 Polyethylene


 Glycol


  (miraLAX PACKET)  17 gm  DAILY  8/28/20 11:00


    8/29/20 09:02


17 GM


 


 Potassium


 Chloride/Dextrose/


 Sod Cl  1,000 ml @ 


 125 mls/hr  Q8H ONCE  8/20/20 21:30


 8/21/20 05:29 DC 8/20/20 22:13


125 MLS/HR


 


 Propofol  50 ml @ 0


 mls/hr  1X  ONCE  8/20/20 20:00


 8/20/20 20:01 DC 8/20/20 18:55


3.9 MLS/HR


 


 Sodium Chloride  1,000 ml @ 


 75 mls/hr  [id number]  8/21/20 10:00


 8/25/20 10:23 DC 8/24/20 19:33


75 MLS/HR


 


 Succinylcholine


 Chloride


  (Anectine)  200 mg  STK-MED ONCE  8/20/20 18:55


 8/20/20 18:55 DC  





 


 Vecuronium


 Bromide 50 mg/


 Miscellaneous  50 ml @ 


 4.027 mls/


 hr  CONT  PRN  8/24/20 16:15


    9/3/20 09:44


3.524 MLS/HR


 


 Vecuronium Bromide


  (Norcuron Bolus)  6 mg  PRN Q6HRS  PRN  8/22/20 09:30


    8/24/20 16:36


6 MG


 


 Zinc Sulfate


  (Orazinc)  220 mg  DAILY  8/21/20 09:00


    9/3/20 08:56


220 MG








Lab





Laboratory Tests








Test


 9/2/20


10:21 9/2/20


16:51 9/2/20


17:20 9/3/20


05:00


 


Glucose (Fingerstick)


 144 mg/dL


(70-99) 131 mg/dL


(70-99) 


 





 


D-Dimer (Lexii)


 


 


 3.50 ug/mlFEU


(0.00-0.50) 





 


Sodium Level


 


 


 


 143 mmol/L


(136-145)


 


Potassium Level


 


 


 


 4.1 mmol/L


(3.5-5.1)


 


Chloride Level


 


 


 


 112 mmol/L


()


 


Carbon Dioxide Level


 


 


 


 27 mmol/L


(21-32)


 


Anion Gap    4 (6-14) 


 


Blood Urea Nitrogen


 


 


 


 22 mg/dL


(8-26)


 


Creatinine


 


 


 


 0.5 mg/dL


(0.7-1.3)


 


Estimated GFR


(Cockcroft-Gault) 


 


 


 166.9 





 


Glucose Level


 


 


 


 149 mg/dL


(70-99)


 


Calcium Level


 


 


 


 7.0 mg/dL


(8.5-10.1)


 


Test


 9/3/20


05:28 


 


 





 


Glucose (Fingerstick)


 148 mg/dL


(70-99) 


 


 











Results


All relevant outside records, renal labs, imaging studies, telemetry/EKG's were 

reviewed.





Mption of Admission Dx:


Justifications for Admission:


Justification of Admission Dx:  Yes











VERITO LOU MD                 Sep 3, 2020 10:21

## 2020-09-03 NOTE — PDOC
[facility] PROGRESS NOTE


Date of Service


DOS:


DATE: 9/3/20 


TIME: 10:45





Chief Complaint


Chief Complaint


COVID-19 positive


Acute hypoxemic respiratory failure


Bilateral perihilar infiltrates are  seen which appear increased slightly


Leukocytosis with bandemia secondary to septic process


Hyponatremia secondary to low effective circulatory volume, now hypernatremic


Acue renal failure due to vasomotor nephropathy most likely 


Hyperglycemia


transaminitis 


Uncontrolled hypertension


Abdominal distention





History of Present Illness


History of Present Illness


9/3/2020


Patient seen and examined in the COVID-19 ICU


He remains intubated


He is on Versed for sedation and he has to be paralyzed with vecuronium


He also has propofol fentanyl and Precedex for additional sedation


Chart reviewed


Discussed with the 


Discussed with RN


He remains critically ill


Vent settings as follows


AC/22/6 100/100% with 14 of PEEP











9/2/2020


Patient seen and examined in the [place] [address] ICU


He is intubated


AC/22/6 100/100% with 14 of PEEP


He is sedated with Versed propofol fentanyl and Precedex he is also paralyzed 

with vecuronium


Chart reviewed


Discussed with RN


Discussed with 








History of Present Illness


Patient is a 65-year-old gentleman who was diagnosed with COVID-19 infection on 

Monday 5 days prior to his admission to the intensive care unit.  Apparently the

patient reported worsening respiratory distress reason why he came to the 

emergency department for evaluation and treatment.  The story was quite limited 

since the patient was speaking [place] only and his acuity was such that he 

required mechanical ventilation and prompt intubation by the emergency depa

rtment physician.  At the present time patient is intubated and sedated 

continues to require a lot of support no pertinent past medical history was 

reported he is admitted to the intensive care unit for further treatment





8/24/2020


Continues to require full support, glycemia has been noted in hypertension as 

well.  Discussed with nursing staff, all of concerns were addressed to the best 

of my abilities no acute events reported overnight





8/25/2020


No acute events reported overnight, case discussed with nursing staff patient in

no acute distress  





8/26/2020


Per nursing staff, patient febrile overnight, treated with Tylenol and tepid 

water bath. Case discussed with nursing staff, continue with IV antibiotics and 

full VTE prophylaxis.





8/27/2020


Patient still febrile overnight.  O2 requirement increasing.  Discussed with RN.





8/31 /2020


Patient still intubated and sedated.  Discussed with RN, patient was made DNR.  

schedule MiraLAX.





Vitals/I&O


Vitals/I&O:





                                   Vital Signs








  Date Time  Temp Pulse Resp B/P (MAP) Pulse Ox O2 Delivery O2 Flow Rate FiO2


 


9/3/20 10:00  53 22 129/72 (91) 92 Ventilator  


 


9/3/20 08:20       18.0 


 


9/3/20 08:00 97.5       





 97.5       














                                    I & O   


 


 9/2/20 9/2/20 9/3/20





 15:00 23:00 07:00


 


Intake Total  1464 ml 1276.1 ml


 


Output Total 230 ml 820 ml 410 ml


 


Balance -230 ml 644 ml 866.1 ml











Physical Exam


Physical Exam:


sedated on vent


General:  No acute distress, Other (Intubated and sedated)


Heart:  Regular rate


Lungs:  Other (Intubated)


Abdomen:  Other (Nondistended)


Extremities:  No clubbing, No cyanosis


Skin:  No rashes, No breakdown





Labs


Labs:





Laboratory Tests








Test


 9/2/20


16:51 9/2/20


17:20 9/3/20


05:00 9/3/20


05:28


 


Glucose (Fingerstick)


 131 mg/dL


(70-99) 


 


 148 mg/dL


(70-99)


 


D-Dimer (Lexii)


 


 3.50 ug/mlFEU


(0.00-0.50) 


 





 


Sodium Level


 


 


 143 mmol/L


(136-145) 





 


Potassium Level


 


 


 4.1 mmol/L


(3.5-5.1) 





 


Chloride Level


 


 


 112 mmol/L


() 





 


Carbon Dioxide Level


 


 


 27 mmol/L


(21-32) 





 


Anion Gap   4 (6-14)  


 


Blood Urea Nitrogen


 


 


 22 mg/dL


(8-26) 





 


Creatinine


 


 


 0.5 mg/dL


(0.7-1.3) 





 


Estimated GFR


(Cockcroft-Gault) 


 


 166.9 


 





 


Glucose Level


 


 


 149 mg/dL


(70-99) 





 


Calcium Level


 


 


 7.0 mg/dL


(8.5-10.1) 














Assessment and Plan


Assessmemt and Plan


COVID-19 positive


Acute hypoxemic respiratory failure


Bilateral perihilar infiltrates are  seen which appear increased slightly


Leukocytosis with bandemia secondary to septic process


Hyponatremia secondary to low effective circulatory volume, now hypernatremic


Acue renal failure due to vasomotor nephropathy most likely 


Hyperglycemia


transaminitis 


Uncontrolled hypertension


Abdominal distention





Plan


ICU monitoring


Respiratory isolation


COVID-19 protocol including antibiotics steroids vitamins beta agonist mecha

nical ventilation


Continue sedation with propofol fentanyl Precedex and Versed


Paralytics with vecuronium


Vent weaning


Appreciate pulmonary input


DVT prophylaxis


Full code


He remains critically ill


Prognosis extremely guarded at best as he remains on 100% oxygen


Total critical care time 31 minutes





Comment


Review of Relevant


I have reviewed the following items jim (where applicable) has been applied.


Medications:





Current Medications








 Medications


  (Trade)  Dose


 Ordered  Sig/Breanne


 Route


 PRN Reason  Start Time


 Stop Time Status Last Admin


Dose Admin


 


 Enoxaparin Sodium


  (Lovenox 40mg


 Syringe)  40 mg  Q12HR


 SQ


   9/2/20 21:00


    9/3/20 08:56














Justifications for Admission


Other Justification














CECELIA ZACARIAS III DO            Sep 3, 2020 10:49

## 2020-09-03 NOTE — PDOC
PULMONARY PROGRESS NOTES


DATE: 9/3/20 


TIME: 11:24


Subjective


remains intubated/sedated


AC mode ,100%FIO2/14 PEEP, low P02 on ABG -- continues to HAVE SEVERE  hypoxia


Vitals





Vital Signs








  Date Time  Temp Pulse Resp B/P (MAP) Pulse Ox O2 Delivery O2 Flow Rate FiO2


 


9/3/20 10:00  53 22 129/72 (91) 92 Ventilator  


 


9/3/20 08:20       18.0 


 


9/3/20 08:00 97.5       





 97.5       








Comments


visual exam done due to COVID-Pandemic


no resp distess


VENT 100% and PEEP 14 


no skin rash


lower extremity edema


Lungs:  Other (Intubated)


Labs





Laboratory Tests








Test


 9/1/20


12:06 9/1/20


21:09 9/2/20


09:25 9/2/20


10:21


 


Glucose (Fingerstick)


 179 mg/dL


(70-99) 165 mg/dL


(70-99) 


 144 mg/dL


(70-99)


 


O2 Saturation   85 % (92-99)  


 


Arterial Blood pH


 


 


 7.35


(7.35-7.45) 





 


Arterial Blood pCO2 at


Patient Temp 


 


 43 mmHg


(35-46) 





 


Arterial Blood pO2 at Patient


Temp 


 


 54 mmHg


() 





 


Arterial Blood HCO3


 


 


 23 mmol/L


(21-28) 





 


Arterial Blood Base Excess


 


 


 -3 mmol/L


(-3-3) 





 


FiO2   100  


 


Test


 9/2/20


16:51 9/2/20


17:20 9/3/20


05:00 9/3/20


05:28


 


Glucose (Fingerstick)


 131 mg/dL


(70-99) 


 


 148 mg/dL


(70-99)


 


D-Dimer (Lexii)


 


 3.50 ug/mlFEU


(0.00-0.50) 


 





 


Sodium Level


 


 


 143 mmol/L


(136-145) 





 


Potassium Level


 


 


 4.1 mmol/L


(3.5-5.1) 





 


Chloride Level


 


 


 112 mmol/L


() 





 


Carbon Dioxide Level


 


 


 27 mmol/L


(21-32) 





 


Anion Gap   4 (6-14)  


 


Blood Urea Nitrogen


 


 


 22 mg/dL


(8-26) 





 


Creatinine


 


 


 0.5 mg/dL


(0.7-1.3) 





 


Estimated GFR


(Cockcroft-Gault) 


 


 166.9 


 





 


Glucose Level


 


 


 149 mg/dL


(70-99) 





 


Calcium Level


 


 


 7.0 mg/dL


(8.5-10.1) 











Laboratory Tests








Test


 9/2/20


16:51 9/2/20


17:20 9/3/20


05:00 9/3/20


05:28


 


Glucose (Fingerstick)


 131 mg/dL


(70-99) 


 


 148 mg/dL


(70-99)


 


D-Dimer (Lexii)


 


 3.50 ug/mlFEU


(0.00-0.50) 


 





 


Sodium Level


 


 


 143 mmol/L


(136-145) 





 


Potassium Level


 


 


 4.1 mmol/L


(3.5-5.1) 





 


Chloride Level


 


 


 112 mmol/L


() 





 


Carbon Dioxide Level


 


 


 27 mmol/L


(21-32) 





 


Anion Gap   4 (6-14)  


 


Blood Urea Nitrogen


 


 


 22 mg/dL


(8-26) 





 


Creatinine


 


 


 0.5 mg/dL


(0.7-1.3) 





 


Estimated GFR


(Cockcroft-Gault) 


 


 166.9 


 





 


Glucose Level


 


 


 149 mg/dL


(70-99) 





 


Calcium Level


 


 


 7.0 mg/dL


(8.5-10.1) 











Comments


 CXR 8/21 


IMPRESSION: 


1. Bilateral diffuse infiltrates are increased in the left base and mildly


improved elsewhere.





CXR


8/28/20


Impression: Slight interval increase in the bilateral perihilar 


infiltrates.





Impression


.


1.  Acute hypoxic respiratory failure secondary to COVID-19 pneumonia/ARDS/acute

lung injury--worsening oxygen requirements


2.  Abnormal chest x-ray with bilateral infiltrates consistent with COVID-19 

pneumonia.


3.  Hypotension secondary to combination of hypovolemia and sepsis status post 2

liters of IV fluids. no pressors, 


4.  Acute kidney injury--resolved


5.  Severe protein-calorie malnutrition.


6.  Abnormal D-dimer related to COVID-19 pneumonia. Full dose lovenox added 8/24

due to increase in D-dimer to 20, now decreasing 


7. Fevers





Plan


.


RECOMMENDATIONS:


ABG noted, will continue current assist control, 100%FIO2 and PEEP of 14


will not escalate any further PEEP


Full dose anticoagulation


Broad spectrum antibiotic.


Cont.  IV steroids, with taper, 


Stress ulcer prophylaxis.


s/p Plasma 


Pt doing poorly not expected to survive





D/W RN and RT  


PT. is DNR 


Prognosis poor 


Critical care time 30 minutes, family aware of pt. clinical condition,


Dr Amin spoke with wife











NATHANAELANGELASONU BURT MD                  Sep 3, 2020 11:25

## 2020-09-04 VITALS — DIASTOLIC BLOOD PRESSURE: 79 MMHG | SYSTOLIC BLOOD PRESSURE: 139 MMHG

## 2020-09-04 VITALS — DIASTOLIC BLOOD PRESSURE: 84 MMHG | SYSTOLIC BLOOD PRESSURE: 153 MMHG

## 2020-09-04 VITALS — DIASTOLIC BLOOD PRESSURE: 76 MMHG | SYSTOLIC BLOOD PRESSURE: 137 MMHG

## 2020-09-04 VITALS — DIASTOLIC BLOOD PRESSURE: 71 MMHG | SYSTOLIC BLOOD PRESSURE: 135 MMHG

## 2020-09-04 VITALS — DIASTOLIC BLOOD PRESSURE: 81 MMHG | SYSTOLIC BLOOD PRESSURE: 146 MMHG

## 2020-09-04 VITALS — SYSTOLIC BLOOD PRESSURE: 138 MMHG | DIASTOLIC BLOOD PRESSURE: 78 MMHG

## 2020-09-04 VITALS — DIASTOLIC BLOOD PRESSURE: 89 MMHG | SYSTOLIC BLOOD PRESSURE: 163 MMHG

## 2020-09-04 VITALS — SYSTOLIC BLOOD PRESSURE: 139 MMHG | DIASTOLIC BLOOD PRESSURE: 78 MMHG

## 2020-09-04 VITALS — DIASTOLIC BLOOD PRESSURE: 80 MMHG | SYSTOLIC BLOOD PRESSURE: 141 MMHG

## 2020-09-04 VITALS — SYSTOLIC BLOOD PRESSURE: 155 MMHG | DIASTOLIC BLOOD PRESSURE: 90 MMHG

## 2020-09-04 VITALS — DIASTOLIC BLOOD PRESSURE: 91 MMHG | SYSTOLIC BLOOD PRESSURE: 165 MMHG

## 2020-09-04 VITALS — DIASTOLIC BLOOD PRESSURE: 83 MMHG | SYSTOLIC BLOOD PRESSURE: 142 MMHG

## 2020-09-04 VITALS — SYSTOLIC BLOOD PRESSURE: 147 MMHG | DIASTOLIC BLOOD PRESSURE: 82 MMHG

## 2020-09-04 VITALS — SYSTOLIC BLOOD PRESSURE: 143 MMHG | DIASTOLIC BLOOD PRESSURE: 78 MMHG

## 2020-09-04 VITALS — DIASTOLIC BLOOD PRESSURE: 84 MMHG | SYSTOLIC BLOOD PRESSURE: 150 MMHG

## 2020-09-04 VITALS — SYSTOLIC BLOOD PRESSURE: 146 MMHG | DIASTOLIC BLOOD PRESSURE: 79 MMHG

## 2020-09-04 VITALS — SYSTOLIC BLOOD PRESSURE: 143 MMHG | DIASTOLIC BLOOD PRESSURE: 77 MMHG

## 2020-09-04 VITALS — SYSTOLIC BLOOD PRESSURE: 150 MMHG | DIASTOLIC BLOOD PRESSURE: 83 MMHG

## 2020-09-04 VITALS — DIASTOLIC BLOOD PRESSURE: 83 MMHG | SYSTOLIC BLOOD PRESSURE: 151 MMHG

## 2020-09-04 VITALS — DIASTOLIC BLOOD PRESSURE: 76 MMHG | SYSTOLIC BLOOD PRESSURE: 133 MMHG

## 2020-09-04 VITALS — DIASTOLIC BLOOD PRESSURE: 76 MMHG | SYSTOLIC BLOOD PRESSURE: 132 MMHG

## 2020-09-04 VITALS — DIASTOLIC BLOOD PRESSURE: 78 MMHG | SYSTOLIC BLOOD PRESSURE: 143 MMHG

## 2020-09-04 VITALS — DIASTOLIC BLOOD PRESSURE: 80 MMHG | SYSTOLIC BLOOD PRESSURE: 138 MMHG

## 2020-09-04 LAB
ANION GAP SERPL CALC-SCNC: 3 MMOL/L (ref 6–14)
BASE EXCESS ABG: -1 MMOL/L (ref -3–3)
BASOPHILS # BLD AUTO: 0 X10^3/UL (ref 0–0.2)
BASOPHILS NFR BLD: 0 % (ref 0–3)
BUN SERPL-MCNC: 17 MG/DL (ref 8–26)
CALCIUM SERPL-MCNC: 6.4 MG/DL (ref 8.5–10.1)
CHLORIDE SERPL-SCNC: 110 MMOL/L (ref 98–107)
CO2 SERPL-SCNC: 30 MMOL/L (ref 21–32)
CREAT SERPL-MCNC: 0.5 MG/DL (ref 0.7–1.3)
EOSINOPHIL NFR BLD: 0.1 X10^3/UL (ref 0–0.7)
EOSINOPHIL NFR BLD: 1 % (ref 0–3)
ERYTHROCYTE [DISTWIDTH] IN BLOOD BY AUTOMATED COUNT: 15 % (ref 11.5–14.5)
GFR SERPLBLD BASED ON 1.73 SQ M-ARVRAT: 166.9 ML/MIN
GLUCOSE SERPL-MCNC: 165 MG/DL (ref 70–99)
HCO3 BLDA-SCNC: 25 MMOL/L (ref 21–28)
HCT VFR BLD CALC: 30.3 % (ref 39–53)
HGB BLD-MCNC: 9.9 G/DL (ref 13–17.5)
INSPIRATION SETTING TIME VENT: 100
LYMPHOCYTES # BLD: 0.6 X10^3/UL (ref 1–4.8)
LYMPHOCYTES NFR BLD AUTO: 7 % (ref 24–48)
MAGNESIUM SERPL-MCNC: 2.4 MG/DL (ref 1.8–2.4)
MCH RBC QN AUTO: 31 PG (ref 25–35)
MCHC RBC AUTO-ENTMCNC: 33 G/DL (ref 31–37)
MCV RBC AUTO: 95 FL (ref 79–100)
MONO #: 0.2 X10^3/UL (ref 0–1.1)
MONOCYTES NFR BLD: 3 % (ref 0–9)
NEUT #: 7.3 X10^3/UL (ref 1.8–7.7)
NEUTROPHILS NFR BLD AUTO: 89 % (ref 31–73)
PCO2 BLDA: 41 MMHG (ref 35–46)
PHOSPHATE SERPL-MCNC: 2.9 MG/DL (ref 2.6–4.7)
PLATELET # BLD AUTO: 167 X10^3/UL (ref 140–400)
PO2 BLDA: 53 MMHG (ref 65–108)
POTASSIUM SERPL-SCNC: 4 MMOL/L (ref 3.5–5.1)
RBC # BLD AUTO: 3.18 X10^6/UL (ref 4.3–5.7)
SAO2 % BLDA: 86 % (ref 92–99)
SODIUM SERPL-SCNC: 143 MMOL/L (ref 136–145)
WBC # BLD AUTO: 8.2 X10^3/UL (ref 4–11)

## 2020-09-04 RX ADMIN — INSULIN GLARGINE SCH UNIT: 100 INJECTION, SOLUTION SUBCUTANEOUS at 21:00

## 2020-09-04 RX ADMIN — ZINC SULFATE CAP 220 MG (50 MG ELEMENTAL ZN) SCH MG: 220 (50 ZN) CAP at 08:30

## 2020-09-04 RX ADMIN — DEXTROSE AND SODIUM CHLORIDE SCH MLS/HR: 5; .2 INJECTION, SOLUTION INTRAVENOUS at 15:15

## 2020-09-04 RX ADMIN — DEXMEDETOMIDINE HYDROCHLORIDE PRN MLS/HR: 100 INJECTION, SOLUTION, CONCENTRATE INTRAVENOUS at 00:39

## 2020-09-04 RX ADMIN — METHYLPREDNISOLONE SODIUM SUCCINATE SCH MG: 40 INJECTION, POWDER, FOR SOLUTION INTRAMUSCULAR; INTRAVENOUS at 13:18

## 2020-09-04 RX ADMIN — Medication PRN MLS/HR: at 22:33

## 2020-09-04 RX ADMIN — PROPOFOL PRN MLS/HR: 10 INJECTION, EMULSION INTRAVENOUS at 14:29

## 2020-09-04 RX ADMIN — PROPOFOL PRN MLS/HR: 10 INJECTION, EMULSION INTRAVENOUS at 19:31

## 2020-09-04 RX ADMIN — PROPOFOL PRN MLS/HR: 10 INJECTION, EMULSION INTRAVENOUS at 04:20

## 2020-09-04 RX ADMIN — Medication PRN MLS/HR: at 09:55

## 2020-09-04 RX ADMIN — OXYCODONE HYDROCHLORIDE AND ACETAMINOPHEN SCH MG: 500 TABLET ORAL at 13:18

## 2020-09-04 RX ADMIN — OXYCODONE HYDROCHLORIDE AND ACETAMINOPHEN SCH MG: 500 TABLET ORAL at 18:00

## 2020-09-04 RX ADMIN — MIDAZOLAM PRN MLS/HR: 5 INJECTION, SOLUTION INTRAMUSCULAR; INTRAVENOUS at 06:14

## 2020-09-04 RX ADMIN — METHYLPREDNISOLONE SODIUM SUCCINATE SCH MG: 40 INJECTION, POWDER, FOR SOLUTION INTRAMUSCULAR; INTRAVENOUS at 22:05

## 2020-09-04 RX ADMIN — DEXMEDETOMIDINE HYDROCHLORIDE PRN MLS/HR: 100 INJECTION, SOLUTION, CONCENTRATE INTRAVENOUS at 22:31

## 2020-09-04 RX ADMIN — VECURONIUM BROMIDE PRN MLS/HR: 1 INJECTION, POWDER, LYOPHILIZED, FOR SOLUTION INTRAVENOUS at 15:16

## 2020-09-04 RX ADMIN — ENOXAPARIN SODIUM SCH MG: 40 INJECTION SUBCUTANEOUS at 21:01

## 2020-09-04 RX ADMIN — INSULIN LISPRO SCH UNITS: 100 INJECTION, SOLUTION INTRAVENOUS; SUBCUTANEOUS at 12:00

## 2020-09-04 RX ADMIN — MIDAZOLAM PRN MLS/HR: 5 INJECTION, SOLUTION INTRAMUSCULAR; INTRAVENOUS at 18:55

## 2020-09-04 RX ADMIN — Medication PRN EACH: at 10:32

## 2020-09-04 RX ADMIN — INSULIN GLARGINE SCH UNIT: 100 INJECTION, SOLUTION SUBCUTANEOUS at 08:30

## 2020-09-04 RX ADMIN — POLYETHYLENE GLYCOL 3350 SCH GM: 17 POWDER, FOR SOLUTION ORAL at 09:00

## 2020-09-04 RX ADMIN — DEXMEDETOMIDINE HYDROCHLORIDE PRN MLS/HR: 100 INJECTION, SOLUTION, CONCENTRATE INTRAVENOUS at 10:18

## 2020-09-04 RX ADMIN — INSULIN LISPRO SCH UNITS: 100 INJECTION, SOLUTION INTRAVENOUS; SUBCUTANEOUS at 00:00

## 2020-09-04 RX ADMIN — Medication PRN EACH: at 10:31

## 2020-09-04 RX ADMIN — VECURONIUM BROMIDE PRN MLS/HR: 1 INJECTION, POWDER, LYOPHILIZED, FOR SOLUTION INTRAVENOUS at 00:39

## 2020-09-04 RX ADMIN — PANTOPRAZOLE SODIUM SCH MG: 40 INJECTION, POWDER, FOR SOLUTION INTRAVENOUS at 08:30

## 2020-09-04 RX ADMIN — ENOXAPARIN SODIUM SCH MG: 40 INJECTION SUBCUTANEOUS at 08:30

## 2020-09-04 RX ADMIN — METHYLPREDNISOLONE SODIUM SUCCINATE SCH MG: 40 INJECTION, POWDER, FOR SOLUTION INTRAMUSCULAR; INTRAVENOUS at 06:13

## 2020-09-04 RX ADMIN — INSULIN LISPRO SCH UNITS: 100 INJECTION, SOLUTION INTRAVENOUS; SUBCUTANEOUS at 06:37

## 2020-09-04 RX ADMIN — DEXMEDETOMIDINE HYDROCHLORIDE PRN MLS/HR: 100 INJECTION, SOLUTION, CONCENTRATE INTRAVENOUS at 15:16

## 2020-09-04 RX ADMIN — DEXMEDETOMIDINE HYDROCHLORIDE PRN MLS/HR: 100 INJECTION, SOLUTION, CONCENTRATE INTRAVENOUS at 18:49

## 2020-09-04 RX ADMIN — INSULIN LISPRO SCH UNITS: 100 INJECTION, SOLUTION INTRAVENOUS; SUBCUTANEOUS at 18:00

## 2020-09-04 NOTE — PDOC
[employer] PROGRESS NOTE


Date of Service


DOS:


DATE: 9/4/20 


TIME: 10:39





Chief Complaint


Chief Complaint


COVID-19 positive


Acute hypoxemic respiratory failure


Bilateral perihilar infiltrates are  seen which appear increased slightly


Leukocytosis with bandemia secondary to septic process


Hyponatremia secondary to low effective circulatory volume, now hypernatremic


Acue renal failure due to vasomotor nephropathy most likely 


Hyperglycemia


transaminitis 


Uncontrolled hypertension


Abdominal distention





History of Present Illness


History of Present Illness


9/4/2020


Patient seen and Covid-19 ICU


He remains intubated


Assist-control/22/600 with 100% FiO2


He is in [facility] for patient safety


He is sedated with propofol Versed and fentanyl


Has OG at 60 cc an hour


Discussed with RN


Discussed with case management


Reviewed chart











9/3/2020


Patient seen and examined in the COVID-19 ICU


He remains intubated


He is on Versed for sedation and he has to be paralyzed with vecuronium


He also has propofol fentanyl and Precedex for additional sedation


Chart reviewed


Discussed with the 


Discussed with RN


He remains critically ill


Vent settings as follows


AC/22/6 100/100% with 14 of PEEP











9/2/2020


Patient seen and examined in the [place] [address] ICU


He is intubated


AC/22/6 100/100% with 14 of PEEP


He is sedated with Versed propofol fentanyl and Precedex he is also paralyzed 

with vecuronium


Chart reviewed


Discussed with RN


Discussed with 








History of Present Illness


Patient is a 65-year-old gentleman who was diagnosed with COVID-19 infection on 

Monday 5 days prior to his admission to the intensive care unit.  Apparently the

patient reported worsening respiratory distress reason why he came to the 

emergency department for evaluation and treatment.  The story was quite limited 

since the patient was speaking Sinhala only and his acuity was such that he 

required mechanical ventilation and prompt intubation by the emergency 

department physician.  At the present time patient is intubated and sedated 

continues to require a lot of support no pertinent past medical history was 

reported he is admitted to the intensive care unit for further treatment





8/24/2020


Continues to require full support, glycemia has been noted in hypertension as 

well.  Discussed with nursing staff, all of concerns were addressed to the best 

of my abilities no acute events reported overnight





8/25/2020


No acute events reported overnight, case discussed with nursing staff patient in

no acute distress  





8/26/2020


Per nursing staff, patient febrile overnight, treated with Tylenol and tepid 

water bath. Case discussed with nursing staff, continue with IV antibiotics and 

full VTE prophylaxis.





8/27/2020


Patient still febrile overnight.  O2 requirement increasing.  Discussed with RN.





8/31 /2020


Patient still intubated and sedated.  Discussed with RN, patient was made DNR.  

schedule MiraLAX.





Vitals/I&O


Vitals/I&O:





                                   Vital Signs








  Date Time  Temp Pulse Resp B/P (MAP) Pulse Ox O2 Delivery O2 Flow Rate FiO2


 


9/4/20 09:55     91  18.0 


 


9/4/20 07:21      Ventilator  


 


9/4/20 07:00  50 22 141/80 (100)    


 


9/4/20 04:00 97.6       





 97.6       














                                    I & O   


 


 9/3/20 9/3/20 9/4/20





 15:00 23:00 07:00


 


Intake Total  1723.9 ml 


 


Output Total 540 ml 490 ml 625 ml


 


Balance -540 ml 1233.9 ml -625 ml











Physical Exam


Physical Exam:


sedated on vent


General:  No acute distress, Other (Intubated and sedated)


Heart:  Regular rate


Lungs:  Other (Intubated)


Abdomen:  Other (Nondistended)


Extremities:  No clubbing, No cyanosis


Skin:  No rashes, No breakdown





Labs


Labs:





Laboratory Tests








Test


 9/3/20


13:14 9/3/20


17:00 9/4/20


08:40 9/4/20


08:45


 


Glucose (Fingerstick)


 151 mg/dL


(70-99) 160 mg/dL


(70-99) 


 





 


O2 Saturation   86 % (92-99)  


 


Arterial Blood pH


 


 


 7.39


(7.35-7.45) 





 


Arterial Blood pCO2 at


Patient Temp 


 


 41 mmHg


(35-46) 





 


Arterial Blood pO2 at Patient


Temp 


 


 53 mmHg


() 





 


Arterial Blood HCO3


 


 


 25 mmol/L


(21-28) 





 


Arterial Blood Base Excess


 


 


 -1 mmol/L


(-3-3) 





 


FiO2   100  


 


White Blood Count


 


 


 


 8.2 x10^3/uL


(4.0-11.0)


 


Red Blood Count


 


 


 


 3.18 x10^6/uL


(4.30-5.70)


 


Hemoglobin


 


 


 


 9.9 g/dL


(13.0-17.5)


 


Hematocrit


 


 


 


 30.3 %


(39.0-53.0)


 


Mean Corpuscular Volume    95 fL () 


 


Mean Corpuscular Hemoglobin    31 pg (25-35) 


 


Mean Corpuscular Hemoglobin


Concent 


 


 


 33 g/dL


(31-37)


 


Red Cell Distribution Width


 


 


 


 15.0 %


(11.5-14.5)


 


Platelet Count


 


 


 


 167 x10^3/uL


(140-400)


 


Neutrophils (%) (Auto)    89 % (31-73) 


 


Lymphocytes (%) (Auto)    7 % (24-48) 


 


Monocytes (%) (Auto)    3 % (0-9) 


 


Eosinophils (%) (Auto)    1 % (0-3) 


 


Basophils (%) (Auto)    0 % (0-3) 


 


Neutrophils # (Auto)


 


 


 


 7.3 x10^3/uL


(1.8-7.7)


 


Lymphocytes # (Auto)


 


 


 


 0.6 x10^3/uL


(1.0-4.8)


 


Monocytes # (Auto)


 


 


 


 0.2 x10^3/uL


(0.0-1.1)


 


Eosinophils # (Auto)


 


 


 


 0.1 x10^3/uL


(0.0-0.7)


 


Basophils # (Auto)


 


 


 


 0.0 x10^3/uL


(0.0-0.2)


 


Sodium Level


 


 


 


 143 mmol/L


(136-145)


 


Potassium Level


 


 


 


 4.0 mmol/L


(3.5-5.1)


 


Chloride Level


 


 


 


 110 mmol/L


()


 


Carbon Dioxide Level


 


 


 


 30 mmol/L


(21-32)


 


Anion Gap    3 (6-14) 


 


Blood Urea Nitrogen


 


 


 


 17 mg/dL


(8-26)


 


Creatinine


 


 


 


 0.5 mg/dL


(0.7-1.3)


 


Estimated GFR


(Cockcroft-Gault) 


 


 


 166.9 





 


Glucose Level


 


 


 


 165 mg/dL


(70-99)


 


Calcium Level


 


 


 


 6.4 mg/dL


(8.5-10.1)


 


Phosphorus Level


 


 


 


 2.9 mg/dL


(2.6-4.7)


 


Magnesium Level


 


 


 


 2.4 mg/dL


(1.8-2.4)


 


Triglycerides Level


 


 


 


 242 mg/dL


(0-150)











Assessment and Plan


Assessmemt and Plan


COVID-19 positive


Acute hypoxemic respiratory failure


Bilateral perihilar infiltrates are  seen which appear increased slightly


Leukocytosis with bandemia secondary to septic process


Hyponatremia secondary to low effective circulatory volume, now hypernatremic


Acue renal failure due to vasomotor nephropathy most likely 


Hyperglycemia


transaminitis 


Uncontrolled hypertension


Abdominal distention





Plan


ICU monitoring


Respiratory isolation


COVID-19 protocol including antibiotics steroids vitamins beta agonist 

mechanical ventilation


Continue sedation with propofol fentanyl Precedex and Versed


Paralytics with vecuronium


Vent weaning


Appreciate pulmonary input


DVT prophylaxis


Full code


He remains critically ill


Prognosis extremely guarded at best as he remains on 100% oxygen


cc time 33 minutes











Per pulmonary please see below recommendations, agree:





RECOMMENDATIONS:


ABG noted, will continue current assist control, 100%FIO2 and PEEP of 14


will not escalate any further PEEP


Full dose anticoagulation


Broad spectrum antibiotic.


Cont.  IV steroids, with taper, 


Stress ulcer prophylaxis.


s/p Plasma 


Pt doing poorly not expected to survive





Comment


Review of Relevant


I have reviewed the following items jim (where applicable) has been applied.


Medications:





Current Medications








 Medications


  (Trade)  Dose


 Ordered  Sig/Breanne


 Route


 PRN Reason  Start Time


 Stop Time Status Last Admin


Dose Admin


 


 Info


  (Tpn Per


 Pharmacy)  1 each  PRN DAILY  PRN


 [facility]


 SEE COMMENTS  9/4/20 09:30


    9/4/20 10:32














Justifications for Admission


Other Justification














CECELIA ZACARIAS III DO            Sep 4, 2020 10:40

## 2020-09-04 NOTE — NUR
SS following up with discharge planning. SS reviewed pt chart and discussed with pt RN. Pt 
remains on the vent at this time. COVID19 positive. Pt on TPN and is DNR. Self pay. SS will 
continue to follow for discharge planning.

## 2020-09-04 NOTE — NUR
Pharmacy TPN Dosing Note



S: RENATO GILLIAM is a 65 year old M Currently receiving Central Continuous TPN started 
09/04/20



B:Pertinent PMH: 

critical illness

Height: 5 feet, 2 inches

Weight: 90.0 kg



Current diet: npo 



LABS:

Sodium:    143 

Potassium: 4 

Chloride:  110 

Calcium:   6.4 

Corrected Calcium: 8.72 

Magnesium: 2.4 

CO2:       30 

SCr:       0.5 

Glucose:   165 

Albumin:   1.1 

AST:       31 

ALT:       36 



TPN FORMULA:

TPN TYPE:  Central Continuous

AMINO ACIDS:         60 gm

DEXTROSE:            195 gm

LIPIDS:              gm

SODIUM CHLORIDE:     mEq

SODIUM ACETATE:      90 mEq

SODIUM PHOSPHATE:    mmol

POTASSIUM CHLORIDE:  mEq

POTASSIUM ACETATE:   50 mEq

POTASSIUM PHOSPHATE: 13.6 mmol

MAGNESIUM:           10 mEq

CALCIUM:             10 mEq

INSULIN:             units

MULTIPLE VITAMIN:    10 ml

TRACE ELEMENTS:      mte5 1 ml ml(s)



TPN PLAN:  

Pt with Covid 19, intubated and critically ill. On propofol at 23.3 ml/h,

this supplies ~615 kcal/day. Started standard TPN with no lipid and salts

as acetate. Stopped large volume fluid D5-1/4NaCl when TPN starts.

BMP/phos/mag in AM.





R: Begin TPN AS ABOVE.

Will monitor electrolytes, glucose, and tolerance to TPN.



 MELANY ORTIZ formerly [facility], 09/04/20 [id number]

## 2020-09-04 NOTE — PDOC
PULMONARY PROGRESS NOTES


DATE: 9/4/20 


TIME: 10:53


Subjective


remains intubated/sedated


AC mode ,100%FIO2/14 PEEP, low P02 on ABG -- continues to HAVE SEVERE  hypoxia


Vitals





Vital Signs








  Date Time  Temp Pulse Resp B/P (MAP) Pulse Ox O2 Delivery O2 Flow Rate FiO2


 


9/4/20 09:55     91  18.0 


 


9/4/20 09:00  52 22 139/78 (98)  Ventilator  


 


9/4/20 08:00 97.9       





 97.9       








Comments


visual exam done due to COVID-Pandemic


no resp distess


VENT 100% and PEEP 14 


no skin rash


lower extremity edema


Lungs:  Other (Intubated)


Labs





Laboratory Tests








Test


 9/2/20


16:51 9/2/20


17:20 9/3/20


05:00 9/3/20


05:28


 


Glucose (Fingerstick)


 131 mg/dL


(70-99) 


 


 148 mg/dL


(70-99)


 


D-Dimer (Lexii)


 


 3.50 ug/mlFEU


(0.00-0.50) 


 





 


Sodium Level


 


 


 143 mmol/L


(136-145) 





 


Potassium Level


 


 


 4.1 mmol/L


(3.5-5.1) 





 


Chloride Level


 


 


 112 mmol/L


() 





 


Carbon Dioxide Level


 


 


 27 mmol/L


(21-32) 





 


Anion Gap   4 (6-14)  


 


Blood Urea Nitrogen


 


 


 22 mg/dL


(8-26) 





 


Creatinine


 


 


 0.5 mg/dL


(0.7-1.3) 





 


Estimated GFR


(Cockcroft-Gault) 


 


 166.9 


 





 


Glucose Level


 


 


 149 mg/dL


(70-99) 





 


Calcium Level


 


 


 7.0 mg/dL


(8.5-10.1) 





 


Test


 9/3/20


13:14 9/3/20


17:00 9/4/20


08:40 9/4/20


08:45


 


Glucose (Fingerstick)


 151 mg/dL


(70-99) 160 mg/dL


(70-99) 


 





 


O2 Saturation   86 % (92-99)  


 


Arterial Blood pH


 


 


 7.39


(7.35-7.45) 





 


Arterial Blood pCO2 at


Patient Temp 


 


 41 mmHg


(35-46) 





 


Arterial Blood pO2 at Patient


Temp 


 


 53 mmHg


() 





 


Arterial Blood HCO3


 


 


 25 mmol/L


(21-28) 





 


Arterial Blood Base Excess


 


 


 -1 mmol/L


(-3-3) 





 


FiO2   100  


 


White Blood Count


 


 


 


 8.2 x10^3/uL


(4.0-11.0)


 


Red Blood Count


 


 


 


 3.18 x10^6/uL


(4.30-5.70)


 


Hemoglobin


 


 


 


 9.9 g/dL


(13.0-17.5)


 


Hematocrit


 


 


 


 30.3 %


(39.0-53.0)


 


Mean Corpuscular Volume    95 fL () 


 


Mean Corpuscular Hemoglobin    31 pg (25-35) 


 


Mean Corpuscular Hemoglobin


Concent 


 


 


 33 g/dL


(31-37)


 


Red Cell Distribution Width


 


 


 


 15.0 %


(11.5-14.5)


 


Platelet Count


 


 


 


 167 x10^3/uL


(140-400)


 


Neutrophils (%) (Auto)    89 % (31-73) 


 


Lymphocytes (%) (Auto)    7 % (24-48) 


 


Monocytes (%) (Auto)    3 % (0-9) 


 


Eosinophils (%) (Auto)    1 % (0-3) 


 


Basophils (%) (Auto)    0 % (0-3) 


 


Neutrophils # (Auto)


 


 


 


 7.3 x10^3/uL


(1.8-7.7)


 


Lymphocytes # (Auto)


 


 


 


 0.6 x10^3/uL


(1.0-4.8)


 


Monocytes # (Auto)


 


 


 


 0.2 x10^3/uL


(0.0-1.1)


 


Eosinophils # (Auto)


 


 


 


 0.1 x10^3/uL


(0.0-0.7)


 


Basophils # (Auto)


 


 


 


 0.0 x10^3/uL


(0.0-0.2)


 


Sodium Level


 


 


 


 143 mmol/L


(136-145)


 


Potassium Level


 


 


 


 4.0 mmol/L


(3.5-5.1)


 


Chloride Level


 


 


 


 110 mmol/L


()


 


Carbon Dioxide Level


 


 


 


 30 mmol/L


(21-32)


 


Anion Gap    3 (6-14) 


 


Blood Urea Nitrogen


 


 


 


 17 mg/dL


(8-26)


 


Creatinine


 


 


 


 0.5 mg/dL


(0.7-1.3)


 


Estimated GFR


(Cockcroft-Gault) 


 


 


 166.9 





 


Glucose Level


 


 


 


 165 mg/dL


(70-99)


 


Calcium Level


 


 


 


 6.4 mg/dL


(8.5-10.1)


 


Phosphorus Level


 


 


 


 2.9 mg/dL


(2.6-4.7)


 


Magnesium Level


 


 


 


 2.4 mg/dL


(1.8-2.4)


 


Triglycerides Level


 


 


 


 242 mg/dL


(0-150)








Laboratory Tests








Test


 9/3/20


13:14 9/3/20


17:00 9/4/20


08:40 9/4/20


08:45


 


Glucose (Fingerstick)


 151 mg/dL


(70-99) 160 mg/dL


(70-99) 


 





 


O2 Saturation   86 % (92-99)  


 


Arterial Blood pH


 


 


 7.39


(7.35-7.45) 





 


Arterial Blood pCO2 at


Patient Temp 


 


 41 mmHg


(35-46) 





 


Arterial Blood pO2 at Patient


Temp 


 


 53 mmHg


() 





 


Arterial Blood HCO3


 


 


 25 mmol/L


(21-28) 





 


Arterial Blood Base Excess


 


 


 -1 mmol/L


(-3-3) 





 


FiO2   100  


 


White Blood Count


 


 


 


 8.2 x10^3/uL


(4.0-11.0)


 


Red Blood Count


 


 


 


 3.18 x10^6/uL


(4.30-5.70)


 


Hemoglobin


 


 


 


 9.9 g/dL


(13.0-17.5)


 


Hematocrit


 


 


 


 30.3 %


(39.0-53.0)


 


Mean Corpuscular Volume    95 fL () 


 


Mean Corpuscular Hemoglobin    31 pg (25-35) 


 


Mean Corpuscular Hemoglobin


Concent 


 


 


 33 g/dL


(31-37)


 


Red Cell Distribution Width


 


 


 


 15.0 %


(11.5-14.5)


 


Platelet Count


 


 


 


 167 x10^3/uL


(140-400)


 


Neutrophils (%) (Auto)    89 % (31-73) 


 


Lymphocytes (%) (Auto)    7 % (24-48) 


 


Monocytes (%) (Auto)    3 % (0-9) 


 


Eosinophils (%) (Auto)    1 % (0-3) 


 


Basophils (%) (Auto)    0 % (0-3) 


 


Neutrophils # (Auto)


 


 


 


 7.3 x10^3/uL


(1.8-7.7)


 


Lymphocytes # (Auto)


 


 


 


 0.6 x10^3/uL


(1.0-4.8)


 


Monocytes # (Auto)


 


 


 


 0.2 x10^3/uL


(0.0-1.1)


 


Eosinophils # (Auto)


 


 


 


 0.1 x10^3/uL


(0.0-0.7)


 


Basophils # (Auto)


 


 


 


 0.0 x10^3/uL


(0.0-0.2)


 


Sodium Level


 


 


 


 143 mmol/L


(136-145)


 


Potassium Level


 


 


 


 4.0 mmol/L


(3.5-5.1)


 


Chloride Level


 


 


 


 110 mmol/L


()


 


Carbon Dioxide Level


 


 


 


 30 mmol/L


(21-32)


 


Anion Gap    3 (6-14) 


 


Blood Urea Nitrogen


 


 


 


 17 mg/dL


(8-26)


 


Creatinine


 


 


 


 0.5 mg/dL


(0.7-1.3)


 


Estimated GFR


(Cockcroft-Gault) 


 


 


 166.9 





 


Glucose Level


 


 


 


 165 mg/dL


(70-99)


 


Calcium Level


 


 


 


 6.4 mg/dL


(8.5-10.1)


 


Phosphorus Level


 


 


 


 2.9 mg/dL


(2.6-4.7)


 


Magnesium Level


 


 


 


 2.4 mg/dL


(1.8-2.4)


 


Triglycerides Level


 


 


 


 242 mg/dL


(0-150)








Comments


 CXR 8/21 


IMPRESSION: 


1. Bilateral diffuse infiltrates are increased in the left base and mildly


improved elsewhere.





CXR


8/28/20


Impression: Slight interval increase in the bilateral perihilar 


infiltrates.





Impression


.


1.  Acute hypoxic respiratory failure secondary to COVID-19 pneumonia/ARDS/acute

lung injury--worsening oxygen requirements


2.  Abnormal chest x-ray with bilateral infiltrates consistent with COVID-19 

pneumonia.


3.  Hypotension secondary to combination of hypovolemia and sepsis status post 2

liters of IV fluids. no pressors, 


4.  Acute kidney injury--resolved


5.  Severe protein-calorie malnutrition.


6.  Abnormal D-dimer related to COVID-19 pneumonia. Full dose lovenox added 8/24

due to increase in D-dimer to 20, now decreasing to 3.5. Lovenox dose reduced


7. Fevers





Plan


.


RECOMMENDATIONS:


ABG noted, will continue current assist control, 100%FIO2 and PEEP of 14


will not escalate any further PEEP


Lovenox dose reduced since D-Dimer down to 3.5 from 20


Broad spectrum antibiotic.


Cont.  IV steroids, with taper, 


Stress ulcer prophylaxis.


s/p Plasma 


Pt doing poorly not expected to survive





D/W RN and RT  


PT. is DNR 


Prognosis poor 


Critical care time 30 minutes, family aware of pt. clinical condition,


Dr Amin spoke with wife











HUFFMANANGELA ASCENCIO JAVED ANNE                  Sep 4, 2020 10:55

## 2020-09-05 VITALS — DIASTOLIC BLOOD PRESSURE: 89 MMHG | SYSTOLIC BLOOD PRESSURE: 166 MMHG

## 2020-09-05 VITALS — SYSTOLIC BLOOD PRESSURE: 160 MMHG | DIASTOLIC BLOOD PRESSURE: 86 MMHG

## 2020-09-05 VITALS — DIASTOLIC BLOOD PRESSURE: 93 MMHG | SYSTOLIC BLOOD PRESSURE: 176 MMHG

## 2020-09-05 VITALS — DIASTOLIC BLOOD PRESSURE: 90 MMHG | SYSTOLIC BLOOD PRESSURE: 168 MMHG

## 2020-09-05 VITALS — DIASTOLIC BLOOD PRESSURE: 97 MMHG | SYSTOLIC BLOOD PRESSURE: 174 MMHG

## 2020-09-05 VITALS — SYSTOLIC BLOOD PRESSURE: 168 MMHG | DIASTOLIC BLOOD PRESSURE: 91 MMHG

## 2020-09-05 VITALS — DIASTOLIC BLOOD PRESSURE: 89 MMHG | SYSTOLIC BLOOD PRESSURE: 163 MMHG

## 2020-09-05 VITALS — DIASTOLIC BLOOD PRESSURE: 84 MMHG | SYSTOLIC BLOOD PRESSURE: 155 MMHG

## 2020-09-05 VITALS — DIASTOLIC BLOOD PRESSURE: 87 MMHG | SYSTOLIC BLOOD PRESSURE: 160 MMHG

## 2020-09-05 VITALS — SYSTOLIC BLOOD PRESSURE: 168 MMHG | DIASTOLIC BLOOD PRESSURE: 87 MMHG

## 2020-09-05 VITALS — SYSTOLIC BLOOD PRESSURE: 170 MMHG | DIASTOLIC BLOOD PRESSURE: 90 MMHG

## 2020-09-05 VITALS — DIASTOLIC BLOOD PRESSURE: 88 MMHG | SYSTOLIC BLOOD PRESSURE: 165 MMHG

## 2020-09-05 VITALS — SYSTOLIC BLOOD PRESSURE: 177 MMHG | DIASTOLIC BLOOD PRESSURE: 97 MMHG

## 2020-09-05 VITALS — SYSTOLIC BLOOD PRESSURE: 172 MMHG | DIASTOLIC BLOOD PRESSURE: 93 MMHG

## 2020-09-05 VITALS — SYSTOLIC BLOOD PRESSURE: 162 MMHG | DIASTOLIC BLOOD PRESSURE: 88 MMHG

## 2020-09-05 VITALS — DIASTOLIC BLOOD PRESSURE: 94 MMHG | SYSTOLIC BLOOD PRESSURE: 178 MMHG

## 2020-09-05 VITALS — DIASTOLIC BLOOD PRESSURE: 91 MMHG | SYSTOLIC BLOOD PRESSURE: 74 MMHG

## 2020-09-05 VITALS — SYSTOLIC BLOOD PRESSURE: 169 MMHG | DIASTOLIC BLOOD PRESSURE: 89 MMHG

## 2020-09-05 VITALS — SYSTOLIC BLOOD PRESSURE: 172 MMHG | DIASTOLIC BLOOD PRESSURE: 90 MMHG

## 2020-09-05 VITALS — SYSTOLIC BLOOD PRESSURE: 169 MMHG | DIASTOLIC BLOOD PRESSURE: 97 MMHG

## 2020-09-05 VITALS — DIASTOLIC BLOOD PRESSURE: 86 MMHG | SYSTOLIC BLOOD PRESSURE: 163 MMHG

## 2020-09-05 VITALS — SYSTOLIC BLOOD PRESSURE: 169 MMHG | DIASTOLIC BLOOD PRESSURE: 91 MMHG

## 2020-09-05 LAB
ANION GAP SERPL CALC-SCNC: 1 MMOL/L (ref 6–14)
BASE EXCESS ABG: 1 MMOL/L (ref -3–3)
BUN SERPL-MCNC: 15 MG/DL (ref 8–26)
CALCIUM SERPL-MCNC: 6.6 MG/DL (ref 8.5–10.1)
CHLORIDE SERPL-SCNC: 109 MMOL/L (ref 98–107)
CO2 SERPL-SCNC: 32 MMOL/L (ref 21–32)
CREAT SERPL-MCNC: 0.5 MG/DL (ref 0.7–1.3)
GFR SERPLBLD BASED ON 1.73 SQ M-ARVRAT: 166.9 ML/MIN
GLUCOSE SERPL-MCNC: 230 MG/DL (ref 70–99)
HCO3 BLDA-SCNC: 26 MMOL/L (ref 21–28)
INSPIRATION SETTING TIME VENT: 100
MAGNESIUM SERPL-MCNC: 2.4 MG/DL (ref 1.8–2.4)
PCO2 BLDA: 42 MMHG (ref 35–46)
PHOSPHATE SERPL-MCNC: 3.2 MG/DL (ref 2.6–4.7)
PO2 BLDA: 61 MMHG (ref 65–108)
POTASSIUM SERPL-SCNC: 4.4 MMOL/L (ref 3.5–5.1)
SAO2 % BLDA: 90 % (ref 92–99)
SODIUM SERPL-SCNC: 142 MMOL/L (ref 136–145)

## 2020-09-05 RX ADMIN — ZINC SULFATE CAP 220 MG (50 MG ELEMENTAL ZN) SCH MG: 220 (50 ZN) CAP at 08:21

## 2020-09-05 RX ADMIN — METHYLPREDNISOLONE SODIUM SUCCINATE SCH MG: 40 INJECTION, POWDER, FOR SOLUTION INTRAMUSCULAR; INTRAVENOUS at 06:01

## 2020-09-05 RX ADMIN — DEXMEDETOMIDINE HYDROCHLORIDE PRN MLS/HR: 100 INJECTION, SOLUTION, CONCENTRATE INTRAVENOUS at 14:16

## 2020-09-05 RX ADMIN — ENOXAPARIN SODIUM SCH MG: 40 INJECTION SUBCUTANEOUS at 08:22

## 2020-09-05 RX ADMIN — METHYLPREDNISOLONE SODIUM SUCCINATE SCH MG: 40 INJECTION, POWDER, FOR SOLUTION INTRAMUSCULAR; INTRAVENOUS at 22:11

## 2020-09-05 RX ADMIN — VECURONIUM BROMIDE PRN MLS/HR: 1 INJECTION, POWDER, LYOPHILIZED, FOR SOLUTION INTRAVENOUS at 05:52

## 2020-09-05 RX ADMIN — INSULIN GLARGINE SCH UNIT: 100 INJECTION, SOLUTION SUBCUTANEOUS at 08:21

## 2020-09-05 RX ADMIN — POLYETHYLENE GLYCOL 3350 SCH GM: 17 POWDER, FOR SOLUTION ORAL at 08:20

## 2020-09-05 RX ADMIN — OXYCODONE HYDROCHLORIDE AND ACETAMINOPHEN SCH MG: 500 TABLET ORAL at 00:08

## 2020-09-05 RX ADMIN — INSULIN GLARGINE SCH UNIT: 100 INJECTION, SOLUTION SUBCUTANEOUS at 22:12

## 2020-09-05 RX ADMIN — Medication PRN MLS/HR: at 09:28

## 2020-09-05 RX ADMIN — PROPOFOL PRN MLS/HR: 10 INJECTION, EMULSION INTRAVENOUS at 09:19

## 2020-09-05 RX ADMIN — DEXMEDETOMIDINE HYDROCHLORIDE PRN MLS/HR: 100 INJECTION, SOLUTION, CONCENTRATE INTRAVENOUS at 22:51

## 2020-09-05 RX ADMIN — PROPOFOL PRN MLS/HR: 10 INJECTION, EMULSION INTRAVENOUS at 03:44

## 2020-09-05 RX ADMIN — PROPOFOL PRN MLS/HR: 10 INJECTION, EMULSION INTRAVENOUS at 17:54

## 2020-09-05 RX ADMIN — PANTOPRAZOLE SODIUM SCH MG: 40 INJECTION, POWDER, FOR SOLUTION INTRAVENOUS at 08:20

## 2020-09-05 RX ADMIN — OXYCODONE HYDROCHLORIDE AND ACETAMINOPHEN SCH MG: 500 TABLET ORAL at 17:53

## 2020-09-05 RX ADMIN — OXYCODONE HYDROCHLORIDE AND ACETAMINOPHEN SCH MG: 500 TABLET ORAL at 06:01

## 2020-09-05 RX ADMIN — PROPOFOL PRN MLS/HR: 10 INJECTION, EMULSION INTRAVENOUS at 12:43

## 2020-09-05 RX ADMIN — MIDAZOLAM PRN MLS/HR: 5 INJECTION, SOLUTION INTRAMUSCULAR; INTRAVENOUS at 12:43

## 2020-09-05 RX ADMIN — INSULIN LISPRO SCH UNITS: 100 INJECTION, SOLUTION INTRAVENOUS; SUBCUTANEOUS at 18:12

## 2020-09-05 RX ADMIN — DEXMEDETOMIDINE HYDROCHLORIDE PRN MLS/HR: 100 INJECTION, SOLUTION, CONCENTRATE INTRAVENOUS at 10:24

## 2020-09-05 RX ADMIN — INSULIN LISPRO SCH UNITS: 100 INJECTION, SOLUTION INTRAVENOUS; SUBCUTANEOUS at 05:55

## 2020-09-05 RX ADMIN — Medication PRN EACH: at 12:32

## 2020-09-05 RX ADMIN — DEXMEDETOMIDINE HYDROCHLORIDE PRN MLS/HR: 100 INJECTION, SOLUTION, CONCENTRATE INTRAVENOUS at 19:38

## 2020-09-05 RX ADMIN — DEXMEDETOMIDINE HYDROCHLORIDE PRN MLS/HR: 100 INJECTION, SOLUTION, CONCENTRATE INTRAVENOUS at 01:36

## 2020-09-05 RX ADMIN — ENOXAPARIN SODIUM SCH MG: 40 INJECTION SUBCUTANEOUS at 22:11

## 2020-09-05 RX ADMIN — METHYLPREDNISOLONE SODIUM SUCCINATE SCH MG: 40 INJECTION, POWDER, FOR SOLUTION INTRAMUSCULAR; INTRAVENOUS at 13:44

## 2020-09-05 RX ADMIN — INSULIN LISPRO SCH UNITS: 100 INJECTION, SOLUTION INTRAVENOUS; SUBCUTANEOUS at 00:43

## 2020-09-05 RX ADMIN — VECURONIUM BROMIDE PRN MLS/HR: 1 INJECTION, POWDER, LYOPHILIZED, FOR SOLUTION INTRAVENOUS at 14:30

## 2020-09-05 RX ADMIN — INSULIN LISPRO SCH UNITS: 100 INJECTION, SOLUTION INTRAVENOUS; SUBCUTANEOUS at 11:47

## 2020-09-05 RX ADMIN — DEXMEDETOMIDINE HYDROCHLORIDE PRN MLS/HR: 100 INJECTION, SOLUTION, CONCENTRATE INTRAVENOUS at 05:53

## 2020-09-05 RX ADMIN — MIDAZOLAM PRN MLS/HR: 5 INJECTION, SOLUTION INTRAMUSCULAR; INTRAVENOUS at 05:53

## 2020-09-05 RX ADMIN — OXYCODONE HYDROCHLORIDE AND ACETAMINOPHEN SCH MG: 500 TABLET ORAL at 11:35

## 2020-09-05 NOTE — NUR
Pharmacy TPN Dosing Note



S: RENATO GILLIAM is a 65 year old M Currently receiving Central Continuous TPN started 
09/04/20



B:Pertinent PMH: 

critical illness

Height: 5 feet, 2 inches

Weight: 97.9 kg



Current diet: npo 



LABS:

Sodium:    142 

Potassium: 4.4 

Chloride:  109 

Calcium:   6.6 

Corrected Calcium: 8.92 

Magnesium: 2.4 

CO2:       32 

SCr:       0.5 

Glucose:   230 

Albumin:   1.1 

AST:       31 

ALT:       36 



TPN FORMULA:

TPN TYPE:  Central Continuous

AMINO ACIDS:         60 gm

DEXTROSE:            195 gm

LIPIDS:              gm

SODIUM CHLORIDE:     mEq

SODIUM ACETATE:      90 mEq

SODIUM PHOSPHATE:    mmol

POTASSIUM CHLORIDE:  mEq

POTASSIUM ACETATE:   40 mEq

POTASSIUM PHOSPHATE: 13.6 mmol

MAGNESIUM:           8 mEq

CALCIUM:             10 mEq

INSULIN:             units

MULTIPLE VITAMIN:    10 ml

TRACE ELEMENTS:      mte5 1 ml ml(s)



TPN PLAN:  

-On propofol, continue w/o lipids

-Will decrease magnesium and potassium acetate slightly

-Labs in the am





R: Continue TPN as written above.

Will monitor electrolytes, glucose, and tolerance to TPN.



 MICHAEL CLARKE [facility], 09/05/20 [id number]

## 2020-09-05 NOTE — PDOC
PULMONARY PROGRESS NOTES


DATE: 9/5/20 


TIME: 10:25


Subjective


remains intubated/sedated


AC mode ,100%FIO2/14 PEEP,  





sedated on versed propofol fentanyl on paralytic agent  vac   small ett 

secretion


Vitals





Vital Signs








  Date Time  Temp Pulse Resp B/P (MAP) Pulse Ox O2 Delivery O2 Flow Rate FiO2


 


9/5/20 10:00  53 22 178/94 (122) 90 Ventilator  


 


9/5/20 08:00 97.4       





 97.4       


 


9/4/20 10:25       18.0 








Comments


visual exam done due to COVID-Pandemic


intubated sedated


VENT 100% and PEEP 14 


nc at


rrr


no accessory muscle use


no skin rash


lower extremity edema


Lungs:  Other (Intubated)


Labs





Laboratory Tests








Test


 9/3/20


13:14 9/3/20


17:00 9/4/20


06:32 9/4/20


08:40


 


Glucose (Fingerstick)


 151 mg/dL


(70-99) 160 mg/dL


(70-99) 162 mg/dL


(70-99) 





 


O2 Saturation    86 % (92-99) 


 


Arterial Blood pH


 


 


 


 7.39


(7.35-7.45)


 


Arterial Blood pCO2 at


Patient Temp 


 


 


 41 mmHg


(35-46)


 


Arterial Blood pO2 at Patient


Temp 


 


 


 53 mmHg


()


 


Arterial Blood HCO3


 


 


 


 25 mmol/L


(21-28)


 


Arterial Blood Base Excess


 


 


 


 -1 mmol/L


(-3-3)


 


FiO2    100 


 


Test


 9/4/20


08:45 9/4/20


18:07 9/5/20


00:18 9/5/20


05:37


 


White Blood Count


 8.2 x10^3/uL


(4.0-11.0) 


 


 





 


Red Blood Count


 3.18 x10^6/uL


(4.30-5.70) 


 


 





 


Hemoglobin


 9.9 g/dL


(13.0-17.5) 


 


 





 


Hematocrit


 30.3 %


(39.0-53.0) 


 


 





 


Mean Corpuscular Volume 95 fL ()    


 


Mean Corpuscular Hemoglobin 31 pg (25-35)    


 


Mean Corpuscular Hemoglobin


Concent 33 g/dL


(31-37) 


 


 





 


Red Cell Distribution Width


 15.0 %


(11.5-14.5) 


 


 





 


Platelet Count


 167 x10^3/uL


(140-400) 


 


 





 


Neutrophils (%) (Auto) 89 % (31-73)    


 


Lymphocytes (%) (Auto) 7 % (24-48)    


 


Monocytes (%) (Auto) 3 % (0-9)    


 


Eosinophils (%) (Auto) 1 % (0-3)    


 


Basophils (%) (Auto) 0 % (0-3)    


 


Neutrophils # (Auto)


 7.3 x10^3/uL


(1.8-7.7) 


 


 





 


Lymphocytes # (Auto)


 0.6 x10^3/uL


(1.0-4.8) 


 


 





 


Monocytes # (Auto)


 0.2 x10^3/uL


(0.0-1.1) 


 


 





 


Eosinophils # (Auto)


 0.1 x10^3/uL


(0.0-0.7) 


 


 





 


Basophils # (Auto)


 0.0 x10^3/uL


(0.0-0.2) 


 


 





 


Sodium Level


 143 mmol/L


(136-145) 


 


 





 


Potassium Level


 4.0 mmol/L


(3.5-5.1) 


 


 





 


Chloride Level


 110 mmol/L


() 


 


 





 


Carbon Dioxide Level


 30 mmol/L


(21-32) 


 


 





 


Anion Gap 3 (6-14)    


 


Blood Urea Nitrogen


 17 mg/dL


(8-26) 


 


 





 


Creatinine


 0.5 mg/dL


(0.7-1.3) 


 


 





 


Estimated GFR


(Cockcroft-Gault) 166.9 


 


 


 





 


Glucose Level


 165 mg/dL


(70-99) 


 


 





 


Calcium Level


 6.4 mg/dL


(8.5-10.1) 


 


 





 


Phosphorus Level


 2.9 mg/dL


(2.6-4.7) 


 


 





 


Magnesium Level


 2.4 mg/dL


(1.8-2.4) 


 


 





 


Triglycerides Level


 242 mg/dL


(0-150) 


 


 





 


Glucose (Fingerstick)


 


 181 mg/dL


(70-99) 203 mg/dL


(70-99) 240 mg/dL


(70-99)


 


Test


 9/5/20


06:00 9/5/20


09:00 


 





 


Sodium Level


 142 mmol/L


(136-145) 


 


 





 


Potassium Level


 4.4 mmol/L


(3.5-5.1) 


 


 





 


Chloride Level


 109 mmol/L


() 


 


 





 


Carbon Dioxide Level


 32 mmol/L


(21-32) 


 


 





 


Anion Gap 1 (6-14)    


 


Blood Urea Nitrogen


 15 mg/dL


(8-26) 


 


 





 


Creatinine


 0.5 mg/dL


(0.7-1.3) 


 


 





 


Estimated GFR


(Cockcroft-Gault) 166.9 


 


 


 





 


Glucose Level


 230 mg/dL


(70-99) 


 


 





 


Calcium Level


 6.6 mg/dL


(8.5-10.1) 


 


 





 


Phosphorus Level


 3.2 mg/dL


(2.6-4.7) 


 


 





 


Magnesium Level


 2.4 mg/dL


(1.8-2.4) 


 


 





 


O2 Saturation  90 % (92-99)   


 


Arterial Blood pH


 


 7.42


(7.35-7.45) 


 





 


Arterial Blood pCO2 at


Patient Temp 


 42 mmHg


(35-46) 


 





 


Arterial Blood pO2 at Patient


Temp 


 61 mmHg


() 


 





 


Arterial Blood HCO3


 


 26 mmol/L


(21-28) 


 





 


Arterial Blood Base Excess


 


 1 mmol/L


(-3-3) 


 





 


FiO2  100   








Laboratory Tests








Test


 9/4/20


18:07 9/5/20


00:18 9/5/20


05:37 9/5/20


06:00


 


Glucose (Fingerstick)


 181 mg/dL


(70-99) 203 mg/dL


(70-99) 240 mg/dL


(70-99) 





 


Sodium Level


 


 


 


 142 mmol/L


(136-145)


 


Potassium Level


 


 


 


 4.4 mmol/L


(3.5-5.1)


 


Chloride Level


 


 


 


 109 mmol/L


()


 


Carbon Dioxide Level


 


 


 


 32 mmol/L


(21-32)


 


Anion Gap    1 (6-14) 


 


Blood Urea Nitrogen


 


 


 


 15 mg/dL


(8-26)


 


Creatinine


 


 


 


 0.5 mg/dL


(0.7-1.3)


 


Estimated GFR


(Cockcroft-Gault) 


 


 


 166.9 





 


Glucose Level


 


 


 


 230 mg/dL


(70-99)


 


Calcium Level


 


 


 


 6.6 mg/dL


(8.5-10.1)


 


Phosphorus Level


 


 


 


 3.2 mg/dL


(2.6-4.7)


 


Magnesium Level


 


 


 


 2.4 mg/dL


(1.8-2.4)


 


Test


 9/5/20


09:00 


 


 





 


O2 Saturation 90 % (92-99)    


 


Arterial Blood pH


 7.42


(7.35-7.45) 


 


 





 


Arterial Blood pCO2 at


Patient Temp 42 mmHg


(35-46) 


 


 





 


Arterial Blood pO2 at Patient


Temp 61 mmHg


() 


 


 





 


Arterial Blood HCO3


 26 mmol/L


(21-28) 


 


 





 


Arterial Blood Base Excess


 1 mmol/L


(-3-3) 


 


 





 


FiO2 100    








Comments


 CXR 


reviewed 


1. Bilateral diffuse infiltrates are increased in the left base and mildly


improved elsewhere.





Impression


.


1.  Acute hypoxic respiratory failure secondary to COVID-19 pneumonia/ARDS/acute

lung injury--


2.  Abnormal chest x-ray with bilateral infiltrates consistent with COVID-19 

pneumonia.


3.  Hypotension secondary to combination of hypovolemia and sepsis status post 2

liters of IV fluids. no pressors, 


4.  Acute kidney injury--resolved


5.  Severe protein-calorie malnutrition.


6.  Abnormal D-dimer related to COVID-19 pneumonia. Full dose lovenox added 8/24

due to increase in D-dimer to 20, now decreasing to 3.5. Lovenox dose reduced


7. Fevers





Plan


.


RECOMMENDATIONS:


ABG noted, will continue current assist control, 100%FIO2 and PEEP of 14


will not escalate any further PEEP


Lovenox dose reduced since D-Dimer down to 3.5 from 20


Broad spectrum antibiotic.


Cont.  IV steroids, with taper, 


Stress ulcer prophylaxis.


s/p Plasma 


Pt doing poorly not expected to survive


prognosis poor 


D/W RN and RT  


PT. is DNR 


Prognosis poor 





critically ill 


Critical care time 30 minutes, family aware of pt. clinical condition, no 

overlap


Dr Amin spoke with wife











MALINDA MEHTA MD                Sep 5, 2020 10:27

## 2020-09-05 NOTE — PDOC
[facility] PROGRESS NOTE


Date of Service


DOS:


DATE: 9/5/20 


TIME: 10:54





Chief Complaint


Chief Complaint


COVID-19 positive


Acute hypoxemic respiratory failure


Bilateral perihilar infiltrates are  seen which appear increased slightly


Leukocytosis with bandemia secondary to septic process


Hyponatremia secondary to low effective circulatory volume, now hypernatremic


Acue renal failure due to vasomotor nephropathy most likely 


Hyperglycemia


transaminitis 


Uncontrolled hypertension


Abdominal distention





History of Present Illness


History of Present Illness


9/5/2020


Patient seen in the COVID-19 ICU


He remains mechanically ventilated


He is also started on TPN


He is sedated with propofol fentanyl Versed and Precedex


We are using paralytics with vecuronium


Vent settings as follows


AC/22/6 100/100% with 14 of PEEP


Chart reviewed


Discussed with RN


He remains very critically ill prognosis is very guarded at best











9/4/2020


Patient seen and Covid-19 ICU


He remains intubated


Assist-control/22/600 with 100% FiO2


He is in mitts for patient safety


He is sedated with propofol Versed and fentanyl


Has OG at 60 cc an hour


Discussed with RN


Discussed with case management


Reviewed chart











9/3/2020


Patient seen and examined in the COVID-19 ICU


He remains intubated


He is on Versed for sedation and he has to be paralyzed with vecuronium


He also has propofol fentanyl and Precedex for additional sedation


Chart reviewed


Discussed with the 


Discussed with RN


He remains critically ill


Vent settings as follows


AC/22/6 100/100% with 14 of PEEP











9/2/2020


Patient seen and examined in the [place] [address] ICU


He is intubated


AC/22/6 100/100% with 14 of PEEP


He is sedated with Versed propofol fentanyl and Precedex he is also paralyzed 

with vecuronium


Chart reviewed


Discussed with RN


Discussed with 








History of Present Illness


Patient is a 65-year-old gentleman who was diagnosed with COVID-19 infection on 

Monday 5 days prior to his admission to the intensive care unit.  Apparently the

patient reported worsening respiratory distress reason why he came to the 

emergency department for evaluation and treatment.  The story was quite limited 

since the patient was speaking [place] only and his acuity was such that he 

required mechanical ventilation and prompt intubation by the emergency 

department physician.  At the present time patient is intubated and sedated 

continues to require a lot of support no pertinent past medical history was 

reported he is admitted to the intensive care unit for further treatment





8/24/2020


Continues to require full support, glycemia has been noted in hypertension as 

well.  Discussed with nursing staff, all of concerns were addressed to the best 

of my abilities no acute events reported overnight





8/25/2020


No acute events reported overnight, case discussed with nursing staff patient in

no acute distress  





8/26/2020


Per nursing staff, patient febrile overnight, treated with Tylenol and tepid 

water bath. Case discussed with nursing staff, continue with IV antibiotics and 

full VTE prophylaxis.





8/27/2020


Patient still febrile overnight.  O2 requirement increasing.  Discussed with RN.





8/31 /2020


Patient still intubated and sedated.  Discussed with RN, patient was made DNR.  

schedule MiraLAX.





Vitals/I&O


Vitals/I&O:





                                   Vital Signs








  Date Time  Temp Pulse Resp B/P (MAP) Pulse Ox O2 Delivery O2 Flow Rate FiO2


 


9/5/20 10:00  53 22 178/94 (122) 90 Ventilator  


 


9/5/20 08:00 97.4       





 97.4       


 


9/4/20 10:25       18.0 














                                    I & O   


 


 9/4/20 9/4/20 9/5/20





 15:00 23:00 07:00


 


Intake Total  2050.7 ml 1696.30 ml


 


Output Total 620 ml 790 ml 1000 ml


 


Balance -620 ml 1260.7 ml 696.30 ml











Physical Exam


Physical Exam:


sedated on vent


General:  No acute distress, Other (Intubated and sedated)


Heart:  Regular rate


Lungs:  Other (Intubated)


Abdomen:  Other (Nondistended)


Extremities:  No clubbing, No cyanosis


Skin:  No rashes, No breakdown





Labs


Labs:





Laboratory Tests








Test


 9/4/20


18:07 9/5/20


00:18 9/5/20


05:37 9/5/20


06:00


 


Glucose (Fingerstick)


 181 mg/dL


(70-99) 203 mg/dL


(70-99) 240 mg/dL


(70-99) 





 


Sodium Level


 


 


 


 142 mmol/L


(136-145)


 


Potassium Level


 


 


 


 4.4 mmol/L


(3.5-5.1)


 


Chloride Level


 


 


 


 109 mmol/L


()


 


Carbon Dioxide Level


 


 


 


 32 mmol/L


(21-32)


 


Anion Gap    1 (6-14) 


 


Blood Urea Nitrogen


 


 


 


 15 mg/dL


(8-26)


 


Creatinine


 


 


 


 0.5 mg/dL


(0.7-1.3)


 


Estimated GFR


(Cockcroft-Gault) 


 


 


 166.9 





 


Glucose Level


 


 


 


 230 mg/dL


(70-99)


 


Calcium Level


 


 


 


 6.6 mg/dL


(8.5-10.1)


 


Phosphorus Level


 


 


 


 3.2 mg/dL


(2.6-4.7)


 


Magnesium Level


 


 


 


 2.4 mg/dL


(1.8-2.4)


 


Test


 9/5/20


09:00 


 


 





 


O2 Saturation 90 % (92-99)    


 


Arterial Blood pH


 7.42


(7.35-7.45) 


 


 





 


Arterial Blood pCO2 at


Patient Temp 42 mmHg


(35-46) 


 


 





 


Arterial Blood pO2 at Patient


Temp 61 mmHg


() 


 


 





 


Arterial Blood HCO3


 26 mmol/L


(21-28) 


 


 





 


Arterial Blood Base Excess


 1 mmol/L


(-3-3) 


 


 





 


FiO2 100    











Assessment and Plan


Assessmemt and Plan


ICU monitoring


Respiratory isolation


IV TPN


COVID-19 protocol including antibiotics steroids vitamins beta agonist 

mechanical ventilation


Continue sedation with propofol fentanyl Precedex and Versed


Paralytics with vecuronium


Vent weaning


Appreciate pulmonary input


DVT prophylaxis


Full code


He remains critically ill


Prognosis extremely guarded at best and probably terminal as he remains on 100% 

oxygen


cc time 31 minutes





Comment


Review of Relevant


I have reviewed the following items jim (where applicable) has been applied.


Medications:





Current Medications








 Medications


  (Trade)  Dose


 Ordered  Sig/Breanne


 Route


 PRN Reason  Start Time


 Stop Time Status Last Admin


Dose Admin


 


 Sodium Acetate 90


 meq/Potassium


 Acetate 50 meq/


 Potassium


 Phosphate 13.6


 mmol/Magnesium


 Sulfate 10 meq/


 Calcium Gluconate


 10 meq/


 Multivitamins 10


 ml/Chromium/


 Copper/Manganese/


 Seleni/Zn 1 ml/


 Total Parenteral


 Nutrition/Amino


 Acids/Dextrose  1,512 ml @ 


 63 mls/hr  TPN  CONT


 IV


   9/4/20 22:00


 9/5/20 21:59  9/4/20 22:05














Justifications for Admission


Other Justification














CECELIA ZACARIAS III DO            Sep 5, 2020 10:56

## 2020-09-06 VITALS — DIASTOLIC BLOOD PRESSURE: 67 MMHG | SYSTOLIC BLOOD PRESSURE: 128 MMHG

## 2020-09-06 VITALS — SYSTOLIC BLOOD PRESSURE: 133 MMHG | DIASTOLIC BLOOD PRESSURE: 71 MMHG

## 2020-09-06 VITALS — DIASTOLIC BLOOD PRESSURE: 87 MMHG | SYSTOLIC BLOOD PRESSURE: 165 MMHG

## 2020-09-06 VITALS — SYSTOLIC BLOOD PRESSURE: 165 MMHG | DIASTOLIC BLOOD PRESSURE: 87 MMHG

## 2020-09-06 VITALS — SYSTOLIC BLOOD PRESSURE: 163 MMHG | DIASTOLIC BLOOD PRESSURE: 87 MMHG

## 2020-09-06 VITALS — DIASTOLIC BLOOD PRESSURE: 84 MMHG | SYSTOLIC BLOOD PRESSURE: 158 MMHG

## 2020-09-06 VITALS — SYSTOLIC BLOOD PRESSURE: 156 MMHG | DIASTOLIC BLOOD PRESSURE: 87 MMHG

## 2020-09-06 VITALS — SYSTOLIC BLOOD PRESSURE: 169 MMHG | DIASTOLIC BLOOD PRESSURE: 91 MMHG

## 2020-09-06 VITALS — DIASTOLIC BLOOD PRESSURE: 86 MMHG | SYSTOLIC BLOOD PRESSURE: 165 MMHG

## 2020-09-06 VITALS — SYSTOLIC BLOOD PRESSURE: 155 MMHG | DIASTOLIC BLOOD PRESSURE: 87 MMHG

## 2020-09-06 VITALS — SYSTOLIC BLOOD PRESSURE: 160 MMHG | DIASTOLIC BLOOD PRESSURE: 85 MMHG

## 2020-09-06 VITALS — DIASTOLIC BLOOD PRESSURE: 73 MMHG | SYSTOLIC BLOOD PRESSURE: 138 MMHG

## 2020-09-06 VITALS — SYSTOLIC BLOOD PRESSURE: 144 MMHG | DIASTOLIC BLOOD PRESSURE: 6 MMHG

## 2020-09-06 VITALS — DIASTOLIC BLOOD PRESSURE: 87 MMHG | SYSTOLIC BLOOD PRESSURE: 162 MMHG

## 2020-09-06 VITALS — SYSTOLIC BLOOD PRESSURE: 153 MMHG | DIASTOLIC BLOOD PRESSURE: 85 MMHG

## 2020-09-06 VITALS — DIASTOLIC BLOOD PRESSURE: 68 MMHG | SYSTOLIC BLOOD PRESSURE: 126 MMHG

## 2020-09-06 VITALS — SYSTOLIC BLOOD PRESSURE: 150 MMHG | DIASTOLIC BLOOD PRESSURE: 84 MMHG

## 2020-09-06 VITALS — SYSTOLIC BLOOD PRESSURE: 152 MMHG | DIASTOLIC BLOOD PRESSURE: 82 MMHG

## 2020-09-06 VITALS — DIASTOLIC BLOOD PRESSURE: 72 MMHG | SYSTOLIC BLOOD PRESSURE: 130 MMHG

## 2020-09-06 VITALS — SYSTOLIC BLOOD PRESSURE: 143 MMHG | DIASTOLIC BLOOD PRESSURE: 77 MMHG

## 2020-09-06 VITALS — DIASTOLIC BLOOD PRESSURE: 81 MMHG | SYSTOLIC BLOOD PRESSURE: 151 MMHG

## 2020-09-06 VITALS — SYSTOLIC BLOOD PRESSURE: 162 MMHG | DIASTOLIC BLOOD PRESSURE: 86 MMHG

## 2020-09-06 VITALS — SYSTOLIC BLOOD PRESSURE: 164 MMHG | DIASTOLIC BLOOD PRESSURE: 89 MMHG

## 2020-09-06 VITALS — DIASTOLIC BLOOD PRESSURE: 80 MMHG | SYSTOLIC BLOOD PRESSURE: 149 MMHG

## 2020-09-06 LAB
ANION GAP SERPL CALC-SCNC: 3 MMOL/L (ref 6–14)
BASE EXCESS ABG: 4 MMOL/L (ref -3–3)
BUN SERPL-MCNC: 15 MG/DL (ref 8–26)
CALCIUM SERPL-MCNC: 7.2 MG/DL (ref 8.5–10.1)
CHLORIDE SERPL-SCNC: 108 MMOL/L (ref 98–107)
CO2 SERPL-SCNC: 32 MMOL/L (ref 21–32)
CREAT SERPL-MCNC: 0.4 MG/DL (ref 0.7–1.3)
GFR SERPLBLD BASED ON 1.73 SQ M-ARVRAT: 215.9 ML/MIN
GLUCOSE SERPL-MCNC: 218 MG/DL (ref 70–99)
HCO3 BLDA-SCNC: 29 MMOL/L (ref 21–28)
INSPIRATION SETTING TIME VENT: 100
MAGNESIUM SERPL-MCNC: 2.2 MG/DL (ref 1.8–2.4)
PCO2 BLDA: 45 MMHG (ref 35–46)
PHOSPHATE SERPL-MCNC: 2.8 MG/DL (ref 2.6–4.7)
PO2 BLDA: 54 MMHG (ref 65–108)
POTASSIUM SERPL-SCNC: 4.5 MMOL/L (ref 3.5–5.1)
SAO2 % BLDA: 86 % (ref 92–99)
SODIUM SERPL-SCNC: 143 MMOL/L (ref 136–145)

## 2020-09-06 RX ADMIN — ZINC SULFATE CAP 220 MG (50 MG ELEMENTAL ZN) SCH MG: 220 (50 ZN) CAP at 08:30

## 2020-09-06 RX ADMIN — INSULIN GLARGINE SCH UNIT: 100 INJECTION, SOLUTION SUBCUTANEOUS at 21:12

## 2020-09-06 RX ADMIN — MIDAZOLAM PRN MLS/HR: 5 INJECTION, SOLUTION INTRAMUSCULAR; INTRAVENOUS at 13:00

## 2020-09-06 RX ADMIN — INSULIN LISPRO SCH UNITS: 100 INJECTION, SOLUTION INTRAVENOUS; SUBCUTANEOUS at 06:26

## 2020-09-06 RX ADMIN — Medication PRN EACH: at 12:19

## 2020-09-06 RX ADMIN — Medication PRN MLS/HR: at 12:09

## 2020-09-06 RX ADMIN — PROPOFOL PRN MLS/HR: 10 INJECTION, EMULSION INTRAVENOUS at 07:40

## 2020-09-06 RX ADMIN — METHYLPREDNISOLONE SODIUM SUCCINATE SCH MG: 40 INJECTION, POWDER, FOR SOLUTION INTRAMUSCULAR; INTRAVENOUS at 05:42

## 2020-09-06 RX ADMIN — OXYCODONE HYDROCHLORIDE AND ACETAMINOPHEN SCH MG: 500 TABLET ORAL at 00:43

## 2020-09-06 RX ADMIN — OXYCODONE HYDROCHLORIDE AND ACETAMINOPHEN SCH MG: 500 TABLET ORAL at 11:14

## 2020-09-06 RX ADMIN — PROPOFOL PRN MLS/HR: 10 INJECTION, EMULSION INTRAVENOUS at 11:12

## 2020-09-06 RX ADMIN — INSULIN LISPRO SCH UNITS: 100 INJECTION, SOLUTION INTRAVENOUS; SUBCUTANEOUS at 11:33

## 2020-09-06 RX ADMIN — OXYCODONE HYDROCHLORIDE AND ACETAMINOPHEN SCH MG: 500 TABLET ORAL at 05:43

## 2020-09-06 RX ADMIN — VECURONIUM BROMIDE PRN MLS/HR: 1 INJECTION, POWDER, LYOPHILIZED, FOR SOLUTION INTRAVENOUS at 13:06

## 2020-09-06 RX ADMIN — DEXMEDETOMIDINE HYDROCHLORIDE PRN MLS/HR: 100 INJECTION, SOLUTION, CONCENTRATE INTRAVENOUS at 07:11

## 2020-09-06 RX ADMIN — VECURONIUM BROMIDE PRN MLS/HR: 1 INJECTION, POWDER, LYOPHILIZED, FOR SOLUTION INTRAVENOUS at 23:52

## 2020-09-06 RX ADMIN — OXYCODONE HYDROCHLORIDE AND ACETAMINOPHEN SCH MG: 500 TABLET ORAL at 23:52

## 2020-09-06 RX ADMIN — INSULIN LISPRO SCH UNITS: 100 INJECTION, SOLUTION INTRAVENOUS; SUBCUTANEOUS at 00:39

## 2020-09-06 RX ADMIN — MIDAZOLAM PRN MLS/HR: 5 INJECTION, SOLUTION INTRAMUSCULAR; INTRAVENOUS at 23:53

## 2020-09-06 RX ADMIN — PROPOFOL PRN MLS/HR: 10 INJECTION, EMULSION INTRAVENOUS at 03:35

## 2020-09-06 RX ADMIN — PANTOPRAZOLE SODIUM SCH MG: 40 INJECTION, POWDER, FOR SOLUTION INTRAVENOUS at 07:41

## 2020-09-06 RX ADMIN — VECURONIUM BROMIDE PRN MLS/HR: 1 INJECTION, POWDER, LYOPHILIZED, FOR SOLUTION INTRAVENOUS at 02:16

## 2020-09-06 RX ADMIN — MIDAZOLAM PRN MLS/HR: 5 INJECTION, SOLUTION INTRAMUSCULAR; INTRAVENOUS at 00:44

## 2020-09-06 RX ADMIN — Medication PRN MLS/HR: at 00:40

## 2020-09-06 RX ADMIN — METHYLPREDNISOLONE SODIUM SUCCINATE SCH MG: 40 INJECTION, POWDER, FOR SOLUTION INTRAMUSCULAR; INTRAVENOUS at 21:49

## 2020-09-06 RX ADMIN — INSULIN GLARGINE SCH UNIT: 100 INJECTION, SOLUTION SUBCUTANEOUS at 08:30

## 2020-09-06 RX ADMIN — DEXMEDETOMIDINE HYDROCHLORIDE PRN MLS/HR: 100 INJECTION, SOLUTION, CONCENTRATE INTRAVENOUS at 12:06

## 2020-09-06 RX ADMIN — DEXMEDETOMIDINE HYDROCHLORIDE PRN MLS/HR: 100 INJECTION, SOLUTION, CONCENTRATE INTRAVENOUS at 21:49

## 2020-09-06 RX ADMIN — OXYCODONE HYDROCHLORIDE AND ACETAMINOPHEN SCH MG: 500 TABLET ORAL at 17:43

## 2020-09-06 RX ADMIN — POLYETHYLENE GLYCOL 3350 SCH GM: 17 POWDER, FOR SOLUTION ORAL at 08:31

## 2020-09-06 RX ADMIN — ENOXAPARIN SODIUM SCH MG: 40 INJECTION SUBCUTANEOUS at 08:30

## 2020-09-06 RX ADMIN — DEXMEDETOMIDINE HYDROCHLORIDE PRN MLS/HR: 100 INJECTION, SOLUTION, CONCENTRATE INTRAVENOUS at 02:59

## 2020-09-06 RX ADMIN — INSULIN LISPRO SCH UNITS: 100 INJECTION, SOLUTION INTRAVENOUS; SUBCUTANEOUS at 17:56

## 2020-09-06 RX ADMIN — METHYLPREDNISOLONE SODIUM SUCCINATE SCH MG: 40 INJECTION, POWDER, FOR SOLUTION INTRAMUSCULAR; INTRAVENOUS at 13:16

## 2020-09-06 RX ADMIN — ENOXAPARIN SODIUM SCH MG: 40 INJECTION SUBCUTANEOUS at 21:11

## 2020-09-06 RX ADMIN — PROPOFOL PRN MLS/HR: 10 INJECTION, EMULSION INTRAVENOUS at 18:41

## 2020-09-06 NOTE — PDOC
[facility]    [address] [address]    Phone:  [phone]                                       Obdulia Rey   MRN: [id number]    Department:  [facility]   Date of Visit:  3/31/2018           After Visit Summary Signature Page     I have received my discharge instructions, and my questions have been answered. I have discussed any challenges I see with this plan with the nurse or doctor.    ..........................................................................................................................................  Patient/Patient Representative Signature      ..........................................................................................................................................  Patient Representative Print Name and Relationship to Patient    ..................................................               ................................................  Date                                            Time    ..........................................................................................................................................  Reviewed by Signature/Title    ...................................................              ..............................................  Date                                                            Time           PULMONARY PROGRESS NOTES


DATE: 9/6/20 


TIME: 12:06


Subjective


remains intubated/sedated


AC mode ,100%FIO2/14 PEEP,  desat w any movement





sedated on versed propofol fentanyl precedex on paralytic agent  vac   small ett

secretion


Vitals





Vital Signs








  Date Time  Temp Pulse Resp B/P (MAP) Pulse Ox O2 Delivery O2 Flow Rate FiO2


 


9/6/20 11:58     94 Ventilator  


 


9/6/20 11:43  50 20 128/67 (87)    


 


9/6/20 08:00 97.4       





 97.4       








Comments


visual exam done due to COVID-Pandemic


intubated sedated


VENT 100% and PEEP 14 


nc at


rrr


no accessory muscle use


no skin rash


lower extremity edema


Lungs:  Crackles, Other (Intubated)


Labs





Laboratory Tests








Test


 9/4/20


18:07 9/5/20


00:18 9/5/20


05:37 9/5/20


06:00


 


Glucose (Fingerstick)


 181 mg/dL


(70-99) 203 mg/dL


(70-99) 240 mg/dL


(70-99) 





 


Sodium Level


 


 


 


 142 mmol/L


(136-145)


 


Potassium Level


 


 


 


 4.4 mmol/L


(3.5-5.1)


 


Chloride Level


 


 


 


 109 mmol/L


()


 


Carbon Dioxide Level


 


 


 


 32 mmol/L


(21-32)


 


Anion Gap    1 (6-14) 


 


Blood Urea Nitrogen


 


 


 


 15 mg/dL


(8-26)


 


Creatinine


 


 


 


 0.5 mg/dL


(0.7-1.3)


 


Estimated GFR


(Cockcroft-Gault) 


 


 


 166.9 





 


Glucose Level


 


 


 


 230 mg/dL


(70-99)


 


Calcium Level


 


 


 


 6.6 mg/dL


(8.5-10.1)


 


Phosphorus Level


 


 


 


 3.2 mg/dL


(2.6-4.7)


 


Magnesium Level


 


 


 


 2.4 mg/dL


(1.8-2.4)


 


Test


 9/5/20


09:00 9/5/20


11:38 9/5/20


17:59 9/6/20


00:36


 


O2 Saturation 90 % (92-99)    


 


Arterial Blood pH


 7.42


(7.35-7.45) 


 


 





 


Arterial Blood pCO2 at


Patient Temp 42 mmHg


(35-46) 


 


 





 


Arterial Blood pO2 at Patient


Temp 61 mmHg


() 


 


 





 


Arterial Blood HCO3


 26 mmol/L


(21-28) 


 


 





 


Arterial Blood Base Excess


 1 mmol/L


(-3-3) 


 


 





 


FiO2 100    


 


Glucose (Fingerstick)


 


 253 mg/dL


(70-99) 238 mg/dL


(70-99) 229 mg/dL


(70-99)


 


Test


 9/6/20


05:30 9/6/20


05:58 9/6/20


08:54 9/6/20


11:27


 


Sodium Level


 143 mmol/L


(136-145) 


 


 





 


Potassium Level


 4.5 mmol/L


(3.5-5.1) 


 


 





 


Chloride Level


 108 mmol/L


() 


 


 





 


Carbon Dioxide Level


 32 mmol/L


(21-32) 


 


 





 


Anion Gap 3 (6-14)    


 


Blood Urea Nitrogen


 15 mg/dL


(8-26) 


 


 





 


Creatinine


 0.4 mg/dL


(0.7-1.3) 


 


 





 


Estimated GFR


(Cockcroft-Gault) 215.9 


 


 


 





 


Glucose Level


 218 mg/dL


(70-99) 


 


 





 


Calcium Level


 7.2 mg/dL


(8.5-10.1) 


 


 





 


Phosphorus Level


 2.8 mg/dL


(2.6-4.7) 


 


 





 


Magnesium Level


 2.2 mg/dL


(1.8-2.4) 


 


 





 


Glucose (Fingerstick)


 


 208 mg/dL


(70-99) 


 236 mg/dL


(70-99)


 


O2 Saturation   86 % (92-99)  


 


Arterial Blood pH


 


 


 7.42


(7.35-7.45) 





 


Arterial Blood pCO2 at


Patient Temp 


 


 45 mmHg


(35-46) 





 


Arterial Blood pO2 at Patient


Temp 


 


 54 mmHg


() 





 


Arterial Blood HCO3


 


 


 29 mmol/L


(21-28) 





 


Arterial Blood Base Excess


 


 


 4 mmol/L


(-3-3) 





 


FiO2   100  








Laboratory Tests








Test


 9/5/20


17:59 9/6/20


00:36 9/6/20


05:30 9/6/20


05:58


 


Glucose (Fingerstick)


 238 mg/dL


(70-99) 229 mg/dL


(70-99) 


 208 mg/dL


(70-99)


 


Sodium Level


 


 


 143 mmol/L


(136-145) 





 


Potassium Level


 


 


 4.5 mmol/L


(3.5-5.1) 





 


Chloride Level


 


 


 108 mmol/L


() 





 


Carbon Dioxide Level


 


 


 32 mmol/L


(21-32) 





 


Anion Gap   3 (6-14)  


 


Blood Urea Nitrogen


 


 


 15 mg/dL


(8-26) 





 


Creatinine


 


 


 0.4 mg/dL


(0.7-1.3) 





 


Estimated GFR


(Cockcroft-Gault) 


 


 215.9 


 





 


Glucose Level


 


 


 218 mg/dL


(70-99) 





 


Calcium Level


 


 


 7.2 mg/dL


(8.5-10.1) 





 


Phosphorus Level


 


 


 2.8 mg/dL


(2.6-4.7) 





 


Magnesium Level


 


 


 2.2 mg/dL


(1.8-2.4) 





 


Test


 9/6/20


08:54 9/6/20


11:27 


 





 


O2 Saturation 86 % (92-99)    


 


Arterial Blood pH


 7.42


(7.35-7.45) 


 


 





 


Arterial Blood pCO2 at


Patient Temp 45 mmHg


(35-46) 


 


 





 


Arterial Blood pO2 at Patient


Temp 54 mmHg


() 


 


 





 


Arterial Blood HCO3


 29 mmol/L


(21-28) 


 


 





 


Arterial Blood Base Excess


 4 mmol/L


(-3-3) 


 


 





 


FiO2 100    


 


Glucose (Fingerstick)


 


 236 mg/dL


(70-99) 


 











Comments


 CXR 


reviewed 


1. Bilateral diffuse infiltrates are increased in the left base and mildly


improved elsewhere.





Impression


.


1.  Acute hypoxic respiratory failure secondary to COVID-19 pneumonia/ARDS/acute

lung injury--


2.  Abnormal chest x-ray with bilateral infiltrates consistent with COVID-19 

pneumonia.


3.  Hypotension secondary to combination of hypovolemia and sepsis status post 2

liters of IV fluids. no pressors, 


4.  Acute kidney injury--resolved


5.  Severe protein-calorie malnutrition.


6.  Abnormal D-dimer related to COVID-19 pneumonia. Full dose lovenox added 8/24

due to increase in D-dimer to 20, now decreasing to 3.5. Lovenox dose reduced


7. Fevers





Plan


.


RECOMMENDATIONS:


ABG noted, will continue current assist control, 100%FIO2 and PEEP of 14


try to taper off propofol  if possible


will not escalate any further PEEP


Lovenox dose reduced since D-Dimer down to 3.5 from 20


Broad spectrum antibiotic.


Cont.  IV steroids, with taper, 


Stress ulcer prophylaxis.


s/p Plasma 


Pt doing poorly not expected to survive


prognosis poor 


D/W RN and RT  


PT. is DNR 





critically ill 


Critical care time 30 minutes, family aware of pt. clinical condition, no 

overlap


Dr Amin spoke with wife











JANINEMALINDA ANNE                Sep 6, 2020 12:08

## 2020-09-06 NOTE — NUR
Pharmacy TPN Dosing Note



S: RENATO GILLIAM is a 65 year old M Currently receiving Central Continuous TPN started 
09/04/20



B:Pertinent PMH: 

critical illness

Height: 5 feet, 2 inches

Weight: 88.7 kg



Current diet: npo 



LABS:

Sodium:    143 

Potassium: 4.5 

Chloride:  108 

Calcium:   7.2 

Corrected Calcium: 9.52 

Magnesium: 2.2 

CO2:       32 

SCr:       0.4 

Glucose:   218 

Albumin:   1.1 

AST:       31 

ALT:       36 



TPN FORMULA:

TPN TYPE:  Central Continuous

AMINO ACIDS:         60 gm

DEXTROSE:            195 gm

LIPIDS:              gm

SODIUM CHLORIDE:     mEq

SODIUM ACETATE:      90 mEq

SODIUM PHOSPHATE:    mmol

POTASSIUM CHLORIDE:  mEq

POTASSIUM ACETATE:   10 mEq

POTASSIUM PHOSPHATE: 20 mmol

MAGNESIUM:           8 mEq

CALCIUM:             10 mEq

INSULIN:             units

MULTIPLE VITAMIN:    10 ml

TRACE ELEMENTS:      mte5 1 ml ml(s)



TPN PLAN:  

-On propofol, continue w/o lipids

-Potassium creeping up with decrease potassium acetate

-Phos trending down, will increase phos to 20mmol

-Labs in the am





R: Continue TPN as written above.

Will monitor electrolytes, glucose, and tolerance to TPN.



 MICHAEL CLARKE [facility], 09/06/20 [id number]

## 2020-09-06 NOTE — PDOC
[employer] PROGRESS NOTE


Date of Service


DOS:


DATE: 9/6/20 


TIME: 10:04





Chief Complaint


Chief Complaint


COVID-19 positive


Acute hypoxemic respiratory failure


Bilateral perihilar infiltrates are  seen which appear increased slightly


Leukocytosis with bandemia secondary to septic process


Hyponatremia secondary to low effective circulatory volume, now hypernatremic


Acue renal failure due to vasomotor nephropathy most likely 


Hyperglycemia


transaminitis 


Uncontrolled hypertension


Abdominal distention





History of Present Illness


History of Present Illness


9/6/2020


Patient seen in the COVID-19 ICU


He is on the vent


Assist-control/22/6 100/100% with 14 of PEEP


He is critically ill


Discussed with RN


Chart reviewed











9/5/2020


Patient seen in the COVID-19 ICU


He remains mechanically ventilated


He is also started on TPN


He is sedated with propofol fentanyl Versed and Precedex


We are using paralytics with vecuronium


Vent settings as follows


AC/22/6 100/100% with 14 of PEEP


Chart reviewed


Discussed with RN


He remains very critically ill prognosis is very guarded at best











9/4/2020


Patient seen and Covid-19 ICU


He remains intubated


Assist-control/22/600 with 100% FiO2


He is in [facility] for patient safety


He is sedated with propofol Versed and fentanyl


Has OG at 60 cc an hour


Discussed with RN


Discussed with case management


Reviewed chart











9/3/2020


Patient seen and examined in the COVID-19 ICU


He remains intubated


He is on Versed for sedation and he has to be paralyzed with vecuronium


He also has propofol fentanyl and Precedex for additional sedation


Chart reviewed


Discussed with the 


Discussed with RN


He remains critically ill


Vent settings as follows


AC/22/6 100/100% with 14 of PEEP











9/2/2020


Patient seen and examined in the [place] [address] ICU


He is intubated


AC/22/6 100/100% with 14 of PEEP


He is sedated with Versed propofol fentanyl and Precedex he is also paralyzed 

with vecuronium


Chart reviewed


Discussed with RN


Discussed with 








History of Present Illness


Patient is a 65-year-old gentleman who was diagnosed with COVID-19 infection on 

Monday 5 days prior to his admission to the intensive care unit.  Apparently the

patient reported worsening respiratory distress reason why he came to the 

emergency department for evaluation and treatment.  The story was quite limited 

since the patient was speaking [place] only and his acuity was such that he 

required mechanical ventilation and prompt intubation by the emergency 

department physician.  At the present time patient is intubated and sedated 

continues to require a lot of support no pertinent past medical history was 

reported he is admitted to the intensive care unit for further treatment





8/24/2020


Continues to require full support, glycemia has been noted in hypertension as 

well.  Discussed with nursing staff, all of concerns were addressed to the best 

of my abilities no acute events reported overnight





8/25/2020


No acute events reported overnight, case discussed with nursing staff patient in

no acute distress  





8/26/2020


Per nursing staff, patient febrile overnight, treated with Tylenol and tepid 

water bath. Case discussed with nursing staff, continue with IV antibiotics and 

full VTE prophylaxis.





8/27/2020


Patient still febrile overnight.  O2 requirement increasing.  Discussed with RN.





8/31 /2020


Patient still intubated and sedated.  Discussed with RN, patient was made DNR.  

schedule MiraLAX.





Vitals/I&O


Vitals/I&O:





                                   Vital Signs








  Date Time  Temp Pulse Resp B/P (MAP) Pulse Ox O2 Delivery O2 Flow Rate FiO2


 


9/6/20 09:00  50 20 165/87 (113) 89 Ventilator  


 


9/6/20 08:00 97.4       





 97.4       














                                    I & O   


 


 9/5/20 9/5/20 9/6/20





 15:00 23:00 07:00


 


Intake Total  2105 ml 1171 ml


 


Output Total 500 ml 900 ml 1025 ml


 


Balance -500 ml 1205 ml 146 ml











Physical Exam


Physical Exam:


sedated on vent


General:  No acute distress, Other (Intubated and sedated)


Heart:  Regular rate


Lungs:  Crackles, Other (Intubated)


Abdomen:  Other (Nondistended)


Extremities:  No clubbing, No cyanosis


Skin:  No rashes, No breakdown





Labs


Labs:





Laboratory Tests








Test


 9/5/20


11:38 9/5/20


17:59 9/6/20


00:36 9/6/20


05:30


 


Glucose (Fingerstick)


 253 mg/dL


(70-99) 238 mg/dL


(70-99) 229 mg/dL


(70-99) 





 


Sodium Level


 


 


 


 143 mmol/L


(136-145)


 


Potassium Level


 


 


 


 4.5 mmol/L


(3.5-5.1)


 


Chloride Level


 


 


 


 108 mmol/L


()


 


Carbon Dioxide Level


 


 


 


 32 mmol/L


(21-32)


 


Anion Gap    3 (6-14) 


 


Blood Urea Nitrogen


 


 


 


 15 mg/dL


(8-26)


 


Creatinine


 


 


 


 0.4 mg/dL


(0.7-1.3)


 


Estimated GFR


(Cockcroft-Gault) 


 


 


 215.9 





 


Glucose Level


 


 


 


 218 mg/dL


(70-99)


 


Calcium Level


 


 


 


 7.2 mg/dL


(8.5-10.1)


 


Phosphorus Level


 


 


 


 2.8 mg/dL


(2.6-4.7)


 


Magnesium Level


 


 


 


 2.2 mg/dL


(1.8-2.4)


 


Test


 9/6/20


05:58 9/6/20


08:54 


 





 


Glucose (Fingerstick)


 208 mg/dL


(70-99) 


 


 





 


O2 Saturation  86 % (92-99)   


 


Arterial Blood pH


 


 7.42


(7.35-7.45) 


 





 


Arterial Blood pCO2 at


Patient Temp 


 45 mmHg


(35-46) 


 





 


Arterial Blood pO2 at Patient


Temp 


 54 mmHg


() 


 





 


Arterial Blood HCO3


 


 29 mmol/L


(21-28) 


 





 


Arterial Blood Base Excess


 


 4 mmol/L


(-3-3) 


 





 


FiO2  100   











Assessment and Plan


Assessmemt and Plan


COVID-19 positive


Acute hypoxemic respiratory failure requiring mechanical ventilation


Bilateral perihilar infiltrates are  seen which appear increased slightly


Leukocytosis with bandemia secondary to septic process


Hyponatremia secondary to low effective circulatory volume, now hypernatremic


Acue renal failure due to vasomotor nephropathy most likely 


Hyperglycemia


transaminitis 


Uncontrolled hypertension


Abdominal distention





Plan


ICU monitoring


Respiratory isolation


Trend labs and chest x-rays


COVID-19 protocol including antibiotics steroids vitamins beta agonist 

mechanical ventilation


Continue sedation with propofol fentanyl Precedex and Versed


Paralytics with vecuronium


Vent weaning


Appreciate pulmonary input


DVT prophylaxis


Full code


He remains critically ill


Prognosis is still guarded at best as he remains on 100% oxygen


cc time 32 minutes





Comment


Review of Relevant


I have reviewed the following items jim (where applicable) has been applied.


Medications:





Current Medications








 Medications


  (Trade)  Dose


 Ordered  Sig/Breanne


 Route


 PRN Reason  Start Time


 Stop Time Status Last Admin


Dose Admin


 


 Sodium Acetate 90


 meq/Potassium


 Acetate 40 meq/


 Potassium


 Phosphate 13.6


 mmol/Magnesium


 Sulfate 8 meq/


 Calcium Gluconate


 10 meq/


 Multivitamins 10


 ml/Chromium/


 Copper/Manganese/


 Seleni/Zn 1 ml/


 Total Parenteral


 Nutrition/Amino


 Acids/Dextrose  1,512 ml @ 


 63 mls/hr  TPN  CONT


 IV


   9/5/20 22:00


 9/6/20 21:59  9/5/20 22:11














Justifications for Admission


Other Justification














CECELIA ZACARIAS III DO            Sep 6, 2020 10:06

## 2020-09-07 VITALS — DIASTOLIC BLOOD PRESSURE: 71 MMHG | SYSTOLIC BLOOD PRESSURE: 122 MMHG

## 2020-09-07 VITALS — DIASTOLIC BLOOD PRESSURE: 73 MMHG | SYSTOLIC BLOOD PRESSURE: 115 MMHG

## 2020-09-07 VITALS — SYSTOLIC BLOOD PRESSURE: 157 MMHG | DIASTOLIC BLOOD PRESSURE: 83 MMHG

## 2020-09-07 VITALS — DIASTOLIC BLOOD PRESSURE: 80 MMHG | SYSTOLIC BLOOD PRESSURE: 191 MMHG

## 2020-09-07 VITALS — DIASTOLIC BLOOD PRESSURE: 69 MMHG | SYSTOLIC BLOOD PRESSURE: 101 MMHG

## 2020-09-07 VITALS — SYSTOLIC BLOOD PRESSURE: 137 MMHG | DIASTOLIC BLOOD PRESSURE: 73 MMHG

## 2020-09-07 VITALS — SYSTOLIC BLOOD PRESSURE: 94 MMHG | DIASTOLIC BLOOD PRESSURE: 64 MMHG

## 2020-09-07 VITALS — DIASTOLIC BLOOD PRESSURE: 60 MMHG | SYSTOLIC BLOOD PRESSURE: 96 MMHG

## 2020-09-07 VITALS — SYSTOLIC BLOOD PRESSURE: 99 MMHG | DIASTOLIC BLOOD PRESSURE: 51 MMHG

## 2020-09-07 VITALS — DIASTOLIC BLOOD PRESSURE: 66 MMHG | SYSTOLIC BLOOD PRESSURE: 103 MMHG

## 2020-09-07 VITALS — DIASTOLIC BLOOD PRESSURE: 93 MMHG | SYSTOLIC BLOOD PRESSURE: 187 MMHG

## 2020-09-07 VITALS — DIASTOLIC BLOOD PRESSURE: 82 MMHG | SYSTOLIC BLOOD PRESSURE: 153 MMHG

## 2020-09-07 VITALS — DIASTOLIC BLOOD PRESSURE: 92 MMHG | SYSTOLIC BLOOD PRESSURE: 196 MMHG

## 2020-09-07 VITALS — SYSTOLIC BLOOD PRESSURE: 165 MMHG | DIASTOLIC BLOOD PRESSURE: 82 MMHG

## 2020-09-07 VITALS — SYSTOLIC BLOOD PRESSURE: 139 MMHG | DIASTOLIC BLOOD PRESSURE: 76 MMHG

## 2020-09-07 VITALS — SYSTOLIC BLOOD PRESSURE: 121 MMHG | DIASTOLIC BLOOD PRESSURE: 72 MMHG

## 2020-09-07 VITALS — DIASTOLIC BLOOD PRESSURE: 67 MMHG | SYSTOLIC BLOOD PRESSURE: 129 MMHG

## 2020-09-07 VITALS — DIASTOLIC BLOOD PRESSURE: 62 MMHG | SYSTOLIC BLOOD PRESSURE: 93 MMHG

## 2020-09-07 VITALS — DIASTOLIC BLOOD PRESSURE: 94 MMHG | SYSTOLIC BLOOD PRESSURE: 181 MMHG

## 2020-09-07 VITALS — DIASTOLIC BLOOD PRESSURE: 81 MMHG | SYSTOLIC BLOOD PRESSURE: 155 MMHG

## 2020-09-07 VITALS — SYSTOLIC BLOOD PRESSURE: 115 MMHG | DIASTOLIC BLOOD PRESSURE: 70 MMHG

## 2020-09-07 VITALS — SYSTOLIC BLOOD PRESSURE: 99 MMHG | DIASTOLIC BLOOD PRESSURE: 62 MMHG

## 2020-09-07 VITALS — SYSTOLIC BLOOD PRESSURE: 171 MMHG | DIASTOLIC BLOOD PRESSURE: 86 MMHG

## 2020-09-07 LAB
ANION GAP SERPL CALC-SCNC: 5 MMOL/L (ref 6–14)
BASE EXCESS ABG: 3 MMOL/L (ref -3–3)
BUN SERPL-MCNC: 20 MG/DL (ref 8–26)
CALCIUM SERPL-MCNC: 7.7 MG/DL (ref 8.5–10.1)
CHLORIDE SERPL-SCNC: 103 MMOL/L (ref 98–107)
CO2 SERPL-SCNC: 32 MMOL/L (ref 21–32)
CREAT SERPL-MCNC: 0.5 MG/DL (ref 0.7–1.3)
GFR SERPLBLD BASED ON 1.73 SQ M-ARVRAT: 166.9 ML/MIN
GLUCOSE SERPL-MCNC: 211 MG/DL (ref 70–99)
HCO3 BLDA-SCNC: 28 MMOL/L (ref 21–28)
INSPIRATION SETTING TIME VENT: 100
MAGNESIUM SERPL-MCNC: 2.2 MG/DL (ref 1.8–2.4)
PCO2 BLDA: 48 MMHG (ref 35–46)
PHOSPHATE SERPL-MCNC: 3.2 MG/DL (ref 2.6–4.7)
PO2 BLDA: 71 MMHG (ref 65–108)
POTASSIUM SERPL-SCNC: 4 MMOL/L (ref 3.5–5.1)
SAO2 % BLDA: 93 % (ref 92–99)
SODIUM SERPL-SCNC: 140 MMOL/L (ref 136–145)

## 2020-09-07 RX ADMIN — VECURONIUM BROMIDE PRN MLS/HR: 1 INJECTION, POWDER, LYOPHILIZED, FOR SOLUTION INTRAVENOUS at 15:08

## 2020-09-07 RX ADMIN — INSULIN LISPRO SCH UNITS: 100 INJECTION, SOLUTION INTRAVENOUS; SUBCUTANEOUS at 12:38

## 2020-09-07 RX ADMIN — DEXMEDETOMIDINE HYDROCHLORIDE PRN MLS/HR: 100 INJECTION, SOLUTION, CONCENTRATE INTRAVENOUS at 19:36

## 2020-09-07 RX ADMIN — POLYETHYLENE GLYCOL 3350 SCH GM: 17 POWDER, FOR SOLUTION ORAL at 09:02

## 2020-09-07 RX ADMIN — Medication PRN MLS/HR: at 01:38

## 2020-09-07 RX ADMIN — DEXMEDETOMIDINE HYDROCHLORIDE PRN MLS/HR: 100 INJECTION, SOLUTION, CONCENTRATE INTRAVENOUS at 09:44

## 2020-09-07 RX ADMIN — ZINC SULFATE CAP 220 MG (50 MG ELEMENTAL ZN) SCH MG: 220 (50 ZN) CAP at 09:02

## 2020-09-07 RX ADMIN — METHYLPREDNISOLONE SODIUM SUCCINATE SCH MG: 40 INJECTION, POWDER, FOR SOLUTION INTRAMUSCULAR; INTRAVENOUS at 14:06

## 2020-09-07 RX ADMIN — INSULIN LISPRO SCH UNITS: 100 INJECTION, SOLUTION INTRAVENOUS; SUBCUTANEOUS at 06:25

## 2020-09-07 RX ADMIN — ENOXAPARIN SODIUM SCH MG: 40 INJECTION SUBCUTANEOUS at 09:02

## 2020-09-07 RX ADMIN — PROPOFOL PRN MLS/HR: 10 INJECTION, EMULSION INTRAVENOUS at 08:05

## 2020-09-07 RX ADMIN — INSULIN GLARGINE SCH UNIT: 100 INJECTION, SOLUTION SUBCUTANEOUS at 08:59

## 2020-09-07 RX ADMIN — Medication PRN MLS/HR: at 14:02

## 2020-09-07 RX ADMIN — METHYLPREDNISOLONE SODIUM SUCCINATE SCH MG: 40 INJECTION, POWDER, FOR SOLUTION INTRAMUSCULAR; INTRAVENOUS at 05:46

## 2020-09-07 RX ADMIN — OXYCODONE HYDROCHLORIDE AND ACETAMINOPHEN SCH MG: 500 TABLET ORAL at 17:14

## 2020-09-07 RX ADMIN — MIDAZOLAM PRN MLS/HR: 5 INJECTION, SOLUTION INTRAMUSCULAR; INTRAVENOUS at 17:40

## 2020-09-07 RX ADMIN — PANTOPRAZOLE SODIUM SCH MG: 40 INJECTION, POWDER, FOR SOLUTION INTRAVENOUS at 08:09

## 2020-09-07 RX ADMIN — INSULIN GLARGINE SCH UNIT: 100 INJECTION, SOLUTION SUBCUTANEOUS at 20:52

## 2020-09-07 RX ADMIN — PROPOFOL PRN MLS/HR: 10 INJECTION, EMULSION INTRAVENOUS at 04:58

## 2020-09-07 RX ADMIN — INSULIN LISPRO SCH UNITS: 100 INJECTION, SOLUTION INTRAVENOUS; SUBCUTANEOUS at 01:18

## 2020-09-07 RX ADMIN — OXYCODONE HYDROCHLORIDE AND ACETAMINOPHEN SCH MG: 500 TABLET ORAL at 12:52

## 2020-09-07 RX ADMIN — OXYCODONE HYDROCHLORIDE AND ACETAMINOPHEN SCH MG: 500 TABLET ORAL at 05:46

## 2020-09-07 RX ADMIN — HYDRALAZINE HYDROCHLORIDE PRN MG: 20 INJECTION INTRAMUSCULAR; INTRAVENOUS at 09:03

## 2020-09-07 RX ADMIN — Medication PRN EACH: at 11:01

## 2020-09-07 RX ADMIN — INSULIN LISPRO SCH UNITS: 100 INJECTION, SOLUTION INTRAVENOUS; SUBCUTANEOUS at 17:14

## 2020-09-07 RX ADMIN — MIDAZOLAM PRN MLS/HR: 5 INJECTION, SOLUTION INTRAMUSCULAR; INTRAVENOUS at 09:44

## 2020-09-07 RX ADMIN — METHYLPREDNISOLONE SODIUM SUCCINATE SCH MG: 40 INJECTION, POWDER, FOR SOLUTION INTRAMUSCULAR; INTRAVENOUS at 22:37

## 2020-09-07 RX ADMIN — ENOXAPARIN SODIUM SCH MG: 40 INJECTION SUBCUTANEOUS at 20:52

## 2020-09-07 NOTE — NUR
Pharmacy TPN Dosing Note



S: RENATO GILLIAM is a 65 year old M Currently receiving Central Continuous TPN started 
09/04/20



B:Pertinent PMH: 

critical illness

Height: 5 feet, 2 inches

Weight: 99.7 kg



Current diet: npo 



LABS:

Sodium:    140 

Potassium: 4.0 

Chloride:  103 

Calcium:   7.7 

Corrected Calcium: 10.02 

Magnesium: 2.2 

CO2:       32 

SCr:       0.5 

Glucose:   211 

Albumin:   1.1 

AST:       31 

ALT:       36 



TPN FORMULA:

TPN TYPE:  Central Continuous

AMINO ACIDS:         60 gm

DEXTROSE:            195 gm

LIPIDS:              0 gm

SODIUM CHLORIDE:     mEq

SODIUM ACETATE:      90 mEq

SODIUM PHOSPHATE:    mmol

POTASSIUM CHLORIDE:  mEq

POTASSIUM ACETATE:   10 mEq

POTASSIUM PHOSPHATE: 20 mmol

MAGNESIUM:           8 mEq

CALCIUM:             10 mEq

INSULIN:             units

MULTIPLE VITAMIN:    10 ml

TRACE ELEMENTS:      mte5 1 ml ml(s)



TPN PLAN:  

-On propofol, continue w/o lipids

-Labs stable and WNLs, continue same TPN

-Labs in the am





R: Continue TPN as written above.

Will monitor electrolytes, glucose, and tolerance to TPN.



 MICHAEL CLARKE [facility], 09/07/20 [id number]

## 2020-09-07 NOTE — PDOC
PROGRESS NOTES


Date of Service:


DATE: 9/7/20 


TIME: 09:59





Chief Complaint


Chief Complaint


COVID-19 positive


Acute hypoxemic respiratory failure


Bilateral perihilar infiltrates are  seen which appear increased slightly


Leukocytosis with bandemia secondary to septic process


Hyponatremia secondary to low effective circulatory volume, now hypernatremic


Acue renal failure due to vasomotor nephropathy most likely 


Hyperglycemia


transaminitis 


Uncontrolled hypertension


Abdominal distention





Plan


Continue with supportive measures


Prognosis is quite guarded


Continues to require 100% oxygen


Continues to require a lot of PEEP





History of Present Illness


History of Present Illness


9/7/2020


Remains critically ill


Seen in the ICU


Continues with ventilatory support


Assist-control/22/6 100/100% with 14 of PEEP


Discussed with RN


Chart reviewed





9/6/2020


Patient seen in the COVID-19 ICU


He is on the vent


Assist-control/22/6 100/100% with 14 of PEEP


He is critically ill


Discussed with RN


Chart reviewed











9/5/2020


Patient seen in the COVID-19 ICU


He remains mechanically ventilated


He is also started on TPN


He is sedated with propofol fentanyl Versed and Precedex


We are using paralytics with vecuronium


Vent settings as follows


AC/22/6 100/100% with 14 of PEEP


Chart reviewed


Discussed with RN


He remains very critically ill prognosis is very guarded at best











9/4/2020


Patient seen and Covid-19 ICU


He remains intubated


Assist-control/22/600 with 100% FiO2


He is in mitts for patient safety


He is sedated with propofol Versed and fentanyl


Has OG at 60 cc an hour


Discussed with RN


Discussed with case management


Reviewed chart











9/3/2020


Patient seen and examined in the COVID-19 ICU


He remains intubated


He is on Versed for sedation and he has to be paralyzed with vecuronium


He also has propofol fentanyl and Precedex for additional sedation


Chart reviewed


Discussed with the 


Discussed with RN


He remains critically ill


Vent settings as follows


AC/22/6 100/100% with 14 of PEEP











9/2/2020


Patient seen and examined in the [place] [id number] ICU


He is intubated


AC/22/6 100/100% with 14 of PEEP


He is sedated with Versed propofol fentanyl and Precedex he is also paralyzed 

with vecuronium


Chart reviewed


Discussed with RN


Discussed with 








History of Present Illness


Patient is a 65-year-old gentleman who was diagnosed with COVID-19 infection on 

Monday 5 days prior to his admission to the intensive care unit.  Apparently the

patient reported worsening respiratory distress reason why he came to the 

emergency department for evaluation and treatment.  The story was quite limited 

since the patient was speaking [place]ian only and his acuity was such that he 

required mechanical ventilation and prompt intubation by the emergency 

department physician.  At the present time patient is intubated and sedated 

continues to require a lot of support no pertinent past medical history was 

reported he is admitted to the intensive care unit for further treatment





8/24/2020


Continues to require full support, glycemia has been noted in hypertension as 

well.  Discussed with nursing staff, all of concerns were addressed to the best 

of my abilities no acute events reported overnight





8/25/2020


No acute events reported overnight, case discussed with nursing staff patient in

no acute distress  





8/26/2020


Per nursing staff, patient febrile overnight, treated with Tylenol and tepid 

water bath. Case discussed with nursing staff, continue with IV antibiotics and 

full VTE prophylaxis.





8/27/2020


Patient still febrile overnight.  O2 requirement increasing.  Discussed with RN.





8/31 /2020


Patient still intubated and sedated.  Discussed with RN, patient was made DNR.  

schedule MiraLAX.





Vitals


Vitals





Vital Signs








  Date Time  Temp Pulse Resp B/P (MAP) Pulse Ox O2 Delivery O2 Flow Rate FiO2


 


9/7/20 09:03  118  193/110    


 


9/7/20 08:10     95 Ventilator  


 


9/7/20 06:00   22     


 


9/7/20 04:00 97.4       





 97.4       











Physical Exam


Physical Exam


sedated on vent


General:  No acute distress, Other (Intubated and sedated)


Heart:  Regular rate


Lungs:  Crackles, Other (Intubated)


Abdomen:  Other (Nondistended)


Extremities:  No clubbing, No cyanosis


Skin:  No rashes, No breakdown





Labs


LABS





Laboratory Tests








Test


 9/6/20


11:27 9/6/20


17:46 9/6/20


21:38 9/7/20


00:43


 


Glucose (Fingerstick)


 236 mg/dL


(70-99) 237 mg/dL


(70-99) 229 mg/dL


(70-99) 226 mg/dL


(70-99)


 


Test


 9/7/20


05:45 9/7/20


06:10 9/7/20


08:10 





 


Sodium Level


 140 mmol/L


(136-145) 


 


 





 


Potassium Level


 4.0 mmol/L


(3.5-5.1) 


 


 





 


Chloride Level


 103 mmol/L


() 


 


 





 


Carbon Dioxide Level


 32 mmol/L


(21-32) 


 


 





 


Anion Gap 5 (6-14)    


 


Blood Urea Nitrogen


 20 mg/dL


(8-26) 


 


 





 


Creatinine


 0.5 mg/dL


(0.7-1.3) 


 


 





 


Estimated GFR


(Cockcroft-Gault) 166.9 


 


 


 





 


Glucose Level


 211 mg/dL


(70-99) 


 


 





 


Calcium Level


 7.7 mg/dL


(8.5-10.1) 


 


 





 


Phosphorus Level


 3.2 mg/dL


(2.6-4.7) 


 


 





 


Magnesium Level


 2.2 mg/dL


(1.8-2.4) 


 


 





 


Glucose (Fingerstick)


 


 202 mg/dL


(70-99) 


 





 


O2 Saturation   93 % (92-99)  


 


Arterial Blood pH


 


 


 7.39


(7.35-7.45) 





 


Arterial Blood pCO2 at


Patient Temp 


 


 48 mmHg


(35-46) 





 


Arterial Blood pO2 at Patient


Temp 


 


 71 mmHg


() 





 


Arterial Blood HCO3


 


 


 28 mmol/L


(21-28) 





 


Arterial Blood Base Excess


 


 


 3 mmol/L


(-3-3) 





 


FiO2   100  











Comment


Review of Relevant


I have reviewed the following items jim (where applicable) has been applied.


Labs





Laboratory Tests








Test


 9/5/20


11:38 9/5/20


17:59 9/6/20


00:36 9/6/20


05:30


 


Glucose (Fingerstick)


 253 mg/dL


(70-99) 238 mg/dL


(70-99) 229 mg/dL


(70-99) 





 


Sodium Level


 


 


 


 143 mmol/L


(136-145)


 


Potassium Level


 


 


 


 4.5 mmol/L


(3.5-5.1)


 


Chloride Level


 


 


 


 108 mmol/L


()


 


Carbon Dioxide Level


 


 


 


 32 mmol/L


(21-32)


 


Anion Gap    3 (6-14) 


 


Blood Urea Nitrogen


 


 


 


 15 mg/dL


(8-26)


 


Creatinine


 


 


 


 0.4 mg/dL


(0.7-1.3)


 


Estimated GFR


(Cockcroft-Gault) 


 


 


 215.9 





 


Glucose Level


 


 


 


 218 mg/dL


(70-99)


 


Calcium Level


 


 


 


 7.2 mg/dL


(8.5-10.1)


 


Phosphorus Level


 


 


 


 2.8 mg/dL


(2.6-4.7)


 


Magnesium Level


 


 


 


 2.2 mg/dL


(1.8-2.4)


 


Test


 9/6/20


05:58 9/6/20


08:54 9/6/20


11:27 9/6/20


17:46


 


Glucose (Fingerstick)


 208 mg/dL


(70-99) 


 236 mg/dL


(70-99) 237 mg/dL


(70-99)


 


O2 Saturation  86 % (92-99)   


 


Arterial Blood pH


 


 7.42


(7.35-7.45) 


 





 


Arterial Blood pCO2 at


Patient Temp 


 45 mmHg


(35-46) 


 





 


Arterial Blood pO2 at Patient


Temp 


 54 mmHg


() 


 





 


Arterial Blood HCO3


 


 29 mmol/L


(21-28) 


 





 


Arterial Blood Base Excess


 


 4 mmol/L


(-3-3) 


 





 


FiO2  100   


 


Test


 9/6/20


21:38 9/7/20


00:43 9/7/20


05:45 9/7/20


06:10


 


Glucose (Fingerstick)


 229 mg/dL


(70-99) 226 mg/dL


(70-99) 


 202 mg/dL


(70-99)


 


Sodium Level


 


 


 140 mmol/L


(136-145) 





 


Potassium Level


 


 


 4.0 mmol/L


(3.5-5.1) 





 


Chloride Level


 


 


 103 mmol/L


() 





 


Carbon Dioxide Level


 


 


 32 mmol/L


(21-32) 





 


Anion Gap   5 (6-14)  


 


Blood Urea Nitrogen


 


 


 20 mg/dL


(8-26) 





 


Creatinine


 


 


 0.5 mg/dL


(0.7-1.3) 





 


Estimated GFR


(Cockcroft-Gault) 


 


 166.9 


 





 


Glucose Level


 


 


 211 mg/dL


(70-99) 





 


Calcium Level


 


 


 7.7 mg/dL


(8.5-10.1) 





 


Phosphorus Level


 


 


 3.2 mg/dL


(2.6-4.7) 





 


Magnesium Level


 


 


 2.2 mg/dL


(1.8-2.4) 





 


Test


 9/7/20


08:10 


 


 





 


O2 Saturation 93 % (92-99)    


 


Arterial Blood pH


 7.39


(7.35-7.45) 


 


 





 


Arterial Blood pCO2 at


Patient Temp 48 mmHg


(35-46) 


 


 





 


Arterial Blood pO2 at Patient


Temp 71 mmHg


() 


 


 





 


Arterial Blood HCO3


 28 mmol/L


(21-28) 


 


 





 


Arterial Blood Base Excess


 3 mmol/L


(-3-3) 


 


 





 


FiO2 100    








Laboratory Tests








Test


 9/6/20


11:27 9/6/20


17:46 9/6/20


21:38 9/7/20


00:43


 


Glucose (Fingerstick)


 236 mg/dL


(70-99) 237 mg/dL


(70-99) 229 mg/dL


(70-99) 226 mg/dL


(70-99)


 


Test


 9/7/20


05:45 9/7/20


06:10 9/7/20


08:10 





 


Sodium Level


 140 mmol/L


(136-145) 


 


 





 


Potassium Level


 4.0 mmol/L


(3.5-5.1) 


 


 





 


Chloride Level


 103 mmol/L


() 


 


 





 


Carbon Dioxide Level


 32 mmol/L


(21-32) 


 


 





 


Anion Gap 5 (6-14)    


 


Blood Urea Nitrogen


 20 mg/dL


(8-26) 


 


 





 


Creatinine


 0.5 mg/dL


(0.7-1.3) 


 


 





 


Estimated GFR


(Cockcroft-Gault) 166.9 


 


 


 





 


Glucose Level


 211 mg/dL


(70-99) 


 


 





 


Calcium Level


 7.7 mg/dL


(8.5-10.1) 


 


 





 


Phosphorus Level


 3.2 mg/dL


(2.6-4.7) 


 


 





 


Magnesium Level


 2.2 mg/dL


(1.8-2.4) 


 


 





 


Glucose (Fingerstick)


 


 202 mg/dL


(70-99) 


 





 


O2 Saturation   93 % (92-99)  


 


Arterial Blood pH


 


 


 7.39


(7.35-7.45) 





 


Arterial Blood pCO2 at


Patient Temp 


 


 48 mmHg


(35-46) 





 


Arterial Blood pO2 at Patient


Temp 


 


 71 mmHg


() 





 


Arterial Blood HCO3


 


 


 28 mmol/L


(21-28) 





 


Arterial Blood Base Excess


 


 


 3 mmol/L


(-3-3) 





 


FiO2   100  








Microbiology


8/22/20 Blood Culture - Final, Complete


          NO GROWTH AFTER 5 DAYS


8/20/20 Urine Culture - Final, Complete


Medications





Current Medications


Etomidate (Amidate) 20 mg STK-MED ONCE IV ;  Start 8/20/20 at 18:54;  Stop 

8/20/20 at 18:54;  Status DC


Succinylcholine Chloride (Anectine) 200 mg STK-MED ONCE .ROUTE ;  Start 8/20/20 

at 18:55;  Stop 8/20/20 at 18:55;  Status DC


Propofol 50 ml @ As Directed STK-MED ONCE IV ;  Start 8/20/20 at 18:55;  Stop 

8/20/20 at 18:56;  Status DC


Propofol 100 ml @ 0 mls/hr CONT  PRN IV SEE PROTOCOL Last administered on 

9/7/20at 08:05;  Start 8/20/20 at 19:00


Fentanyl Citrate (Fentanyl 2ml Vial) 25 mcg PRN Q1HR  PRN IV SEE COMMENTS;  

Start 8/20/20 at 19:00;  Stop 9/5/20 at 08:54;  Status DC


Fentanyl Citrate (Fentanyl 2ml Vial) 50 mcg PRN Q1HR  PRN IV SEE COMMENTS Last 

administered on 8/20/20at 19:42;  Start 8/20/20 at 19:00


Chlorhexidine Gluconate (Peridex) 15 ml BID MM  Last administered on 8/21/20at 

08:16;  Start 8/20/20 at 21:00;  Stop 8/21/20 at 09:45;  Status DC


Midazolam HCl (Versed) 5 mg STK-MED ONCE .ROUTE ;  Start 8/20/20 at 19:17;  Stop

8/20/20 at 19:18;  Status DC


Midazolam HCl 50 mg/Sodium Chloride 50 ml @ 0 mls/hr 1X  ONCE IV ;  Start 

8/20/20 at 19:30;  Stop 8/20/20 at 19:31;  Status UNV


Albuterol/ Ipratropium (Duoneb) 3 ml STK-MED ONCE .ROUTE ;  Start 8/20/20 at 

19:27;  Stop 8/20/20 at 19:27;  Status DC


Midazolam HCl 100 ml @ 0 mls/hr CONT  PRN IV SEE PROTOCOL Last administered on 

9/7/20at 09:44;  Start 8/20/20 at 19:30


Propofol 50 ml @ 0 mls/hr 1X  ONCE IV  Last administered on 8/20/20at 18:55;  

Start 8/20/20 at 20:00;  Stop 8/20/20 at 20:01;  Status DC


Midazolam HCl (Versed) 5 mg 1X  ONCE NS  Last administered on 8/20/20at 19:18;  

Start 8/20/20 at 19:18;  Stop 8/20/20 at 19:59;  Status DC


Piperacillin Sod/ Tazobactam Sod 3.375 gm/Sodium Chloride 50 ml @  100 mls/hr 1X

 ONCE IV  Last administered on 8/20/20at 21:47;  Start 8/20/20 at 20:45;  Stop 

8/20/20 at 21:14;  Status DC


Dexmedetomidine HCl 400 mcg/ Sodium Chloride 100 ml @ 0 mls/hr CONT  PRN IV 

SEDATION Last administered on 9/7/20at 09:44;  Start 8/20/20 at 20:15


Sodium Chloride 500 ml @  500 mls/hr 1X PRN  PRN IV SEE COMMENTS Last 

administered on 8/27/20at 11:22;  Start 8/20/20 at 20:15


Atropine Sulfate (ATROPINE 0.5mg SYRINGE) 0.5 mg PRN Q5MIN  PRN IV SEE COMMENTS;

 Start 8/20/20 at 20:15


Fentanyl Citrate (Fentanyl 2ml Vial) 100 mcg 1X  ONCE IVP  Last administered on 

8/20/20at 20:17;  Start 8/20/20 at 20:15;  Stop 8/20/20 at 20:18;  Status DC


Methylprednisolone Sodium Succinate (SOLU-Medrol 40MG VIAL) 40 mg Q8HRS IV  Last

administered on 9/7/20at 05:46;  Start 8/20/20 at 22:00


Enoxaparin Sodium (Lovenox 40mg Syringe) 40 mg BID SQ  Last administered on 

8/21/20at 08:15;  Start 8/20/20 at 21:30;  Stop 8/21/20 at 09:47;  Status DC


Potassium Chloride/Dextrose/ Sod Cl 1,000 ml @  125 mls/hr Q8H ONCE IV  Last 

administered on 8/20/20at 22:13;  Start 8/20/20 at 21:30;  Stop 8/21/20 at 

05:29;  Status DC


Zinc Sulfate (Orazinc) 220 mg DAILY PO  Last administered on 9/7/20at 09:02;  

Start 8/21/20 at 09:00


Ondansetron HCl (Zofran) 4 mg PRN Q4HRS  PRN IV NAUSEA/VOMITING;  Start 8/20/20 

at 21:30


Acetaminophen (Tylenol) 650 mg PRN Q4HRS  PRN GT TEMP OVER 100.4F OR MILD PAIN 

Last administered on 8/28/20at 05:51;  Start 8/20/20 at 21:30


Acetaminophen (Tylenol Supp) 650 mg PRN Q4HRS  PRN TN TEMP OVER 100.4F OR MILD 

PAIN;  Start 8/20/20 at 21:30


Docusate Sodium (Colace) 100 mg PRN BID  PRN PO HARD STOOLS Last administered on

8/26/20at 08:26;  Start 8/20/20 at 21:30


Piperacillin Sod/ Tazobactam Sod 3.375 gm/Sodium Chloride 50 ml @  100 mls/hr 

Q6HRS IV  Last administered on 9/2/20at 12:16;  Start 8/21/20 at 00:00;  Stop 

9/2/20 at 17:00;  Status DC


Pantoprazole Sodium (PROTONIX VIAL for IV PUSH) 40 mg DAILYAC IVP  Last 

administered on 9/7/20at 08:09;  Start 8/21/20 at 07:30


Norepinephrine Bitartrate 8 mg/ Dextrose 258 ml @  12.578 mls/ hr CONT  PRN IV 

PER PROTOCOL Last administered on 8/20/20at 22:15;  Start 8/20/20 at 21:45


Sodium Chloride 1,000 ml @  1,000 mls/hr 1X  ONCE IV  Last administered on 

8/20/20at 21:44;  Start 8/20/20 at 21:45;  Stop 8/20/20 at 22:44;  Status DC


Fentanyl Citrate 30 ml @ 0 mls/hr CONT  PRN IV SEE PROTOCOL Last administered on

8/22/20at 10:44;  Start 8/20/20 at 21:45;  Stop 8/22/20 at 14:49;  Status DC


Vecuronium Bromide (Norcuron Bolus) 6 mg PRN Q2HR  PRN IV VENT ASYNCHRONY Last 

administered on 8/22/20at 11:23;  Start 8/20/20 at 21:45;  Stop 8/22/20 at 

14:48;  Status DC


Sodium Chloride 1,000 ml @  1,000 mls/hr 1X  ONCE IV  Last administered on 

8/20/20at 22:36;  Start 8/20/20 at 22:30;  Stop 8/20/20 at 23:29;  Status DC


Methylprednisolone Sodium Succinate (SOLU-Medrol 40MG VIAL) 40 mg Q8HRS IV ;  

Start 8/21/20 at 14:00;  Status UNV


Enoxaparin Sodium (Lovenox 80mg Syringe) 80 mg BID SQ  Last administered on 

8/21/20at 20:53;  Start 8/21/20 at 21:00;  Stop 8/22/20 at 08:57;  Status DC


Insulin Human Lispro (HumaLOG) 6 units TID SQ ;  Start 8/21/20 at 14:00;  Stop 

8/21/20 at 10:05;  Status DC


Insulin Glargine (Lantus Syringe) 10 unit BID SQ  Last administered on 8/22/20at

09:27;  Start 8/21/20 at 21:00;  Stop 8/22/20 at 11:25;  Status DC


Ascorbic Acid (Vitamin C) 250 mg Q6HRS PO  Last administered on 9/7/20at 05:46; 

Start 8/21/20 at 12:00


Sodium Chloride 1,000 ml @  75 mls/hr [id number] IV  Last administered on 8/24/20at

19:33;  Start 8/21/20 at 10:00;  Stop 8/25/20 at 10:23;  Status DC


Insulin Human Lispro (HumaLOG) 6 units Q6HRS SQ  Last administered on 8/22/20at 

06:11;  Start 8/21/20 at 12:00;  Stop 8/22/20 at 11:25;  Status DC


Insulin Glargine (Lantus Syringe) 10 unit 1X  ONCE SQ  Last administered on 

8/21/20at 11:40;  Start 8/21/20 at 11:00;  Stop 8/21/20 at 11:01;  Status DC


Enoxaparin Sodium (Lovenox 60mg Syringe) 60 mg BID SQ  Last administered on 

8/24/20at 08:55;  Start 8/22/20 at 09:00;  Stop 8/24/20 at 16:45;  Status DC


Vecuronium Bromide (Norcuron Bolus) 6 mg PRN Q6HRS  PRN IV SEDATION (2nd Choice)

Last administered on 8/24/20at 16:36;  Start 8/22/20 at 09:30


Insulin Glargine (Lantus Syringe) 14 unit BID SQ  Last administered on 8/24/20at

09:24;  Start 8/22/20 at 21:00;  Stop 8/24/20 at 12:27;  Status DC


Insulin Human Lispro (HumaLOG) 8 units Q6HRS SQ  Last administered on 8/24/20at 

12:11;  Start 8/22/20 at 12:00;  Stop 8/24/20 at 12:27;  Status DC


Fentanyl Citrate 55 ml @ 0 mls/hr CONT  PRN IV SEE PROTOCOL Last administered on

9/7/20at 01:38;  Start 8/22/20 at 14:49


Lorazepam (Ativan Inj) 1 mg PRN Q1HR  PRN IVP ANXIETY / AGITATION- MODERATE;  

Start 8/22/20 at 16:15


Lorazepam (Ativan Inj) 2 mg PRN Q1HR  PRN IVP ANXIETY / AGITATION- SEVERE Last 

administered on 8/23/20at 13:10;  Start 8/22/20 at 16:30


Insulin Human Lispro (HumaLOG) 6 units 1X  ONCE SQ  Last administered on 

8/23/20at 06:20;  Start 8/23/20 at 06:15;  Stop 8/23/20 at 06:16;  Status DC


Hydralazine HCl (Apresoline Inj) 10 mg PRN Q4HRS  PRN IVP ELEVATED BP, SEE 

COMMENTS Last administered on 9/7/20at 09:03;  Start 8/24/20 at 09:15


Alteplase, Recombinant (Cathflo For Central Catheter Clearance) 1 mg 1X  ONCE 

INT CAT  Last administered on 8/24/20at 12:05;  Start 8/24/20 at 11:00;  Stop 

8/24/20 at 11:01;  Status DC


Insulin Glargine (Lantus Syringe) 18 unit BID SQ  Last administered on 8/25/20at

08:30;  Start 8/24/20 at 21:00;  Stop 8/25/20 at 10:03;  Status DC


Insulin Human Lispro (HumaLOG) 10 units Q6HRS SQ  Last administered on 8/25/20at

05:53;  Start 8/24/20 at 18:00;  Stop 8/25/20 at 10:03;  Status DC


Vecuronium Bromide 50 mg/ Miscellaneous 50 ml @  4.027 mls/ hr CONT  PRN IV SEE 

I/O RECORD Last administered on 9/6/20at 23:52;  Start 8/24/20 at 16:15


Enoxaparin Sodium (Lovenox Per Pharmacy Treatment Dosing) 1 each PRN DAILY  PRN 

MC SEE COMMENTS;  Start 8/24/20 at 16:45;  Stop 9/2/20 at 18:12;  Status DC


Enoxaparin Sodium (Lovenox 80mg Syringe) 80 mg Q12HR SQ  Last administered on 

9/2/20at 08:26;  Start 8/24/20 at 21:00;  Stop 9/2/20 at 18:10;  Status DC


Furosemide (Lasix) 40 mg 1X  ONCE IVP  Last administered on 8/25/20at 10:19;  

Start 8/25/20 at 10:00;  Stop 8/25/20 at 10:01;  Status DC


Insulin Glargine (Lantus Syringe) 24 unit BID SQ  Last administered on 9/7/20at 

08:59;  Start 8/25/20 at 10:30


Insulin Human Lispro (HumaLOG) 14 units Q6HRS SQ  Last administered on 8/31/20at

06:06;  Start 8/25/20 at 12:00;  Stop 8/31/20 at 11:48;  Status DC


Info (Anti-Coagulation Monitoring By Pharmacy) 1 each PRN DAILY  PRN MC SEE 

COMMENTS Last administered on 9/2/20at 12:39;  Start 8/26/20 at 10:00;  Stop 

9/3/20 at 13:31;  Status DC


Polyethylene Glycol (miraLAX PACKET) 17 gm DAILY PO  Last administered on 

9/7/20at 09:02;  Start 8/28/20 at 11:00


Dextrose/Sodium Chloride 1,000 ml @  75 mls/hr [id number] IV  Last administered on 

9/4/20at 15:15;  Start 8/29/20 at 09:30;  Stop 9/4/20 at 21:59;  Status DC


Insulin Human Lispro (HumaLOG) 0-5 UNITS Q6HRS SQ  Last administered on 9/7/20at

06:25;  Start 8/31/20 at 12:00


Dextrose (Dextrose 50%-Water Syringe) 12.5 gm PRN Q15MIN  PRN IV SEE COMMENTS;  

Start 8/31/20 at 12:00


Enoxaparin Sodium (Lovenox 40mg Syringe) 40 mg Q12HR SQ  Last administered on 

9/7/20at 09:02;  Start 9/2/20 at 21:00


Info (Tpn Per Pharmacy) 1 each PRN DAILY  PRN MC SEE COMMENTS Last administered 

on 9/6/20at 12:19;  Start 9/4/20 at 09:30


Sodium Acetate 90 meq/Potassium Acetate 50 meq/ Potassium Phosphate 13.6 

mmol/Magnesium Sulfate 10 meq/ Calcium Gluconate 10 meq/ Multivitamins 10 

ml/Chromium/ Copper/Manganese/ Seleni/Zn 1 ml/ Total Parenteral Nutrition/Amino 

Acids/Dextrose 1,512 ml @  63 mls/hr TPN  CONT IV  Last administered on 9/4/20at

22:05;  Start 9/4/20 at 22:00;  Stop 9/5/20 at 21:59;  Status DC


Sodium Acetate 90 meq/Potassium Acetate 40 meq/ Potassium Phosphate 13.6 

mmol/Magnesium Sulfate 8 meq/ Calcium Gluconate 10 meq/ Multivitamins 10 

ml/Chromium/ Copper/Manganese/ Seleni/Zn 1 ml/ Total Parenteral Nutrition/Amino 

Acids/Dextrose 1,512 ml @  63 mls/hr TPN  CONT IV  Last administered on 9/5/20at

22:11;  Start 9/5/20 at 22:00;  Stop 9/6/20 at 21:59;  Status DC


Sodium Acetate 90 meq/Potassium Acetate 10 meq/ Potassium Phosphate 20 mmol/ 

Magnesium Sulfate 8 meq/Calcium Gluconate 10 meq/ Multivitamins 10 ml/Chromium/ 

Copper/Manganese/ Seleni/Zn 1 ml/ Total Parenteral Nutrition/Amino 

Acids/Dextrose 1,512 ml @  63 mls/hr TPN  CONT IV  Last administered on 9/6/20at

21:49;  Start 9/6/20 at 22:00;  Stop 9/7/20 at 21:59


Vitals/I & O





Vital Sign - Last 24 Hours








 9/6/20 9/6/20 9/6/20 9/6/20





 10:00 11:43 11:58 12:00


 


Temp    97.4





    97.4


 


Pulse 49 50  49


 


Resp 20 20  20


 


B/P (MAP) 165/87 (113) 128/67 (87)  155/87 (109)


 


Pulse Ox 91 90 94 94


 


O2 Delivery Ventilator Ventilator Ventilator Ventilator


 


    





    





 9/6/20 9/6/20 9/6/20 9/6/20





 12:00 12:09 12:39 13:00


 


Pulse    49


 


Resp  20 22 20


 


B/P (MAP)    156/87 (110)


 


Pulse Ox  94 93 93


 


O2 Delivery Mechanical Ventilator Ventilator Ventilator Ventilator





 9/6/20 9/6/20 9/6/20 9/6/20





 14:00 15:00 15:39 16:00


 


Temp    97.8





    97.8


 


Pulse 49 49  49


 


Resp 20 20  20


 


B/P (MAP) 150/84 (106) 153/85 (107)  144/76 (98)


 


Pulse Ox 94 92 94 92


 


O2 Delivery Ventilator Ventilator Ventilator Ventilator


 


    





    





 9/6/20 9/6/20 9/6/20 9/6/20





 16:00 17:00 18:00 19:00


 


Pulse  49 49 49


 


Resp  20 20 22


 


B/P (MAP)  152/82 (105) 149/80 (103) 143/77 (99)


 


Pulse Ox  93 94 94


 


O2 Delivery Mechanical Ventilator Ventilator Ventilator Ventilator





 9/6/20 9/6/20 9/6/20 9/6/20





 20:00 20:00 20:28 21:00


 


Temp  97.3  





  97.3  


 


Pulse  50  49


 


Resp  22  22


 


B/P (MAP)  138/73 (94)  130/72 (91)


 


Pulse Ox  95 94 95


 


O2 Delivery Mechanical Ventilator Ventilator Ventilator Ventilator


 


    





    





 9/6/20 9/6/20 9/6/20 9/7/20





 22:00 23:00 23:59 00:00


 


Temp    97.3





    97.3


 


Pulse 49 49  50


 


Resp 22 22  22


 


B/P (MAP) 133/71 (91) 126/68 (87)  129/67 (87)


 


Pulse Ox 95 94  93


 


O2 Delivery Ventilator Ventilator Mechanical Ventilator Ventilator


 


    





    





 9/7/20 9/7/20 9/7/20 9/7/20





 00:17 01:00 01:38 02:00


 


Pulse  52  49


 


Resp  22 22 22


 


B/P (MAP)  137/73 (94)  153/82 (105)


 


Pulse Ox 94 92  93


 


O2 Delivery Ventilator Ventilator Ventilator Ventilator





 9/7/20 9/7/20 9/7/20 9/7/20





 02:08 03:00 04:00 04:00


 


Temp   97.4 





   97.4 


 


Pulse  54 97 


 


Resp 22 22 22 


 


B/P (MAP)  157/83 (107) 171/86 (114) 


 


Pulse Ox  92 94 


 


O2 Delivery Ventilator Ventilator Ventilator Mechanical Ventilator


 


    





    





 9/7/20 9/7/20 9/7/20 9/7/20





 04:44 05:00 06:00 08:10


 


Pulse  110 115 


 


Resp  22 22 


 


B/P (MAP)  155/81 (105) 187/93 (124) 


 


Pulse Ox 95 95 94 95


 


O2 Delivery Ventilator Ventilator Ventilator Ventilator





 9/7/20   





 09:03   


 


Pulse 118   


 


B/P (MAP) 193/110   














Intake and Output   


 


 9/6/20 9/6/20 9/7/20





 15:00 23:00 07:00


 


Intake Total   1801 ml


 


Output Total 575 ml 735 ml 345 ml


 


Balance -575 ml -735 ml 1456 ml











Justicifation of Admission Dx:


Justifications for Admission:


Justification of Admission Dx:  Yes











ELIJAH AGUSTIN MD              Sep 7, 2020 10:00

## 2020-09-07 NOTE — PDOC
PULMONARY PROGRESS NOTES


DATE: 9/7/20 


TIME: 09:28


Subjective


remains intubated/sedated


AC mode ,100%FIO2/14 PEEP,  desat w any movement





sedated on versed propofol fentanyl precedex on paralytic agent  vac   small ett

secretion


Vitals





Vital Signs








  Date Time  Temp Pulse Resp B/P (MAP) Pulse Ox O2 Delivery O2 Flow Rate FiO2


 


9/7/20 09:03  118  193/110    


 


9/7/20 06:00   22  94 Ventilator  


 


9/7/20 04:00 97.4       





 97.4       








Comments


visual exam done due to COVID-Pandemic


intubated sedated


VENT 100% and PEEP 14 


nc at


rrr


no accessory muscle use


no skin rash


lower extremity edema


Lungs:  Crackles, Other (Intubated)


Labs





Laboratory Tests








Test


 9/5/20


11:38 9/5/20


17:59 9/6/20


00:36 9/6/20


05:30


 


Glucose (Fingerstick)


 253 mg/dL


(70-99) 238 mg/dL


(70-99) 229 mg/dL


(70-99) 





 


Sodium Level


 


 


 


 143 mmol/L


(136-145)


 


Potassium Level


 


 


 


 4.5 mmol/L


(3.5-5.1)


 


Chloride Level


 


 


 


 108 mmol/L


()


 


Carbon Dioxide Level


 


 


 


 32 mmol/L


(21-32)


 


Anion Gap    3 (6-14) 


 


Blood Urea Nitrogen


 


 


 


 15 mg/dL


(8-26)


 


Creatinine


 


 


 


 0.4 mg/dL


(0.7-1.3)


 


Estimated GFR


(Cockcroft-Gault) 


 


 


 215.9 





 


Glucose Level


 


 


 


 218 mg/dL


(70-99)


 


Calcium Level


 


 


 


 7.2 mg/dL


(8.5-10.1)


 


Phosphorus Level


 


 


 


 2.8 mg/dL


(2.6-4.7)


 


Magnesium Level


 


 


 


 2.2 mg/dL


(1.8-2.4)


 


Test


 9/6/20


05:58 9/6/20


08:54 9/6/20


11:27 9/6/20


17:46


 


Glucose (Fingerstick)


 208 mg/dL


(70-99) 


 236 mg/dL


(70-99) 237 mg/dL


(70-99)


 


O2 Saturation  86 % (92-99)   


 


Arterial Blood pH


 


 7.42


(7.35-7.45) 


 





 


Arterial Blood pCO2 at


Patient Temp 


 45 mmHg


(35-46) 


 





 


Arterial Blood pO2 at Patient


Temp 


 54 mmHg


() 


 





 


Arterial Blood HCO3


 


 29 mmol/L


(21-28) 


 





 


Arterial Blood Base Excess


 


 4 mmol/L


(-3-3) 


 





 


FiO2  100   


 


Test


 9/6/20


21:38 9/7/20


00:43 9/7/20


05:45 9/7/20


06:10


 


Glucose (Fingerstick)


 229 mg/dL


(70-99) 226 mg/dL


(70-99) 


 202 mg/dL


(70-99)


 


Sodium Level


 


 


 140 mmol/L


(136-145) 





 


Potassium Level


 


 


 4.0 mmol/L


(3.5-5.1) 





 


Chloride Level


 


 


 103 mmol/L


() 





 


Carbon Dioxide Level


 


 


 32 mmol/L


(21-32) 





 


Anion Gap   5 (6-14)  


 


Blood Urea Nitrogen


 


 


 20 mg/dL


(8-26) 





 


Creatinine


 


 


 0.5 mg/dL


(0.7-1.3) 





 


Estimated GFR


(Cockcroft-Gault) 


 


 166.9 


 





 


Glucose Level


 


 


 211 mg/dL


(70-99) 





 


Calcium Level


 


 


 7.7 mg/dL


(8.5-10.1) 





 


Phosphorus Level


 


 


 3.2 mg/dL


(2.6-4.7) 





 


Magnesium Level


 


 


 2.2 mg/dL


(1.8-2.4) 





 


Test


 9/7/20


08:10 


 


 





 


O2 Saturation 93 % (92-99)    


 


Arterial Blood pH


 7.39


(7.35-7.45) 


 


 





 


Arterial Blood pCO2 at


Patient Temp 48 mmHg


(35-46) 


 


 





 


Arterial Blood pO2 at Patient


Temp 71 mmHg


() 


 


 





 


Arterial Blood HCO3


 28 mmol/L


(21-28) 


 


 





 


Arterial Blood Base Excess


 3 mmol/L


(-3-3) 


 


 





 


FiO2 100    








Laboratory Tests








Test


 9/6/20


11:27 9/6/20


17:46 9/6/20


21:38 9/7/20


00:43


 


Glucose (Fingerstick)


 236 mg/dL


(70-99) 237 mg/dL


(70-99) 229 mg/dL


(70-99) 226 mg/dL


(70-99)


 


Test


 9/7/20


05:45 9/7/20


06:10 9/7/20


08:10 





 


Sodium Level


 140 mmol/L


(136-145) 


 


 





 


Potassium Level


 4.0 mmol/L


(3.5-5.1) 


 


 





 


Chloride Level


 103 mmol/L


() 


 


 





 


Carbon Dioxide Level


 32 mmol/L


(21-32) 


 


 





 


Anion Gap 5 (6-14)    


 


Blood Urea Nitrogen


 20 mg/dL


(8-26) 


 


 





 


Creatinine


 0.5 mg/dL


(0.7-1.3) 


 


 





 


Estimated GFR


(Cockcroft-Gault) 166.9 


 


 


 





 


Glucose Level


 211 mg/dL


(70-99) 


 


 





 


Calcium Level


 7.7 mg/dL


(8.5-10.1) 


 


 





 


Phosphorus Level


 3.2 mg/dL


(2.6-4.7) 


 


 





 


Magnesium Level


 2.2 mg/dL


(1.8-2.4) 


 


 





 


Glucose (Fingerstick)


 


 202 mg/dL


(70-99) 


 





 


O2 Saturation   93 % (92-99)  


 


Arterial Blood pH


 


 


 7.39


(7.35-7.45) 





 


Arterial Blood pCO2 at


Patient Temp 


 


 48 mmHg


(35-46) 





 


Arterial Blood pO2 at Patient


Temp 


 


 71 mmHg


() 





 


Arterial Blood HCO3


 


 


 28 mmol/L


(21-28) 





 


Arterial Blood Base Excess


 


 


 3 mmol/L


(-3-3) 





 


FiO2   100  








Comments


 CXR 


reviewed 


1. Bilateral diffuse infiltrates are increased in the left base and mildly


improved elsewhere.





Impression


.


1.  Acute hypoxic respiratory failure secondary to COVID-19 pneumonia/ARDS/acute

lung injury--


2.  Abnormal chest x-ray with bilateral infiltrates consistent with COVID-19 

pneumonia.


3.  Hypotension secondary to combination of hypovolemia and sepsis status post 2

liters of IV fluids. no pressors, 


4.  Acute kidney injury--resolved


5.  Severe protein-calorie malnutrition.


6.  Abnormal D-dimer related to COVID-19 pneumonia. Full dose lovenox added 8/24

due to increase in D-dimer to 20, now decreasing to 3.5. Lovenox dose reduced


7. Fevers ,resolved





Plan


.


RECOMMENDATIONS:


ABG noted, will continue current assist control, 100%FIO2 and PEEP of 14, wean 

FIO2 to 95% if tolerates


try to taper off propofol  if possible


will not escalate any further PEEP


Lovenox dose reduced since D-Dimer down to 3.5 from 20


Broad spectrum antibiotic.


Cont.  IV steroids, with taper, 


Stress ulcer prophylaxis.


s/p Plasma 


Pt doing poorly not expected to survive


prognosis poor 


D/W RN and RT  


PT. is DNR 





critically ill 


Critical care time 30 minutes, family aware of pt. clinical condition, no 

overlap


Dr Amin spoke with wife











ANGELA HUFFMAN MD                  Sep 7, 2020 09:29

## 2020-09-08 VITALS — DIASTOLIC BLOOD PRESSURE: 62 MMHG | SYSTOLIC BLOOD PRESSURE: 94 MMHG

## 2020-09-08 VITALS — DIASTOLIC BLOOD PRESSURE: 75 MMHG | SYSTOLIC BLOOD PRESSURE: 160 MMHG

## 2020-09-08 VITALS — DIASTOLIC BLOOD PRESSURE: 80 MMHG | SYSTOLIC BLOOD PRESSURE: 154 MMHG

## 2020-09-08 VITALS — DIASTOLIC BLOOD PRESSURE: 86 MMHG | SYSTOLIC BLOOD PRESSURE: 173 MMHG

## 2020-09-08 VITALS — SYSTOLIC BLOOD PRESSURE: 107 MMHG | DIASTOLIC BLOOD PRESSURE: 60 MMHG

## 2020-09-08 VITALS — DIASTOLIC BLOOD PRESSURE: 77 MMHG | SYSTOLIC BLOOD PRESSURE: 129 MMHG

## 2020-09-08 VITALS — DIASTOLIC BLOOD PRESSURE: 59 MMHG | SYSTOLIC BLOOD PRESSURE: 104 MMHG

## 2020-09-08 VITALS — SYSTOLIC BLOOD PRESSURE: 99 MMHG | DIASTOLIC BLOOD PRESSURE: 57 MMHG

## 2020-09-08 VITALS — DIASTOLIC BLOOD PRESSURE: 94 MMHG | SYSTOLIC BLOOD PRESSURE: 177 MMHG

## 2020-09-08 VITALS — DIASTOLIC BLOOD PRESSURE: 58 MMHG | SYSTOLIC BLOOD PRESSURE: 101 MMHG

## 2020-09-08 VITALS — DIASTOLIC BLOOD PRESSURE: 58 MMHG | SYSTOLIC BLOOD PRESSURE: 100 MMHG

## 2020-09-08 VITALS — DIASTOLIC BLOOD PRESSURE: 87 MMHG | SYSTOLIC BLOOD PRESSURE: 173 MMHG

## 2020-09-08 VITALS — SYSTOLIC BLOOD PRESSURE: 137 MMHG | DIASTOLIC BLOOD PRESSURE: 75 MMHG

## 2020-09-08 VITALS — SYSTOLIC BLOOD PRESSURE: 207 MMHG | DIASTOLIC BLOOD PRESSURE: 104 MMHG

## 2020-09-08 VITALS — DIASTOLIC BLOOD PRESSURE: 86 MMHG | SYSTOLIC BLOOD PRESSURE: 148 MMHG

## 2020-09-08 VITALS — SYSTOLIC BLOOD PRESSURE: 200 MMHG | DIASTOLIC BLOOD PRESSURE: 85 MMHG

## 2020-09-08 VITALS — DIASTOLIC BLOOD PRESSURE: 55 MMHG | SYSTOLIC BLOOD PRESSURE: 96 MMHG

## 2020-09-08 VITALS — SYSTOLIC BLOOD PRESSURE: 99 MMHG | DIASTOLIC BLOOD PRESSURE: 60 MMHG

## 2020-09-08 VITALS — SYSTOLIC BLOOD PRESSURE: 125 MMHG | DIASTOLIC BLOOD PRESSURE: 80 MMHG

## 2020-09-08 VITALS — DIASTOLIC BLOOD PRESSURE: 68 MMHG | SYSTOLIC BLOOD PRESSURE: 105 MMHG

## 2020-09-08 VITALS — SYSTOLIC BLOOD PRESSURE: 128 MMHG | DIASTOLIC BLOOD PRESSURE: 74 MMHG

## 2020-09-08 VITALS — DIASTOLIC BLOOD PRESSURE: 74 MMHG | SYSTOLIC BLOOD PRESSURE: 164 MMHG

## 2020-09-08 VITALS — DIASTOLIC BLOOD PRESSURE: 75 MMHG | SYSTOLIC BLOOD PRESSURE: 136 MMHG

## 2020-09-08 VITALS — DIASTOLIC BLOOD PRESSURE: 77 MMHG | SYSTOLIC BLOOD PRESSURE: 140 MMHG

## 2020-09-08 LAB
ANION GAP SERPL CALC-SCNC: 0 MMOL/L (ref 6–14)
BASE EXCESS ABG: 6 MMOL/L (ref -3–3)
BUN SERPL-MCNC: 21 MG/DL (ref 8–26)
CALCIUM SERPL-MCNC: 7.2 MG/DL (ref 8.5–10.1)
CHLORIDE SERPL-SCNC: 105 MMOL/L (ref 98–107)
CO2 SERPL-SCNC: 36 MMOL/L (ref 21–32)
CREAT SERPL-MCNC: 0.6 MG/DL (ref 0.7–1.3)
GFR SERPLBLD BASED ON 1.73 SQ M-ARVRAT: 135.2 ML/MIN
GLUCOSE SERPL-MCNC: 187 MG/DL (ref 70–99)
HCO3 BLDA-SCNC: 32 MMOL/L (ref 21–28)
INSPIRATION SETTING TIME VENT: 95
MAGNESIUM SERPL-MCNC: 2.2 MG/DL (ref 1.8–2.4)
PCO2 BLDA: 49 MMHG (ref 35–46)
PHOSPHATE SERPL-MCNC: 3.2 MG/DL (ref 2.6–4.7)
PO2 BLDA: 65 MMHG (ref 65–108)
POTASSIUM SERPL-SCNC: 4.1 MMOL/L (ref 3.5–5.1)
SAO2 % BLDA: 91 % (ref 92–99)
SODIUM SERPL-SCNC: 141 MMOL/L (ref 136–145)

## 2020-09-08 RX ADMIN — PROPOFOL PRN MLS/HR: 10 INJECTION, EMULSION INTRAVENOUS at 15:29

## 2020-09-08 RX ADMIN — PROPOFOL PRN MLS/HR: 10 INJECTION, EMULSION INTRAVENOUS at 01:01

## 2020-09-08 RX ADMIN — PROPOFOL PRN MLS/HR: 10 INJECTION, EMULSION INTRAVENOUS at 12:06

## 2020-09-08 RX ADMIN — METHYLPREDNISOLONE SODIUM SUCCINATE SCH MG: 40 INJECTION, POWDER, FOR SOLUTION INTRAMUSCULAR; INTRAVENOUS at 13:09

## 2020-09-08 RX ADMIN — METHYLPREDNISOLONE SODIUM SUCCINATE SCH MG: 40 INJECTION, POWDER, FOR SOLUTION INTRAMUSCULAR; INTRAVENOUS at 21:30

## 2020-09-08 RX ADMIN — METOPROLOL TARTRATE SCH MG: 5 INJECTION INTRAVENOUS at 17:37

## 2020-09-08 RX ADMIN — ZINC SULFATE CAP 220 MG (50 MG ELEMENTAL ZN) SCH MG: 220 (50 ZN) CAP at 08:03

## 2020-09-08 RX ADMIN — OXYCODONE HYDROCHLORIDE AND ACETAMINOPHEN SCH MG: 500 TABLET ORAL at 05:41

## 2020-09-08 RX ADMIN — MIDAZOLAM PRN MLS/HR: 5 INJECTION, SOLUTION INTRAMUSCULAR; INTRAVENOUS at 09:03

## 2020-09-08 RX ADMIN — MIDAZOLAM PRN MLS/HR: 5 INJECTION, SOLUTION INTRAMUSCULAR; INTRAVENOUS at 01:38

## 2020-09-08 RX ADMIN — POLYETHYLENE GLYCOL 3350 SCH GM: 17 POWDER, FOR SOLUTION ORAL at 08:03

## 2020-09-08 RX ADMIN — ENOXAPARIN SODIUM SCH MG: 40 INJECTION SUBCUTANEOUS at 21:18

## 2020-09-08 RX ADMIN — MIDAZOLAM PRN MLS/HR: 5 INJECTION, SOLUTION INTRAMUSCULAR; INTRAVENOUS at 15:28

## 2020-09-08 RX ADMIN — OXYCODONE HYDROCHLORIDE AND ACETAMINOPHEN SCH MG: 500 TABLET ORAL at 00:50

## 2020-09-08 RX ADMIN — VECURONIUM BROMIDE PRN MLS/HR: 1 INJECTION, POWDER, LYOPHILIZED, FOR SOLUTION INTRAVENOUS at 13:09

## 2020-09-08 RX ADMIN — INSULIN GLARGINE SCH UNIT: 100 INJECTION, SOLUTION SUBCUTANEOUS at 08:04

## 2020-09-08 RX ADMIN — Medication PRN MLS/HR: at 15:53

## 2020-09-08 RX ADMIN — INSULIN GLARGINE SCH UNIT: 100 INJECTION, SOLUTION SUBCUTANEOUS at 21:18

## 2020-09-08 RX ADMIN — PANTOPRAZOLE SODIUM SCH MG: 40 INJECTION, POWDER, FOR SOLUTION INTRAVENOUS at 08:03

## 2020-09-08 RX ADMIN — INSULIN LISPRO SCH UNITS: 100 INJECTION, SOLUTION INTRAVENOUS; SUBCUTANEOUS at 00:00

## 2020-09-08 RX ADMIN — OXYCODONE HYDROCHLORIDE AND ACETAMINOPHEN SCH MG: 500 TABLET ORAL at 17:38

## 2020-09-08 RX ADMIN — DEXMEDETOMIDINE HYDROCHLORIDE PRN MLS/HR: 100 INJECTION, SOLUTION, CONCENTRATE INTRAVENOUS at 05:37

## 2020-09-08 RX ADMIN — Medication PRN EACH: at 13:53

## 2020-09-08 RX ADMIN — ENOXAPARIN SODIUM SCH MG: 40 INJECTION SUBCUTANEOUS at 08:04

## 2020-09-08 RX ADMIN — METOPROLOL TARTRATE SCH MG: 5 INJECTION INTRAVENOUS at 15:28

## 2020-09-08 RX ADMIN — INSULIN LISPRO SCH UNITS: 100 INJECTION, SOLUTION INTRAVENOUS; SUBCUTANEOUS at 17:58

## 2020-09-08 RX ADMIN — HYDRALAZINE HYDROCHLORIDE PRN MG: 20 INJECTION INTRAMUSCULAR; INTRAVENOUS at 17:37

## 2020-09-08 RX ADMIN — METHYLPREDNISOLONE SODIUM SUCCINATE SCH MG: 40 INJECTION, POWDER, FOR SOLUTION INTRAMUSCULAR; INTRAVENOUS at 05:41

## 2020-09-08 RX ADMIN — HYDRALAZINE HYDROCHLORIDE PRN MG: 20 INJECTION INTRAMUSCULAR; INTRAVENOUS at 13:09

## 2020-09-08 RX ADMIN — OXYCODONE HYDROCHLORIDE AND ACETAMINOPHEN SCH MG: 500 TABLET ORAL at 11:34

## 2020-09-08 RX ADMIN — INSULIN LISPRO SCH UNITS: 100 INJECTION, SOLUTION INTRAVENOUS; SUBCUTANEOUS at 06:00

## 2020-09-08 RX ADMIN — INSULIN LISPRO SCH UNITS: 100 INJECTION, SOLUTION INTRAVENOUS; SUBCUTANEOUS at 12:05

## 2020-09-08 RX ADMIN — PROPOFOL PRN MLS/HR: 10 INJECTION, EMULSION INTRAVENOUS at 05:41

## 2020-09-08 RX ADMIN — Medication PRN MLS/HR: at 04:44

## 2020-09-08 NOTE — PDOC
PROGRESS NOTES


Date of Service:


DATE: 9/8/20 


TIME: 07:20





Chief Complaint


Chief Complaint


COVID-19 positive


Acute hypoxemic respiratory failure


Bilateral perihilar infiltrates are  seen which appear increased slightly


Leukocytosis with bandemia secondary to septic process


Hyponatremia secondary to low effective circulatory volume, now hypernatremic


Acue renal failure due to vasomotor nephropathy most likely 


Hyperglycemia


transaminitis 


Uncontrolled hypertension


Abdominal distention





Plan


Continue with supportive measures


Prognosis is quite guarded


Continues to require 95% oxygen


Continues to require a lot of PEEP currently at 14


will give an update to the family





History of Present Illness


History of Present Illness


9/8/2020


Continues to be about the same


No acute events reported overnight. 


discussed with RN


Does not tolerate moving around too much. 





9/7/2020


Remains critically ill


Seen in the ICU


Continues with ventilatory support


Assist-control/22/6 100/100% with 14 of PEEP


Discussed with RN


Chart reviewed





9/6/2020


Patient seen in the COVID-19 ICU


He is on the vent


Assist-control/22/6 100/100% with 14 of PEEP


He is critically ill


Discussed with RN


Chart reviewed











9/5/2020


Patient seen in the COVID-19 ICU


He remains mechanically ventilated


He is also started on TPN


He is sedated with propofol fentanyl Versed and Precedex


We are using paralytics with vecuronium


Vent settings as follows


AC/22/6 100/100% with 14 of PEEP


Chart reviewed


Discussed with RN


He remains very critically ill prognosis is very guarded at best











9/4/2020


Patient seen and Covid-19 ICU


He remains intubated


Assist-control/22/600 with 100% FiO2


He is in [facility] for patient safety


He is sedated with propofol Versed and fentanyl


Has OG at 60 cc an hour


Discussed with RN


Discussed with case management


Reviewed chart











9/3/2020


Patient seen and examined in the COVID-19 ICU


He remains intubated


He is on Versed for sedation and he has to be paralyzed with vecuronium


He also has propofol fentanyl and Precedex for additional sedation


Chart reviewed


Discussed with the 


Discussed with RN


He remains critically ill


Vent settings as follows


AC/22/6 100/100% with 14 of PEEP











9/2/2020


Patient seen and examined in the [place] [id number] ICU


He is intubated


AC/22/6 100/100% with 14 of PEEP


He is sedated with Versed propofol fentanyl and Precedex he is also paralyzed 

with vecuronium


Chart reviewed


Discussed with RN


Discussed with 








History of Present Illness


Patient is a 65-year-old gentleman who was diagnosed with COVID-19 infection on 

Monday 5 days prior to his admission to the intensive care unit.  Apparently the

patient reported worsening respiratory distress reason why he came to the emerg

ency department for evaluation and treatment.  The story was quite limited since

the patient was speaking Romanian only and his acuity was such that he required 

mechanical ventilation and prompt intubation by the emergency department 

physician.  At the present time patient is intubated and sedated continues to 

require a lot of support no pertinent past medical history was reported he is 

admitted to the intensive care unit for further treatment





8/24/2020


Continues to require full support, glycemia has been noted in hypertension as 

well.  Discussed with nursing staff, all of concerns were addressed to the best 

of my abilities no acute events reported overnight





8/25/2020


No acute events reported overnight, case discussed with nursing staff patient in

no acute distress  





8/26/2020


Per nursing staff, patient febrile overnight, treated with Tylenol and tepid 

water bath. Case discussed with nursing staff, continue with IV antibiotics and 

full VTE prophylaxis.





8/27/2020


Patient still febrile overnight.  O2 requirement increasing.  Discussed with RN.





8/31 /2020


Patient still intubated and sedated.  Discussed with RN, patient was made DNR.  

schedule MiraLAX.





Vitals


Vitals





Vital Signs








  Date Time  Temp Pulse Resp B/P (MAP) Pulse Ox O2 Delivery O2 Flow Rate FiO2


 


9/8/20 07:00  59 22 96/55 (69) 94 Ventilator  


 


9/8/20 04:00 97.6       





 97.6       











Physical Exam


Physical Exam


sedated on vent


General:  No acute distress, Other (Intubated and sedated)


Heart:  Regular rate, Normal S1, Normal S2


Lungs:  Crackles, Other (Intubated)


Abdomen:  Other (Nondistended)


Extremities:  No clubbing, No cyanosis


Skin:  No rashes, No breakdown





Labs


LABS





Laboratory Tests








Test


 9/7/20


08:10 9/7/20


12:33 9/7/20


17:11 9/8/20


00:51


 


O2 Saturation 93 % (92-99)    


 


Arterial Blood pH


 7.39


(7.35-7.45) 


 


 





 


Arterial Blood pCO2 at


Patient Temp 48 mmHg


(35-46) 


 


 





 


Arterial Blood pO2 at Patient


Temp 71 mmHg


() 


 


 





 


Arterial Blood HCO3


 28 mmol/L


(21-28) 


 


 





 


Arterial Blood Base Excess


 3 mmol/L


(-3-3) 


 


 





 


FiO2 100    


 


Glucose (Fingerstick)


 


 223 mg/dL


(70-99) 210 mg/dL


(70-99) 193 mg/dL


(70-99)


 


Test


 9/8/20


06:13 9/8/20


06:15 


 





 


Glucose (Fingerstick)


 223 mg/dL


(70-99) 


 


 





 


Sodium Level


 


 141 mmol/L


(136-145) 


 





 


Potassium Level


 


 4.1 mmol/L


(3.5-5.1) 


 





 


Chloride Level


 


 105 mmol/L


() 


 





 


Carbon Dioxide Level


 


 36 mmol/L


(21-32) 


 





 


Anion Gap  0 (6-14)   


 


Blood Urea Nitrogen


 


 21 mg/dL


(8-26) 


 





 


Creatinine


 


 0.6 mg/dL


(0.7-1.3) 


 





 


Estimated GFR


(Cockcroft-Gault) 


 135.2 


 


 





 


Glucose Level


 


 187 mg/dL


(70-99) 


 





 


Calcium Level


 


 7.2 mg/dL


(8.5-10.1) 


 





 


Phosphorus Level


 


 3.2 mg/dL


(2.6-4.7) 


 





 


Magnesium Level


 


 2.2 mg/dL


(1.8-2.4) 


 














Comment


Review of Relevant


I have reviewed the following items jim (where applicable) has been applied.


Labs





Laboratory Tests








Test


 9/6/20


08:54 9/6/20


11:27 9/6/20


17:46 9/6/20


21:38


 


O2 Saturation 86 % (92-99)    


 


Arterial Blood pH


 7.42


(7.35-7.45) 


 


 





 


Arterial Blood pCO2 at


Patient Temp 45 mmHg


(35-46) 


 


 





 


Arterial Blood pO2 at Patient


Temp 54 mmHg


() 


 


 





 


Arterial Blood HCO3


 29 mmol/L


(21-28) 


 


 





 


Arterial Blood Base Excess


 4 mmol/L


(-3-3) 


 


 





 


FiO2 100    


 


Glucose (Fingerstick)


 


 236 mg/dL


(70-99) 237 mg/dL


(70-99) 229 mg/dL


(70-99)


 


Test


 9/7/20


00:43 9/7/20


05:45 9/7/20


06:10 9/7/20


08:10


 


Glucose (Fingerstick)


 226 mg/dL


(70-99) 


 202 mg/dL


(70-99) 





 


Sodium Level


 


 140 mmol/L


(136-145) 


 





 


Potassium Level


 


 4.0 mmol/L


(3.5-5.1) 


 





 


Chloride Level


 


 103 mmol/L


() 


 





 


Carbon Dioxide Level


 


 32 mmol/L


(21-32) 


 





 


Anion Gap  5 (6-14)   


 


Blood Urea Nitrogen


 


 20 mg/dL


(8-26) 


 





 


Creatinine


 


 0.5 mg/dL


(0.7-1.3) 


 





 


Estimated GFR


(Cockcroft-Gault) 


 166.9 


 


 





 


Glucose Level


 


 211 mg/dL


(70-99) 


 





 


Calcium Level


 


 7.7 mg/dL


(8.5-10.1) 


 





 


Phosphorus Level


 


 3.2 mg/dL


(2.6-4.7) 


 





 


Magnesium Level


 


 2.2 mg/dL


(1.8-2.4) 


 





 


O2 Saturation    93 % (92-99) 


 


Arterial Blood pH


 


 


 


 7.39


(7.35-7.45)


 


Arterial Blood pCO2 at


Patient Temp 


 


 


 48 mmHg


(35-46)


 


Arterial Blood pO2 at Patient


Temp 


 


 


 71 mmHg


()


 


Arterial Blood HCO3


 


 


 


 28 mmol/L


(21-28)


 


Arterial Blood Base Excess


 


 


 


 3 mmol/L


(-3-3)


 


FiO2    100 


 


Test


 9/7/20


12:33 9/7/20


17:11 9/8/20


00:51 9/8/20


06:13


 


Glucose (Fingerstick)


 223 mg/dL


(70-99) 210 mg/dL


(70-99) 193 mg/dL


(70-99) 223 mg/dL


(70-99)


 


Test


 9/8/20


06:15 


 


 





 


Sodium Level


 141 mmol/L


(136-145) 


 


 





 


Potassium Level


 4.1 mmol/L


(3.5-5.1) 


 


 





 


Chloride Level


 105 mmol/L


() 


 


 





 


Carbon Dioxide Level


 36 mmol/L


(21-32) 


 


 





 


Anion Gap 0 (6-14)    


 


Blood Urea Nitrogen


 21 mg/dL


(8-26) 


 


 





 


Creatinine


 0.6 mg/dL


(0.7-1.3) 


 


 





 


Estimated GFR


(Cockcroft-Gault) 135.2 


 


 


 





 


Glucose Level


 187 mg/dL


(70-99) 


 


 





 


Calcium Level


 7.2 mg/dL


(8.5-10.1) 


 


 





 


Phosphorus Level


 3.2 mg/dL


(2.6-4.7) 


 


 





 


Magnesium Level


 2.2 mg/dL


(1.8-2.4) 


 


 











Laboratory Tests








Test


 9/7/20


08:10 9/7/20


12:33 9/7/20


17:11 9/8/20


00:51


 


O2 Saturation 93 % (92-99)    


 


Arterial Blood pH


 7.39


(7.35-7.45) 


 


 





 


Arterial Blood pCO2 at


Patient Temp 48 mmHg


(35-46) 


 


 





 


Arterial Blood pO2 at Patient


Temp 71 mmHg


() 


 


 





 


Arterial Blood HCO3


 28 mmol/L


(21-28) 


 


 





 


Arterial Blood Base Excess


 3 mmol/L


(-3-3) 


 


 





 


FiO2 100    


 


Glucose (Fingerstick)


 


 223 mg/dL


(70-99) 210 mg/dL


(70-99) 193 mg/dL


(70-99)


 


Test


 9/8/20


06:13 9/8/20


06:15 


 





 


Glucose (Fingerstick)


 223 mg/dL


(70-99) 


 


 





 


Sodium Level


 


 141 mmol/L


(136-145) 


 





 


Potassium Level


 


 4.1 mmol/L


(3.5-5.1) 


 





 


Chloride Level


 


 105 mmol/L


() 


 





 


Carbon Dioxide Level


 


 36 mmol/L


(21-32) 


 





 


Anion Gap  0 (6-14)   


 


Blood Urea Nitrogen


 


 21 mg/dL


(8-26) 


 





 


Creatinine


 


 0.6 mg/dL


(0.7-1.3) 


 





 


Estimated GFR


(Cockcroft-Gault) 


 135.2 


 


 





 


Glucose Level


 


 187 mg/dL


(70-99) 


 





 


Calcium Level


 


 7.2 mg/dL


(8.5-10.1) 


 





 


Phosphorus Level


 


 3.2 mg/dL


(2.6-4.7) 


 





 


Magnesium Level


 


 2.2 mg/dL


(1.8-2.4) 


 











Microbiology


8/22/20 Blood Culture - Final, Complete


          NO GROWTH AFTER 5 DAYS


8/20/20 Urine Culture - Final, Complete


Medications





Current Medications


Etomidate (Amidate) 20 mg STK-MED ONCE IV ;  Start 8/20/20 at 18:54;  Stop 

8/20/20 at 18:54;  Status DC


Succinylcholine Chloride (Anectine) 200 mg STK-MED ONCE .ROUTE ;  Start 8/20/20 

at 18:55;  Stop 8/20/20 at 18:55;  Status DC


Propofol 50 ml @ As Directed STK-MED ONCE IV ;  Start 8/20/20 at 18:55;  Stop 

8/20/20 at 18:56;  Status DC


Propofol 100 ml @ 0 mls/hr CONT  PRN IV SEE PROTOCOL Last administered on 

9/8/20at 05:41;  Start 8/20/20 at 19:00


Fentanyl Citrate (Fentanyl 2ml Vial) 25 mcg PRN Q1HR  PRN IV SEE COMMENTS;  

Start 8/20/20 at 19:00;  Stop 9/5/20 at 08:54;  Status DC


Fentanyl Citrate (Fentanyl 2ml Vial) 50 mcg PRN Q1HR  PRN IV SEE COMMENTS Last 

administered on 8/20/20at 19:42;  Start 8/20/20 at 19:00


Chlorhexidine Gluconate (Peridex) 15 ml BID MM  Last administered on 8/21/20at 

08:16;  Start 8/20/20 at 21:00;  Stop 8/21/20 at 09:45;  Status DC


Midazolam HCl (Versed) 5 mg STK-MED ONCE .ROUTE ;  Start 8/20/20 at 19:17;  Stop

8/20/20 at 19:18;  Status DC


Midazolam HCl 50 mg/Sodium Chloride 50 ml @ 0 mls/hr 1X  ONCE IV ;  Start 

8/20/20 at 19:30;  Stop 8/20/20 at 19:31;  Status UNV


Albuterol/ Ipratropium (Duoneb) 3 ml STK-MED ONCE .ROUTE ;  Start 8/20/20 at 

19:27;  Stop 8/20/20 at 19:27;  Status DC


Midazolam HCl 100 ml @ 0 mls/hr CONT  PRN IV SEE PROTOCOL Last administered on 

9/8/20at 01:38;  Start 8/20/20 at 19:30


Propofol 50 ml @ 0 mls/hr 1X  ONCE IV  Last administered on 8/20/20at 18:55;  

Start 8/20/20 at 20:00;  Stop 8/20/20 at 20:01;  Status DC


Midazolam HCl (Versed) 5 mg 1X  ONCE NS  Last administered on 8/20/20at 19:18;  

Start 8/20/20 at 19:18;  Stop 8/20/20 at 19:59;  Status DC


Piperacillin Sod/ Tazobactam Sod 3.375 gm/Sodium Chloride 50 ml @  100 mls/hr 1X

 ONCE IV  Last administered on 8/20/20at 21:47;  Start 8/20/20 at 20:45;  Stop 

8/20/20 at 21:14;  Status DC


Dexmedetomidine HCl 400 mcg/ Sodium Chloride 100 ml @ 0 mls/hr CONT  PRN IV 

SEDATION Last administered on 9/8/20at 05:37;  Start 8/20/20 at 20:15


Sodium Chloride 500 ml @  500 mls/hr 1X PRN  PRN IV SEE COMMENTS Last administe

red on 8/27/20at 11:22;  Start 8/20/20 at 20:15


Atropine Sulfate (ATROPINE 0.5mg SYRINGE) 0.5 mg PRN Q5MIN  PRN IV SEE COMMENTS;

 Start 8/20/20 at 20:15


Fentanyl Citrate (Fentanyl 2ml Vial) 100 mcg 1X  ONCE IVP  Last administered on 

8/20/20at 20:17;  Start 8/20/20 at 20:15;  Stop 8/20/20 at 20:18;  Status DC


Methylprednisolone Sodium Succinate (SOLU-Medrol 40MG VIAL) 40 mg Q8HRS IV  Last

administered on 9/8/20at 05:41;  Start 8/20/20 at 22:00


Enoxaparin Sodium (Lovenox 40mg Syringe) 40 mg BID SQ  Last administered on 

8/21/20at 08:15;  Start 8/20/20 at 21:30;  Stop 8/21/20 at 09:47;  Status DC


Potassium Chloride/Dextrose/ Sod Cl 1,000 ml @  125 mls/hr Q8H ONCE IV  Last 

administered on 8/20/20at 22:13;  Start 8/20/20 at 21:30;  Stop 8/21/20 at 

05:29;  Status DC


Zinc Sulfate (Orazinc) 220 mg DAILY PO  Last administered on 9/7/20at 09:02;  

Start 8/21/20 at 09:00


Ondansetron HCl (Zofran) 4 mg PRN Q4HRS  PRN IV NAUSEA/VOMITING;  Start 8/20/20 

at 21:30


Acetaminophen (Tylenol) 650 mg PRN Q4HRS  PRN GT TEMP OVER 100.4F OR MILD PAIN 

Last administered on 8/28/20at 05:51;  Start 8/20/20 at 21:30


Acetaminophen (Tylenol Supp) 650 mg PRN Q4HRS  PRN UT TEMP OVER 100.4F OR MILD 

PAIN;  Start 8/20/20 at 21:30


Docusate Sodium (Colace) 100 mg PRN BID  PRN PO HARD STOOLS Last administered on

8/26/20at 08:26;  Start 8/20/20 at 21:30


Piperacillin Sod/ Tazobactam Sod 3.375 gm/Sodium Chloride 50 ml @  100 mls/hr 

Q6HRS IV  Last administered on 9/2/20at 12:16;  Start 8/21/20 at 00:00;  Stop 

9/2/20 at 17:00;  Status DC


Pantoprazole Sodium (PROTONIX VIAL for IV PUSH) 40 mg DAILYAC IVP  Last 

administered on 9/7/20at 08:09;  Start 8/21/20 at 07:30


Norepinephrine Bitartrate 8 mg/ Dextrose 258 ml @  12.578 mls/ hr CONT  PRN IV 

PER PROTOCOL Last administered on 8/20/20at 22:15;  Start 8/20/20 at 21:45


Sodium Chloride 1,000 ml @  1,000 mls/hr 1X  ONCE IV  Last administered on 

8/20/20at 21:44;  Start 8/20/20 at 21:45;  Stop 8/20/20 at 22:44;  Status DC


Fentanyl Citrate 30 ml @ 0 mls/hr CONT  PRN IV SEE PROTOCOL Last administered on

8/22/20at 10:44;  Start 8/20/20 at 21:45;  Stop 8/22/20 at 14:49;  Status DC


Vecuronium Bromide (Norcuron Bolus) 6 mg PRN Q2HR  PRN IV VENT ASYNCHRONY Last 

administered on 8/22/20at 11:23;  Start 8/20/20 at 21:45;  Stop 8/22/20 at 

14:48;  Status DC


Sodium Chloride 1,000 ml @  1,000 mls/hr 1X  ONCE IV  Last administered on 

8/20/20at 22:36;  Start 8/20/20 at 22:30;  Stop 8/20/20 at 23:29;  Status DC


Methylprednisolone Sodium Succinate (SOLU-Medrol 40MG VIAL) 40 mg Q8HRS IV ;  

Start 8/21/20 at 14:00;  Status UNV


Enoxaparin Sodium (Lovenox 80mg Syringe) 80 mg BID SQ  Last administered on 

8/21/20at 20:53;  Start 8/21/20 at 21:00;  Stop 8/22/20 at 08:57;  Status DC


Insulin Human Lispro (HumaLOG) 6 units TID SQ ;  Start 8/21/20 at 14:00;  Stop 

8/21/20 at 10:05;  Status DC


Insulin Glargine (Lantus Syringe) 10 unit BID SQ  Last administered on 8/22/20at

09:27;  Start 8/21/20 at 21:00;  Stop 8/22/20 at 11:25;  Status DC


Ascorbic Acid (Vitamin C) 250 mg Q6HRS PO  Last administered on 9/8/20at 05:41; 

Start 8/21/20 at 12:00


Sodium Chloride 1,000 ml @  75 mls/hr [id number] IV  Last administered on 8/24/20at

19:33;  Start 8/21/20 at 10:00;  Stop 8/25/20 at 10:23;  Status DC


Insulin Human Lispro (HumaLOG) 6 units Q6HRS SQ  Last administered on 8/22/20at 

06:11;  Start 8/21/20 at 12:00;  Stop 8/22/20 at 11:25;  Status DC


Insulin Glargine (Lantus Syringe) 10 unit 1X  ONCE SQ  Last administered on 

8/21/20at 11:40;  Start 8/21/20 at 11:00;  Stop 8/21/20 at 11:01;  Status DC


Enoxaparin Sodium (Lovenox 60mg Syringe) 60 mg BID SQ  Last administered on 8 /24/20at 08:55;  Start 8/22/20 at 09:00;  Stop 8/24/20 at 16:45;  Status DC


Vecuronium Bromide (Norcuron Bolus) 6 mg PRN Q6HRS  PRN IV SEDATION (2nd Choice)

Last administered on 8/24/20at 16:36;  Start 8/22/20 at 09:30


Insulin Glargine (Lantus Syringe) 14 unit BID SQ  Last administered on 8/24/20at

09:24;  Start 8/22/20 at 21:00;  Stop 8/24/20 at 12:27;  Status DC


Insulin Human Lispro (HumaLOG) 8 units Q6HRS SQ  Last administered on 8/24/20at 

12:11;  Start 8/22/20 at 12:00;  Stop 8/24/20 at 12:27;  Status DC


Fentanyl Citrate 55 ml @ 0 mls/hr CONT  PRN IV SEE PROTOCOL Last administered on

9/8/20at 04:44;  Start 8/22/20 at 14:49


Lorazepam (Ativan Inj) 1 mg PRN Q1HR  PRN IVP ANXIETY / AGITATION- MODERATE;  

Start 8/22/20 at 16:15


Lorazepam (Ativan Inj) 2 mg PRN Q1HR  PRN IVP ANXIETY / AGITATION- SEVERE Last 

administered on 8/23/20at 13:10;  Start 8/22/20 at 16:30


Insulin Human Lispro (HumaLOG) 6 units 1X  ONCE SQ  Last administered on 

8/23/20at 06:20;  Start 8/23/20 at 06:15;  Stop 8/23/20 at 06:16;  Status DC


Hydralazine HCl (Apresoline Inj) 10 mg PRN Q4HRS  PRN IVP ELEVATED BP, SEE 

COMMENTS Last administered on 9/7/20at 09:03;  Start 8/24/20 at 09:15


Alteplase, Recombinant (Cathflo For Central Catheter Clearance) 1 mg 1X  ONCE 

INT CAT  Last administered on 8/24/20at 12:05;  Start 8/24/20 at 11:00;  Stop 

8/24/20 at 11:01;  Status DC


Insulin Glargine (Lantus Syringe) 18 unit BID SQ  Last administered on 8/25/20at

08:30;  Start 8/24/20 at 21:00;  Stop 8/25/20 at 10:03;  Status DC


Insulin Human Lispro (HumaLOG) 10 units Q6HRS SQ  Last administered on 8/25/20at

05:53;  Start 8/24/20 at 18:00;  Stop 8/25/20 at 10:03;  Status DC


Vecuronium Bromide 50 mg/ Miscellaneous 50 ml @  4.027 mls/ hr CONT  PRN IV SEE 

I/O RECORD Last administered on 9/7/20at 15:08;  Start 8/24/20 at 16:15


Enoxaparin Sodium (Lovenox Per Pharmacy Treatment Dosing) 1 each PRN DAILY  PRN 

MC SEE COMMENTS;  Start 8/24/20 at 16:45;  Stop 9/2/20 at 18:12;  Status DC


Enoxaparin Sodium (Lovenox 80mg Syringe) 80 mg Q12HR SQ  Last administered on 

9/2/20at 08:26;  Start 8/24/20 at 21:00;  Stop 9/2/20 at 18:10;  Status DC


Furosemide (Lasix) 40 mg 1X  ONCE IVP  Last administered on 8/25/20at 10:19;  

Start 8/25/20 at 10:00;  Stop 8/25/20 at 10:01;  Status DC


Insulin Glargine (Lantus Syringe) 24 unit BID SQ  Last administered on 9/7/20at 

20:52;  Start 8/25/20 at 10:30


Insulin Human Lispro (HumaLOG) 14 units Q6HRS SQ  Last administered on 8/31/20at

06:06;  Start 8/25/20 at 12:00;  Stop 8/31/20 at 11:48;  Status DC


Info (Anti-Coagulation Monitoring By Pharmacy) 1 each PRN DAILY  PRN MC SEE 

COMMENTS Last administered on 9/2/20at 12:39;  Start 8/26/20 at 10:00;  Stop 

9/3/20 at 13:31;  Status DC


Polyethylene Glycol (miraLAX PACKET) 17 gm DAILY PO  Last administered on 

9/7/20at 09:02;  Start 8/28/20 at 11:00


Dextrose/Sodium Chloride 1,000 ml @  75 mls/hr [id number] IV  Last administered on 

9/4/20at 15:15;  Start 8/29/20 at 09:30;  Stop 9/4/20 at 21:59;  Status DC


Insulin Human Lispro (HumaLOG) 0-5 UNITS Q6HRS SQ  Last administered on 9/7/20at

17:14;  Start 8/31/20 at 12:00


Dextrose (Dextrose 50%-Water Syringe) 12.5 gm PRN Q15MIN  PRN IV SEE COMMENTS;  

Start 8/31/20 at 12:00


Enoxaparin Sodium (Lovenox 40mg Syringe) 40 mg Q12HR SQ  Last administered on 

9/7/20at 20:52;  Start 9/2/20 at 21:00


Info (Tpn Per Pharmacy) 1 each PRN DAILY  PRN MC SEE COMMENTS Last administered 

on 9/7/20at 11:01;  Start 9/4/20 at 09:30


Sodium Acetate 90 meq/Potassium Acetate 50 meq/ Potassium Phosphate 13.6 

mmol/Magnesium Sulfate 10 meq/ Calcium Gluconate 10 meq/ Multivitamins 10 

ml/Chromium/ Copper/Manganese/ Seleni/Zn 1 ml/ Total Parenteral Nutrition/Amino 

Acids/Dextrose 1,512 ml @  63 mls/hr TPN  CONT IV  Last administered on 9/4/20at

22:05;  Start 9/4/20 at 22:00;  Stop 9/5/20 at 21:59;  Status DC


Sodium Acetate 90 meq/Potassium Acetate 40 meq/ Potassium Phosphate 13.6 

mmol/Magnesium Sulfate 8 meq/ Calcium Gluconate 10 meq/ Multivitamins 10 

ml/Chromium/ Copper/Manganese/ Seleni/Zn 1 ml/ Total Parenteral Nutrition/Amino 

Acids/Dextrose 1,512 ml @  63 mls/hr TPN  CONT IV  Last administered on 9/5/20at

22:11;  Start 9/5/20 at 22:00;  Stop 9/6/20 at 21:59;  Status DC


Sodium Acetate 90 meq/Potassium Acetate 10 meq/ Potassium Phosphate 20 mmol/ 

Magnesium Sulfate 8 meq/Calcium Gluconate 10 meq/ Multivitamins 10 ml/Chromium/ 

Copper/Manganese/ Seleni/Zn 1 ml/ Total Parenteral Nutrition/Amino 

Acids/Dextrose 1,512 ml @  63 mls/hr TPN  CONT IV  Last administered on 9/6/20at

21:49;  Start 9/6/20 at 22:00;  Stop 9/7/20 at 21:59;  Status DC


Sodium Acetate 90 meq/Potassium Acetate 10 meq/ Potassium Phosphate 20 mmol/ 

Magnesium Sulfate 8 meq/Calcium Gluconate 10 meq/ Multivitamins 10 ml/Chromium/ 

Copper/Manganese/ Seleni/Zn 1 ml/ Total Parenteral Nutrition/Amino 

Acids/Dextrose 1,512 ml @  63 mls/hr TPN  CONT IV  Last administered on 9/7/20at

22:38;  Start 9/7/20 at 22:00;  Stop 9/8/20 at 21:59


Vitals/I & O





Vital Sign - Last 24 Hours








 9/7/20 9/7/20 9/7/20 9/7/20





 08:00 08:00 08:10 09:00


 


Temp 97.7   





 97.7   


 


Pulse 120   120


 


Resp 22   22


 


B/P (MAP) 191/80 (117)   181/94 (123)


 


Pulse Ox 96  95 95


 


O2 Delivery Ventilator Mechanical Ventilator Ventilator Ventilator


 


    





    





 9/7/20 9/7/20 9/7/20 9/7/20





 09:03 10:00 11:00 12:00


 


Temp    97.7





    97.7


 


Pulse 118 120 120 120


 


Resp  22 22 22


 


B/P (MAP) 193/110  165/82 (109) 139/76 (97)


 


Pulse Ox  95 95 95


 


O2 Delivery  Ventilator Ventilator Ventilator


 


    





    





 9/7/20 9/7/20 9/7/20 9/7/20





 12:00 12:30 13:00 14:00


 


Pulse   107 108


 


Resp   22 22


 


B/P (MAP)   122/71 (88) 121/72 (88)


 


Pulse Ox  95 95 98


 


O2 Delivery Mechanical Ventilator Ventilator Ventilator Ventilator





 9/7/20 9/7/20 9/7/20 9/7/20





 14:02 15:00 16:00 16:00


 


Temp   98.1 





   98.1 


 


Pulse  93 76 


 


Resp 22 22 22 


 


B/P (MAP)  115/73 (87) 99/51 (67) 


 


Pulse Ox  95 99 


 


O2 Delivery Ventilator Ventilator Ventilator Mechanical Ventilator


 


    





    





 9/7/20 9/7/20 9/7/20 9/7/20





 16:15 17:00 18:00 19:00


 


Pulse  82 80 78


 


Resp  22 22 22


 


B/P (MAP)  115/70 (85) 101/69 (80) 94/64 (74)


 


Pulse Ox 95 95 97 97


 


O2 Delivery Ventilator Ventilator Ventilator Ventilator





 9/7/20 9/7/20 9/7/20 9/7/20





 20:00 20:00 20:27 21:00


 


Temp  97.8  





  97.8  


 


Pulse  76  74


 


Resp  22 22


 


B/P (MAP)  93/62 (72)  103/66 (78)


 


Pulse Ox  97 95 97


 


O2 Delivery Mechanical Ventilator Ventilator Ventilator Ventilator


 


    





    





 9/7/20 9/7/20 9/8/20 9/8/20





 22:00 23:00 00:00 00:00


 


Temp   98.3 





   98.3 


 


Pulse 72 69 66 


 


Resp 22 22 22 


 


B/P (MAP) 99/62 (74) 96/60 (72) 94/62 (73) 


 


Pulse Ox 97 96 96 


 


O2 Delivery Ventilator Ventilator Ventilator Mechanical Ventilator


 


    





    





 9/8/20 9/8/20 9/8/20 9/8/20





 00:03 01:00 02:00 03:00


 


Pulse  65 63 61


 


Resp  22 22 22


 


B/P (MAP)  105/68 (80) 99/57 (71) 99/60 (73)


 


Pulse Ox 96 94 95 96


 


O2 Delivery Ventilator Ventilator Ventilator Ventilator





 9/8/20 9/8/20 9/8/20 9/8/20





 04:00 04:00 04:10 05:00


 


Temp 97.6   





 97.6   


 


Pulse 66   66


 


Resp 22   22


 


B/P (MAP) 101/58 (72)   107/60 (76)


 


Pulse Ox 93  95 94


 


O2 Delivery Ventilator Mechanical Ventilator Ventilator Ventilator


 


    





    





 9/8/20 9/8/20  





 06:00 07:00  


 


Pulse 65 59  


 


Resp 22 22  


 


B/P (MAP) 100/58 (72) 96/55 (69)  


 


Pulse Ox 94 94  


 


O2 Delivery Ventilator Ventilator  














Intake and Output   


 


 9/7/20 9/7/20 9/8/20





 15:00 23:00 07:00


 


Intake Total  1689.29 ml 1012.3 ml


 


Output Total 325 ml 160 ml 375 ml


 


Balance -325 ml 1529.29 ml 637.3 ml











Justicifation of Admission Dx:


Justifications for Admission:


Justification of Admission Dx:  Yes











ELIJAH AGUSTIN MD              Sep 8, 2020 07:23

## 2020-09-08 NOTE — NUR
SS following up with discharge planning. SS reviewed pt chart and discussed with pt RN. Pt 
remains on the vent at this time. COVID19 positive. Pt on TPN. DNR. SS will continue to 
follow for discharge planning.

## 2020-09-08 NOTE — PDOC
PULMONARY PROGRESS NOTES


DATE: 9/8/20 


TIME: 08:49


Subjective


remains intubated/sedated


AC mode ,95 FIO2/14 PEEP,  desat w any movement


Patient sedated


Vitals





Vital Signs








  Date Time  Temp Pulse Resp B/P (MAP) Pulse Ox O2 Delivery O2 Flow Rate FiO2


 


9/8/20 08:00 97.5 57 22 104/59 (74) 93 Ventilator  





 97.5       








Comments


visual exam done due to COVID-Pandemic


intubated sedated


VENT 100% and PEEP 14 


nc at


rrr


no accessory muscle use


no skin rash


lower extremity edema


Lungs:  Crackles, Other (Intubated)


Labs





Laboratory Tests








Test


 9/6/20


08:54 9/6/20


11:27 9/6/20


17:46 9/6/20


21:38


 


O2 Saturation 86 % (92-99)    


 


Arterial Blood pH


 7.42


(7.35-7.45) 


 


 





 


Arterial Blood pCO2 at


Patient Temp 45 mmHg


(35-46) 


 


 





 


Arterial Blood pO2 at Patient


Temp 54 mmHg


() 


 


 





 


Arterial Blood HCO3


 29 mmol/L


(21-28) 


 


 





 


Arterial Blood Base Excess


 4 mmol/L


(-3-3) 


 


 





 


FiO2 100    


 


Glucose (Fingerstick)


 


 236 mg/dL


(70-99) 237 mg/dL


(70-99) 229 mg/dL


(70-99)


 


Test


 9/7/20


00:43 9/7/20


05:45 9/7/20


06:10 9/7/20


08:10


 


Glucose (Fingerstick)


 226 mg/dL


(70-99) 


 202 mg/dL


(70-99) 





 


Sodium Level


 


 140 mmol/L


(136-145) 


 





 


Potassium Level


 


 4.0 mmol/L


(3.5-5.1) 


 





 


Chloride Level


 


 103 mmol/L


() 


 





 


Carbon Dioxide Level


 


 32 mmol/L


(21-32) 


 





 


Anion Gap  5 (6-14)   


 


Blood Urea Nitrogen


 


 20 mg/dL


(8-26) 


 





 


Creatinine


 


 0.5 mg/dL


(0.7-1.3) 


 





 


Estimated GFR


(Cockcroft-Gault) 


 166.9 


 


 





 


Glucose Level


 


 211 mg/dL


(70-99) 


 





 


Calcium Level


 


 7.7 mg/dL


(8.5-10.1) 


 





 


Phosphorus Level


 


 3.2 mg/dL


(2.6-4.7) 


 





 


Magnesium Level


 


 2.2 mg/dL


(1.8-2.4) 


 





 


O2 Saturation    93 % (92-99) 


 


Arterial Blood pH


 


 


 


 7.39


(7.35-7.45)


 


Arterial Blood pCO2 at


Patient Temp 


 


 


 48 mmHg


(35-46)


 


Arterial Blood pO2 at Patient


Temp 


 


 


 71 mmHg


()


 


Arterial Blood HCO3


 


 


 


 28 mmol/L


(21-28)


 


Arterial Blood Base Excess


 


 


 


 3 mmol/L


(-3-3)


 


FiO2    100 


 


Test


 9/7/20


12:33 9/7/20


17:11 9/8/20


00:51 9/8/20


06:13


 


Glucose (Fingerstick)


 223 mg/dL


(70-99) 210 mg/dL


(70-99) 193 mg/dL


(70-99) 223 mg/dL


(70-99)


 


Test


 9/8/20


06:15 


 


 





 


Sodium Level


 141 mmol/L


(136-145) 


 


 





 


Potassium Level


 4.1 mmol/L


(3.5-5.1) 


 


 





 


Chloride Level


 105 mmol/L


() 


 


 





 


Carbon Dioxide Level


 36 mmol/L


(21-32) 


 


 





 


Anion Gap 0 (6-14)    


 


Blood Urea Nitrogen


 21 mg/dL


(8-26) 


 


 





 


Creatinine


 0.6 mg/dL


(0.7-1.3) 


 


 





 


Estimated GFR


(Cockcroft-Gault) 135.2 


 


 


 





 


Glucose Level


 187 mg/dL


(70-99) 


 


 





 


Calcium Level


 7.2 mg/dL


(8.5-10.1) 


 


 





 


Phosphorus Level


 3.2 mg/dL


(2.6-4.7) 


 


 





 


Magnesium Level


 2.2 mg/dL


(1.8-2.4) 


 


 











Laboratory Tests








Test


 9/7/20


12:33 9/7/20


17:11 9/8/20


00:51 9/8/20


06:13


 


Glucose (Fingerstick)


 223 mg/dL


(70-99) 210 mg/dL


(70-99) 193 mg/dL


(70-99) 223 mg/dL


(70-99)


 


Test


 9/8/20


06:15 


 


 





 


Sodium Level


 141 mmol/L


(136-145) 


 


 





 


Potassium Level


 4.1 mmol/L


(3.5-5.1) 


 


 





 


Chloride Level


 105 mmol/L


() 


 


 





 


Carbon Dioxide Level


 36 mmol/L


(21-32) 


 


 





 


Anion Gap 0 (6-14)    


 


Blood Urea Nitrogen


 21 mg/dL


(8-26) 


 


 





 


Creatinine


 0.6 mg/dL


(0.7-1.3) 


 


 





 


Estimated GFR


(Cockcroft-Gault) 135.2 


 


 


 





 


Glucose Level


 187 mg/dL


(70-99) 


 


 





 


Calcium Level


 7.2 mg/dL


(8.5-10.1) 


 


 





 


Phosphorus Level


 3.2 mg/dL


(2.6-4.7) 


 


 





 


Magnesium Level


 2.2 mg/dL


(1.8-2.4) 


 


 











Comments


 CXR 


reviewed 


1. Bilateral diffuse infiltrates are increased in the left base and mildly


improved elsewhere.





Impression


.


1.  Acute hypoxic respiratory failure secondary to COVID-19 pneumonia/ARDS/acute

lung injury--


2.  Abnormal chest x-ray with bilateral infiltrates consistent with COVID-19 

pneumonia.


3.  Hypotension secondary to combination of hypovolemia and sepsis status post 2

liters of IV fluids. no pressors, 


4.  Acute kidney injury--resolved


5.  Severe protein-calorie malnutrition.


6.  Abnormal D-dimer related to COVID-19 pneumonia. Full dose lovenox added 8/24

due to increase in D-dimer to 20, now decreasing to 3.5. Lovenox dose reduced


7. Fevers ,resolved





Plan


.


Discussed with RT titrate FiO2


Continue current support


try to taper off propofol  if possible


will not escalate any further PEEP, to avoid barotrauma


Lovenox dose reduced since D-Dimer down to 3.5 from 20


Broad spectrum antibiotic.


Cont.  IV steroids, with taper, 


Stress ulcer prophylaxis.


s/p Plasma 


Pt doing poorly not expected to survive


prognosis poor 


D/W RN and RT  


PT. is DNR 





critically ill 


Critical care time 30 minutes, family aware of pt. clinical condition, no 

overlap


Dr Benjamin spoke with wife











SG BENJAMIN MD               Sep 8, 2020 08:49

## 2020-09-08 NOTE — NUR
Pharmacy TPN Dosing Note



S: RENATO GILLIAM is a 65 year old M Currently receiving Central Continuous TPN started 
09/04/20



B:Pertinent PMH: critical illness

Height: 5 feet, 2 inches

Weight: 100.9 kg



Current diet: npo 



LABS:

Sodium:    141 

Potassium: 4.1 

Chloride:  105 

Calcium:   7.2 

Corrected Calcium: 9.52 

Magnesium: 2.2 

CO2:       36 

SCr:       0.6 

Glucose:   169-223 

Albumin:   1.1 

AST:       31 

ALT:       36 



TPN FORMULA:

TPN TYPE:  Central Continuous

AMINO ACIDS:         60 gm

DEXTROSE:            195 gm

LIPIDS:              0 gm

SODIUM ACETATE:      90 mEq

POTASSIUM CHLORIDE:  10 mEq

POTASSIUM PHOSPHATE: 20 mmol

MAGNESIUM:           8 mEq

CALCIUM:             10 mEq

MULTIPLE VITAMIN:    10 ml

TRACE ELEMENTS:      1 ml(s)



TPN PLAN:  

Change KAce to KCl due to elevated TCO2 today.





R: Change TPN as noted above.

Will monitor electrolytes, glucose, and tolerance to TPN.



 SERENE MALDONADO [facility], 09/08/20 [id number]

## 2020-09-08 NOTE — NUR
Pt O2 sats have decreased into the 70's with no activity around 1600. FiO2 increased to 
100%. Pt remains paralyzed on the vent with sedation. Suctioned pt with little secretions 
removed. RT notified of pt condition and FiO2 increase.

## 2020-09-09 VITALS — DIASTOLIC BLOOD PRESSURE: 72 MMHG | SYSTOLIC BLOOD PRESSURE: 174 MMHG

## 2020-09-09 VITALS — DIASTOLIC BLOOD PRESSURE: 72 MMHG | SYSTOLIC BLOOD PRESSURE: 115 MMHG

## 2020-09-09 VITALS — SYSTOLIC BLOOD PRESSURE: 149 MMHG | DIASTOLIC BLOOD PRESSURE: 70 MMHG

## 2020-09-09 VITALS — SYSTOLIC BLOOD PRESSURE: 144 MMHG | DIASTOLIC BLOOD PRESSURE: 80 MMHG

## 2020-09-09 VITALS — SYSTOLIC BLOOD PRESSURE: 175 MMHG | DIASTOLIC BLOOD PRESSURE: 75 MMHG

## 2020-09-09 VITALS — DIASTOLIC BLOOD PRESSURE: 77 MMHG | SYSTOLIC BLOOD PRESSURE: 175 MMHG

## 2020-09-09 VITALS — SYSTOLIC BLOOD PRESSURE: 159 MMHG | DIASTOLIC BLOOD PRESSURE: 67 MMHG

## 2020-09-09 VITALS — SYSTOLIC BLOOD PRESSURE: 153 MMHG | DIASTOLIC BLOOD PRESSURE: 80 MMHG

## 2020-09-09 VITALS — SYSTOLIC BLOOD PRESSURE: 45 MMHG | DIASTOLIC BLOOD PRESSURE: 29 MMHG

## 2020-09-09 VITALS — DIASTOLIC BLOOD PRESSURE: 55 MMHG | SYSTOLIC BLOOD PRESSURE: 118 MMHG

## 2020-09-09 VITALS — SYSTOLIC BLOOD PRESSURE: 69 MMHG | DIASTOLIC BLOOD PRESSURE: 46 MMHG

## 2020-09-09 VITALS — SYSTOLIC BLOOD PRESSURE: 130 MMHG | DIASTOLIC BLOOD PRESSURE: 74 MMHG

## 2020-09-09 VITALS — SYSTOLIC BLOOD PRESSURE: 148 MMHG | DIASTOLIC BLOOD PRESSURE: 73 MMHG

## 2020-09-09 VITALS — SYSTOLIC BLOOD PRESSURE: 161 MMHG | DIASTOLIC BLOOD PRESSURE: 76 MMHG

## 2020-09-09 VITALS — DIASTOLIC BLOOD PRESSURE: 72 MMHG | SYSTOLIC BLOOD PRESSURE: 141 MMHG

## 2020-09-09 VITALS — DIASTOLIC BLOOD PRESSURE: 74 MMHG | SYSTOLIC BLOOD PRESSURE: 147 MMHG

## 2020-09-09 VITALS — SYSTOLIC BLOOD PRESSURE: 127 MMHG | DIASTOLIC BLOOD PRESSURE: 75 MMHG

## 2020-09-09 VITALS — DIASTOLIC BLOOD PRESSURE: 72 MMHG | SYSTOLIC BLOOD PRESSURE: 138 MMHG

## 2020-09-09 LAB
BASE EXCESS ABG: 5 MMOL/L (ref -3–3)
HCO3 BLDA-SCNC: 33 MMOL/L (ref 21–28)
INSPIRATION SETTING TIME VENT: 100
PCO2 BLDA: 62 MMHG (ref 35–46)
PO2 BLDA: 50 MMHG (ref 65–108)
SAO2 % BLDA: 84 % (ref 92–99)

## 2020-09-09 RX ADMIN — METHYLPREDNISOLONE SODIUM SUCCINATE SCH MG: 40 INJECTION, POWDER, FOR SOLUTION INTRAMUSCULAR; INTRAVENOUS at 13:48

## 2020-09-09 RX ADMIN — MIDAZOLAM PRN MLS/HR: 5 INJECTION, SOLUTION INTRAMUSCULAR; INTRAVENOUS at 00:34

## 2020-09-09 RX ADMIN — METOPROLOL TARTRATE SCH MG: 5 INJECTION INTRAVENOUS at 00:07

## 2020-09-09 RX ADMIN — POLYETHYLENE GLYCOL 3350 SCH GM: 17 POWDER, FOR SOLUTION ORAL at 08:15

## 2020-09-09 RX ADMIN — ENOXAPARIN SODIUM SCH MG: 40 INJECTION SUBCUTANEOUS at 08:16

## 2020-09-09 RX ADMIN — INSULIN LISPRO SCH UNITS: 100 INJECTION, SOLUTION INTRAVENOUS; SUBCUTANEOUS at 00:08

## 2020-09-09 RX ADMIN — PROPOFOL PRN MLS/HR: 10 INJECTION, EMULSION INTRAVENOUS at 00:34

## 2020-09-09 RX ADMIN — ZINC SULFATE CAP 220 MG (50 MG ELEMENTAL ZN) SCH MG: 220 (50 ZN) CAP at 08:16

## 2020-09-09 RX ADMIN — OXYCODONE HYDROCHLORIDE AND ACETAMINOPHEN SCH MG: 500 TABLET ORAL at 06:11

## 2020-09-09 RX ADMIN — Medication PRN MLS/HR: at 05:11

## 2020-09-09 RX ADMIN — PROPOFOL PRN MLS/HR: 10 INJECTION, EMULSION INTRAVENOUS at 09:43

## 2020-09-09 RX ADMIN — OXYCODONE HYDROCHLORIDE AND ACETAMINOPHEN SCH MG: 500 TABLET ORAL at 00:07

## 2020-09-09 RX ADMIN — VECURONIUM BROMIDE PRN MLS/HR: 1 INJECTION, POWDER, LYOPHILIZED, FOR SOLUTION INTRAVENOUS at 08:17

## 2020-09-09 RX ADMIN — INSULIN GLARGINE SCH UNIT: 100 INJECTION, SOLUTION SUBCUTANEOUS at 08:18

## 2020-09-09 RX ADMIN — PROPOFOL PRN MLS/HR: 10 INJECTION, EMULSION INTRAVENOUS at 13:49

## 2020-09-09 RX ADMIN — Medication PRN EACH: at 10:02

## 2020-09-09 RX ADMIN — METHYLPREDNISOLONE SODIUM SUCCINATE SCH MG: 40 INJECTION, POWDER, FOR SOLUTION INTRAMUSCULAR; INTRAVENOUS at 06:00

## 2020-09-09 RX ADMIN — METOPROLOL TARTRATE SCH MG: 5 INJECTION INTRAVENOUS at 11:43

## 2020-09-09 RX ADMIN — METOPROLOL TARTRATE SCH MG: 5 INJECTION INTRAVENOUS at 06:11

## 2020-09-09 RX ADMIN — INSULIN LISPRO SCH UNITS: 100 INJECTION, SOLUTION INTRAVENOUS; SUBCUTANEOUS at 11:43

## 2020-09-09 RX ADMIN — MIDAZOLAM PRN MLS/HR: 5 INJECTION, SOLUTION INTRAMUSCULAR; INTRAVENOUS at 08:16

## 2020-09-09 RX ADMIN — INSULIN LISPRO SCH UNITS: 100 INJECTION, SOLUTION INTRAVENOUS; SUBCUTANEOUS at 06:14

## 2020-09-09 RX ADMIN — PANTOPRAZOLE SODIUM SCH MG: 40 INJECTION, POWDER, FOR SOLUTION INTRAVENOUS at 08:16

## 2020-09-09 RX ADMIN — OXYCODONE HYDROCHLORIDE AND ACETAMINOPHEN SCH MG: 500 TABLET ORAL at 11:43

## 2020-09-09 NOTE — PDOC
PULMONARY PROGRESS NOTES


DATE: 9/9/20 


TIME: 08:39


Subjective


remains intubated/sedated


AC mode ,95 FIO2/14 PEEP,  desat w any movement


Patient sedated


Vitals





Vital Signs








  Date Time  Temp Pulse Resp B/P (MAP) Pulse Ox O2 Delivery O2 Flow Rate FiO2


 


9/9/20 08:00 98.2 94 22 148/73 (98) 83 Ventilator  





 98.2       








Comments


visual exam done due to COVID-Pandemic


intubated sedated


VENT 100% and PEEP 14 


nc at


rrr


no accessory muscle use


no skin rash


lower extremity edema


Lungs:  Crackles, Other (Intubated)


Labs





Laboratory Tests








Test


 9/7/20


12:33 9/7/20


17:11 9/8/20


00:51 9/8/20


06:13


 


Glucose (Fingerstick)


 223 mg/dL


(70-99) 210 mg/dL


(70-99) 193 mg/dL


(70-99) 223 mg/dL


(70-99)


 


Test


 9/8/20


06:15 9/8/20


08:45 9/8/20


11:39 9/8/20


17:46


 


Sodium Level


 141 mmol/L


(136-145) 


 


 





 


Potassium Level


 4.1 mmol/L


(3.5-5.1) 


 


 





 


Chloride Level


 105 mmol/L


() 


 


 





 


Carbon Dioxide Level


 36 mmol/L


(21-32) 


 


 





 


Anion Gap 0 (6-14)    


 


Blood Urea Nitrogen


 21 mg/dL


(8-26) 


 


 





 


Creatinine


 0.6 mg/dL


(0.7-1.3) 


 


 





 


Estimated GFR


(Cockcroft-Gault) 135.2 


 


 


 





 


Glucose Level


 187 mg/dL


(70-99) 


 


 





 


Calcium Level


 7.2 mg/dL


(8.5-10.1) 


 


 





 


Phosphorus Level


 3.2 mg/dL


(2.6-4.7) 


 


 





 


Magnesium Level


 2.2 mg/dL


(1.8-2.4) 


 


 





 


O2 Saturation  91 % (92-99)   


 


Arterial Blood pH


 


 7.42


(7.35-7.45) 


 





 


Arterial Blood pCO2 at


Patient Temp 


 49 mmHg


(35-46) 


 





 


Arterial Blood pO2 at Patient


Temp 


 65 mmHg


() 


 





 


Arterial Blood HCO3


 


 32 mmol/L


(21-28) 


 





 


Arterial Blood Base Excess


 


 6 mmol/L


(-3-3) 


 





 


FiO2  95   


 


Glucose (Fingerstick)


 


 


 169 mg/dL


(70-99) 181 mg/dL


(70-99)


 


Test


 9/8/20


23:55 9/9/20


05:52 


 





 


Glucose (Fingerstick)


 172 mg/dL


(70-99) 167 mg/dL


(70-99) 


 











Laboratory Tests








Test


 9/8/20


08:45 9/8/20


11:39 9/8/20


17:46 9/8/20


23:55


 


O2 Saturation 91 % (92-99)    


 


Arterial Blood pH


 7.42


(7.35-7.45) 


 


 





 


Arterial Blood pCO2 at


Patient Temp 49 mmHg


(35-46) 


 


 





 


Arterial Blood pO2 at Patient


Temp 65 mmHg


() 


 


 





 


Arterial Blood HCO3


 32 mmol/L


(21-28) 


 


 





 


Arterial Blood Base Excess


 6 mmol/L


(-3-3) 


 


 





 


FiO2 95    


 


Glucose (Fingerstick)


 


 169 mg/dL


(70-99) 181 mg/dL


(70-99) 172 mg/dL


(70-99)


 


Test


 9/9/20


05:52 


 


 





 


Glucose (Fingerstick)


 167 mg/dL


(70-99) 


 


 











Comments


 CXR 


reviewed 


1. Bilateral diffuse infiltrates are increased in the left base and mildly


improved elsewhere.





Impression


.


1.  Acute hypoxic respiratory failure secondary to COVID-19 pneumonia/ARDS/acute

lung injury--


2.  Abnormal chest x-ray with bilateral infiltrates consistent with COVID-19 

pneumonia.


3.  Hypotension secondary to combination of hypovolemia and sepsis status post 2

liters of IV fluids. no pressors, 


4.  Acute kidney injury--resolved


5.  Severe protein-calorie malnutrition.


6.  Abnormal D-dimer related to COVID-19 pneumonia. Full dose lovenox added 8/24

due to increase in D-dimer to 20, now decreasing to 3.5. Lovenox dose reduced


7. Fevers ,resolved





Plan


.


Patient doing poorly today, 100% FiO2 14 of PEEP saturations 69%


We will increase Lovenox to 1 mg/kg


Continue current support


try to taper off propofol  if possible


will not escalate any further PEEP, to avoid barotrauma


Broad spectrum antibiotic.


Cont.  IV steroids, with taper, 


Stress ulcer prophylaxis.


s/p Plasma 


Pt doing poorly not expected to survive


prognosis poor 


D/W RN and RT  


PT. is DNR 





critically ill 


Critical care time 30 minutes, family aware of pt. clinical condition, no 

overlap


Dr Benjamin spoke with wife











SG BENJAMIN MD               Sep 9, 2020 08:39

## 2020-09-09 NOTE — RAD
CHEST AP ONLY 9/9/2020 6:00 AM

 

INDICATION: Respiratory failure

 

COMPARISON: 8/31/2020

 

TECHNIQUE: Portable frontal view of the chest is provided.

 

FINDINGS:

 

The cardiomediastinal silhouette is similar in appearance. Small left 

pleural effusion with adjacent breast atelectasis versus infiltrate 

appears similar. Improved aeration of the right lung base with decrease in

tracer pleural effusion. There is mixed interstitial and alveolar airspace

disease with improved aeration compared the prior examination. No 

pneumothorax.

 

Endotracheal tube, nasogastric tube and right IJ central venous catheter. 

Similar position given differences in technique.

 

No suspicious osseous abnormality.

 

IMPRESSION:

 

Improved aeration of the lungs compared to prior examination with 

persistent bilateral mixed interstitial and alveolar airspace disease.

 

Electronically signed by: Tasha Araya MD (9/9/2020 1:52 PM) 

[id number]

## 2020-09-09 NOTE — NUR
SS following up with discharge planning. COVID19 positive. Pt remains on the vent at this 
time. Pt DNR. SS will continue to follow for discharge planning.

## 2020-09-09 NOTE — PDOC3
Discharge Summary


Visit Information


Date of Admission:  Aug 21, 2020


Date of Discharge:  Sep 9, 2020


Admitting Diagnosis Comment:


Acute hypoxemic respiratory failure


Leukocytosis with bandemia secondary to septic process


Hyponatremia secondary to low effective circulatory volume


Acue renal failure due to vasomotor nephropathy most likely 


Hyperglycemia


mild transaminitis


Final Diagnosis


COVID-19 positive


Acute hypoxemic respiratory failure


Bilateral perihilar infiltrates are  seen which appear increased slightly


Leukocytosis with bandemia secondary to septic process


Hyponatremia secondary to low effective circulatory volume, now hypernatremic


Acue renal failure due to vasomotor nephropathy most likely 


Hyperglycemia


transaminitis 


Uncontrolled hypertension


Abdominal distention





Brief Hospital Course


Allergies





                                    Allergies








Coded Allergies Type Severity Reaction Last Updated Verified


 


  No Known Drug Allergies    20 No








Vital Signs





Vital Signs








  Date Time  Temp Pulse Resp B/P (MAP) Pulse Ox O2 Delivery O2 Flow Rate FiO2


 


20 17:00  67 22 45/29 (34) 26 Ventilator  


 


20 12:00 98.0       





 98.0       








Lab Results





Laboratory Tests








Test


 20


00:51 20


06:13 20


06:15 20


08:45


 


Glucose (Fingerstick)


 193 mg/dL


(70-99) 223 mg/dL


(70-99) 


 





 


Sodium Level


 


 


 141 mmol/L


(136-145) 





 


Potassium Level


 


 


 4.1 mmol/L


(3.5-5.1) 





 


Chloride Level


 


 


 105 mmol/L


() 





 


Carbon Dioxide Level


 


 


 36 mmol/L


(21-32) 





 


Anion Gap   0 (6-14)  


 


Blood Urea Nitrogen


 


 


 21 mg/dL


(8-26) 





 


Creatinine


 


 


 0.6 mg/dL


(0.7-1.3) 





 


Estimated GFR


(Cockcroft-Gault) 


 


 135.2 


 





 


Glucose Level


 


 


 187 mg/dL


(70-99) 





 


Calcium Level


 


 


 7.2 mg/dL


(8.5-10.1) 





 


Phosphorus Level


 


 


 3.2 mg/dL


(2.6-4.7) 





 


Magnesium Level


 


 


 2.2 mg/dL


(1.8-2.4) 





 


O2 Saturation    91 % (92-99) 


 


Arterial Blood pH


 


 


 


 7.42


(7.35-7.45)


 


Arterial Blood pCO2 at


Patient Temp 


 


 


 49 mmHg


(35-46)


 


Arterial Blood pO2 at Patient


Temp 


 


 


 65 mmHg


()


 


Arterial Blood HCO3


 


 


 


 32 mmol/L


(21-28)


 


Arterial Blood Base Excess


 


 


 


 6 mmol/L


(-3-3)


 


FiO2    95 


 


Test


 20


11:39 20


17:46 20


23:55 20


05:52


 


Glucose (Fingerstick)


 169 mg/dL


(70-99) 181 mg/dL


(70-99) 172 mg/dL


(70-99) 167 mg/dL


(70-99)


 


Test


 20


08:45 20


11:36 


 





 


O2 Saturation 84 % (92-99)    


 


Arterial Blood pH


 7.34


(7.35-7.45) 


 


 





 


Arterial Blood pCO2 at


Patient Temp 62 mmHg


(35-46) 


 


 





 


Arterial Blood pO2 at Patient


Temp 50 mmHg


() 


 


 





 


Arterial Blood HCO3


 33 mmol/L


(21-28) 


 


 





 


Arterial Blood Base Excess


 5 mmol/L


(-3-3) 


 


 





 


FiO2 100    


 


Glucose (Fingerstick)


 


 141 mg/dL


(70-99) 


 











Laboratory Tests








Test


 20


23:55 20


05:52 20


08:45 20


11:36


 


Glucose (Fingerstick)


 172 mg/dL


(70-99) 167 mg/dL


(70-99) 


 141 mg/dL


(70-99)


 


O2 Saturation   84 % (92-99)  


 


Arterial Blood pH


 


 


 7.34


(7.35-7.45) 





 


Arterial Blood pCO2 at


Patient Temp 


 


 62 mmHg


(35-46) 





 


Arterial Blood pO2 at Patient


Temp 


 


 50 mmHg


() 





 


Arterial Blood HCO3


 


 


 33 mmol/L


(21-28) 





 


Arterial Blood Base Excess


 


 


 5 mmol/L


(-3-3) 





 


FiO2   100  








Brief Hospital Course


History of Present Illness


Patient is a 65-year-old gentleman who was diagnosed with COVID-19 infection on 

Monday 5 days prior to his admission to the intensive care unit.  Apparently the

patient reported worsening respiratory distress reason why he came to the 

emergency department for evaluation and treatment.  The story was quite limited 

since the patient was speaking Maltese only and his acuity was such that he 

required mechanical ventilation and prompt intubation by the emergency 

department physician.  At the present time patient is intubated and sedated 

continues to require a lot of support no pertinent past medical history was 

reported he is admitted to the intensive care unit for further treatment





2020


Continues to require full support, glycemia has been noted in hypertension as 

well.  Discussed with nursing staff, all of concerns were addressed to the best 

of my abilities no acute events reported overnight





2020


No acute events reported overnight, case discussed with nursing staff patient in

no acute distress  





2020


Per nursing staff, patient febrile overnight, treated with Tylenol and tepid 

water bath. Case discussed with nursing staff, continue with IV antibiotics and 

full VTE prophylaxis.





2020


Patient still febrile overnight.  O2 requirement increasing.  Discussed with RN.








Patient still intubated and sedated.  Discussed with RN, patient was made DNR.  

schedule MiraLAX.





2020


Patient seen and examined in the [place] [address] ICU


He is intubated


AC/22/6 100/100% with 14 of PEEP


He is sedated with Versed propofol fentanyl and Precedex he is also paralyzed 

with vecuronium


Chart reviewed


Discussed with RN


Discussed with 





9/3/2020


Patient seen and examined in the COVID-19 ICU


He remains intubated


He is on Versed for sedation and he has to be paralyzed with vecuronium


He also has propofol fentanyl and Precedex for additional sedation


Chart reviewed


Discussed with the 


Discussed with RN


He remains critically ill


Vent settings as follows


AC/22/6 100/100% with 14 of PEEP





2020


Patient seen and Covid-19 ICU


He remains intubated


Assist-control/ with 100% FiO2


He is in mitts for patient safety


He is sedated with propofol Versed and fentanyl


Has OG at 60 cc an hour


Discussed with RN


Discussed with case management





2020


Patient seen in the COVID-19 ICU


He remains mechanically ventilated


He is also started on TPN


He is sedated with propofol fentanyl Versed and Precedex


We are using paralytics with vecuronium


Vent settings as follows


AC/22/6 100/100% with 14 of PEEP


Chart reviewed


Discussed with RN


He remains very critically ill prognosis is very guarded at best





2020


Patient seen in the COVID-19 ICU


He is on the vent


Assist-control/6 100/100% with 14 of PEEP


He is critically ill


Discussed with RN


Chart reviewed





2020


Remains critically ill


Seen in the ICU


Continues with ventilatory support


Assist-control/ 100/100% with 14 of PEEP


Discussed with RN


Chart reviewed





2020


Continues to be about the same


No acute events reported overnight. 


discussed with RN


Does not tolerate moving around too much. 





2020


Remains critically ill


Seen in the ICU


Continues with ventilatory support


Assist-control/ 100/100% with 14 of PEEP


Discussed with RN


Chart reviewed





Patient deteriorated during the day even more and conversation took place over 

the phone with his wife. She was quite tearful given the grim prognosis 

presented to her, she was able to visit moments before the patient passed 





I placed a phone call to inform her of the passing. Time of death 1733





Discharge Information


Condition at Discharge:  /





Justicifation of Admission Dx:


Justifications for Admission:


Justification of Admission Dx:  Yes











ELIJAH AGUSTIN MD              Sep 9, 2020 18:07

## 2020-09-09 NOTE — NUR
Pharmacy TPN Dosing Note



S: RENATO GILLIAM is a 65 year old M Currently receiving Central Continuous TPN started 
09/04/20



B:Pertinent PMH: critical illness

Height: 5 feet, 2 inches

Weight: 99.8 kg



Current diet: npo 



LABS:

Sodium:    141 

Potassium: 4.1 

Chloride:  105 

Calcium:   7.2 

Corrected Calcium: 9.52 

Magnesium: 2.2 

CO2:       36 

SCr:       0.6 

Glucose:   169-223 

Albumin:   1.1 

AST:       31 

ALT:       36 



TPN FORMULA:

TPN TYPE:  Central Continuous

AMINO ACIDS:         75 gm

DEXTROSE:            225 gm



SODIUM ACETATE:      90 mEq

POTASSIUM CHLORIDE:  10 mEq

POTASSIUM PHOSPHATE: 20 mmol

MAGNESIUM:           8 mEq

CALCIUM:             10 mEq

MULTIPLE VITAMIN:    10 ml

TRACE ELEMENTS:      1 ml(s)



TPN PLAN:  

Macros adjusted per dietician rec's

No labs/no electrolyte adjustements today.

Labs ordered for AM tomorrow





R: Change TPN per plan and ordered formula

Will monitor electrolytes, glucose, and tolerance to TPN.



 Marcy Issa RPH, 09/09/20 [id number]

## 2020-09-09 NOTE — PDOC
PROGRESS NOTES


Date of Service:


DATE: 9/9/20 


TIME: 06:40





Chief Complaint


Chief Complaint


COVID-19 positive


Acute hypoxemic respiratory failure


Bilateral perihilar infiltrates are  seen which appear increased slightly


Leukocytosis with bandemia secondary to septic process


Hyponatremia secondary to low effective circulatory volume, now hypernatremic


Acue renal failure due to vasomotor nephropathy most likely 


Hyperglycemia


transaminitis 


Uncontrolled hypertension


Abdominal distention





Plan


Continue with supportive measures


Prognosis is quite guarded


Continues to require 95% oxygen


Continues to require a lot of PEEP currently at 14


will give an update to the family





History of Present Illness


History of Present Illness


9/9/2020


Remains critically ill


Seen in the ICU


Continues with ventilatory support


Assist-control/22/6 100/100% with 14 of PEEP


Discussed with RN


Chart reviewed








9/8/2020


Continues to be about the same


No acute events reported overnight. 


discussed with RN


Does not tolerate moving around too much. 





9/7/2020


Remains critically ill


Seen in the ICU


Continues with ventilatory support


Assist-control/22/6 100/100% with 14 of PEEP


Discussed with RN


Chart reviewed





9/6/2020


Patient seen in the COVID-19 ICU


He is on the vent


Assist-control/22/6 100/100% with 14 of PEEP


He is critically ill


Discussed with RN


Chart reviewed











9/5/2020


Patient seen in the COVID-19 ICU


He remains mechanically ventilated


He is also started on TPN


He is sedated with propofol fentanyl Versed and Precedex


We are using paralytics with vecuronium


Vent settings as follows


AC/22/6 100/100% with 14 of PEEP


Chart reviewed


Discussed with RN


He remains very critically ill prognosis is very guarded at best











9/4/2020


Patient seen and Covid-19 ICU


He remains intubated


Assist-control/22/600 with 100% FiO2


He is in [facility] for patient safety


He is sedated with propofol Versed and fentanyl


Has OG at 60 cc an hour


Discussed with RN


Discussed with case management


Reviewed chart











9/3/2020


Patient seen and examined in the COVID-19 ICU


He remains intubated


He is on Versed for sedation and he has to be paralyzed with vecuronium


He also has propofol fentanyl and Precedex for additional sedation


Chart reviewed


Discussed with the 


Discussed with RN


He remains critically ill


Vent settings as follows


AC/22/6 100/100% with 14 of PEEP











9/2/2020


Patient seen and examined in the COVID-19 ICU


He is intubated


AC/22/6 100/100% with 14 of PEEP


He is sedated with Versed propofol fentanyl and Precedex he is also paralyzed 

with vecuronium


Chart reviewed


Discussed with RN


Discussed with 








History of Present Illness


Patient is a 65-year-old gentleman who was diagnosed with COVID-19 infection on 

Monday 5 days prior to his admission to the intensive care unit.  Apparently the

patient reported worsening respiratory distress reason why he came to the 

emergency department for evaluation and treatment.  The story was quite limited 

since the patient was speaking Martiniquais only and his acuity was such that he 

required mechanical ventilation and prompt intubation by the emergency 

department physician.  At the present time patient is intubated and sedated 

continues to require a lot of support no pertinent past medical history was 

reported he is admitted to the intensive care unit for further treatment





8/24/2020


Continues to require full support, glycemia has been noted in hypertension as 

well.  Discussed with nursing staff, all of concerns were addressed to the best 

of my abilities no acute events reported overnight





8/25/2020


No acute events reported overnight, case discussed with nursing staff patient in

no acute distress  





8/26/2020


Per nursing staff, patient febrile overnight, treated with Tylenol and tepid 

water bath. Case discussed with nursing staff, continue with IV antibiotics and 

full VTE prophylaxis.





8/27/2020


Patient still febrile overnight.  O2 requirement increasing.  Discussed with RN.





8/31 /2020


Patient still intubated and sedated.  Discussed with RN, patient was made DNR.  

schedule MiraLAX.





Vitals


Vitals





Vital Signs








  Date Time  Temp Pulse Resp B/P (MAP) Pulse Ox O2 Delivery O2 Flow Rate FiO2


 


9/9/20 06:11  103  148/70    


 


9/9/20 06:00   22  80 Ventilator  


 


9/9/20 04:00 98.4       





 98.4       











Physical Exam


Physical Exam


sedated on vent


General:  No acute distress, Other (Intubated and sedated)


Heart:  Regular rate, Normal S1, Normal S2


Lungs:  Crackles, Other (Intubated)


Abdomen:  Other (Nondistended)


Extremities:  No clubbing, No cyanosis


Skin:  No rashes, No breakdown





Labs


LABS





Laboratory Tests








Test


 9/8/20


08:45 9/8/20


11:39 9/8/20


17:46 9/8/20


23:55


 


O2 Saturation 91 % (92-99)    


 


Arterial Blood pH


 7.42


(7.35-7.45) 


 


 





 


Arterial Blood pCO2 at


Patient Temp 49 mmHg


(35-46) 


 


 





 


Arterial Blood pO2 at Patient


Temp 65 mmHg


() 


 


 





 


Arterial Blood HCO3


 32 mmol/L


(21-28) 


 


 





 


Arterial Blood Base Excess


 6 mmol/L


(-3-3) 


 


 





 


FiO2 95    


 


Glucose (Fingerstick)


 


 169 mg/dL


(70-99) 181 mg/dL


(70-99) 172 mg/dL


(70-99)


 


Test


 9/9/20


05:52 


 


 





 


Glucose (Fingerstick)


 167 mg/dL


(70-99) 


 


 














Comment


Review of Relevant


I have reviewed the following items jim (where applicable) has been applied.


Labs





Laboratory Tests








Test


 9/7/20


08:10 9/7/20


12:33 9/7/20


17:11 9/8/20


00:51


 


O2 Saturation 93 % (92-99)    


 


Arterial Blood pH


 7.39


(7.35-7.45) 


 


 





 


Arterial Blood pCO2 at


Patient Temp 48 mmHg


(35-46) 


 


 





 


Arterial Blood pO2 at Patient


Temp 71 mmHg


() 


 


 





 


Arterial Blood HCO3


 28 mmol/L


(21-28) 


 


 





 


Arterial Blood Base Excess


 3 mmol/L


(-3-3) 


 


 





 


FiO2 100    


 


Glucose (Fingerstick)


 


 223 mg/dL


(70-99) 210 mg/dL


(70-99) 193 mg/dL


(70-99)


 


Test


 9/8/20


06:13 9/8/20


06:15 9/8/20


08:45 9/8/20


11:39


 


Glucose (Fingerstick)


 223 mg/dL


(70-99) 


 


 169 mg/dL


(70-99)


 


Sodium Level


 


 141 mmol/L


(136-145) 


 





 


Potassium Level


 


 4.1 mmol/L


(3.5-5.1) 


 





 


Chloride Level


 


 105 mmol/L


() 


 





 


Carbon Dioxide Level


 


 36 mmol/L


(21-32) 


 





 


Anion Gap  0 (6-14)   


 


Blood Urea Nitrogen


 


 21 mg/dL


(8-26) 


 





 


Creatinine


 


 0.6 mg/dL


(0.7-1.3) 


 





 


Estimated GFR


(Cockcroft-Gault) 


 135.2 


 


 





 


Glucose Level


 


 187 mg/dL


(70-99) 


 





 


Calcium Level


 


 7.2 mg/dL


(8.5-10.1) 


 





 


Phosphorus Level


 


 3.2 mg/dL


(2.6-4.7) 


 





 


Magnesium Level


 


 2.2 mg/dL


(1.8-2.4) 


 





 


O2 Saturation   91 % (92-99)  


 


Arterial Blood pH


 


 


 7.42


(7.35-7.45) 





 


Arterial Blood pCO2 at


Patient Temp 


 


 49 mmHg


(35-46) 





 


Arterial Blood pO2 at Patient


Temp 


 


 65 mmHg


() 





 


Arterial Blood HCO3


 


 


 32 mmol/L


(21-28) 





 


Arterial Blood Base Excess


 


 


 6 mmol/L


(-3-3) 





 


FiO2   95  


 


Test


 9/8/20


17:46 9/8/20


23:55 9/9/20


05:52 





 


Glucose (Fingerstick)


 181 mg/dL


(70-99) 172 mg/dL


(70-99) 167 mg/dL


(70-99) 











Laboratory Tests








Test


 9/8/20


08:45 9/8/20


11:39 9/8/20


17:46 9/8/20


23:55


 


O2 Saturation 91 % (92-99)    


 


Arterial Blood pH


 7.42


(7.35-7.45) 


 


 





 


Arterial Blood pCO2 at


Patient Temp 49 mmHg


(35-46) 


 


 





 


Arterial Blood pO2 at Patient


Temp 65 mmHg


() 


 


 





 


Arterial Blood HCO3


 32 mmol/L


(21-28) 


 


 





 


Arterial Blood Base Excess


 6 mmol/L


(-3-3) 


 


 





 


FiO2 95    


 


Glucose (Fingerstick)


 


 169 mg/dL


(70-99) 181 mg/dL


(70-99) 172 mg/dL


(70-99)


 


Test


 9/9/20


05:52 


 


 





 


Glucose (Fingerstick)


 167 mg/dL


(70-99) 


 


 











Microbiology


8/22/20 Blood Culture - Final, Complete


          NO GROWTH AFTER 5 DAYS


8/20/20 Urine Culture - Final, Complete


Medications





Current Medications


Etomidate (Amidate) 20 mg STK-MED ONCE IV ;  Start 8/20/20 at 18:54;  Stop 

8/20/20 at 18:54;  Status DC


Succinylcholine Chloride (Anectine) 200 mg STK-MED ONCE .ROUTE ;  Start 8/20/20 

at 18:55;  Stop 8/20/20 at 18:55;  Status DC


Propofol 50 ml @ As Directed STK-MED ONCE IV ;  Start 8/20/20 at 18:55;  Stop 

8/20/20 at 18:56;  Status DC


Propofol 100 ml @ 0 mls/hr CONT  PRN IV SEE PROTOCOL Last administered on 

9/9/20at 00:34;  Start 8/20/20 at 19:00


Fentanyl Citrate (Fentanyl 2ml Vial) 25 mcg PRN Q1HR  PRN IV SEE COMMENTS;  Sta

rt 8/20/20 at 19:00;  Stop 9/5/20 at 08:54;  Status DC


Fentanyl Citrate (Fentanyl 2ml Vial) 50 mcg PRN Q1HR  PRN IV SEE COMMENTS Last 

administered on 8/20/20at 19:42;  Start 8/20/20 at 19:00


Chlorhexidine Gluconate (Peridex) 15 ml BID MM  Last administered on 8/21/20at 

08:16;  Start 8/20/20 at 21:00;  Stop 8/21/20 at 09:45;  Status DC


Midazolam HCl (Versed) 5 mg STK-MED ONCE .ROUTE ;  Start 8/20/20 at 19:17;  Stop

8/20/20 at 19:18;  Status DC


Midazolam HCl 50 mg/Sodium Chloride 50 ml @ 0 mls/hr 1X  ONCE IV ;  Start 

8/20/20 at 19:30;  Stop 8/20/20 at 19:31;  Status UNV


Albuterol/ Ipratropium (Duoneb) 3 ml STK-MED ONCE .ROUTE ;  Start 8/20/20 at 

19:27;  Stop 8/20/20 at 19:27;  Status DC


Midazolam HCl 100 ml @ 0 mls/hr CONT  PRN IV SEE PROTOCOL Last administered on 

9/9/20at 00:34;  Start 8/20/20 at 19:30


Propofol 50 ml @ 0 mls/hr 1X  ONCE IV  Last administered on 8/20/20at 18:55;  

Start 8/20/20 at 20:00;  Stop 8/20/20 at 20:01;  Status DC


Midazolam HCl (Versed) 5 mg 1X  ONCE NS  Last administered on 8/20/20at 19:18;  

Start 8/20/20 at 19:18;  Stop 8/20/20 at 19:59;  Status DC


Piperacillin Sod/ Tazobactam Sod 3.375 gm/Sodium Chloride 50 ml @  100 mls/hr 1X

 ONCE IV  Last administered on 8/20/20at 21:47;  Start 8/20/20 at 20:45;  Stop 

8/20/20 at 21:14;  Status DC


Dexmedetomidine HCl 400 mcg/ Sodium Chloride 100 ml @ 0 mls/hr CONT  PRN IV 

SEDATION Last administered on 9/8/20at 05:37;  Start 8/20/20 at 20:15


Sodium Chloride 500 ml @  500 mls/hr 1X PRN  PRN IV SEE COMMENTS Last 

administered on 8/27/20at 11:22;  Start 8/20/20 at 20:15


Atropine Sulfate (ATROPINE 0.5mg SYRINGE) 0.5 mg PRN Q5MIN  PRN IV SEE COMMENTS;

 Start 8/20/20 at 20:15


Fentanyl Citrate (Fentanyl 2ml Vial) 100 mcg 1X  ONCE IVP  Last administered on 

8/20/20at 20:17;  Start 8/20/20 at 20:15;  Stop 8/20/20 at 20:18;  Status DC


Methylprednisolone Sodium Succinate (SOLU-Medrol 40MG VIAL) 40 mg Q8HRS IV  Last

administered on 9/9/20at 06:00;  Start 8/20/20 at 22:00


Enoxaparin Sodium (Lovenox 40mg Syringe) 40 mg BID SQ  Last administered on 

8/21/20at 08:15;  Start 8/20/20 at 21:30;  Stop 8/21/20 at 09:47;  Status DC


Potassium Chloride/Dextrose/ Sod Cl 1,000 ml @  125 mls/hr Q8H ONCE IV  Last 

administered on 8/20/20at 22:13;  Start 8/20/20 at 21:30;  Stop 8/21/20 at 

05:29;  Status DC


Zinc Sulfate (Orazinc) 220 mg DAILY PO  Last administered on 9/8/20at 08:03;  

Start 8/21/20 at 09:00


Ondansetron HCl (Zofran) 4 mg PRN Q4HRS  PRN IV NAUSEA/VOMITING;  Start 8/20/20 

at 21:30


Acetaminophen (Tylenol) 650 mg PRN Q4HRS  PRN GT TEMP OVER 100.4F OR MILD PAIN 

Last administered on 8/28/20at 05:51;  Start 8/20/20 at 21:30


Acetaminophen (Tylenol Supp) 650 mg PRN Q4HRS  PRN IA TEMP OVER 100.4F OR MILD 

PAIN;  Start 8/20/20 at 21:30


Docusate Sodium (Colace) 100 mg PRN BID  PRN PO HARD STOOLS Last administered on

8/26/20at 08:26;  Start 8/20/20 at 21:30


Piperacillin Sod/ Tazobactam Sod 3.375 gm/Sodium Chloride 50 ml @  100 mls/hr 

Q6HRS IV  Last administered on 9/2/20at 12:16;  Start 8/21/20 at 00:00;  Stop 

9/2/20 at 17:00;  Status DC


Pantoprazole Sodium (PROTONIX VIAL for IV PUSH) 40 mg DAILYAC IVP  Last 

administered on 9/8/20at 08:03;  Start 8/21/20 at 07:30


Norepinephrine Bitartrate 8 mg/ Dextrose 258 ml @  12.578 mls/ hr CONT  PRN IV 

PER PROTOCOL Last administered on 8/20/20at 22:15;  Start 8/20/20 at 21:45


Sodium Chloride 1,000 ml @  1,000 mls/hr 1X  ONCE IV  Last administered on 

8/20/20at 21:44;  Start 8/20/20 at 21:45;  Stop 8/20/20 at 22:44;  Status DC


Fentanyl Citrate 30 ml @ 0 mls/hr CONT  PRN IV SEE PROTOCOL Last administered on

8/22/20at 10:44;  Start 8/20/20 at 21:45;  Stop 8/22/20 at 14:49;  Status DC


Vecuronium Bromide (Norcuron Bolus) 6 mg PRN Q2HR  PRN IV VENT ASYNCHRONY Last 

administered on 8/22/20at 11:23;  Start 8/20/20 at 21:45;  Stop 8/22/20 at 

14:48;  Status DC


Sodium Chloride 1,000 ml @  1,000 mls/hr 1X  ONCE IV  Last administered on 

8/20/20at 22:36;  Start 8/20/20 at 22:30;  Stop 8/20/20 at 23:29;  Status DC


Methylprednisolone Sodium Succinate (SOLU-Medrol 40MG VIAL) 40 mg Q8HRS IV ;  

Start 8/21/20 at 14:00;  Status UNV


Enoxaparin Sodium (Lovenox 80mg Syringe) 80 mg BID SQ  Last administered on 

8/21/20at 20:53;  Start 8/21/20 at 21:00;  Stop 8/22/20 at 08:57;  Status DC


Insulin Human Lispro (HumaLOG) 6 units TID SQ ;  Start 8/21/20 at 14:00;  Stop 

8/21/20 at 10:05;  Status DC


Insulin Glargine (Lantus Syringe) 10 unit BID SQ  Last administered on 8/22/20at

09:27;  Start 8/21/20 at 21:00;  Stop 8/22/20 at 11:25;  Status DC


Ascorbic Acid (Vitamin C) 250 mg Q6HRS PO  Last administered on 9/9/20at 06:11; 

Start 8/21/20 at 12:00


Sodium Chloride 1,000 ml @  75 mls/hr [id number] IV  Last administered on 8/24/20at

19:33;  Start 8/21/20 at 10:00;  Stop 8/25/20 at 10:23;  Status DC


Insulin Human Lispro (HumaLOG) 6 units Q6HRS SQ  Last administered on 8/22/20at 

06:11;  Start 8/21/20 at 12:00;  Stop 8/22/20 at 11:25;  Status DC


Insulin Glargine (Lantus Syringe) 10 unit 1X  ONCE SQ  Last administered on 

8/21/20at 11:40;  Start 8/21/20 at 11:00;  Stop 8/21/20 at 11:01;  Status DC


Enoxaparin Sodium (Lovenox 60mg Syringe) 60 mg BID SQ  Last administered on 

8/24/20at 08:55;  Start 8/22/20 at 09:00;  Stop 8/24/20 at 16:45;  Status DC


Vecuronium Bromide (Norcuron Bolus) 6 mg PRN Q6HRS  PRN IV SEDATION (2nd Choice)

Last administered on 8/24/20at 16:36;  Start 8/22/20 at 09:30


Insulin Glargine (Lantus Syringe) 14 unit BID SQ  Last administered on 8/24/20at

09:24;  Start 8/22/20 at 21:00;  Stop 8/24/20 at 12:27;  Status DC


Insulin Human Lispro (HumaLOG) 8 units Q6HRS SQ  Last administered on 8/24/20at 

12:11;  Start 8/22/20 at 12:00;  Stop 8/24/20 at 12:27;  Status DC


Fentanyl Citrate 55 ml @ 0 mls/hr CONT  PRN IV SEE PROTOCOL Last administered on

9/9/20at 05:11;  Start 8/22/20 at 14:49


Lorazepam (Ativan Inj) 1 mg PRN Q1HR  PRN IVP ANXIETY / AGITATION- MODERATE;  

Start 8/22/20 at 16:15


Lorazepam (Ativan Inj) 2 mg PRN Q1HR  PRN IVP ANXIETY / AGITATION- SEVERE Last 

administered on 8/23/20at 13:10;  Start 8/22/20 at 16:30


Insulin Human Lispro (HumaLOG) 6 units 1X  ONCE SQ  Last administered on 

8/23/20at 06:20;  Start 8/23/20 at 06:15;  Stop 8/23/20 at 06:16;  Status DC


Hydralazine HCl (Apresoline Inj) 10 mg PRN Q4HRS  PRN IVP ELEVATED BP, SEE 

COMMENTS Last administered on 9/8/20at 17:37;  Start 8/24/20 at 09:15


Alteplase, Recombinant (Cathflo For Central Catheter Clearance) 1 mg 1X  ONCE 

INT CAT  Last administered on 8/24/20at 12:05;  Start 8/24/20 at 11:00;  Stop 

8/24/20 at 11:01;  Status DC


Insulin Glargine (Lantus Syringe) 18 unit BID SQ  Last administered on 8/25/20at

08:30;  Start 8/24/20 at 21:00;  Stop 8/25/20 at 10:03;  Status DC


Insulin Human Lispro (HumaLOG) 10 units Q6HRS SQ  Last administered on 8/25/20at

05:53;  Start 8/24/20 at 18:00;  Stop 8/25/20 at 10:03;  Status DC


Vecuronium Bromide 50 mg/ Miscellaneous 50 ml @  4.027 mls/ hr CONT  PRN IV SEE 

I/O RECORD Last administered on 9/8/20at 13:09;  Start 8/24/20 at 16:15


Enoxaparin Sodium (Lovenox Per Pharmacy Treatment Dosing) 1 each PRN DAILY  PRN 

MC SEE COMMENTS;  Start 8/24/20 at 16:45;  Stop 9/2/20 at 18:12;  Status DC


Enoxaparin Sodium (Lovenox 80mg Syringe) 80 mg Q12HR SQ  Last administered on 

9/2/20at 08:26;  Start 8/24/20 at 21:00;  Stop 9/2/20 at 18:10;  Status DC


Furosemide (Lasix) 40 mg 1X  ONCE IVP  Last administered on 8/25/20at 10:19;  

Start 8/25/20 at 10:00;  Stop 8/25/20 at 10:01;  Status DC


Insulin Glargine (Lantus Syringe) 24 unit BID SQ  Last administered on 9/8/20at 

21:18;  Start 8/25/20 at 10:30


Insulin Human Lispro (HumaLOG) 14 units Q6HRS SQ  Last administered on 8/31/20at

06:06;  Start 8/25/20 at 12:00;  Stop 8/31/20 at 11:48;  Status DC


Info (Anti-Coagulation Monitoring By Pharmacy) 1 each PRN DAILY  PRN MC SEE 

COMMENTS Last administered on 9/2/20at 12:39;  Start 8/26/20 at 10:00;  Stop 

9/3/20 at 13:31;  Status DC


Polyethylene Glycol (miraLAX PACKET) 17 gm DAILY PO  Last administered on 

9/8/20at 08:03;  Start 8/28/20 at 11:00


Dextrose/Sodium Chloride 1,000 ml @  75 mls/hr [id number] IV  Last administered on 

9/4/20at 15:15;  Start 8/29/20 at 09:30;  Stop 9/4/20 at 21:59;  Status DC


Insulin Human Lispro (HumaLOG) 0-5 UNITS Q6HRS SQ  Last administered on 9/9/20at

06:14;  Start 8/31/20 at 12:00


Dextrose (Dextrose 50%-Water Syringe) 12.5 gm PRN Q15MIN  PRN IV SEE COMMENTS;  

Start 8/31/20 at 12:00


Enoxaparin Sodium (Lovenox 40mg Syringe) 40 mg Q12HR SQ  Last administered on 

9/8/20at 21:18;  Start 9/2/20 at 21:00


Info (Tpn Per Pharmacy) 1 each PRN DAILY  PRN MC SEE COMMENTS Last administered 

on 9/8/20at 13:53;  Start 9/4/20 at 09:30


Sodium Acetate 90 meq/Potassium Acetate 50 meq/ Potassium Phosphate 13.6 mmol/Ma

gnesium Sulfate 10 meq/ Calcium Gluconate 10 meq/ Multivitamins 10 ml/Chromium/ 

Copper/Manganese/ Seleni/Zn 1 ml/ Total Parenteral Nutrition/Amino 

Acids/Dextrose 1,512 ml @  63 mls/hr TPN  CONT IV  Last administered on 9/4/20at

22:05;  Start 9/4/20 at 22:00;  Stop 9/5/20 at 21:59;  Status DC


Sodium Acetate 90 meq/Potassium Acetate 40 meq/ Potassium Phosphate 13.6 

mmol/Magnesium Sulfate 8 meq/ Calcium Gluconate 10 meq/ Multivitamins 10 

ml/Chromium/ Copper/Manganese/ Seleni/Zn 1 ml/ Total Parenteral Nutrition/Amino 

Acids/Dextrose 1,512 ml @  63 mls/hr TPN  CONT IV  Last administered on 9/5/20at

22:11;  Start 9/5/20 at 22:00;  Stop 9/6/20 at 21:59;  Status DC


Sodium Acetate 90 meq/Potassium Acetate 10 meq/ Potassium Phosphate 20 mmol/ 

Magnesium Sulfate 8 meq/Calcium Gluconate 10 meq/ Multivitamins 10 ml/Chromium/ 

Copper/Manganese/ Seleni/Zn 1 ml/ Total Parenteral Nutrition/Amino Acids/D

extrose 1,512 ml @  63 mls/hr TPN  CONT IV  Last administered on 9/6/20at 21:49;

 Start 9/6/20 at 22:00;  Stop 9/7/20 at 21:59;  Status DC


Sodium Acetate 90 meq/Potassium Acetate 10 meq/ Potassium Phosphate 20 mmol/ 

Magnesium Sulfate 8 meq/Calcium Gluconate 10 meq/ Multivitamins 10 ml/Chromium/ 

Copper/Manganese/ Seleni/Zn 1 ml/ Total Parenteral Nutrition/Amino 

Acids/Dextrose 1,512 ml @  63 mls/hr TPN  CONT IV  Last administered on 9/7/20at

22:38;  Start 9/7/20 at 22:00;  Stop 9/8/20 at 21:59;  Status DC


Sodium Acetate 90 meq/Potassium Phosphate 20 mmol/ Magnesium Sulfate 8 

meq/Calcium Gluconate 10 meq/ Multivitamins 10 ml/Chromium/ Copper/Manganese/ 

Seleni/Zn 1 ml/ Potassium Chloride 10 meq/ Total Parenteral Nutrition/Amino 

Acids/Dextrose 1,512 ml @  63 mls/hr TPN  CONT IV  Last administered on 9/8/20at

21:31;  Start 9/8/20 at 22:00;  Stop 9/9/20 at 21:59


Metoprolol Tartrate (Lopressor Vial) 5 mg Q6HRS IVP  Last administered on 

9/9/20at 06:11;  Start 9/8/20 at 15:00


Vitals/I & O





Vital Sign - Last 24 Hours








 9/8/20 9/8/20 9/8/20 9/8/20





 07:00 08:00 08:00 08:42


 


Temp   97.5 





   97.5 


 


Pulse 59  57 


 


Resp 22  22 


 


B/P (MAP) 96/55 (69)  104/59 (74) 


 


Pulse Ox 94  93 93


 


O2 Delivery Ventilator Mechanical Ventilator Ventilator Ventilator


 


    





    





 9/8/20 9/8/20 9/8/20 9/8/20





 09:00 10:00 11:00 11:15


 


Pulse 64 64 62 


 


Resp 22 22 22 


 


B/P (MAP) 125/80 (95) 137/75 (95) 129/77 (94) 


 


Pulse Ox 92 93 92 93


 


O2 Delivery Ventilator Ventilator Ventilator Ventilator





 9/8/20 9/8/20 9/8/20 9/8/20





 12:00 12:00 13:00 13:09


 


Temp  97.4  





  97.4  


 


Pulse  70 83 89


 


Resp  22 22 


 


B/P (MAP)  148/86 (106) 173/86 (115) 173/86


 


Pulse Ox  92 94 


 


O2 Delivery Mechanical Ventilator Ventilator Ventilator 


 


    





    





 9/8/20 9/8/20 9/8/20 9/8/20





 14:00 15:00 15:28 15:58


 


Pulse 121 122 125 


 


Resp 22 22  


 


B/P (MAP) 200/85 (123) 164/74 (104) 168/78 


 


Pulse Ox 91 84  74


 


O2 Delivery Ventilator Ventilator  Ventilator





 9/8/20 9/8/20 9/8/20 9/8/20





 16:00 16:00 17:00 17:37


 


Temp 97.8   





 97.8   


 


Pulse 97  120 126


 


Resp 22  22 


 


B/P (MAP) 160/75 (103)  207/104 (138) 208/96


 


Pulse Ox 78  76 


 


O2 Delivery Ventilator Mechanical Ventilator Ventilator 


 


    





    





 9/8/20 9/8/20 9/8/20 9/8/20





 17:37 18:00 19:00 19:52


 


Pulse 123 104 120 


 


Resp  22 22 


 


B/P (MAP) 208/96 173/87 (115) 177/94 (121) 


 


Pulse Ox  79 77 78


 


O2 Delivery  Ventilator Ventilator Ventilator





 9/8/20 9/8/20 9/8/20 9/8/20





 20:00 20:53 21:00 22:00


 


Temp 97.7   





 97.7   


 


Pulse 120  116 114


 


Resp 22 22 22


 


B/P (MAP) 154/80 (104)  128/74 (92) 136/75 (95)


 


Pulse Ox 78  82 82


 


O2 Delivery Ventilator Mechanical Ventilator Ventilator Ventilator


 


    





    





 9/8/20 9/8/20 9/9/20 9/9/20





 23:00 23:26 00:00 00:00


 


Temp   98.0 





   98.0 


 


Pulse 112  84 


 


Resp 22 22 


 


B/P (MAP) 140/77 (98)  153/80 (104) 


 


Pulse Ox 81 81 69 


 


O2 Delivery Ventilator Ventilator Ventilator Mechanical Ventilator


 


    





    





 9/9/20 9/9/20 9/9/20 9/9/20





 00:07 01:00 02:00 03:00


 


Pulse 112 98 102 102


 


Resp  22 22 22


 


B/P (MAP) 153/80 115/72 (86) 127/75 (92) 130/74 (92)


 


Pulse Ox  74 76 77


 


O2 Delivery  Ventilator Ventilator Ventilator





 9/9/20 9/9/20 9/9/20 9/9/20





 03:54 04:00 04:00 05:00


 


Temp   98.4 





   98.4 


 


Pulse   103 102


 


Resp   22 22


 


B/P (MAP)   138/72 (94) 141/72 (95)


 


Pulse Ox 77  77 78


 


O2 Delivery Ventilator Mechanical Ventilator Ventilator Ventilator


 


    





    





 9/9/20 9/9/20 9/9/20 9/9/20





 05:11 05:45 06:00 06:11


 


Pulse   86 103


 


Resp 22 22 22 


 


B/P (MAP)   149/70 (96) 148/70


 


Pulse Ox 77  80 


 


O2 Delivery Ventilator Ventilator Ventilator 














Intake and Output   


 


 9/8/20 9/8/20 9/9/20





 15:00 23:00 07:00


 


Intake Total  1202 ml 


 


Output Total 360 ml 420 ml 610 ml


 


Balance -360 ml 782 ml -610 ml











Justicifation of Admission Dx:


Justifications for Admission:


Justification of Admission Dx:  Yes











ELIJAH AGUSTIN MD              Sep 9, 2020 06:45

## 2020-09-09 NOTE — NUR
Desats badly with any movement. Recovery time increasing. After pulling pillows out from 
under patient at 0000, O2 sat 69-70 with no signs of recovery at this time. RT informed.

## 2020-09-09 NOTE — NUR
At 1400, patient's oxygen started dropping, notified family member Melinda who translated to 
Cayla, his wife. Dr. Dillon was able to call Cayla and translate in medical terms the 
poor prognosis and the recent decline of the patient. Cayla was able to come visit at the 
bedside briefly wearing a mask and the appropriate PPE, patient's daughter also able to come 
visit. The unit secretary was able to translate for this RN to describe the problems 
oxygenating, the slowing of the heart rate and the reason he is now hypotensive, family 
aware of the situation but still wanted to leave him intubated for the time being. Shortly 
after they left, patient became bradycardic and then rhythm changed to asystole at 1733. 
Patient assessed with two RNs, pronounced at 1733. Cayla notified by Dr. Dillon via 
telephone contact, will call the unit with  home.